# Patient Record
Sex: MALE | Race: BLACK OR AFRICAN AMERICAN | NOT HISPANIC OR LATINO | URBAN - METROPOLITAN AREA
[De-identification: names, ages, dates, MRNs, and addresses within clinical notes are randomized per-mention and may not be internally consistent; named-entity substitution may affect disease eponyms.]

---

## 2017-01-27 ENCOUNTER — INPATIENT (INPATIENT)
Facility: HOSPITAL | Age: 67
LOS: 9 days | Discharge: HOME HEALTH | End: 2017-02-06
Attending: INTERNAL MEDICINE | Admitting: INTERNAL MEDICINE
Payer: MEDICAID

## 2017-01-27 VITALS
DIASTOLIC BLOOD PRESSURE: 110 MMHG | SYSTOLIC BLOOD PRESSURE: 199 MMHG | HEART RATE: 70 BPM | TEMPERATURE: 98 F | OXYGEN SATURATION: 98 % | RESPIRATION RATE: 18 BRPM

## 2017-01-27 NOTE — ED ADULT TRIAGE NOTE - CHIEF COMPLAINT QUOTE
Pt. with c/o blood in urine since yesterday; + urine noted in tex[place] cath noted with blood. denies abdominal pain. pt. is paraplegic. c/o pain to back from pressure ulcer. Pt. c/o headache bp noted to be 199/110.

## 2017-01-28 DIAGNOSIS — N30.01 ACUTE CYSTITIS WITH HEMATURIA: ICD-10-CM

## 2017-01-28 DIAGNOSIS — L89.150 PRESSURE ULCER OF SACRAL REGION, UNSTAGEABLE: ICD-10-CM

## 2017-01-28 DIAGNOSIS — R31.9 HEMATURIA, UNSPECIFIED: ICD-10-CM

## 2017-01-28 DIAGNOSIS — K59.00 CONSTIPATION, UNSPECIFIED: ICD-10-CM

## 2017-01-28 DIAGNOSIS — L98.499 NON-PRESSURE CHRONIC ULCER OF SKIN OF OTHER SITES WITH UNSPECIFIED SEVERITY: Chronic | ICD-10-CM

## 2017-01-28 DIAGNOSIS — R80.9 PROTEINURIA, UNSPECIFIED: ICD-10-CM

## 2017-01-28 DIAGNOSIS — G40.909 EPILEPSY, UNSPECIFIED, NOT INTRACTABLE, WITHOUT STATUS EPILEPTICUS: ICD-10-CM

## 2017-01-28 DIAGNOSIS — N39.0 URINARY TRACT INFECTION, SITE NOT SPECIFIED: ICD-10-CM

## 2017-01-28 DIAGNOSIS — I10 ESSENTIAL (PRIMARY) HYPERTENSION: ICD-10-CM

## 2017-01-28 LAB
ALBUMIN SERPL ELPH-MCNC: 3.9 G/DL — SIGNIFICANT CHANGE UP (ref 3.3–5)
ALP SERPL-CCNC: 86 U/L — SIGNIFICANT CHANGE UP (ref 40–120)
ALT FLD-CCNC: 16 U/L — SIGNIFICANT CHANGE UP (ref 4–41)
APPEARANCE UR: SIGNIFICANT CHANGE UP
AST SERPL-CCNC: 33 U/L — SIGNIFICANT CHANGE UP (ref 4–40)
BACTERIA # UR AUTO: SIGNIFICANT CHANGE UP
BASOPHILS # BLD AUTO: 0.02 K/UL — SIGNIFICANT CHANGE UP (ref 0–0.2)
BASOPHILS NFR BLD AUTO: 0.4 % — SIGNIFICANT CHANGE UP (ref 0–2)
BILIRUB SERPL-MCNC: 0.6 MG/DL — SIGNIFICANT CHANGE UP (ref 0.2–1.2)
BILIRUB UR-MCNC: NEGATIVE — SIGNIFICANT CHANGE UP
BLOOD UR QL VISUAL: HIGH
BUN SERPL-MCNC: 9 MG/DL — SIGNIFICANT CHANGE UP (ref 7–23)
CALCIUM SERPL-MCNC: 8.8 MG/DL — SIGNIFICANT CHANGE UP (ref 8.4–10.5)
CHLORIDE SERPL-SCNC: 98 MMOL/L — SIGNIFICANT CHANGE UP (ref 98–107)
CO2 SERPL-SCNC: 30 MMOL/L — SIGNIFICANT CHANGE UP (ref 22–31)
COLOR SPEC: HIGH
CREAT SERPL-MCNC: 0.73 MG/DL — SIGNIFICANT CHANGE UP (ref 0.5–1.3)
EOSINOPHIL # BLD AUTO: 0.13 K/UL — SIGNIFICANT CHANGE UP (ref 0–0.5)
EOSINOPHIL NFR BLD AUTO: 2.4 % — SIGNIFICANT CHANGE UP (ref 0–6)
GLUCOSE SERPL-MCNC: 100 MG/DL — HIGH (ref 70–99)
GLUCOSE UR-MCNC: NEGATIVE — SIGNIFICANT CHANGE UP
HCT VFR BLD CALC: 38.2 % — LOW (ref 39–50)
HGB BLD-MCNC: 12.2 G/DL — LOW (ref 13–17)
IMM GRANULOCYTES NFR BLD AUTO: 0.2 % — SIGNIFICANT CHANGE UP (ref 0–1.5)
KETONES UR-MCNC: NEGATIVE — SIGNIFICANT CHANGE UP
LEUKOCYTE ESTERASE UR-ACNC: HIGH
LYMPHOCYTES # BLD AUTO: 1.68 K/UL — SIGNIFICANT CHANGE UP (ref 1–3.3)
LYMPHOCYTES # BLD AUTO: 31.1 % — SIGNIFICANT CHANGE UP (ref 13–44)
MCHC RBC-ENTMCNC: 27 PG — SIGNIFICANT CHANGE UP (ref 27–34)
MCHC RBC-ENTMCNC: 31.9 % — LOW (ref 32–36)
MCV RBC AUTO: 84.5 FL — SIGNIFICANT CHANGE UP (ref 80–100)
MONOCYTES # BLD AUTO: 0.51 K/UL — SIGNIFICANT CHANGE UP (ref 0–0.9)
MONOCYTES NFR BLD AUTO: 9.4 % — SIGNIFICANT CHANGE UP (ref 2–14)
NEUTROPHILS # BLD AUTO: 3.05 K/UL — SIGNIFICANT CHANGE UP (ref 1.8–7.4)
NEUTROPHILS NFR BLD AUTO: 56.5 % — SIGNIFICANT CHANGE UP (ref 43–77)
NITRITE UR-MCNC: POSITIVE — HIGH
PH UR: 8.5 — HIGH (ref 4.6–8)
PLATELET # BLD AUTO: 220 K/UL — SIGNIFICANT CHANGE UP (ref 150–400)
PMV BLD: 11.7 FL — SIGNIFICANT CHANGE UP (ref 7–13)
POTASSIUM SERPL-MCNC: 3.8 MMOL/L — SIGNIFICANT CHANGE UP (ref 3.5–5.3)
POTASSIUM SERPL-SCNC: 3.8 MMOL/L — SIGNIFICANT CHANGE UP (ref 3.5–5.3)
PROT SERPL-MCNC: 7.3 G/DL — SIGNIFICANT CHANGE UP (ref 6–8.3)
PROT UR-MCNC: 150 — HIGH
RBC # BLD: 4.52 M/UL — SIGNIFICANT CHANGE UP (ref 4.2–5.8)
RBC # FLD: 14.4 % — SIGNIFICANT CHANGE UP (ref 10.3–14.5)
RBC CASTS # UR COMP ASSIST: >50 — HIGH (ref 0–?)
SODIUM SERPL-SCNC: 140 MMOL/L — SIGNIFICANT CHANGE UP (ref 135–145)
SP GR SPEC: 1.01 — SIGNIFICANT CHANGE UP (ref 1–1.03)
SQUAMOUS # UR AUTO: SIGNIFICANT CHANGE UP
UROBILINOGEN FLD QL: NORMAL E.U. — SIGNIFICANT CHANGE UP (ref 0.1–0.2)
WBC # BLD: 5.4 K/UL — SIGNIFICANT CHANGE UP (ref 3.8–10.5)
WBC # FLD AUTO: 5.4 K/UL — SIGNIFICANT CHANGE UP (ref 3.8–10.5)
WBC UR QL: HIGH (ref 0–?)
YEAST BUDDING # UR COMP ASSIST: SIGNIFICANT CHANGE UP

## 2017-01-28 PROCEDURE — 99223 1ST HOSP IP/OBS HIGH 75: CPT

## 2017-01-28 RX ORDER — SODIUM CHLORIDE 9 MG/ML
1000 INJECTION INTRAMUSCULAR; INTRAVENOUS; SUBCUTANEOUS ONCE
Qty: 0 | Refills: 0 | Status: COMPLETED | OUTPATIENT
Start: 2017-01-28 | End: 2017-01-28

## 2017-01-28 RX ORDER — DOCUSATE SODIUM 100 MG
100 CAPSULE ORAL THREE TIMES A DAY
Qty: 0 | Refills: 0 | Status: DISCONTINUED | OUTPATIENT
Start: 2017-01-28 | End: 2017-02-06

## 2017-01-28 RX ORDER — ENOXAPARIN SODIUM 100 MG/ML
40 INJECTION SUBCUTANEOUS EVERY 24 HOURS
Qty: 0 | Refills: 0 | Status: DISCONTINUED | OUTPATIENT
Start: 2017-01-28 | End: 2017-01-28

## 2017-01-28 RX ORDER — CEFTRIAXONE 500 MG/1
2 INJECTION, POWDER, FOR SOLUTION INTRAMUSCULAR; INTRAVENOUS EVERY 24 HOURS
Qty: 0 | Refills: 0 | Status: DISCONTINUED | OUTPATIENT
Start: 2017-01-29 | End: 2017-02-01

## 2017-01-28 RX ORDER — CEFTRIAXONE 500 MG/1
1 INJECTION, POWDER, FOR SOLUTION INTRAMUSCULAR; INTRAVENOUS ONCE
Qty: 0 | Refills: 0 | Status: COMPLETED | OUTPATIENT
Start: 2017-01-28 | End: 2017-01-28

## 2017-01-28 RX ORDER — SENNA PLUS 8.6 MG/1
2 TABLET ORAL AT BEDTIME
Qty: 0 | Refills: 0 | Status: DISCONTINUED | OUTPATIENT
Start: 2017-01-28 | End: 2017-02-06

## 2017-01-28 RX ORDER — AMLODIPINE BESYLATE 2.5 MG/1
10 TABLET ORAL DAILY
Qty: 0 | Refills: 0 | Status: DISCONTINUED | OUTPATIENT
Start: 2017-01-28 | End: 2017-02-06

## 2017-01-28 RX ORDER — POLYETHYLENE GLYCOL 3350 17 G/17G
17 POWDER, FOR SOLUTION ORAL DAILY
Qty: 0 | Refills: 0 | Status: DISCONTINUED | OUTPATIENT
Start: 2017-01-28 | End: 2017-02-06

## 2017-01-28 RX ORDER — ATENOLOL 25 MG/1
50 TABLET ORAL DAILY
Qty: 0 | Refills: 0 | Status: DISCONTINUED | OUTPATIENT
Start: 2017-01-28 | End: 2017-01-30

## 2017-01-28 RX ORDER — ASPIRIN/CALCIUM CARB/MAGNESIUM 324 MG
81 TABLET ORAL DAILY
Qty: 0 | Refills: 0 | Status: DISCONTINUED | OUTPATIENT
Start: 2017-01-28 | End: 2017-02-06

## 2017-01-28 RX ORDER — BACLOFEN 100 %
10 POWDER (GRAM) MISCELLANEOUS THREE TIMES A DAY
Qty: 0 | Refills: 0 | Status: DISCONTINUED | OUTPATIENT
Start: 2017-01-28 | End: 2017-02-06

## 2017-01-28 RX ADMIN — Medication 1 TABLET(S): at 15:47

## 2017-01-28 RX ADMIN — ATENOLOL 50 MILLIGRAM(S): 25 TABLET ORAL at 15:46

## 2017-01-28 RX ADMIN — Medication 10 MILLIGRAM(S): at 23:10

## 2017-01-28 RX ADMIN — Medication 100 MILLIGRAM(S): at 23:10

## 2017-01-28 RX ADMIN — SENNA PLUS 2 TABLET(S): 8.6 TABLET ORAL at 23:11

## 2017-01-28 RX ADMIN — SODIUM CHLORIDE 1000 MILLILITER(S): 9 INJECTION INTRAMUSCULAR; INTRAVENOUS; SUBCUTANEOUS at 00:46

## 2017-01-28 RX ADMIN — Medication 81 MILLIGRAM(S): at 15:46

## 2017-01-28 RX ADMIN — Medication 10 MILLIGRAM(S): at 15:46

## 2017-01-28 RX ADMIN — CEFTRIAXONE 100 GRAM(S): 500 INJECTION, POWDER, FOR SOLUTION INTRAMUSCULAR; INTRAVENOUS at 04:45

## 2017-01-28 RX ADMIN — POLYETHYLENE GLYCOL 3350 17 GRAM(S): 17 POWDER, FOR SOLUTION ORAL at 15:47

## 2017-01-28 RX ADMIN — Medication 100 MILLIGRAM(S): at 15:47

## 2017-01-28 RX ADMIN — AMLODIPINE BESYLATE 10 MILLIGRAM(S): 2.5 TABLET ORAL at 15:46

## 2017-01-28 NOTE — H&P ADULT. - PROBLEM SELECTOR PLAN 6
Patient has had wound care at home   - Wound care consult placed for recs   - Pain control with percocet 1 tab q4hrs prn

## 2017-01-28 NOTE — ED PROVIDER NOTE - ATTENDING CONTRIBUTION TO CARE
67 yo male with paraplegia presents with hematuria, without fevers chills back pain // NL vitals afebrile no distress ao3 rrr s1s2 cta bl abd sntnd no cva or calf tenderness//ua labs check sensitivities, not septic will try to DC

## 2017-01-28 NOTE — H&P ADULT. - PROBLEM SELECTOR PLAN 8
Unclear etiology; may be due to small nerve polyneuropathy from pre-diabetes??  - Check A1C, B12 levels  - Wants to hold off gabapentin at this time until further w/u  - Consider outpatient EMG/nerve conduction study

## 2017-01-28 NOTE — H&P ADULT. - ASSESSMENT
65 yo man with a history of HTN, paraplegia, seizure disorder, constipation, and incontinence requiring chronic texas condom cath who presents with hematuria for 2 days duration, currently admitted for workup and management of hematuria and UTI.

## 2017-01-28 NOTE — ED ADULT NURSE NOTE - OBJECTIVE STATEMENT
Pt received to rm 6 aaox4 nonambulatory d/t paraplegia c/o hematuria beginning tonight.  Pt wears a condom catheter at home d/t paraplegia.  Pt denies painful urination, difficulty urinating, pelvic or abdominal pain. Pt denies blood in stool and reports he has not had BM for two days and feels constipated.  Pt denies all other symptoms and is in NAD.  Pt urinating brightly red colored urine in ED.  Condom catheter changed.  Meds and labs as ordered.  Left buttocks unstageable pressure ulcer as documented below.  Will continue to monitor

## 2017-01-28 NOTE — H&P ADULT. - PROBLEM SELECTOR PLAN 1
Painless hematuria; DDx includes UTI (no sensation below groin) vs.  cancer vs. stone   - Maintain condom cath   - Urology consult placed   - Monitor Hgb/Hct daily  - Hold chemical ppx, IPC instead

## 2017-01-28 NOTE — PATIENT PROFILE ADULT. - VISION (WITH CORRECTIVE LENSES IF THE PATIENT USUALLY WEARS THEM):
Normal vision: sees adequately in most situations; can see medication labels, newsprint Normal vision: sees adequately in most situations; can see medication labels, newsprint/reading blurry no glasses

## 2017-01-28 NOTE — H&P ADULT. - HISTORY OF PRESENT ILLNESS
This is a 65 yo man with a history of HTN, paraplegia, seizure disorder, constipation, and incontinence requiring chronic texas condom cath who presents with hematuria for 2 days duration. As per patient, his home aide noticed bleeding coming from the catheter on Thursday, which has been persistent. Patient denies any previous episodes of such blood in the urine and states normally urine is yellow to clear color. He does not have any dysuria, feeling or frequency or urgency but also states he does not have any feeling from the groin area downwards. Patient's other c/o at this time is burning sensation of the hands b/l, which he describes as on the brock surface, tingling sensation occasionally, with burning pain. This pain has been going on for 2 months and he saw a neurologist at [facility], who prescribed a medication (gabapentin??) which he has not been adherent too. Patient also has some constipation, last BM 2 days ago. Otherwise patient denies any fevers, chills rigors, diaphoresis, N/V, diarrhea, suprapubic pain, malaise, fatigue, or other sx at this time. In the ED, hypertensive but afebrile. He received 1L NS and ceftriaxone x1 and was admitted to medicine for further evaluation of UTI and hematuria.

## 2017-01-28 NOTE — H&P ADULT. - PROBLEM SELECTOR PLAN 3
UA shows proteinuria, unclear if acute or chronic but may be due to HTN  - Check A1C  - Quantify protein with spot urine protein/Cr

## 2017-01-28 NOTE — H&P ADULT. - PROBLEM SELECTOR PLAN 2
Unclear if symptomatic given lack of sensation below groin. Will treat as UTI given presence of hematuria   - F/u urine cx  - C/w ceftriaxone qdaily   - F/u urology recs

## 2017-01-28 NOTE — ED PROVIDER NOTE - OBJECTIVE STATEMENT
66M h/o HTN, paraplegic, chronic texas condom cath p/w hematuria.  Pt lives with son and has aid 3 times a week, blood was noted coming from the gentile so EMS was called.  no one with patient at this time.  c/o chronic pain to right sacral deub.  Pt also notes constipation, +flatus.  denies CP, abd pain, n/v/d.

## 2017-01-28 NOTE — PATIENT PROFILE ADULT. - MEDICATION DISPOSITION, PROFILE
pt states already showed meds downstairs empty methadone bottle- rest are meds already scheduled in [facility]; family will come /bedside

## 2017-01-29 LAB
BUN SERPL-MCNC: 7 MG/DL — SIGNIFICANT CHANGE UP (ref 7–23)
CALCIUM SERPL-MCNC: 9.4 MG/DL — SIGNIFICANT CHANGE UP (ref 8.4–10.5)
CHLORIDE SERPL-SCNC: 96 MMOL/L — LOW (ref 98–107)
CO2 SERPL-SCNC: 29 MMOL/L — SIGNIFICANT CHANGE UP (ref 22–31)
CREAT SERPL-MCNC: 0.73 MG/DL — SIGNIFICANT CHANGE UP (ref 0.5–1.3)
GLUCOSE SERPL-MCNC: 92 MG/DL — SIGNIFICANT CHANGE UP (ref 70–99)
HBA1C BLD-MCNC: 5.4 % — SIGNIFICANT CHANGE UP (ref 4–5.6)
HCT VFR BLD CALC: 38.3 % — LOW (ref 39–50)
HGB BLD-MCNC: 12.3 G/DL — LOW (ref 13–17)
MCHC RBC-ENTMCNC: 27.2 PG — SIGNIFICANT CHANGE UP (ref 27–34)
MCHC RBC-ENTMCNC: 32.1 % — SIGNIFICANT CHANGE UP (ref 32–36)
MCV RBC AUTO: 84.7 FL — SIGNIFICANT CHANGE UP (ref 80–100)
PLATELET # BLD AUTO: 213 K/UL — SIGNIFICANT CHANGE UP (ref 150–400)
PMV BLD: 11.9 FL — SIGNIFICANT CHANGE UP (ref 7–13)
POTASSIUM SERPL-MCNC: 3 MMOL/L — LOW (ref 3.5–5.3)
POTASSIUM SERPL-SCNC: 3 MMOL/L — LOW (ref 3.5–5.3)
RBC # BLD: 4.52 M/UL — SIGNIFICANT CHANGE UP (ref 4.2–5.8)
RBC # FLD: 14.2 % — SIGNIFICANT CHANGE UP (ref 10.3–14.5)
SODIUM SERPL-SCNC: 140 MMOL/L — SIGNIFICANT CHANGE UP (ref 135–145)
SPECIMEN SOURCE: SIGNIFICANT CHANGE UP
TSH SERPL-MCNC: 0.67 UIU/ML — SIGNIFICANT CHANGE UP (ref 0.27–4.2)
WBC # BLD: 4.29 K/UL — SIGNIFICANT CHANGE UP (ref 3.8–10.5)
WBC # FLD AUTO: 4.29 K/UL — SIGNIFICANT CHANGE UP (ref 3.8–10.5)

## 2017-01-29 RX ORDER — POTASSIUM CHLORIDE 20 MEQ
40 PACKET (EA) ORAL ONCE
Qty: 0 | Refills: 0 | Status: COMPLETED | OUTPATIENT
Start: 2017-01-29 | End: 2017-01-29

## 2017-01-29 RX ORDER — LABETALOL HCL 100 MG
20 TABLET ORAL EVERY 8 HOURS
Qty: 0 | Refills: 0 | Status: DISCONTINUED | OUTPATIENT
Start: 2017-01-29 | End: 2017-01-30

## 2017-01-29 RX ORDER — OXYCODONE HYDROCHLORIDE 5 MG/1
5 TABLET ORAL ONCE
Qty: 0 | Refills: 0 | Status: DISCONTINUED | OUTPATIENT
Start: 2017-01-29 | End: 2017-01-29

## 2017-01-29 RX ORDER — HYDRALAZINE HCL 50 MG
10 TABLET ORAL ONCE
Qty: 0 | Refills: 0 | Status: COMPLETED | OUTPATIENT
Start: 2017-01-29 | End: 2017-01-29

## 2017-01-29 RX ORDER — POTASSIUM CHLORIDE 20 MEQ
10 PACKET (EA) ORAL
Qty: 0 | Refills: 0 | Status: COMPLETED | OUTPATIENT
Start: 2017-01-29 | End: 2017-01-29

## 2017-01-29 RX ADMIN — ATENOLOL 50 MILLIGRAM(S): 25 TABLET ORAL at 04:48

## 2017-01-29 RX ADMIN — OXYCODONE HYDROCHLORIDE 5 MILLIGRAM(S): 5 TABLET ORAL at 09:49

## 2017-01-29 RX ADMIN — Medication 100 MILLIGRAM(S): at 04:48

## 2017-01-29 RX ADMIN — Medication 10 MILLIGRAM(S): at 04:48

## 2017-01-29 RX ADMIN — Medication 10 MILLIGRAM(S): at 14:07

## 2017-01-29 RX ADMIN — Medication 20 MILLIGRAM(S): at 23:35

## 2017-01-29 RX ADMIN — Medication 100 MILLIGRAM(S): at 23:02

## 2017-01-29 RX ADMIN — Medication 100 MILLIGRAM(S): at 14:07

## 2017-01-29 RX ADMIN — Medication 10 MILLIGRAM(S): at 23:02

## 2017-01-29 RX ADMIN — Medication 100 MILLIEQUIVALENT(S): at 18:07

## 2017-01-29 RX ADMIN — Medication 10 MILLIGRAM(S): at 15:17

## 2017-01-29 RX ADMIN — CEFTRIAXONE 100 GRAM(S): 500 INJECTION, POWDER, FOR SOLUTION INTRAMUSCULAR; INTRAVENOUS at 00:14

## 2017-01-29 RX ADMIN — POLYETHYLENE GLYCOL 3350 17 GRAM(S): 17 POWDER, FOR SOLUTION ORAL at 13:23

## 2017-01-29 RX ADMIN — OXYCODONE HYDROCHLORIDE 5 MILLIGRAM(S): 5 TABLET ORAL at 15:16

## 2017-01-29 RX ADMIN — CEFTRIAXONE 100 GRAM(S): 500 INJECTION, POWDER, FOR SOLUTION INTRAMUSCULAR; INTRAVENOUS at 23:02

## 2017-01-29 RX ADMIN — Medication 40 MILLIEQUIVALENT(S): at 15:16

## 2017-01-29 RX ADMIN — Medication 1 TABLET(S): at 13:23

## 2017-01-29 RX ADMIN — OXYCODONE HYDROCHLORIDE 5 MILLIGRAM(S): 5 TABLET ORAL at 16:55

## 2017-01-29 RX ADMIN — Medication 81 MILLIGRAM(S): at 13:23

## 2017-01-29 RX ADMIN — AMLODIPINE BESYLATE 10 MILLIGRAM(S): 2.5 TABLET ORAL at 04:48

## 2017-01-29 RX ADMIN — OXYCODONE HYDROCHLORIDE 5 MILLIGRAM(S): 5 TABLET ORAL at 08:30

## 2017-01-29 RX ADMIN — SENNA PLUS 2 TABLET(S): 8.6 TABLET ORAL at 23:02

## 2017-01-29 RX ADMIN — Medication 1 ENEMA: at 23:02

## 2017-01-29 RX ADMIN — Medication 100 MILLIEQUIVALENT(S): at 14:16

## 2017-01-29 RX ADMIN — Medication 100 MILLIEQUIVALENT(S): at 16:35

## 2017-01-30 LAB
BUN SERPL-MCNC: 9 MG/DL — SIGNIFICANT CHANGE UP (ref 7–23)
CALCIUM SERPL-MCNC: 9 MG/DL — SIGNIFICANT CHANGE UP (ref 8.4–10.5)
CHLORIDE SERPL-SCNC: 98 MMOL/L — SIGNIFICANT CHANGE UP (ref 98–107)
CO2 SERPL-SCNC: 26 MMOL/L — SIGNIFICANT CHANGE UP (ref 22–31)
CREAT SERPL-MCNC: 0.76 MG/DL — SIGNIFICANT CHANGE UP (ref 0.5–1.3)
GLUCOSE SERPL-MCNC: 99 MG/DL — SIGNIFICANT CHANGE UP (ref 70–99)
HCT VFR BLD CALC: 39.3 % — SIGNIFICANT CHANGE UP (ref 39–50)
HGB BLD-MCNC: 12.5 G/DL — LOW (ref 13–17)
MCHC RBC-ENTMCNC: 26.9 PG — LOW (ref 27–34)
MCHC RBC-ENTMCNC: 31.8 % — LOW (ref 32–36)
MCV RBC AUTO: 84.5 FL — SIGNIFICANT CHANGE UP (ref 80–100)
PLATELET # BLD AUTO: 207 K/UL — SIGNIFICANT CHANGE UP (ref 150–400)
PMV BLD: 11.2 FL — SIGNIFICANT CHANGE UP (ref 7–13)
POTASSIUM SERPL-MCNC: 3.1 MMOL/L — LOW (ref 3.5–5.3)
POTASSIUM SERPL-SCNC: 3.1 MMOL/L — LOW (ref 3.5–5.3)
RBC # BLD: 4.65 M/UL — SIGNIFICANT CHANGE UP (ref 4.2–5.8)
RBC # FLD: 14.2 % — SIGNIFICANT CHANGE UP (ref 10.3–14.5)
SODIUM SERPL-SCNC: 141 MMOL/L — SIGNIFICANT CHANGE UP (ref 135–145)
WBC # BLD: 6.39 K/UL — SIGNIFICANT CHANGE UP (ref 3.8–10.5)
WBC # FLD AUTO: 6.39 K/UL — SIGNIFICANT CHANGE UP (ref 3.8–10.5)

## 2017-01-30 RX ORDER — MORPHINE SULFATE 50 MG/1
2 CAPSULE, EXTENDED RELEASE ORAL ONCE
Qty: 0 | Refills: 0 | Status: DISCONTINUED | OUTPATIENT
Start: 2017-01-30 | End: 2017-01-30

## 2017-01-30 RX ORDER — HYDRALAZINE HCL 50 MG
10 TABLET ORAL ONCE
Qty: 0 | Refills: 0 | Status: COMPLETED | OUTPATIENT
Start: 2017-01-30 | End: 2017-01-30

## 2017-01-30 RX ORDER — POTASSIUM CHLORIDE 20 MEQ
40 PACKET (EA) ORAL EVERY 4 HOURS
Qty: 0 | Refills: 0 | Status: COMPLETED | OUTPATIENT
Start: 2017-01-30 | End: 2017-01-30

## 2017-01-30 RX ORDER — HYDRALAZINE HCL 50 MG
10 TABLET ORAL THREE TIMES A DAY
Qty: 0 | Refills: 0 | Status: DISCONTINUED | OUTPATIENT
Start: 2017-01-30 | End: 2017-01-31

## 2017-01-30 RX ORDER — ATENOLOL 25 MG/1
100 TABLET ORAL DAILY
Qty: 0 | Refills: 0 | Status: DISCONTINUED | OUTPATIENT
Start: 2017-01-30 | End: 2017-02-06

## 2017-01-30 RX ORDER — LOSARTAN POTASSIUM 100 MG/1
50 TABLET, FILM COATED ORAL DAILY
Qty: 0 | Refills: 0 | Status: DISCONTINUED | OUTPATIENT
Start: 2017-01-30 | End: 2017-01-31

## 2017-01-30 RX ORDER — ATENOLOL 25 MG/1
50 TABLET ORAL ONCE
Qty: 0 | Refills: 0 | Status: COMPLETED | OUTPATIENT
Start: 2017-01-30 | End: 2017-01-30

## 2017-01-30 RX ORDER — ATENOLOL 25 MG/1
100 TABLET ORAL DAILY
Qty: 0 | Refills: 0 | Status: DISCONTINUED | OUTPATIENT
Start: 2017-01-30 | End: 2017-01-30

## 2017-01-30 RX ADMIN — ATENOLOL 50 MILLIGRAM(S): 25 TABLET ORAL at 10:22

## 2017-01-30 RX ADMIN — Medication 1 TABLET(S): at 11:29

## 2017-01-30 RX ADMIN — Medication 40 MILLIEQUIVALENT(S): at 21:38

## 2017-01-30 RX ADMIN — Medication 100 MILLIGRAM(S): at 05:48

## 2017-01-30 RX ADMIN — Medication 100 MILLIGRAM(S): at 13:06

## 2017-01-30 RX ADMIN — MORPHINE SULFATE 2 MILLIGRAM(S): 50 CAPSULE, EXTENDED RELEASE ORAL at 19:16

## 2017-01-30 RX ADMIN — POLYETHYLENE GLYCOL 3350 17 GRAM(S): 17 POWDER, FOR SOLUTION ORAL at 11:29

## 2017-01-30 RX ADMIN — AMLODIPINE BESYLATE 10 MILLIGRAM(S): 2.5 TABLET ORAL at 05:48

## 2017-01-30 RX ADMIN — MORPHINE SULFATE 2 MILLIGRAM(S): 50 CAPSULE, EXTENDED RELEASE ORAL at 19:46

## 2017-01-30 RX ADMIN — CEFTRIAXONE 100 GRAM(S): 500 INJECTION, POWDER, FOR SOLUTION INTRAMUSCULAR; INTRAVENOUS at 23:58

## 2017-01-30 RX ADMIN — Medication 10 MILLIGRAM(S): at 13:06

## 2017-01-30 RX ADMIN — Medication 100 MILLIGRAM(S): at 21:38

## 2017-01-30 RX ADMIN — Medication 10 MILLIGRAM(S): at 05:48

## 2017-01-30 RX ADMIN — Medication 10 MILLIGRAM(S): at 15:21

## 2017-01-30 RX ADMIN — ATENOLOL 50 MILLIGRAM(S): 25 TABLET ORAL at 05:48

## 2017-01-30 RX ADMIN — Medication 40 MILLIEQUIVALENT(S): at 17:39

## 2017-01-30 RX ADMIN — Medication 81 MILLIGRAM(S): at 11:29

## 2017-01-30 RX ADMIN — Medication 10 MILLIGRAM(S): at 21:38

## 2017-01-30 RX ADMIN — Medication 10 MILLIGRAM(S): at 23:58

## 2017-01-30 NOTE — ADVANCED PRACTICE NURSE CONSULT - ASSESSMENT
A&Ox4, bed & wheelchair bound, incontinent of urine with use of chronic condom catheter for management of urinary incontinence, chronic constipation- uses enema every other day for bowel management. Skin warm and dry, adequate skin turgor, reports decreased sensation from groin down, bilateral foot drop, bilateral feet with multiple areas of darkened skin pigmentation.     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 Goals of care: protect periwound skin, decrease bioburden, absorb.    IAD perineal area- chronic skin changes, hyperpigmentation. Goals of care: protect from incontinence.    Findings discussed with primary team, JOSEFA Perez, recommend rule out osteomyelitis, [facility] Wound MD consult, Dr Washington ([phone]) to evaluate chronic non-healing left ischial pressure injury.    Patient reports current daily VNS at home using a 'Silver' dressing, reports previous use of MediHoney in the past.

## 2017-01-30 NOTE — ADVANCED PRACTICE NURSE CONSULT - RECOMMEDATIONS
Recommendations fro topical management:    Recommend [facility] Wound MD consult, Dr Washington ([phone]) to evaluate chronic non-healing left Ischial pressure injury.    Dietary consult to evaluate and optimize nutrition for healing.    Left Ischial: Clean with SAF-Clens. Apply Liquid barrier film to periwound skin. Aquacel AG Hydrofiber Ribbon, cut to size- leaving 1 inch exposed for ease of removal, cover with foam with border. Change daily.    IAD: Clean with perineal cleaning spray. Apply Tony barrier cream twice a day and after incontinence episodes, single breathable underpad.    Continue condom catheter for management of urinary incontinence.    Sween 24 daily moisturizing cream to intact upper & lower extremity dry, flaky skin. Apply daily & prn as needed.    Continue low air loss bed therapy, continue heel elevation with Z-flex boots, continue Z-Sergio fluidized positioning device for pressure redistribution and assist to turn & reposition q2h, continue moisture management with barrier creams (Tony barrier cream) & single breathable pad, continue measures to decrease friction/shear/pressure. Nutrition as per Dietary recommendations, monitor weights.    Please call [facility] service line at x [phone], if we can be of further assistance.

## 2017-01-30 NOTE — ADVANCED PRACTICE NURSE CONSULT - REASON FOR CONSULT
Patient seen on skin care rounds, after wound care referral received from MD, to evaluate impaired skin integrity and recommend topical management. Chart reviewed: Serum albumin 3.9, Luke range 13-14, patient interviewed reports bed & wheelchair bound, reports paraplegic since 1980 with chronic left ischial pressure which patient reports has opened & closed, currently receiving VNS daily for topical management, as per chart, H/O HTN, paraplegia, seizure disorder, constipation, and incontinence requiring chronic texas condom cath use, who presents with hematuria for 2 days, admitted with UTI, on systemic antibiotics, presents with chronic left ischial pressure injury. NKA. Patient reports followed as out patient at [facility] and consumes 2 cans of Ensure daily.

## 2017-01-31 LAB
-  AMIKACIN: SIGNIFICANT CHANGE UP
-  AMIKACIN: SIGNIFICANT CHANGE UP
-  AMPICILLIN/SULBACTAM: SIGNIFICANT CHANGE UP
-  AMPICILLIN: SIGNIFICANT CHANGE UP
-  AZTREONAM: SIGNIFICANT CHANGE UP
-  AZTREONAM: SIGNIFICANT CHANGE UP
-  CEFAZOLIN: SIGNIFICANT CHANGE UP
-  CEFEPIME: SIGNIFICANT CHANGE UP
-  CEFEPIME: SIGNIFICANT CHANGE UP
-  CEFOXITIN: SIGNIFICANT CHANGE UP
-  CEFTAZIDIME: SIGNIFICANT CHANGE UP
-  CEFTAZIDIME: SIGNIFICANT CHANGE UP
-  CEFTRIAXONE: SIGNIFICANT CHANGE UP
-  CIPROFLOXACIN: SIGNIFICANT CHANGE UP
-  CIPROFLOXACIN: SIGNIFICANT CHANGE UP
-  ERTAPENEM: SIGNIFICANT CHANGE UP
-  GENTAMICIN: SIGNIFICANT CHANGE UP
-  GENTAMICIN: SIGNIFICANT CHANGE UP
-  IMIPENEM: SIGNIFICANT CHANGE UP
-  IMIPENEM: SIGNIFICANT CHANGE UP
-  LEVOFLOXACIN: SIGNIFICANT CHANGE UP
-  LEVOFLOXACIN: SIGNIFICANT CHANGE UP
-  MEROPENEM: SIGNIFICANT CHANGE UP
-  MEROPENEM: SIGNIFICANT CHANGE UP
-  NITROFURANTOIN: SIGNIFICANT CHANGE UP
-  PIPERACILLIN/TAZOBACTAM: SIGNIFICANT CHANGE UP
-  PIPERACILLIN/TAZOBACTAM: SIGNIFICANT CHANGE UP
-  TIGECYCLINE: SIGNIFICANT CHANGE UP
-  TOBRAMYCIN: SIGNIFICANT CHANGE UP
-  TOBRAMYCIN: SIGNIFICANT CHANGE UP
-  TRIMETHOPRIM/SULFAMETHOXAZOLE: SIGNIFICANT CHANGE UP
BACTERIA UR CULT: SIGNIFICANT CHANGE UP
BUN SERPL-MCNC: 14 MG/DL — SIGNIFICANT CHANGE UP (ref 7–23)
CALCIUM SERPL-MCNC: 9.7 MG/DL — SIGNIFICANT CHANGE UP (ref 8.4–10.5)
CHLORIDE SERPL-SCNC: 100 MMOL/L — SIGNIFICANT CHANGE UP (ref 98–107)
CO2 SERPL-SCNC: 26 MMOL/L — SIGNIFICANT CHANGE UP (ref 22–31)
CREAT SERPL-MCNC: 0.76 MG/DL — SIGNIFICANT CHANGE UP (ref 0.5–1.3)
GLUCOSE SERPL-MCNC: 122 MG/DL — HIGH (ref 70–99)
HCT VFR BLD CALC: 39.9 % — SIGNIFICANT CHANGE UP (ref 39–50)
HGB BLD-MCNC: 13 G/DL — SIGNIFICANT CHANGE UP (ref 13–17)
MCHC RBC-ENTMCNC: 27.4 PG — SIGNIFICANT CHANGE UP (ref 27–34)
MCHC RBC-ENTMCNC: 32.6 % — SIGNIFICANT CHANGE UP (ref 32–36)
MCV RBC AUTO: 84 FL — SIGNIFICANT CHANGE UP (ref 80–100)
METHOD TYPE: SIGNIFICANT CHANGE UP
METHOD TYPE: SIGNIFICANT CHANGE UP
ORGANISM # SPEC MICROSCOPIC CNT: SIGNIFICANT CHANGE UP
PLATELET # BLD AUTO: 287 K/UL — SIGNIFICANT CHANGE UP (ref 150–400)
PMV BLD: 11.9 FL — SIGNIFICANT CHANGE UP (ref 7–13)
POTASSIUM SERPL-MCNC: 3.4 MMOL/L — LOW (ref 3.5–5.3)
POTASSIUM SERPL-SCNC: 3.4 MMOL/L — LOW (ref 3.5–5.3)
RBC # BLD: 4.75 M/UL — SIGNIFICANT CHANGE UP (ref 4.2–5.8)
RBC # FLD: 14.5 % — SIGNIFICANT CHANGE UP (ref 10.3–14.5)
SODIUM SERPL-SCNC: 141 MMOL/L — SIGNIFICANT CHANGE UP (ref 135–145)
WBC # BLD: 6.99 K/UL — SIGNIFICANT CHANGE UP (ref 3.8–10.5)
WBC # FLD AUTO: 6.99 K/UL — SIGNIFICANT CHANGE UP (ref 3.8–10.5)

## 2017-01-31 RX ORDER — METHADONE HYDROCHLORIDE 40 MG/1
130 TABLET ORAL DAILY
Qty: 0 | Refills: 0 | Status: DISCONTINUED | OUTPATIENT
Start: 2017-01-31 | End: 2017-01-31

## 2017-01-31 RX ORDER — POTASSIUM CHLORIDE 20 MEQ
40 PACKET (EA) ORAL ONCE
Qty: 0 | Refills: 0 | Status: COMPLETED | OUTPATIENT
Start: 2017-01-31 | End: 2017-02-01

## 2017-01-31 RX ORDER — HYDRALAZINE HCL 50 MG
25 TABLET ORAL EVERY 8 HOURS
Qty: 0 | Refills: 0 | Status: DISCONTINUED | OUTPATIENT
Start: 2017-01-31 | End: 2017-02-01

## 2017-01-31 RX ORDER — LOSARTAN POTASSIUM 100 MG/1
100 TABLET, FILM COATED ORAL DAILY
Qty: 0 | Refills: 0 | Status: DISCONTINUED | OUTPATIENT
Start: 2017-02-01 | End: 2017-02-06

## 2017-01-31 RX ORDER — LOSARTAN POTASSIUM 100 MG/1
50 TABLET, FILM COATED ORAL ONCE
Qty: 0 | Refills: 0 | Status: COMPLETED | OUTPATIENT
Start: 2017-01-31 | End: 2017-01-31

## 2017-01-31 RX ORDER — METHADONE HYDROCHLORIDE 40 MG/1
130 TABLET ORAL EVERY 24 HOURS
Qty: 0 | Refills: 0 | Status: DISCONTINUED | OUTPATIENT
Start: 2017-01-31 | End: 2017-02-02

## 2017-01-31 RX ADMIN — Medication 100 MILLIGRAM(S): at 06:51

## 2017-01-31 RX ADMIN — Medication 100 MILLIGRAM(S): at 22:50

## 2017-01-31 RX ADMIN — Medication 10 MILLIGRAM(S): at 11:57

## 2017-01-31 RX ADMIN — Medication 10 MILLIGRAM(S): at 06:51

## 2017-01-31 RX ADMIN — AMLODIPINE BESYLATE 10 MILLIGRAM(S): 2.5 TABLET ORAL at 06:51

## 2017-01-31 RX ADMIN — Medication 100 MILLIGRAM(S): at 11:56

## 2017-01-31 RX ADMIN — LOSARTAN POTASSIUM 50 MILLIGRAM(S): 100 TABLET, FILM COATED ORAL at 17:26

## 2017-01-31 RX ADMIN — SENNA PLUS 2 TABLET(S): 8.6 TABLET ORAL at 22:50

## 2017-01-31 RX ADMIN — METHADONE HYDROCHLORIDE 130 MILLIGRAM(S): 40 TABLET ORAL at 14:14

## 2017-01-31 RX ADMIN — Medication 100 MILLIGRAM(S): at 11:57

## 2017-01-31 RX ADMIN — ATENOLOL 100 MILLIGRAM(S): 25 TABLET ORAL at 06:51

## 2017-01-31 RX ADMIN — CEFTRIAXONE 100 GRAM(S): 500 INJECTION, POWDER, FOR SOLUTION INTRAMUSCULAR; INTRAVENOUS at 23:14

## 2017-01-31 RX ADMIN — Medication 1 TABLET(S): at 11:56

## 2017-01-31 RX ADMIN — Medication 25 MILLIGRAM(S): at 22:49

## 2017-01-31 RX ADMIN — Medication 81 MILLIGRAM(S): at 11:56

## 2017-01-31 RX ADMIN — LOSARTAN POTASSIUM 50 MILLIGRAM(S): 100 TABLET, FILM COATED ORAL at 06:51

## 2017-01-31 RX ADMIN — Medication 10 MILLIGRAM(S): at 22:50

## 2017-01-31 NOTE — DISCHARGE NOTE ADULT - PROVIDER TOKENS
TOKEN:'[id number]',FREE:[LAST:[Urology clnic],PHONE:[phone]],FAX:[(   [phone]    -],ADDRESS:[[address] [place] [address], [facility]]]

## 2017-01-31 NOTE — DISCHARGE NOTE ADULT - CARE PROVIDERS DIRECT ADDRESSES
[email or web address],DirectAddress_Unknown,DirectAddress_Unknown

## 2017-01-31 NOTE — DISCHARGE NOTE ADULT - VISION (WITH CORRECTIVE LENSES IF THE PATIENT USUALLY WEARS THEM):
Normal vision: sees adequately in most situations; can see medication labels, newsprint/reading blurry no glasses

## 2017-01-31 NOTE — DISCHARGE NOTE ADULT - SECONDARY DIAGNOSIS.
Decubitus ulcer of sacral region, unstageable Uncontrolled hypertension Urinary retention Seizure disorder Paraplegia

## 2017-01-31 NOTE — DISCHARGE NOTE ADULT - HOSPITAL COURSE
67 yo man with a history of HTN, paraplegia, seizure disorder, constipation, and incontinence requiring chronic texas condom cath who presents with hematuria for 2 days duration, currently admitted for workup and management of hematuria and UTI.     Painless hematuria; DDx includes UTI (no sensation below groin) vs.  cancer vs. stone   - Maintain condom cath   - -urology cs- advised no inpatient workup from [facility] on 1/28 130pm; provided with outpatient follow up with dr kang @ [phone]; reconsult if needed  - Monitor Hgb/Hct daily  - Hold chemical ppx, IPC instead    Acute cystitis with hematuria.    -Unclear if symptomatic given lack of sensation below groin. Will treat as UTI given presence of hematuria   - urine cx- pseudomonas  - C/w ceftriaxone qdaily   -2/1 changed to PO cipro for 4 more days    Proteinuria  -UA shows proteinuria, unclear if acute or chronic but may be due to HTN  -A1c- 5.4    Uncontrolled hypertension.   -Likely due to missing home meds   - Re-start Atenolol 50mg qdaily and Amlodipine 10mg qdaily.     Constipation,  - Start bowel regiment with senna, colace, and miralax.     Decubitus ulcer of sacral region,  -Patient has had wound care at home   - Wound care consult Dr. Washington- wound care recs ordered  - Pain control with percocet 1 tab q4hrs prn.    Seizure disorder  - C/w phenytoin and baclofen.     R/O Peripheral polyneuropathy.   -Unclear etiology; may be due to small nerve polyneuropathy from pre-diabetes??  - A1c- 5.4  - Wants to hold off gabapentin at this time until further w/u  - Consider outpatient EMG/nerve conduction study.   Urinary retenstion   2/3/17 seen by urology   -c/w with gentile   -start on flomax ,  bowel regimen to prevent constipation   -Trial to viod in 2- 3 days   -antibiotics for UTI x 7 days 65 yo man with a history of HTN, paraplegia, seizure disorder, constipation, and incontinence requiring chronic tex[place] condom cath who presents with hematuria for 2 days duration, currently admitted for workup and management of hematuria and UTI.     Painless hematuria; DDx includes UTI (no sensation below groin) vs.  cancer vs. stone   - Maintain condom cath   - -urology cs- advised no inpatient workup from [facility] on 1/28 130pm; provided with outpatient follow up with dr kang @ [phone]; reconsult if needed  - Monitor Hgb/Hct daily  - Hold chemical ppx, IPC instead    Acute cystitis with hematuria.    -Unclear if symptomatic given lack of sensation below groin. Will treat as UTI given presence of hematuria   - urine cx- pseudomonas  - C/w ceftriaxone qdaily   -2/1 changed to PO cipro for 4 more days    Proteinuria  -UA shows proteinuria, unclear if acute or chronic but may be due to HTN  -A1c- 5.4    Uncontrolled hypertension.   -Likely due to missing home meds   - Re-start Atenolol 50mg qdaily and Amlodipine 10mg qdaily.     Constipation,  - Start bowel regiment with senna, colace, and miralax.     Decubitus ulcer of sacral region,  -Patient has had wound care at home   - Wound care consult Dr. Washington- wound care recs ordered  - Pain control with percocet 1 tab q4hrs prn.    Seizure disorder  - C/w phenytoin and baclofen.     R/O Peripheral polyneuropathy.   -Unclear etiology; may be due to small nerve polyneuropathy from pre-diabetes??  - A1c- 5.4  - Wants to hold off gabapentin at this time until further w/u  - Consider outpatient EMG/nerve conduction study.   Urinary retenstion   2/3/17 seen by urology   -c/w with gentile   -start on flomax ,  bowel regimen to prevent constipation   -Trial to viod in 2- 3 days   -antibiotics for UTI x 7 days     Patient stable for discharge home with aide.  patient to follow up with urology within 1 week for further management.  Dischage with catheter.

## 2017-01-31 NOTE — DISCHARGE NOTE ADULT - CARE PROVIDER_API CALL
Galina Washington), Surgery  [id number] [address] [place] [address]  Phone: [phone]  Fax: [phone]    Urology nic,   [address] [place] [address], [facility]  Phone: [phone]  Fax: (   )    -

## 2017-01-31 NOTE — DISCHARGE NOTE ADULT - MEDICATION SUMMARY - MEDICATIONS TO CHANGE
I will SWITCH the dose or number of times a day I take the medications listed below when I get home from the hospital:    atenolol 50 mg oral tablet  -- 1 tab(s) by mouth once a day  -- Hold for SBP < 110 or HR < 60

## 2017-01-31 NOTE — DISCHARGE NOTE ADULT - ADDITIONAL INSTRUCTIONS
please call urology clinic to schedule a follow up apt for further evaluation and management of urinary retention and gentile care

## 2017-01-31 NOTE — PROVIDER CONTACT NOTE (OTHER) - RECOMMENDATIONS
will admin scheduled hydralazine; admin atenolol/losaartan as well? will admin scheduled hydralazine; admin atenolol or losartan with hydrazaline too?

## 2017-01-31 NOTE — DISCHARGE NOTE ADULT - PATIENT PORTAL LINK FT
“You can access the Follow[facility] Patient Portal, offered by [facility], by registering with the following website: [email or web address]”

## 2017-01-31 NOTE — DISCHARGE NOTE ADULT - CARE PLAN
Principal Discharge DX:	UTI (urinary tract infection)  Secondary Diagnosis:	Decubitus ulcer of sacral region, unstageable  Secondary Diagnosis:	Uncontrolled hypertension Principal Discharge DX:	UTI (urinary tract infection)  Goal:	resolved infection  Secondary Diagnosis:	Decubitus ulcer of sacral region, unstageable  Goal:	wound healing  Secondary Diagnosis:	Uncontrolled hypertension  Goal:	BP control  Secondary Diagnosis:	Urinary retention  Goal:	further follow up  Instructions for follow-up, activity and diet:	Please take Flomax daily and call urology clinic to schedule a follow up apt for further evaluation and management of urinary retention and Osullivan care  Secondary Diagnosis:	Seizure disorder  Goal:	seizure precautions  Instructions for follow-up, activity and diet:	phenytoin  Secondary Diagnosis:	Paraplegia  Instructions for follow-up, activity and diet:	continue baclofen. To be turned and reposition to prevent pressure ulcers Principal Discharge DX:	UTI (urinary tract infection)  Goal:	resolved infection  Instructions for follow-up, activity and diet:	Complete Cipro as ordered, last day 2/6/2017  Secondary Diagnosis:	Decubitus ulcer of sacral region, unstageable  Goal:	wound healing  Instructions for follow-up, activity and diet:	Please make a follow up appt with Dr Washington  for further evaluation and management of chronic non-healing left Ischial pressure injury.  Daily wound care as follow:  Left Ischial: Clean with SAF-Clens. Apply Liquid barrier film to periwound skin. Aquacel AG Hydrofiber Ribbon, cut to size- leaving 1 inch exposed for ease of removal, cover with foam with border.     IAD: Clean with perineal cleaning spray. Apply Tony barrier cream twice a day and after incontinence episodes, single breathable underpad.  Secondary Diagnosis:	Uncontrolled hypertension  Goal:	BP control  Instructions for follow-up, activity and diet:	Low salt diet, Atenolol and Norvasc  Secondary Diagnosis:	Urinary retention  Goal:	further follow up  Instructions for follow-up, activity and diet:	Please take Flomax daily and call urology clinic to schedule a follow up apt for further evaluation and management of urinary retention and Osullivan care  Secondary Diagnosis:	Seizure disorder  Goal:	seizure precautions  Instructions for follow-up, activity and diet:	phenytoin  Secondary Diagnosis:	Paraplegia  Instructions for follow-up, activity and diet:	continue baclofen. To be turned and reposition to prevent pressure ulcers

## 2017-01-31 NOTE — DISCHARGE NOTE ADULT - PLAN OF CARE
resolved infection wound healing BP control further follow up Please take Flomax daily and call urology clinic to schedule a follow up apt for further evaluation and management of urinary retention and Osullivan care phenytoin seizure precautions continue baclofen. To be turned and reposition to prevent pressure ulcers Please make a follow up appt with Dr Washington  for further evaluation and management of chronic non-healing left Ischial pressure injury.  Daily wound care as follow:  Left Ischial: Clean with SAF-Clens. Apply Liquid barrier film to periwound skin. Aquacel AG Hydrofiber Ribbon, cut to size- leaving 1 inch exposed for ease of removal, cover with foam with border.     IAD: Clean with perineal cleaning spray. Apply Tony barrier cream twice a day and after incontinence episodes, single breathable underpad. Low salt diet, Atenolol and Norvasc Complete Cipro as ordered, last day 2/6/2017

## 2017-02-01 LAB
BUN SERPL-MCNC: 24 MG/DL — HIGH (ref 7–23)
CALCIUM SERPL-MCNC: 9.1 MG/DL — SIGNIFICANT CHANGE UP (ref 8.4–10.5)
CHLORIDE SERPL-SCNC: 96 MMOL/L — LOW (ref 98–107)
CO2 SERPL-SCNC: 28 MMOL/L — SIGNIFICANT CHANGE UP (ref 22–31)
CREAT SERPL-MCNC: 1.07 MG/DL — SIGNIFICANT CHANGE UP (ref 0.5–1.3)
GLUCOSE SERPL-MCNC: 72 MG/DL — SIGNIFICANT CHANGE UP (ref 70–99)
HCT VFR BLD CALC: 38.8 % — LOW (ref 39–50)
HGB BLD-MCNC: 12.3 G/DL — LOW (ref 13–17)
MCHC RBC-ENTMCNC: 26.9 PG — LOW (ref 27–34)
MCHC RBC-ENTMCNC: 31.7 % — LOW (ref 32–36)
MCV RBC AUTO: 84.9 FL — SIGNIFICANT CHANGE UP (ref 80–100)
NRBC FLD-RTO: 1.8 — SIGNIFICANT CHANGE UP
PLATELET # BLD AUTO: 218 K/UL — SIGNIFICANT CHANGE UP (ref 150–400)
PMV BLD: 11.2 FL — SIGNIFICANT CHANGE UP (ref 7–13)
POTASSIUM SERPL-MCNC: 3.2 MMOL/L — LOW (ref 3.5–5.3)
POTASSIUM SERPL-SCNC: 3.2 MMOL/L — LOW (ref 3.5–5.3)
RBC # BLD: 4.57 M/UL — SIGNIFICANT CHANGE UP (ref 4.2–5.8)
RBC # FLD: 14.7 % — HIGH (ref 10.3–14.5)
SODIUM SERPL-SCNC: 138 MMOL/L — SIGNIFICANT CHANGE UP (ref 135–145)
VIT B12 SERPL-MCNC: 888 PG/ML — SIGNIFICANT CHANGE UP (ref 200–900)
WBC # BLD: 9.64 K/UL — SIGNIFICANT CHANGE UP (ref 3.8–10.5)
WBC # FLD AUTO: 9.64 K/UL — SIGNIFICANT CHANGE UP (ref 3.8–10.5)

## 2017-02-01 RX ORDER — POTASSIUM CHLORIDE 20 MEQ
40 PACKET (EA) ORAL EVERY 4 HOURS
Qty: 0 | Refills: 0 | Status: COMPLETED | OUTPATIENT
Start: 2017-02-01 | End: 2017-02-01

## 2017-02-01 RX ORDER — HYDRALAZINE HCL 50 MG
25 TABLET ORAL EVERY 8 HOURS
Qty: 0 | Refills: 0 | Status: DISCONTINUED | OUTPATIENT
Start: 2017-02-01 | End: 2017-02-06

## 2017-02-01 RX ORDER — CIPROFLOXACIN LACTATE 400MG/40ML
500 VIAL (ML) INTRAVENOUS EVERY 12 HOURS
Qty: 0 | Refills: 0 | Status: COMPLETED | OUTPATIENT
Start: 2017-02-01 | End: 2017-02-05

## 2017-02-01 RX ADMIN — Medication 100 MILLIGRAM(S): at 13:09

## 2017-02-01 RX ADMIN — METHADONE HYDROCHLORIDE 130 MILLIGRAM(S): 40 TABLET ORAL at 13:47

## 2017-02-01 RX ADMIN — SENNA PLUS 2 TABLET(S): 8.6 TABLET ORAL at 21:17

## 2017-02-01 RX ADMIN — AMLODIPINE BESYLATE 10 MILLIGRAM(S): 2.5 TABLET ORAL at 05:38

## 2017-02-01 RX ADMIN — Medication 500 MILLIGRAM(S): at 19:35

## 2017-02-01 RX ADMIN — LOSARTAN POTASSIUM 100 MILLIGRAM(S): 100 TABLET, FILM COATED ORAL at 05:38

## 2017-02-01 RX ADMIN — Medication 40 MILLIEQUIVALENT(S): at 17:51

## 2017-02-01 RX ADMIN — Medication 81 MILLIGRAM(S): at 13:09

## 2017-02-01 RX ADMIN — Medication 40 MILLIEQUIVALENT(S): at 21:17

## 2017-02-01 RX ADMIN — ATENOLOL 100 MILLIGRAM(S): 25 TABLET ORAL at 05:38

## 2017-02-01 RX ADMIN — Medication 10 MILLIGRAM(S): at 21:17

## 2017-02-01 RX ADMIN — Medication 10 MILLIGRAM(S): at 13:10

## 2017-02-01 RX ADMIN — Medication 1 TABLET(S): at 13:09

## 2017-02-01 RX ADMIN — Medication 100 MILLIGRAM(S): at 21:17

## 2017-02-01 RX ADMIN — POLYETHYLENE GLYCOL 3350 17 GRAM(S): 17 POWDER, FOR SOLUTION ORAL at 13:10

## 2017-02-01 RX ADMIN — Medication 25 MILLIGRAM(S): at 13:09

## 2017-02-01 RX ADMIN — Medication 10 MILLIGRAM(S): at 05:38

## 2017-02-01 RX ADMIN — Medication 100 MILLIGRAM(S): at 05:38

## 2017-02-01 RX ADMIN — Medication 40 MILLIEQUIVALENT(S): at 05:38

## 2017-02-01 RX ADMIN — Medication 25 MILLIGRAM(S): at 21:17

## 2017-02-01 NOTE — PROVIDER CONTACT NOTE (OTHER) - BACKGROUND
pt admitted for Urinary tract infection
Pt admitted for UTI
admitted w/ UTI
patient admitted with elevated blood pressure
pt admitted for urinary tract infection
pt came in with UTI
Pt admitted for UTI
admitted for UTI hematuria
admitted for uti

## 2017-02-01 NOTE — PROVIDER CONTACT NOTE (OTHER) - NAME OF MD/NP/PA/DO NOTIFIED:
ADS- Abbey, NP
ADS- Abbey, NP
Abbey S, NP
JOSEFA porter
PHAN Stern
juan diego HENAO
Abbey NP

## 2017-02-01 NOTE — DIETITIAN INITIAL EVALUATION ADULT. - NS AS NUTRI INTERV COLLABORAT
1)Add Ensure Enlive 8oz PO 2x daily to low sodium diet 2)Continue Multivitamin for micronutrient coverage  3)Obtain daily weights

## 2017-02-01 NOTE — PROVIDER CONTACT NOTE (OTHER) - DATE AND TIME:
31-Jan-2017 16:40
01-Feb-2017 05:44
01-Feb-2017 13:12
30-Jan-2017 06:00
30-Jan-2017 15:14
30-Jan-2017 16:00
30-Jan-2017 21:20
31-Jan-2017 23:43
31-Jan-2017 06:50

## 2017-02-01 NOTE — PROVIDER CONTACT NOTE (OTHER) - SITUATION
/108 manual HR 82 RR 18 98% 99.9
/108 HR 72
/110 HR 77 99.1 RR 17 100%
162/94 patient is hypertensive
BP reassessment is 169/111
Blood pressure 138/96
Bp 178/108 manually HR 78 T 97.8 RR 16 100%
Pt BP is 181/116. All other VSS.
pt DBP elevated

## 2017-02-01 NOTE — DIETITIAN INITIAL EVALUATION ADULT. - OTHER INFO
Pt. observed w good intake of breakfast (>75%).  Pt denies food allergies, nausea/vomiting/diarrhea/constipation, or issues with chewing/swallowing.  Discussed suggested diet modifications, emphasizing sodium restriction.  Also encouraged protein intake... Pt. typically consumes Ensure supplement 2x daily, PTA.

## 2017-02-01 NOTE — PROVIDER CONTACT NOTE (OTHER) - ASSESSMENT
/108 HR 72
/108 manual HR 82 RR 18 98% 99.9. Pt stating that methadone is the only medication that can manage his pain and BP. refusing oxy at this time.
/110 manually HR 77 99.1 RR 17 100%
BP reassessment is 169/111
Bp 178/108 manually HR 78 T 97.8 RR 16 100%. pt resting at this time. denies s/s of any acute distress
Patient denies headache.
Pt BP is 181/116, all other VSS. Pt was just given pain medication for back pain; otherwise pt is asymptomatic.
no s/s
pt is in no acute distress

## 2017-02-02 LAB
BASOPHILS # BLD AUTO: 0.02 K/UL — SIGNIFICANT CHANGE UP (ref 0–0.2)
BASOPHILS NFR BLD AUTO: 0.3 % — SIGNIFICANT CHANGE UP (ref 0–2)
BUN SERPL-MCNC: 20 MG/DL — SIGNIFICANT CHANGE UP (ref 7–23)
CALCIUM SERPL-MCNC: 8.8 MG/DL — SIGNIFICANT CHANGE UP (ref 8.4–10.5)
CHLORIDE SERPL-SCNC: 99 MMOL/L — SIGNIFICANT CHANGE UP (ref 98–107)
CO2 SERPL-SCNC: 26 MMOL/L — SIGNIFICANT CHANGE UP (ref 22–31)
CREAT SERPL-MCNC: 0.82 MG/DL — SIGNIFICANT CHANGE UP (ref 0.5–1.3)
EOSINOPHIL # BLD AUTO: 0.17 K/UL — SIGNIFICANT CHANGE UP (ref 0–0.5)
EOSINOPHIL NFR BLD AUTO: 2.8 % — SIGNIFICANT CHANGE UP (ref 0–6)
GLUCOSE SERPL-MCNC: 84 MG/DL — SIGNIFICANT CHANGE UP (ref 70–99)
HCT VFR BLD CALC: 38 % — LOW (ref 39–50)
HGB BLD-MCNC: 12.2 G/DL — LOW (ref 13–17)
IMM GRANULOCYTES NFR BLD AUTO: 0.2 % — SIGNIFICANT CHANGE UP (ref 0–1.5)
LYMPHOCYTES # BLD AUTO: 2.75 K/UL — SIGNIFICANT CHANGE UP (ref 1–3.3)
LYMPHOCYTES # BLD AUTO: 44.6 % — HIGH (ref 13–44)
MCHC RBC-ENTMCNC: 27.1 PG — SIGNIFICANT CHANGE UP (ref 27–34)
MCHC RBC-ENTMCNC: 32.1 % — SIGNIFICANT CHANGE UP (ref 32–36)
MCV RBC AUTO: 84.4 FL — SIGNIFICANT CHANGE UP (ref 80–100)
MONOCYTES # BLD AUTO: 0.44 K/UL — SIGNIFICANT CHANGE UP (ref 0–0.9)
MONOCYTES NFR BLD AUTO: 7.1 % — SIGNIFICANT CHANGE UP (ref 2–14)
NEUTROPHILS # BLD AUTO: 2.77 K/UL — SIGNIFICANT CHANGE UP (ref 1.8–7.4)
NEUTROPHILS NFR BLD AUTO: 45 % — SIGNIFICANT CHANGE UP (ref 43–77)
PLATELET # BLD AUTO: 207 K/UL — SIGNIFICANT CHANGE UP (ref 150–400)
PMV BLD: 11.1 FL — SIGNIFICANT CHANGE UP (ref 7–13)
POTASSIUM SERPL-MCNC: 4.2 MMOL/L — SIGNIFICANT CHANGE UP (ref 3.5–5.3)
POTASSIUM SERPL-SCNC: 4.2 MMOL/L — SIGNIFICANT CHANGE UP (ref 3.5–5.3)
RBC # BLD: 4.5 M/UL — SIGNIFICANT CHANGE UP (ref 4.2–5.8)
RBC # FLD: 14.3 % — SIGNIFICANT CHANGE UP (ref 10.3–14.5)
SODIUM SERPL-SCNC: 139 MMOL/L — SIGNIFICANT CHANGE UP (ref 135–145)
WBC # BLD: 6.16 K/UL — SIGNIFICANT CHANGE UP (ref 3.8–10.5)
WBC # FLD AUTO: 6.16 K/UL — SIGNIFICANT CHANGE UP (ref 3.8–10.5)

## 2017-02-02 RX ORDER — METHADONE HYDROCHLORIDE 40 MG/1
130 TABLET ORAL EVERY 24 HOURS
Qty: 0 | Refills: 0 | Status: DISCONTINUED | OUTPATIENT
Start: 2017-02-02 | End: 2017-02-06

## 2017-02-02 RX ADMIN — Medication 25 MILLIGRAM(S): at 06:22

## 2017-02-02 RX ADMIN — Medication 100 MILLIGRAM(S): at 14:04

## 2017-02-02 RX ADMIN — Medication 500 MILLIGRAM(S): at 06:23

## 2017-02-02 RX ADMIN — Medication 10 MILLIGRAM(S): at 06:22

## 2017-02-02 RX ADMIN — POLYETHYLENE GLYCOL 3350 17 GRAM(S): 17 POWDER, FOR SOLUTION ORAL at 12:29

## 2017-02-02 RX ADMIN — Medication 81 MILLIGRAM(S): at 12:29

## 2017-02-02 RX ADMIN — Medication 25 MILLIGRAM(S): at 21:18

## 2017-02-02 RX ADMIN — Medication 25 MILLIGRAM(S): at 14:04

## 2017-02-02 RX ADMIN — Medication 100 MILLIGRAM(S): at 21:18

## 2017-02-02 RX ADMIN — Medication 100 MILLIGRAM(S): at 06:22

## 2017-02-02 RX ADMIN — SENNA PLUS 2 TABLET(S): 8.6 TABLET ORAL at 21:18

## 2017-02-02 RX ADMIN — ATENOLOL 100 MILLIGRAM(S): 25 TABLET ORAL at 06:23

## 2017-02-02 RX ADMIN — Medication 10 MILLIGRAM(S): at 21:18

## 2017-02-02 RX ADMIN — Medication 500 MILLIGRAM(S): at 18:14

## 2017-02-02 RX ADMIN — Medication 10 MILLIGRAM(S): at 14:04

## 2017-02-02 RX ADMIN — Medication 1 TABLET(S): at 12:29

## 2017-02-02 RX ADMIN — AMLODIPINE BESYLATE 10 MILLIGRAM(S): 2.5 TABLET ORAL at 06:23

## 2017-02-02 RX ADMIN — LOSARTAN POTASSIUM 100 MILLIGRAM(S): 100 TABLET, FILM COATED ORAL at 06:23

## 2017-02-02 RX ADMIN — METHADONE HYDROCHLORIDE 130 MILLIGRAM(S): 40 TABLET ORAL at 12:28

## 2017-02-02 RX ADMIN — Medication 100 MILLIGRAM(S): at 06:23

## 2017-02-03 RX ORDER — TAMSULOSIN HYDROCHLORIDE 0.4 MG/1
0.4 CAPSULE ORAL AT BEDTIME
Qty: 0 | Refills: 0 | Status: DISCONTINUED | OUTPATIENT
Start: 2017-02-03 | End: 2017-02-06

## 2017-02-03 RX ADMIN — Medication 10 MILLIGRAM(S): at 05:10

## 2017-02-03 RX ADMIN — Medication 100 MILLIGRAM(S): at 21:18

## 2017-02-03 RX ADMIN — Medication 100 MILLIGRAM(S): at 05:10

## 2017-02-03 RX ADMIN — Medication 500 MILLIGRAM(S): at 05:15

## 2017-02-03 RX ADMIN — METHADONE HYDROCHLORIDE 130 MILLIGRAM(S): 40 TABLET ORAL at 12:50

## 2017-02-03 RX ADMIN — ATENOLOL 100 MILLIGRAM(S): 25 TABLET ORAL at 05:10

## 2017-02-03 RX ADMIN — Medication 25 MILLIGRAM(S): at 05:11

## 2017-02-03 RX ADMIN — Medication 10 MILLIGRAM(S): at 13:42

## 2017-02-03 RX ADMIN — AMLODIPINE BESYLATE 10 MILLIGRAM(S): 2.5 TABLET ORAL at 05:11

## 2017-02-03 RX ADMIN — Medication 100 MILLIGRAM(S): at 13:42

## 2017-02-03 RX ADMIN — Medication 500 MILLIGRAM(S): at 19:08

## 2017-02-03 RX ADMIN — LOSARTAN POTASSIUM 100 MILLIGRAM(S): 100 TABLET, FILM COATED ORAL at 05:11

## 2017-02-03 RX ADMIN — SENNA PLUS 2 TABLET(S): 8.6 TABLET ORAL at 21:18

## 2017-02-03 RX ADMIN — Medication 25 MILLIGRAM(S): at 21:18

## 2017-02-03 RX ADMIN — TAMSULOSIN HYDROCHLORIDE 0.4 MILLIGRAM(S): 0.4 CAPSULE ORAL at 23:27

## 2017-02-03 RX ADMIN — Medication 81 MILLIGRAM(S): at 11:48

## 2017-02-03 RX ADMIN — Medication 100 MILLIGRAM(S): at 05:15

## 2017-02-03 RX ADMIN — Medication 1 TABLET(S): at 11:48

## 2017-02-03 RX ADMIN — Medication 10 MILLIGRAM(S): at 21:18

## 2017-02-03 RX ADMIN — Medication 25 MILLIGRAM(S): at 13:42

## 2017-02-04 LAB
BASOPHILS # BLD AUTO: 0.01 K/UL — SIGNIFICANT CHANGE UP (ref 0–0.2)
BASOPHILS NFR BLD AUTO: 0.2 % — SIGNIFICANT CHANGE UP (ref 0–2)
BUN SERPL-MCNC: 23 MG/DL — SIGNIFICANT CHANGE UP (ref 7–23)
CALCIUM SERPL-MCNC: 8.5 MG/DL — SIGNIFICANT CHANGE UP (ref 8.4–10.5)
CHLORIDE SERPL-SCNC: 99 MMOL/L — SIGNIFICANT CHANGE UP (ref 98–107)
CO2 SERPL-SCNC: 27 MMOL/L — SIGNIFICANT CHANGE UP (ref 22–31)
CREAT SERPL-MCNC: 0.8 MG/DL — SIGNIFICANT CHANGE UP (ref 0.5–1.3)
EOSINOPHIL # BLD AUTO: 0.19 K/UL — SIGNIFICANT CHANGE UP (ref 0–0.5)
EOSINOPHIL NFR BLD AUTO: 3.4 % — SIGNIFICANT CHANGE UP (ref 0–6)
GLUCOSE SERPL-MCNC: 98 MG/DL — SIGNIFICANT CHANGE UP (ref 70–99)
HCT VFR BLD CALC: 36.8 % — LOW (ref 39–50)
HGB BLD-MCNC: 11.8 G/DL — LOW (ref 13–17)
IMM GRANULOCYTES NFR BLD AUTO: 0.2 % — SIGNIFICANT CHANGE UP (ref 0–1.5)
LYMPHOCYTES # BLD AUTO: 2.5 K/UL — SIGNIFICANT CHANGE UP (ref 1–3.3)
LYMPHOCYTES # BLD AUTO: 44.3 % — HIGH (ref 13–44)
MCHC RBC-ENTMCNC: 27.2 PG — SIGNIFICANT CHANGE UP (ref 27–34)
MCHC RBC-ENTMCNC: 32.1 % — SIGNIFICANT CHANGE UP (ref 32–36)
MCV RBC AUTO: 84.8 FL — SIGNIFICANT CHANGE UP (ref 80–100)
MONOCYTES # BLD AUTO: 0.4 K/UL — SIGNIFICANT CHANGE UP (ref 0–0.9)
MONOCYTES NFR BLD AUTO: 7.1 % — SIGNIFICANT CHANGE UP (ref 2–14)
NEUTROPHILS # BLD AUTO: 2.53 K/UL — SIGNIFICANT CHANGE UP (ref 1.8–7.4)
NEUTROPHILS NFR BLD AUTO: 44.8 % — SIGNIFICANT CHANGE UP (ref 43–77)
PLATELET # BLD AUTO: 194 K/UL — SIGNIFICANT CHANGE UP (ref 150–400)
PMV BLD: 11.2 FL — SIGNIFICANT CHANGE UP (ref 7–13)
POTASSIUM SERPL-MCNC: 4.5 MMOL/L — SIGNIFICANT CHANGE UP (ref 3.5–5.3)
POTASSIUM SERPL-SCNC: 4.5 MMOL/L — SIGNIFICANT CHANGE UP (ref 3.5–5.3)
RBC # BLD: 4.34 M/UL — SIGNIFICANT CHANGE UP (ref 4.2–5.8)
RBC # FLD: 14.3 % — SIGNIFICANT CHANGE UP (ref 10.3–14.5)
SODIUM SERPL-SCNC: 138 MMOL/L — SIGNIFICANT CHANGE UP (ref 135–145)
WBC # BLD: 5.64 K/UL — SIGNIFICANT CHANGE UP (ref 3.8–10.5)
WBC # FLD AUTO: 5.64 K/UL — SIGNIFICANT CHANGE UP (ref 3.8–10.5)

## 2017-02-04 RX ADMIN — Medication 81 MILLIGRAM(S): at 11:26

## 2017-02-04 RX ADMIN — Medication 100 MILLIGRAM(S): at 22:02

## 2017-02-04 RX ADMIN — Medication 100 MILLIGRAM(S): at 22:03

## 2017-02-04 RX ADMIN — ATENOLOL 100 MILLIGRAM(S): 25 TABLET ORAL at 05:13

## 2017-02-04 RX ADMIN — Medication 10 MILLIGRAM(S): at 05:13

## 2017-02-04 RX ADMIN — Medication 25 MILLIGRAM(S): at 14:33

## 2017-02-04 RX ADMIN — SENNA PLUS 2 TABLET(S): 8.6 TABLET ORAL at 22:03

## 2017-02-04 RX ADMIN — Medication 25 MILLIGRAM(S): at 05:13

## 2017-02-04 RX ADMIN — Medication 100 MILLIGRAM(S): at 05:13

## 2017-02-04 RX ADMIN — AMLODIPINE BESYLATE 10 MILLIGRAM(S): 2.5 TABLET ORAL at 05:13

## 2017-02-04 RX ADMIN — Medication 500 MILLIGRAM(S): at 17:16

## 2017-02-04 RX ADMIN — Medication 500 MILLIGRAM(S): at 05:13

## 2017-02-04 RX ADMIN — Medication 100 MILLIGRAM(S): at 14:33

## 2017-02-04 RX ADMIN — Medication 10 MILLIGRAM(S): at 22:03

## 2017-02-04 RX ADMIN — Medication 1 TABLET(S): at 11:24

## 2017-02-04 RX ADMIN — LOSARTAN POTASSIUM 100 MILLIGRAM(S): 100 TABLET, FILM COATED ORAL at 05:13

## 2017-02-04 RX ADMIN — POLYETHYLENE GLYCOL 3350 17 GRAM(S): 17 POWDER, FOR SOLUTION ORAL at 11:23

## 2017-02-04 RX ADMIN — Medication 10 MILLIGRAM(S): at 14:33

## 2017-02-04 RX ADMIN — Medication 25 MILLIGRAM(S): at 22:03

## 2017-02-04 RX ADMIN — TAMSULOSIN HYDROCHLORIDE 0.4 MILLIGRAM(S): 0.4 CAPSULE ORAL at 22:02

## 2017-02-05 LAB
BASOPHILS # BLD AUTO: 0.01 K/UL — SIGNIFICANT CHANGE UP (ref 0–0.2)
BASOPHILS NFR BLD AUTO: 0.2 % — SIGNIFICANT CHANGE UP (ref 0–2)
BUN SERPL-MCNC: 17 MG/DL — SIGNIFICANT CHANGE UP (ref 7–23)
CALCIUM SERPL-MCNC: 9.1 MG/DL — SIGNIFICANT CHANGE UP (ref 8.4–10.5)
CHLORIDE SERPL-SCNC: 101 MMOL/L — SIGNIFICANT CHANGE UP (ref 98–107)
CO2 SERPL-SCNC: 28 MMOL/L — SIGNIFICANT CHANGE UP (ref 22–31)
CREAT SERPL-MCNC: 0.68 MG/DL — SIGNIFICANT CHANGE UP (ref 0.5–1.3)
EOSINOPHIL # BLD AUTO: 0.13 K/UL — SIGNIFICANT CHANGE UP (ref 0–0.5)
EOSINOPHIL NFR BLD AUTO: 3 % — SIGNIFICANT CHANGE UP (ref 0–6)
GLUCOSE SERPL-MCNC: 97 MG/DL — SIGNIFICANT CHANGE UP (ref 70–99)
HCT VFR BLD CALC: 36.9 % — LOW (ref 39–50)
HGB BLD-MCNC: 11.8 G/DL — LOW (ref 13–17)
IMM GRANULOCYTES NFR BLD AUTO: 0.2 % — SIGNIFICANT CHANGE UP (ref 0–1.5)
LYMPHOCYTES # BLD AUTO: 2.01 K/UL — SIGNIFICANT CHANGE UP (ref 1–3.3)
LYMPHOCYTES # BLD AUTO: 46.5 % — HIGH (ref 13–44)
MCHC RBC-ENTMCNC: 27.2 PG — SIGNIFICANT CHANGE UP (ref 27–34)
MCHC RBC-ENTMCNC: 32 % — SIGNIFICANT CHANGE UP (ref 32–36)
MCV RBC AUTO: 85 FL — SIGNIFICANT CHANGE UP (ref 80–100)
MONOCYTES # BLD AUTO: 0.33 K/UL — SIGNIFICANT CHANGE UP (ref 0–0.9)
MONOCYTES NFR BLD AUTO: 7.6 % — SIGNIFICANT CHANGE UP (ref 2–14)
NEUTROPHILS # BLD AUTO: 1.83 K/UL — SIGNIFICANT CHANGE UP (ref 1.8–7.4)
NEUTROPHILS NFR BLD AUTO: 42.5 % — LOW (ref 43–77)
PLATELET # BLD AUTO: 168 K/UL — SIGNIFICANT CHANGE UP (ref 150–400)
PMV BLD: 10.6 FL — SIGNIFICANT CHANGE UP (ref 7–13)
POTASSIUM SERPL-MCNC: 3.9 MMOL/L — SIGNIFICANT CHANGE UP (ref 3.5–5.3)
POTASSIUM SERPL-SCNC: 3.9 MMOL/L — SIGNIFICANT CHANGE UP (ref 3.5–5.3)
RBC # BLD: 4.34 M/UL — SIGNIFICANT CHANGE UP (ref 4.2–5.8)
RBC # FLD: 14.3 % — SIGNIFICANT CHANGE UP (ref 10.3–14.5)
SODIUM SERPL-SCNC: 141 MMOL/L — SIGNIFICANT CHANGE UP (ref 135–145)
WBC # BLD: 4.32 K/UL — SIGNIFICANT CHANGE UP (ref 3.8–10.5)
WBC # FLD AUTO: 4.32 K/UL — SIGNIFICANT CHANGE UP (ref 3.8–10.5)

## 2017-02-05 RX ORDER — HYDRALAZINE HCL 50 MG
1 TABLET ORAL
Qty: 0 | Refills: 0 | COMMUNITY
Start: 2017-02-05

## 2017-02-05 RX ORDER — ATENOLOL 25 MG/1
1 TABLET ORAL
Qty: 0 | Refills: 0 | COMMUNITY

## 2017-02-05 RX ORDER — TAMSULOSIN HYDROCHLORIDE 0.4 MG/1
1 CAPSULE ORAL
Qty: 0 | Refills: 0 | COMMUNITY
Start: 2017-02-05

## 2017-02-05 RX ORDER — POLYETHYLENE GLYCOL 3350 17 G/17G
17 POWDER, FOR SOLUTION ORAL
Qty: 0 | Refills: 0 | COMMUNITY
Start: 2017-02-05

## 2017-02-05 RX ORDER — ATENOLOL 25 MG/1
1 TABLET ORAL
Qty: 0 | Refills: 0 | COMMUNITY
Start: 2017-02-05

## 2017-02-05 RX ORDER — LOSARTAN POTASSIUM 100 MG/1
1 TABLET, FILM COATED ORAL
Qty: 0 | Refills: 0 | COMMUNITY
Start: 2017-02-05

## 2017-02-05 RX ORDER — SENNA PLUS 8.6 MG/1
2 TABLET ORAL
Qty: 0 | Refills: 0 | COMMUNITY
Start: 2017-02-05

## 2017-02-05 RX ORDER — METHADONE HYDROCHLORIDE 40 MG/1
65 TABLET ORAL
Qty: 0 | Refills: 0 | COMMUNITY
Start: 2017-02-05

## 2017-02-05 RX ORDER — CIPROFLOXACIN LACTATE 400MG/40ML
1 VIAL (ML) INTRAVENOUS
Qty: 0 | Refills: 0 | COMMUNITY
Start: 2017-02-05

## 2017-02-05 RX ORDER — ROBINUL 0.2 MG/ML
1 INJECTION INTRAMUSCULAR; INTRAVENOUS DAILY
Qty: 0 | Refills: 0 | Status: DISCONTINUED | OUTPATIENT
Start: 2017-02-05 | End: 2017-02-05

## 2017-02-05 RX ORDER — DOCUSATE SODIUM 100 MG
1 CAPSULE ORAL
Qty: 0 | Refills: 0 | DISCHARGE
Start: 2017-02-05

## 2017-02-05 RX ADMIN — Medication 25 MILLIGRAM(S): at 05:30

## 2017-02-05 RX ADMIN — Medication 100 MILLIGRAM(S): at 05:29

## 2017-02-05 RX ADMIN — Medication 100 MILLIGRAM(S): at 11:50

## 2017-02-05 RX ADMIN — TAMSULOSIN HYDROCHLORIDE 0.4 MILLIGRAM(S): 0.4 CAPSULE ORAL at 21:22

## 2017-02-05 RX ADMIN — Medication 500 MILLIGRAM(S): at 17:17

## 2017-02-05 RX ADMIN — LOSARTAN POTASSIUM 100 MILLIGRAM(S): 100 TABLET, FILM COATED ORAL at 05:29

## 2017-02-05 RX ADMIN — Medication 81 MILLIGRAM(S): at 11:50

## 2017-02-05 RX ADMIN — ATENOLOL 100 MILLIGRAM(S): 25 TABLET ORAL at 05:30

## 2017-02-05 RX ADMIN — SENNA PLUS 2 TABLET(S): 8.6 TABLET ORAL at 21:22

## 2017-02-05 RX ADMIN — Medication 100 MILLIGRAM(S): at 21:22

## 2017-02-05 RX ADMIN — METHADONE HYDROCHLORIDE 130 MILLIGRAM(S): 40 TABLET ORAL at 08:54

## 2017-02-05 RX ADMIN — Medication 10 MILLIGRAM(S): at 21:22

## 2017-02-05 RX ADMIN — Medication 1 TABLET(S): at 11:49

## 2017-02-05 RX ADMIN — AMLODIPINE BESYLATE 10 MILLIGRAM(S): 2.5 TABLET ORAL at 05:30

## 2017-02-05 RX ADMIN — Medication 25 MILLIGRAM(S): at 11:49

## 2017-02-05 RX ADMIN — Medication 10 MILLIGRAM(S): at 05:30

## 2017-02-05 RX ADMIN — Medication 100 MILLIGRAM(S): at 05:30

## 2017-02-05 RX ADMIN — POLYETHYLENE GLYCOL 3350 17 GRAM(S): 17 POWDER, FOR SOLUTION ORAL at 11:50

## 2017-02-05 RX ADMIN — Medication 25 MILLIGRAM(S): at 21:22

## 2017-02-05 RX ADMIN — Medication 100 MILLIGRAM(S): at 11:49

## 2017-02-05 RX ADMIN — Medication 500 MILLIGRAM(S): at 05:30

## 2017-02-05 RX ADMIN — Medication 10 MILLIGRAM(S): at 11:49

## 2017-02-06 VITALS
SYSTOLIC BLOOD PRESSURE: 141 MMHG | DIASTOLIC BLOOD PRESSURE: 96 MMHG | HEART RATE: 64 BPM | RESPIRATION RATE: 18 BRPM | TEMPERATURE: 98 F | OXYGEN SATURATION: 100 %

## 2017-02-06 LAB
BASOPHILS # BLD AUTO: 0.01 K/UL — SIGNIFICANT CHANGE UP (ref 0–0.2)
BASOPHILS NFR BLD AUTO: 0.2 % — SIGNIFICANT CHANGE UP (ref 0–2)
BUN SERPL-MCNC: 19 MG/DL — SIGNIFICANT CHANGE UP (ref 7–23)
CALCIUM SERPL-MCNC: 9.2 MG/DL — SIGNIFICANT CHANGE UP (ref 8.4–10.5)
CHLORIDE SERPL-SCNC: 100 MMOL/L — SIGNIFICANT CHANGE UP (ref 98–107)
CO2 SERPL-SCNC: 31 MMOL/L — SIGNIFICANT CHANGE UP (ref 22–31)
CREAT SERPL-MCNC: 0.72 MG/DL — SIGNIFICANT CHANGE UP (ref 0.5–1.3)
EOSINOPHIL # BLD AUTO: 0.19 K/UL — SIGNIFICANT CHANGE UP (ref 0–0.5)
EOSINOPHIL NFR BLD AUTO: 4.1 % — SIGNIFICANT CHANGE UP (ref 0–6)
GLUCOSE SERPL-MCNC: 93 MG/DL — SIGNIFICANT CHANGE UP (ref 70–99)
HCT VFR BLD CALC: 38.2 % — LOW (ref 39–50)
HGB BLD-MCNC: 11.9 G/DL — LOW (ref 13–17)
IMM GRANULOCYTES NFR BLD AUTO: 0.2 % — SIGNIFICANT CHANGE UP (ref 0–1.5)
LYMPHOCYTES # BLD AUTO: 1.9 K/UL — SIGNIFICANT CHANGE UP (ref 1–3.3)
LYMPHOCYTES # BLD AUTO: 40.9 % — SIGNIFICANT CHANGE UP (ref 13–44)
MCHC RBC-ENTMCNC: 26.7 PG — LOW (ref 27–34)
MCHC RBC-ENTMCNC: 31.2 % — LOW (ref 32–36)
MCV RBC AUTO: 85.8 FL — SIGNIFICANT CHANGE UP (ref 80–100)
MONOCYTES # BLD AUTO: 0.28 K/UL — SIGNIFICANT CHANGE UP (ref 0–0.9)
MONOCYTES NFR BLD AUTO: 6 % — SIGNIFICANT CHANGE UP (ref 2–14)
NEUTROPHILS # BLD AUTO: 2.25 K/UL — SIGNIFICANT CHANGE UP (ref 1.8–7.4)
NEUTROPHILS NFR BLD AUTO: 48.6 % — SIGNIFICANT CHANGE UP (ref 43–77)
PLATELET # BLD AUTO: 196 K/UL — SIGNIFICANT CHANGE UP (ref 150–400)
PMV BLD: 11.2 FL — SIGNIFICANT CHANGE UP (ref 7–13)
POTASSIUM SERPL-MCNC: 3.9 MMOL/L — SIGNIFICANT CHANGE UP (ref 3.5–5.3)
POTASSIUM SERPL-SCNC: 3.9 MMOL/L — SIGNIFICANT CHANGE UP (ref 3.5–5.3)
RBC # BLD: 4.45 M/UL — SIGNIFICANT CHANGE UP (ref 4.2–5.8)
RBC # FLD: 14.5 % — SIGNIFICANT CHANGE UP (ref 10.3–14.5)
SODIUM SERPL-SCNC: 143 MMOL/L — SIGNIFICANT CHANGE UP (ref 135–145)
WBC # BLD: 4.64 K/UL — SIGNIFICANT CHANGE UP (ref 3.8–10.5)
WBC # FLD AUTO: 4.64 K/UL — SIGNIFICANT CHANGE UP (ref 3.8–10.5)

## 2017-02-06 RX ADMIN — AMLODIPINE BESYLATE 10 MILLIGRAM(S): 2.5 TABLET ORAL at 05:58

## 2017-02-06 RX ADMIN — METHADONE HYDROCHLORIDE 130 MILLIGRAM(S): 40 TABLET ORAL at 10:22

## 2017-02-06 RX ADMIN — Medication 100 MILLIGRAM(S): at 12:45

## 2017-02-06 RX ADMIN — LOSARTAN POTASSIUM 100 MILLIGRAM(S): 100 TABLET, FILM COATED ORAL at 05:58

## 2017-02-06 RX ADMIN — Medication 1 TABLET(S): at 12:44

## 2017-02-06 RX ADMIN — Medication 10 MILLIGRAM(S): at 12:44

## 2017-02-06 RX ADMIN — Medication 10 MILLIGRAM(S): at 05:58

## 2017-02-06 RX ADMIN — POLYETHYLENE GLYCOL 3350 17 GRAM(S): 17 POWDER, FOR SOLUTION ORAL at 12:44

## 2017-02-06 RX ADMIN — Medication 25 MILLIGRAM(S): at 12:45

## 2017-02-06 RX ADMIN — Medication 100 MILLIGRAM(S): at 12:44

## 2017-02-06 RX ADMIN — Medication 100 MILLIGRAM(S): at 05:58

## 2017-02-06 RX ADMIN — Medication 81 MILLIGRAM(S): at 12:44

## 2017-02-06 RX ADMIN — ATENOLOL 100 MILLIGRAM(S): 25 TABLET ORAL at 05:58

## 2017-02-06 RX ADMIN — Medication 25 MILLIGRAM(S): at 05:58

## 2017-03-04 ENCOUNTER — EMERGENCY (EMERGENCY)
Facility: HOSPITAL | Age: 67
LOS: 1 days | Discharge: HOME | End: 2017-03-04
Attending: EMERGENCY MEDICINE | Admitting: EMERGENCY MEDICINE
Payer: MEDICAID

## 2017-03-04 VITALS
HEART RATE: 78 BPM | OXYGEN SATURATION: 100 % | SYSTOLIC BLOOD PRESSURE: 203 MMHG | TEMPERATURE: 98 F | RESPIRATION RATE: 16 BRPM | DIASTOLIC BLOOD PRESSURE: 108 MMHG

## 2017-03-04 VITALS
TEMPERATURE: 98 F | HEART RATE: 88 BPM | SYSTOLIC BLOOD PRESSURE: 159 MMHG | DIASTOLIC BLOOD PRESSURE: 91 MMHG | OXYGEN SATURATION: 100 % | RESPIRATION RATE: 18 BRPM

## 2017-03-04 DIAGNOSIS — L98.499 NON-PRESSURE CHRONIC ULCER OF SKIN OF OTHER SITES WITH UNSPECIFIED SEVERITY: Chronic | ICD-10-CM

## 2017-03-04 PROBLEM — L89.159 PRESSURE ULCER OF SACRAL REGION, UNSPECIFIED STAGE: Chronic | Status: ACTIVE | Noted: 2017-01-28

## 2017-03-04 PROBLEM — G82.20 PARAPLEGIA, UNSPECIFIED: Chronic | Status: ACTIVE | Noted: 2017-01-28

## 2017-03-04 LAB
APPEARANCE UR: SIGNIFICANT CHANGE UP
BACTERIA # UR AUTO: SIGNIFICANT CHANGE UP
BILIRUB UR-MCNC: NEGATIVE — SIGNIFICANT CHANGE UP
BLOOD UR QL VISUAL: NEGATIVE — SIGNIFICANT CHANGE UP
COLOR SPEC: SIGNIFICANT CHANGE UP
GLUCOSE UR-MCNC: NEGATIVE — SIGNIFICANT CHANGE UP
KETONES UR-MCNC: NEGATIVE — SIGNIFICANT CHANGE UP
LEUKOCYTE ESTERASE UR-ACNC: HIGH
MUCOUS THREADS # UR AUTO: SIGNIFICANT CHANGE UP
NITRITE UR-MCNC: NEGATIVE — SIGNIFICANT CHANGE UP
PH UR: 7.5 — SIGNIFICANT CHANGE UP (ref 4.6–8)
PROT UR-MCNC: 30 — SIGNIFICANT CHANGE UP
RBC CASTS # UR COMP ASSIST: SIGNIFICANT CHANGE UP (ref 0–?)
SP GR SPEC: 1.01 — SIGNIFICANT CHANGE UP (ref 1–1.03)
SQUAMOUS # UR AUTO: SIGNIFICANT CHANGE UP
UROBILINOGEN FLD QL: NORMAL E.U. — SIGNIFICANT CHANGE UP (ref 0.1–0.2)
WBC CLUMPS #/AREA URNS HPF: PRESENT — HIGH (ref 0–?)
WBC UR QL: SIGNIFICANT CHANGE UP (ref 0–?)
YEAST BUDDING # UR COMP ASSIST: SIGNIFICANT CHANGE UP

## 2017-03-04 PROCEDURE — 99053 MED SERV 10PM-8AM 24 HR FAC: CPT

## 2017-03-04 PROCEDURE — 99283 EMERGENCY DEPT VISIT LOW MDM: CPT | Mod: 25

## 2017-03-04 RX ORDER — CEPHALEXIN 500 MG
1 CAPSULE ORAL
Qty: 10 | Refills: 0 | OUTPATIENT
Start: 2017-03-04 | End: 2017-03-09

## 2017-03-04 RX ORDER — CEPHALEXIN 500 MG
500 CAPSULE ORAL ONCE
Qty: 0 | Refills: 0 | Status: COMPLETED | OUTPATIENT
Start: 2017-03-04 | End: 2017-03-04

## 2017-03-04 RX ORDER — CEPHALEXIN 500 MG
1 CAPSULE ORAL
Qty: 8 | Refills: 0 | OUTPATIENT
Start: 2017-03-04 | End: 2017-03-06

## 2017-03-04 RX ADMIN — Medication 500 MILLIGRAM(S): at 04:20

## 2017-03-04 NOTE — ED ADULT NURSE NOTE - OBJECTIVE STATEMENT
Patient is a 66 year old male A&Ox3 recd to room 15 stating "I need my gentile removed." Patient also leg pain 2/2 chronic bedsore, states "I am okay with this level of pain. I live with this on a daily basis." Denies chest pain or SOB. Noted to be hypertensive. MD aware. VS as noted. Appears comfortable. Will MD CRISTAL at bedside.

## 2017-03-04 NOTE — ED PROVIDER NOTE - OBJECTIVE STATEMENT
67 yo man with a history of HTN, paraplegia, seizure disorder, constipation, and incontinence requiring chronic tex[place] condom cath presents for removal of gentile. Patient was in hospital Jan 2016 for hematuria and was discharged with a gentile, started on flomax, and with urology follow-up. Patient never followed up with urology saying they did not take his insurance. Today patient reports that he thinks the gentile dislodged. Denies any recurrent hematuria, abd pain, fevers/chills. 65 yo man with a history of HTN, paraplegia, seizure disorder, and incontinence requiring chronic texas condom cath presents for removal of gentile. Patient was in hospital Jan 2016 for hematuria and was discharged with a gentile, started on flomax, and with urology follow-up. Patient never followed up with urology saying they did not take his insurance. Today patient reports that he thinks the gentile dislodged. Denies any recurrent hematuria, abd pain, fevers/chills.

## 2017-03-04 NOTE — ED PROVIDER NOTE - PROGRESS NOTE DETAILS
Osullivan removed. Patient still not voided. Patient self-caths at home intermittently and has supplies. Counseled on importance of self cath if does not void and urology f/u. Given keflex for UTI vs colonization. was called by pharmacy as percocet rx was not going through; rewrote order for 2 day supply

## 2017-03-04 NOTE — ED PROVIDER NOTE - ATTENDING CONTRIBUTION TO CARE
67 yo man with a history of HTN, paraplegia, seizure disorder, and incontinence requiring chronic texas condom cath presents with complaint of being unable to follow up with urology to have gentile removed.  had been admitted and had UTI due to hematuria and gentile was placed.  Unable to get appt so comes in here.  Pt due to paraplegia pt very comfortable self cathing himself.      PE: well appearing; CTAB/L; s1 s2 no m/r/g abd soft/NT/ND : gentile draining clear yellow fluid    given pt has equipment and is able to cath himself will remove gentile; check UA; and have given pt info to follow up with urology

## 2017-03-04 NOTE — ED ADULT TRIAGE NOTE - CHIEF COMPLAINT QUOTE
as per EMS, "pt called us because his catheter was dislodged and his leg was hurting." pt states he "wants us to take out his gentile." states he has been unable to have someone to come remove the catheter. catheter is currently in place and draining yellow urine. pt also reports chronic left leg pain "from my bed sore, but im not here for that." pt states he lives with 10/10 pain and is comfortable with this pain. appears comfortable. calm and cooperative in triage.

## 2017-03-05 LAB — SPECIMEN SOURCE: SIGNIFICANT CHANGE UP

## 2017-03-06 LAB
METHOD TYPE: SIGNIFICANT CHANGE UP
ORGANISM # SPEC MICROSCOPIC CNT: SIGNIFICANT CHANGE UP

## 2017-03-07 LAB
-  AMIKACIN: SIGNIFICANT CHANGE UP
-  AMPICILLIN/SULBACTAM: SIGNIFICANT CHANGE UP
-  AMPICILLIN: SIGNIFICANT CHANGE UP
-  AMPICILLIN: SIGNIFICANT CHANGE UP
-  AZTREONAM: SIGNIFICANT CHANGE UP
-  CEFAZOLIN: SIGNIFICANT CHANGE UP
-  CEFEPIME: SIGNIFICANT CHANGE UP
-  CEFOXITIN: SIGNIFICANT CHANGE UP
-  CEFTAZIDIME: SIGNIFICANT CHANGE UP
-  CEFTRIAXONE: SIGNIFICANT CHANGE UP
-  CIPROFLOXACIN: SIGNIFICANT CHANGE UP
-  CIPROFLOXACIN: SIGNIFICANT CHANGE UP
-  ERTAPENEM: SIGNIFICANT CHANGE UP
-  GENTAMICIN: SIGNIFICANT CHANGE UP
-  IMIPENEM: SIGNIFICANT CHANGE UP
-  LEVOFLOXACIN: SIGNIFICANT CHANGE UP
-  MEROPENEM: SIGNIFICANT CHANGE UP
-  NITROFURANTOIN: SIGNIFICANT CHANGE UP
-  NITROFURANTOIN: SIGNIFICANT CHANGE UP
-  PIPERACILLIN/TAZOBACTAM: SIGNIFICANT CHANGE UP
-  TETRACYCLINE: SIGNIFICANT CHANGE UP
-  TIGECYCLINE: SIGNIFICANT CHANGE UP
-  TOBRAMYCIN: SIGNIFICANT CHANGE UP
-  TRIMETHOPRIM/SULFAMETHOXAZOLE: SIGNIFICANT CHANGE UP
-  VANCOMYCIN: SIGNIFICANT CHANGE UP
BACTERIA UR CULT: SIGNIFICANT CHANGE UP
METHOD TYPE: SIGNIFICANT CHANGE UP
ORGANISM # SPEC MICROSCOPIC CNT: SIGNIFICANT CHANGE UP
ORGANISM # SPEC MICROSCOPIC CNT: SIGNIFICANT CHANGE UP

## 2018-10-28 ENCOUNTER — INPATIENT (INPATIENT)
Facility: HOSPITAL | Age: 68
LOS: 3 days | Discharge: HOME HEALTH | End: 2018-11-01
Attending: HOSPITALIST | Admitting: HOSPITALIST
Payer: MEDICAID

## 2018-10-28 VITALS
OXYGEN SATURATION: 100 % | SYSTOLIC BLOOD PRESSURE: 165 MMHG | TEMPERATURE: 97 F | HEART RATE: 98 BPM | DIASTOLIC BLOOD PRESSURE: 104 MMHG | RESPIRATION RATE: 16 BRPM

## 2018-10-28 DIAGNOSIS — L98.499 NON-PRESSURE CHRONIC ULCER OF SKIN OF OTHER SITES WITH UNSPECIFIED SEVERITY: Chronic | ICD-10-CM

## 2018-10-28 DIAGNOSIS — R50.9 FEVER, UNSPECIFIED: ICD-10-CM

## 2018-10-28 DIAGNOSIS — T14.90XA INJURY, UNSPECIFIED, INITIAL ENCOUNTER: Chronic | ICD-10-CM

## 2018-10-28 LAB
ALBUMIN SERPL ELPH-MCNC: 2.4 G/DL — LOW (ref 3.3–5)
ALBUMIN SERPL ELPH-MCNC: 4.3 G/DL — SIGNIFICANT CHANGE UP (ref 3.3–5)
ALP SERPL-CCNC: 117 U/L — SIGNIFICANT CHANGE UP (ref 40–120)
ALP SERPL-CCNC: 65 U/L — SIGNIFICANT CHANGE UP (ref 40–120)
ALT FLD-CCNC: 16 U/L — SIGNIFICANT CHANGE UP (ref 4–41)
ALT FLD-CCNC: 30 U/L — SIGNIFICANT CHANGE UP (ref 4–41)
APPEARANCE UR: CLEAR — SIGNIFICANT CHANGE UP
APTT BLD: 32.9 SEC — SIGNIFICANT CHANGE UP (ref 27.5–37.4)
AST SERPL-CCNC: 30 U/L — SIGNIFICANT CHANGE UP (ref 4–40)
AST SERPL-CCNC: 47 U/L — HIGH (ref 4–40)
B PERT DNA SPEC QL NAA+PROBE: SIGNIFICANT CHANGE UP
BASE EXCESS BLDA CALC-SCNC: 7.3 MMOL/L — SIGNIFICANT CHANGE UP
BASE EXCESS BLDV CALC-SCNC: -4.3 MMOL/L — SIGNIFICANT CHANGE UP
BASE EXCESS BLDV CALC-SCNC: 6.9 MMOL/L — SIGNIFICANT CHANGE UP
BASOPHILS # BLD AUTO: 0 K/UL — SIGNIFICANT CHANGE UP (ref 0–0.2)
BASOPHILS # BLD AUTO: 0.01 K/UL — SIGNIFICANT CHANGE UP (ref 0–0.2)
BASOPHILS NFR BLD AUTO: 0 % — SIGNIFICANT CHANGE UP (ref 0–2)
BASOPHILS NFR BLD AUTO: 0.1 % — SIGNIFICANT CHANGE UP (ref 0–2)
BASOPHILS NFR SPEC: 0 % — SIGNIFICANT CHANGE UP (ref 0–2)
BILIRUB SERPL-MCNC: 0.4 MG/DL — SIGNIFICANT CHANGE UP (ref 0.2–1.2)
BILIRUB SERPL-MCNC: < 0.2 MG/DL — LOW (ref 0.2–1.2)
BILIRUB UR-MCNC: NEGATIVE — SIGNIFICANT CHANGE UP
BLASTS # FLD: 0 % — SIGNIFICANT CHANGE UP (ref 0–0)
BLD GP AB SCN SERPL QL: NEGATIVE — SIGNIFICANT CHANGE UP
BLOOD GAS VENOUS - CREATININE: < 0.36 MG/DL — LOW (ref 0.5–1.3)
BLOOD GAS VENOUS - CREATININE: SIGNIFICANT CHANGE UP MG/DL (ref 0.5–1.3)
BLOOD UR QL VISUAL: NEGATIVE — SIGNIFICANT CHANGE UP
BUN SERPL-MCNC: 10 MG/DL — SIGNIFICANT CHANGE UP (ref 7–23)
BUN SERPL-MCNC: 16 MG/DL — SIGNIFICANT CHANGE UP (ref 7–23)
C PNEUM DNA SPEC QL NAA+PROBE: NOT DETECTED — SIGNIFICANT CHANGE UP
CALCIUM SERPL-MCNC: 7.4 MG/DL — LOW (ref 8.4–10.5)
CALCIUM SERPL-MCNC: 9.3 MG/DL — SIGNIFICANT CHANGE UP (ref 8.4–10.5)
CHLORIDE BLDA-SCNC: 97 MMOL/L — SIGNIFICANT CHANGE UP (ref 96–108)
CHLORIDE BLDV-SCNC: 93 MMOL/L — LOW (ref 96–108)
CHLORIDE BLDV-SCNC: 98 MMOL/L — SIGNIFICANT CHANGE UP (ref 96–108)
CHLORIDE SERPL-SCNC: 91 MMOL/L — LOW (ref 98–107)
CHLORIDE SERPL-SCNC: 93 MMOL/L — LOW (ref 98–107)
CK SERPL-CCNC: 124 U/L — SIGNIFICANT CHANGE UP (ref 30–200)
CK SERPL-CCNC: 74 U/L — SIGNIFICANT CHANGE UP (ref 30–200)
CLARITY CSF: CLEAR — SIGNIFICANT CHANGE UP
CO2 SERPL-SCNC: 20 MMOL/L — LOW (ref 22–31)
CO2 SERPL-SCNC: 29 MMOL/L — SIGNIFICANT CHANGE UP (ref 22–31)
COLOR CSF: COLORLESS — SIGNIFICANT CHANGE UP
COLOR SPEC: SIGNIFICANT CHANGE UP
CREAT SERPL-MCNC: 0.4 MG/DL — LOW (ref 0.5–1.3)
CREAT SERPL-MCNC: 0.54 MG/DL — SIGNIFICANT CHANGE UP (ref 0.5–1.3)
CSF PCR RESULT: SIGNIFICANT CHANGE UP
EOSINOPHIL # BLD AUTO: 0 K/UL — SIGNIFICANT CHANGE UP (ref 0–0.5)
EOSINOPHIL # BLD AUTO: 0 K/UL — SIGNIFICANT CHANGE UP (ref 0–0.5)
EOSINOPHIL NFR BLD AUTO: 0 % — SIGNIFICANT CHANGE UP (ref 0–6)
EOSINOPHIL NFR BLD AUTO: 0 % — SIGNIFICANT CHANGE UP (ref 0–6)
EOSINOPHIL NFR FLD: 0 % — SIGNIFICANT CHANGE UP (ref 0–6)
FLUAV H1 2009 PAND RNA SPEC QL NAA+PROBE: NOT DETECTED — SIGNIFICANT CHANGE UP
FLUAV H1 RNA SPEC QL NAA+PROBE: NOT DETECTED — SIGNIFICANT CHANGE UP
FLUAV H3 RNA SPEC QL NAA+PROBE: NOT DETECTED — SIGNIFICANT CHANGE UP
FLUAV SUBTYP SPEC NAA+PROBE: SIGNIFICANT CHANGE UP
FLUBV RNA SPEC QL NAA+PROBE: NOT DETECTED — SIGNIFICANT CHANGE UP
GAS PNL BLDV: 115 MMOL/L — CRITICAL LOW (ref 136–146)
GAS PNL BLDV: 132 MMOL/L — LOW (ref 136–146)
GIANT PLATELETS BLD QL SMEAR: PRESENT — SIGNIFICANT CHANGE UP
GLUCOSE BLDA-MCNC: 119 MG/DL — HIGH (ref 70–99)
GLUCOSE BLDV-MCNC: 171 — HIGH (ref 70–99)
GLUCOSE BLDV-MCNC: 75 — SIGNIFICANT CHANGE UP (ref 70–99)
GLUCOSE CSF-MCNC: 89 MG/DL — HIGH (ref 40–70)
GLUCOSE SERPL-MCNC: 166 MG/DL — HIGH (ref 70–99)
GLUCOSE SERPL-MCNC: 91 MG/DL — SIGNIFICANT CHANGE UP (ref 70–99)
GLUCOSE UR-MCNC: NEGATIVE — SIGNIFICANT CHANGE UP
GRAM STN CSF: SIGNIFICANT CHANGE UP
HADV DNA SPEC QL NAA+PROBE: NOT DETECTED — SIGNIFICANT CHANGE UP
HCO3 BLDA-SCNC: 31 MMOL/L — HIGH (ref 22–26)
HCO3 BLDV-SCNC: 21 MMOL/L — SIGNIFICANT CHANGE UP (ref 20–27)
HCO3 BLDV-SCNC: 31 MMOL/L — HIGH (ref 20–27)
HCOV 229E RNA SPEC QL NAA+PROBE: NOT DETECTED — SIGNIFICANT CHANGE UP
HCOV HKU1 RNA SPEC QL NAA+PROBE: NOT DETECTED — SIGNIFICANT CHANGE UP
HCOV NL63 RNA SPEC QL NAA+PROBE: NOT DETECTED — SIGNIFICANT CHANGE UP
HCOV OC43 RNA SPEC QL NAA+PROBE: NOT DETECTED — SIGNIFICANT CHANGE UP
HCT VFR BLD CALC: 34.3 % — LOW (ref 39–50)
HCT VFR BLD CALC: 38.1 % — LOW (ref 39–50)
HCT VFR BLDA CALC: 36 % — LOW (ref 39–51)
HCT VFR BLDV CALC: 23.6 % — LOW (ref 39–51)
HCT VFR BLDV CALC: 37.7 % — LOW (ref 39–51)
HGB BLD-MCNC: 11.2 G/DL — LOW (ref 13–17)
HGB BLD-MCNC: 12.3 G/DL — LOW (ref 13–17)
HGB BLDA-MCNC: 11.7 G/DL — LOW (ref 13–17)
HGB BLDV-MCNC: 12.3 G/DL — LOW (ref 13–17)
HGB BLDV-MCNC: 7.6 G/DL — LOW (ref 13–17)
HIV COMBO RESULT: SIGNIFICANT CHANGE UP
HIV1+2 AB SPEC QL: SIGNIFICANT CHANGE UP
HMPV RNA SPEC QL NAA+PROBE: NOT DETECTED — SIGNIFICANT CHANGE UP
HPIV1 RNA SPEC QL NAA+PROBE: NOT DETECTED — SIGNIFICANT CHANGE UP
HPIV2 RNA SPEC QL NAA+PROBE: NOT DETECTED — SIGNIFICANT CHANGE UP
HPIV3 RNA SPEC QL NAA+PROBE: NOT DETECTED — SIGNIFICANT CHANGE UP
HPIV4 RNA SPEC QL NAA+PROBE: NOT DETECTED — SIGNIFICANT CHANGE UP
IMM GRANULOCYTES # BLD AUTO: 0.03 # — SIGNIFICANT CHANGE UP
IMM GRANULOCYTES # BLD AUTO: 0.05 # — SIGNIFICANT CHANGE UP
IMM GRANULOCYTES NFR BLD AUTO: 0.3 % — SIGNIFICANT CHANGE UP (ref 0–1.5)
IMM GRANULOCYTES NFR BLD AUTO: 0.4 % — SIGNIFICANT CHANGE UP (ref 0–1.5)
INR BLD: 1.1 — SIGNIFICANT CHANGE UP (ref 0.88–1.17)
KETONES UR-MCNC: NEGATIVE — SIGNIFICANT CHANGE UP
LACTATE BLDA-SCNC: 2.3 MMOL/L — HIGH (ref 0.5–2)
LACTATE BLDV-MCNC: 10.5 MMOL/L — CRITICAL HIGH (ref 0.5–2)
LACTATE BLDV-MCNC: 2.6 MMOL/L — HIGH (ref 0.5–2)
LEUKOCYTE ESTERASE UR-ACNC: NEGATIVE — SIGNIFICANT CHANGE UP
LYMPHOCYTES # BLD AUTO: 0.44 K/UL — LOW (ref 1–3.3)
LYMPHOCYTES # BLD AUTO: 1 K/UL — SIGNIFICANT CHANGE UP (ref 1–3.3)
LYMPHOCYTES # BLD AUTO: 10.2 % — LOW (ref 13–44)
LYMPHOCYTES # BLD AUTO: 3.6 % — LOW (ref 13–44)
LYMPHOCYTES # CSF: 100 % — SIGNIFICANT CHANGE UP
LYMPHOCYTES NFR SPEC AUTO: 2.6 % — LOW (ref 13–44)
M PNEUMO DNA SPEC QL NAA+PROBE: NOT DETECTED — SIGNIFICANT CHANGE UP
MAGNESIUM SERPL-MCNC: 1.5 MG/DL — LOW (ref 1.6–2.6)
MAGNESIUM SERPL-MCNC: 2.2 MG/DL — SIGNIFICANT CHANGE UP (ref 1.6–2.6)
MAGNESIUM SERPL-MCNC: 2.3 MG/DL — SIGNIFICANT CHANGE UP (ref 1.6–2.6)
MCHC RBC-ENTMCNC: 28.5 PG — SIGNIFICANT CHANGE UP (ref 27–34)
MCHC RBC-ENTMCNC: 28.8 PG — SIGNIFICANT CHANGE UP (ref 27–34)
MCHC RBC-ENTMCNC: 32.3 % — SIGNIFICANT CHANGE UP (ref 32–36)
MCHC RBC-ENTMCNC: 32.7 % — SIGNIFICANT CHANGE UP (ref 32–36)
MCV RBC AUTO: 88.2 FL — SIGNIFICANT CHANGE UP (ref 80–100)
MCV RBC AUTO: 88.4 FL — SIGNIFICANT CHANGE UP (ref 80–100)
METAMYELOCYTES # FLD: 0 % — SIGNIFICANT CHANGE UP (ref 0–1)
MONOCYTES # BLD AUTO: 0.46 K/UL — SIGNIFICANT CHANGE UP (ref 0–0.9)
MONOCYTES # BLD AUTO: 0.88 K/UL — SIGNIFICANT CHANGE UP (ref 0–0.9)
MONOCYTES NFR BLD AUTO: 3.7 % — SIGNIFICANT CHANGE UP (ref 2–14)
MONOCYTES NFR BLD AUTO: 9 % — SIGNIFICANT CHANGE UP (ref 2–14)
MONOCYTES NFR BLD: 7.8 % — SIGNIFICANT CHANGE UP (ref 2–9)
MYELOCYTES NFR BLD: 0 % — SIGNIFICANT CHANGE UP (ref 0–0)
NEUTROPHIL AB SER-ACNC: 89.6 % — HIGH (ref 43–77)
NEUTROPHILS # BLD AUTO: 11.31 K/UL — HIGH (ref 1.8–7.4)
NEUTROPHILS # BLD AUTO: 7.91 K/UL — HIGH (ref 1.8–7.4)
NEUTROPHILS NFR BLD AUTO: 80.5 % — HIGH (ref 43–77)
NEUTROPHILS NFR BLD AUTO: 92.2 % — HIGH (ref 43–77)
NEUTS BAND # BLD: 0 % — SIGNIFICANT CHANGE UP (ref 0–6)
NITRITE UR-MCNC: NEGATIVE — SIGNIFICANT CHANGE UP
NRBC # FLD: 0 — SIGNIFICANT CHANGE UP
NRBC # FLD: 0 — SIGNIFICANT CHANGE UP
NRBC NFR CSF: 1 CELL/UL — SIGNIFICANT CHANGE UP (ref 0–5)
OTHER - HEMATOLOGY %: 0 — SIGNIFICANT CHANGE UP
PCO2 BLDA: 40 MMHG — SIGNIFICANT CHANGE UP (ref 35–48)
PCO2 BLDV: 32 MMHG — LOW (ref 41–51)
PCO2 BLDV: 43 MMHG — SIGNIFICANT CHANGE UP (ref 41–51)
PH BLDA: 7.5 PH — HIGH (ref 7.35–7.45)
PH BLDV: 7.41 PH — SIGNIFICANT CHANGE UP (ref 7.32–7.43)
PH BLDV: 7.47 PH — HIGH (ref 7.32–7.43)
PH UR: 7.5 — SIGNIFICANT CHANGE UP (ref 5–8)
PHOSPHATE SERPL-MCNC: 1.9 MG/DL — LOW (ref 2.5–4.5)
PHOSPHATE SERPL-MCNC: 2.7 MG/DL — SIGNIFICANT CHANGE UP (ref 2.5–4.5)
PHOSPHATE SERPL-MCNC: 2.8 MG/DL — SIGNIFICANT CHANGE UP (ref 2.5–4.5)
PLATELET # BLD AUTO: 181 K/UL — SIGNIFICANT CHANGE UP (ref 150–400)
PLATELET # BLD AUTO: 225 K/UL — SIGNIFICANT CHANGE UP (ref 150–400)
PLATELET COUNT - ESTIMATE: NORMAL — SIGNIFICANT CHANGE UP
PMV BLD: 11 FL — SIGNIFICANT CHANGE UP (ref 7–13)
PMV BLD: 11.2 FL — SIGNIFICANT CHANGE UP (ref 7–13)
PO2 BLDA: 64 MMHG — LOW (ref 83–108)
PO2 BLDV: 35 MMHG — SIGNIFICANT CHANGE UP (ref 35–40)
PO2 BLDV: 68 MMHG — HIGH (ref 35–40)
POLYCHROMASIA BLD QL SMEAR: SLIGHT — SIGNIFICANT CHANGE UP
POTASSIUM BLDA-SCNC: 2.7 MMOL/L — CRITICAL LOW (ref 3.4–4.5)
POTASSIUM BLDV-SCNC: 12.9 MMOL/L — CRITICAL HIGH (ref 3.4–4.5)
POTASSIUM BLDV-SCNC: 2.5 MMOL/L — CRITICAL LOW (ref 3.4–4.5)
POTASSIUM SERPL-MCNC: 11.9 MMOL/L — CRITICAL HIGH (ref 3.5–5.3)
POTASSIUM SERPL-MCNC: 2.7 MMOL/L — CRITICAL LOW (ref 3.5–5.3)
POTASSIUM SERPL-SCNC: 11.9 MMOL/L — CRITICAL HIGH (ref 3.5–5.3)
POTASSIUM SERPL-SCNC: 2.7 MMOL/L — CRITICAL LOW (ref 3.5–5.3)
PROMYELOCYTES # FLD: 0 % — SIGNIFICANT CHANGE UP (ref 0–0)
PROT CSF-MCNC: 33.5 MG/DL — SIGNIFICANT CHANGE UP (ref 15–40)
PROT SERPL-MCNC: 4.7 G/DL — LOW (ref 6–8.3)
PROT SERPL-MCNC: 7.8 G/DL — SIGNIFICANT CHANGE UP (ref 6–8.3)
PROT UR-MCNC: 10 — SIGNIFICANT CHANGE UP
PROTHROM AB SERPL-ACNC: 12.2 SEC — SIGNIFICANT CHANGE UP (ref 9.8–13.1)
RBC # BLD: 3.89 M/UL — LOW (ref 4.2–5.8)
RBC # BLD: 4.31 M/UL — SIGNIFICANT CHANGE UP (ref 4.2–5.8)
RBC # CSF: 1 CELL/UL — HIGH (ref 0–0)
RBC # FLD: 15.1 % — HIGH (ref 10.3–14.5)
RBC # FLD: 15.2 % — HIGH (ref 10.3–14.5)
REVIEW TO FOLLOW: YES — SIGNIFICANT CHANGE UP
RH IG SCN BLD-IMP: POSITIVE — SIGNIFICANT CHANGE UP
RSV RNA SPEC QL NAA+PROBE: NOT DETECTED — SIGNIFICANT CHANGE UP
RV+EV RNA SPEC QL NAA+PROBE: NOT DETECTED — SIGNIFICANT CHANGE UP
SAO2 % BLDA: 93.1 % — LOW (ref 95–99)
SAO2 % BLDV: 61.5 % — SIGNIFICANT CHANGE UP (ref 60–85)
SAO2 % BLDV: 94.7 % — HIGH (ref 60–85)
SODIUM BLDA-SCNC: 132 MMOL/L — LOW (ref 136–146)
SODIUM SERPL-SCNC: 124 MMOL/L — LOW (ref 135–145)
SODIUM SERPL-SCNC: 137 MMOL/L — SIGNIFICANT CHANGE UP (ref 135–145)
SP GR SPEC: 1.01 — SIGNIFICANT CHANGE UP (ref 1–1.04)
SPECIMEN SOURCE: SIGNIFICANT CHANGE UP
STOMATOCYTES BLD QL SMEAR: SIGNIFICANT CHANGE UP
TOTAL CELLS COUNTED, SPINAL FLUID: 2 CELLS — SIGNIFICANT CHANGE UP
TROPONIN T, HIGH SENSITIVITY: 17 NG/L — SIGNIFICANT CHANGE UP (ref ?–14)
TROPONIN T, HIGH SENSITIVITY: 22 NG/L — SIGNIFICANT CHANGE UP (ref ?–14)
UROBILINOGEN FLD QL: NORMAL — SIGNIFICANT CHANGE UP
VALPROATE SERPL-MCNC: < 3.2 UG/ML — LOW (ref 50–100)
VARIANT LYMPHS # BLD: 0 % — SIGNIFICANT CHANGE UP
WBC # BLD: 12.27 K/UL — HIGH (ref 3.8–10.5)
WBC # BLD: 9.82 K/UL — SIGNIFICANT CHANGE UP (ref 3.8–10.5)
WBC # FLD AUTO: 12.27 K/UL — HIGH (ref 3.8–10.5)
WBC # FLD AUTO: 9.82 K/UL — SIGNIFICANT CHANGE UP (ref 3.8–10.5)
XANTHOCHROMIA: SIGNIFICANT CHANGE UP

## 2018-10-28 PROCEDURE — 72125 CT NECK SPINE W/O DYE: CPT | Mod: 26

## 2018-10-28 PROCEDURE — 71045 X-RAY EXAM CHEST 1 VIEW: CPT | Mod: 26

## 2018-10-28 PROCEDURE — 70450 CT HEAD/BRAIN W/O DYE: CPT | Mod: 26

## 2018-10-28 PROCEDURE — 74176 CT ABD & PELVIS W/O CONTRAST: CPT | Mod: 26

## 2018-10-28 PROCEDURE — 71250 CT THORAX DX C-: CPT | Mod: 26

## 2018-10-28 PROCEDURE — 71045 X-RAY EXAM CHEST 1 VIEW: CPT | Mod: 26,77

## 2018-10-28 PROCEDURE — 99291 CRITICAL CARE FIRST HOUR: CPT

## 2018-10-28 RX ORDER — NALOXONE HYDROCHLORIDE 4 MG/.1ML
0.4 SPRAY NASAL ONCE
Qty: 0 | Refills: 0 | Status: COMPLETED | OUTPATIENT
Start: 2018-10-28 | End: 2018-10-28

## 2018-10-28 RX ORDER — CEFTRIAXONE 500 MG/1
2 INJECTION, POWDER, FOR SOLUTION INTRAMUSCULAR; INTRAVENOUS ONCE
Qty: 0 | Refills: 0 | Status: COMPLETED | OUTPATIENT
Start: 2018-10-28 | End: 2018-10-28

## 2018-10-28 RX ORDER — LABETALOL HCL 100 MG
10 TABLET ORAL ONCE
Qty: 0 | Refills: 0 | Status: COMPLETED | OUTPATIENT
Start: 2018-10-28 | End: 2018-10-28

## 2018-10-28 RX ORDER — CEFTRIAXONE 500 MG/1
INJECTION, POWDER, FOR SOLUTION INTRAMUSCULAR; INTRAVENOUS
Qty: 0 | Refills: 0 | Status: DISCONTINUED | OUTPATIENT
Start: 2018-10-28 | End: 2018-10-29

## 2018-10-28 RX ORDER — SODIUM CHLORIDE 9 MG/ML
500 INJECTION, SOLUTION INTRAVENOUS ONCE
Qty: 0 | Refills: 0 | Status: COMPLETED | OUTPATIENT
Start: 2018-10-28 | End: 2018-10-28

## 2018-10-28 RX ORDER — SODIUM CHLORIDE 9 MG/ML
1000 INJECTION, SOLUTION INTRAVENOUS ONCE
Qty: 0 | Refills: 0 | Status: COMPLETED | OUTPATIENT
Start: 2018-10-28 | End: 2018-10-28

## 2018-10-28 RX ORDER — BACLOFEN 100 %
10 POWDER (GRAM) MISCELLANEOUS ONCE
Qty: 0 | Refills: 0 | Status: COMPLETED | OUTPATIENT
Start: 2018-10-28 | End: 2018-10-28

## 2018-10-28 RX ORDER — VANCOMYCIN HCL 1 G
1000 VIAL (EA) INTRAVENOUS EVERY 12 HOURS
Qty: 0 | Refills: 0 | Status: DISCONTINUED | OUTPATIENT
Start: 2018-10-28 | End: 2018-10-29

## 2018-10-28 RX ORDER — BACLOFEN 100 %
10 POWDER (GRAM) MISCELLANEOUS THREE TIMES A DAY
Qty: 0 | Refills: 0 | Status: DISCONTINUED | OUTPATIENT
Start: 2018-10-28 | End: 2018-11-01

## 2018-10-28 RX ORDER — ACETAMINOPHEN 500 MG
650 TABLET ORAL ONCE
Qty: 0 | Refills: 0 | Status: COMPLETED | OUTPATIENT
Start: 2018-10-28 | End: 2018-10-28

## 2018-10-28 RX ORDER — VANCOMYCIN HCL 1 G
1000 VIAL (EA) INTRAVENOUS ONCE
Qty: 0 | Refills: 0 | Status: COMPLETED | OUTPATIENT
Start: 2018-10-28 | End: 2018-10-28

## 2018-10-28 RX ORDER — INSULIN HUMAN 100 [IU]/ML
8 INJECTION, SOLUTION SUBCUTANEOUS ONCE
Qty: 0 | Refills: 0 | Status: DISCONTINUED | OUTPATIENT
Start: 2018-10-28 | End: 2018-10-28

## 2018-10-28 RX ORDER — AMLODIPINE BESYLATE 2.5 MG/1
1 TABLET ORAL
Qty: 0 | Refills: 0 | COMMUNITY

## 2018-10-28 RX ORDER — VALPROIC ACID (AS SODIUM SALT) 250 MG/5ML
500 SOLUTION, ORAL ORAL EVERY 12 HOURS
Qty: 0 | Refills: 0 | Status: DISCONTINUED | OUTPATIENT
Start: 2018-10-28 | End: 2018-10-29

## 2018-10-28 RX ORDER — MAGNESIUM SULFATE 500 MG/ML
1 VIAL (ML) INJECTION ONCE
Qty: 0 | Refills: 0 | Status: COMPLETED | OUTPATIENT
Start: 2018-10-28 | End: 2018-10-28

## 2018-10-28 RX ORDER — POTASSIUM CHLORIDE 20 MEQ
10 PACKET (EA) ORAL
Qty: 0 | Refills: 0 | Status: COMPLETED | OUTPATIENT
Start: 2018-10-28 | End: 2018-10-28

## 2018-10-28 RX ORDER — AMLODIPINE BESYLATE 2.5 MG/1
10 TABLET ORAL DAILY
Qty: 0 | Refills: 0 | Status: DISCONTINUED | OUTPATIENT
Start: 2018-10-28 | End: 2018-11-01

## 2018-10-28 RX ORDER — AMPICILLIN TRIHYDRATE 250 MG
2000 CAPSULE ORAL ONCE
Qty: 0 | Refills: 0 | Status: COMPLETED | OUTPATIENT
Start: 2018-10-28 | End: 2018-10-28

## 2018-10-28 RX ORDER — CALCIUM GLUCONATE 100 MG/ML
1 VIAL (ML) INTRAVENOUS ONCE
Qty: 0 | Refills: 0 | Status: DISCONTINUED | OUTPATIENT
Start: 2018-10-28 | End: 2018-10-28

## 2018-10-28 RX ORDER — HYDRALAZINE HCL 50 MG
10 TABLET ORAL ONCE
Qty: 0 | Refills: 0 | Status: COMPLETED | OUTPATIENT
Start: 2018-10-28 | End: 2018-10-28

## 2018-10-28 RX ORDER — NALOXONE HYDROCHLORIDE 4 MG/.1ML
0.4 SPRAY NASAL ONCE
Qty: 0 | Refills: 0 | Status: DISCONTINUED | OUTPATIENT
Start: 2018-10-28 | End: 2018-10-28

## 2018-10-28 RX ORDER — LEVETIRACETAM 250 MG/1
1000 TABLET, FILM COATED ORAL ONCE
Qty: 0 | Refills: 0 | Status: COMPLETED | OUTPATIENT
Start: 2018-10-28 | End: 2018-10-28

## 2018-10-28 RX ORDER — DEXTROSE 50 % IN WATER 50 %
50 SYRINGE (ML) INTRAVENOUS ONCE
Qty: 0 | Refills: 0 | Status: DISCONTINUED | OUTPATIENT
Start: 2018-10-28 | End: 2018-10-28

## 2018-10-28 RX ORDER — ASPIRIN/CALCIUM CARB/MAGNESIUM 324 MG
81 TABLET ORAL DAILY
Qty: 0 | Refills: 0 | Status: DISCONTINUED | OUTPATIENT
Start: 2018-10-28 | End: 2018-11-01

## 2018-10-28 RX ORDER — VANCOMYCIN HCL 1 G
VIAL (EA) INTRAVENOUS
Qty: 0 | Refills: 0 | Status: DISCONTINUED | OUTPATIENT
Start: 2018-10-28 | End: 2018-10-29

## 2018-10-28 RX ORDER — HYDRALAZINE HCL 50 MG
50 TABLET ORAL EVERY 6 HOURS
Qty: 0 | Refills: 0 | Status: DISCONTINUED | OUTPATIENT
Start: 2018-10-28 | End: 2018-10-29

## 2018-10-28 RX ORDER — CEFTRIAXONE 500 MG/1
2 INJECTION, POWDER, FOR SOLUTION INTRAMUSCULAR; INTRAVENOUS EVERY 24 HOURS
Qty: 0 | Refills: 0 | Status: DISCONTINUED | OUTPATIENT
Start: 2018-10-29 | End: 2018-10-29

## 2018-10-28 RX ADMIN — NALOXONE HYDROCHLORIDE 0.4 MILLIGRAM(S): 4 SPRAY NASAL at 20:53

## 2018-10-28 RX ADMIN — Medication 100 MILLIEQUIVALENT(S): at 15:08

## 2018-10-28 RX ADMIN — SODIUM CHLORIDE 1000 MILLILITER(S): 9 INJECTION, SOLUTION INTRAVENOUS at 16:30

## 2018-10-28 RX ADMIN — Medication 100 MILLIEQUIVALENT(S): at 17:36

## 2018-10-28 RX ADMIN — LEVETIRACETAM 400 MILLIGRAM(S): 250 TABLET, FILM COATED ORAL at 12:07

## 2018-10-28 RX ADMIN — Medication 10 MILLIGRAM(S): at 20:53

## 2018-10-28 RX ADMIN — Medication 50 MILLIGRAM(S): at 22:19

## 2018-10-28 RX ADMIN — Medication 216 MILLIGRAM(S): at 20:53

## 2018-10-28 RX ADMIN — SODIUM CHLORIDE 1000 MILLILITER(S): 9 INJECTION, SOLUTION INTRAVENOUS at 18:29

## 2018-10-28 RX ADMIN — Medication 100 MILLIEQUIVALENT(S): at 16:26

## 2018-10-28 RX ADMIN — Medication 100 GRAM(S): at 15:08

## 2018-10-28 RX ADMIN — CEFTRIAXONE 100 GRAM(S): 500 INJECTION, POWDER, FOR SOLUTION INTRAMUSCULAR; INTRAVENOUS at 13:13

## 2018-10-28 RX ADMIN — Medication 650 MILLIGRAM(S): at 18:17

## 2018-10-28 RX ADMIN — Medication 250 MILLIGRAM(S): at 13:52

## 2018-10-28 RX ADMIN — Medication 10 MILLIGRAM(S): at 16:48

## 2018-10-28 RX ADMIN — SODIUM CHLORIDE 16.67 MILLILITER(S): 9 INJECTION, SOLUTION INTRAVENOUS at 13:13

## 2018-10-28 RX ADMIN — Medication 650 MILLIGRAM(S): at 15:15

## 2018-10-28 RX ADMIN — Medication 10 MILLIGRAM(S): at 13:54

## 2018-10-28 RX ADMIN — Medication 1 MILLIGRAM(S): at 15:08

## 2018-10-28 NOTE — ED PROVIDER NOTE - ATTENDING CONTRIBUTION TO CARE
DR. BLOCH, ATTENDING MD-  I performed a face to face bedside interview with patient regarding history of present illness, review of symptoms and past medical history. I completed an independent physical exam.  I have discussed patient's plan of care with the resident.   Patient lethargic arousable to voice, but not cooperative. Pupils mid position, not cooperative to light exposure. generally rigid arms and neck. heart sounds nml lungs clear, abd soft nontender.  some blood in mouth, unable to open mouth. lower ext paralysed.
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
## 2018-10-28 NOTE — ED PROVIDER NOTE - CARE PLAN
Principal Discharge DX:	Fever Principal Discharge DX:	Altered mental status  Secondary Diagnosis:	Fever  Secondary Diagnosis:	Withdrawal from sedative, hypnotic, or anxiolytic drug
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
## 2018-10-28 NOTE — H&P ADULT - NSHPPHYSICALEXAM_GEN_ALL_CORE
VS: T 100.2, /100, HR 91, RR 18, 98% on RA  General: Lethargic   Skin: Warm and dry  Neuro: Responsive only to tactile stimuli, opens eyes to tactile stimuli, hyperreflexic, upgoing Babibski, reigid but also seems to resist movement of extremities  HEENT: Pinpoint pupils minimally reactive, dentures removed, dried blood around oropharynx  Neck: Full range of motion, no JVD  Lungs: Clear to ascultation bilaterally, no wheezes, rales, or rhonchi   Heart: Regular rate and rhythm, no murmurs  Abdomen: Normoactive bowl sounds, soft, nontender, nondistended  Extremities: No lower extremity tenderness, erythema, or edema   Vascular: 2+ radial and pedal pulses  Lymph: no cervical lymphadenopathy
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
## 2018-10-28 NOTE — ED PROVIDER NOTE - PROGRESS NOTE DETAILS
ED Attending: Arturo Lerner signed out to me from Dr. Bloch to follow up and reassess.  Per report dx unknown.  DDx included seizure, opiate OD, Baclofen withdrawal, HTN emergency, sepsis, meningitis, encephalitis, and others.  Mental dtatus did not change with BP control.  Also, remained rigid despite BDZ given earlier, which reportedly made pt more somnolent. Had initially, per report, been answering simple q's, now responding only to pain.  CTH was unrevealing. ED Attending: Arturo  Pt signed out to me from Dr. Bloch to follow up and reassess.  Per report dx unknown.  DDx included seizure, opiate OD, Baclofen withdrawal, HTN emergency, sepsis, meningitis, encephalitis, and others.  Mental status did not change with BP control.  Also, remained rigid despite BDZ given earlier, which reportedly made pt more somnolent. Had initially, per report, been answering simple q's, now responding only to pain.  CTH was unrevealing. Tox c/s recc NGT and to give usual Baclofen dose given urinary retention, rigidity, fever, AMS.  Incidentally, pt not known to be on antipsychotics, SSRI, or other illicit drugs.  NGT revealed coffee ground emesis.  Seen by and accepted to MICU. We will also order CTAP given dilated loops of bowel and coffee grounds.  Final report to be followed by MICU team. ED Attending: Arturo  Pt signed out to me from Dr. Bloch to follow up and reassess.  Per report dx unknown.  DDx included seizure, opiate OD, Baclofen withdrawal, HTN emergency, sepsis, meningitis, encephalitis, and others.  Opiate OD also considered given pinpoint pupils and availability, however pt known to be on opiates and no h/o suicidality or OD.  Naloxone deferred d/t concern for withdrawal potentiation and pt not hypoventilating. Mental status did not change with BP control.  Also, remained rigid despite BDZ given earlier, which reportedly made pt more somnolent. Had initially, per report, been answering simple q's, now responding only to pain.  CTH was unrevealing. Tox c/s recc NGT and to give usual Baclofen dose given urinary retention, rigidity, fever, AMS.  Incidentally, pt not known to be on antipsychotics, SSRI, or other illicit drugs.  NGT revealed coffee ground emesis.  Seen by and accepted to MICU. We will also order CTAP given dilated loops of bowel and coffee grounds.  Final report to be followed by MICU team.
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
## 2018-10-28 NOTE — ED ADULT NURSE NOTE - OBJECTIVE STATEMENT
Pt received a&ox0, responsive to verbal and tactile stimuli, as per EMS and aid pt was last seen normal yesterday at 1pm, today aid heard pt scream and found him altered in bed, denies fall, pt w hx of seizure, pt spitting out blood, unable to assess tongue, pt had [place] cath in place, dcd, pt skin with redness to sacrum, pt unable to answer questions at present time, as per aid pt is paraplegic and able a&ox3 and able to maintain conversation at baseline, MD franklin performed pt placed on monitor, noted to be febrile rectally to 100.8, md aware, will continue to monitor.

## 2018-10-28 NOTE — ED ADULT NURSE NOTE - NSIMPLEMENTINTERV_GEN_ALL_ED
Implemented All Fall with Harm Risk Interventions:  [place] to call system. Call bell, personal items and telephone within reach. Instruct patient to call for assistance. Room bathroom lighting operational. Non-slip footwear when patient is off stretcher. Physically safe environment: no spills, clutter or unnecessary equipment. Stretcher in lowest position, wheels locked, appropriate side rails in place. Provide visual cue, wrist band, yellow gown, etc. Monitor gait and stability. Monitor for mental status changes and reorient to person, place, and time. Review medications for side effects contributing to fall risk. Reinforce activity limits and safety measures with patient and family. Provide visual clues: red socks.

## 2018-10-28 NOTE — ED ADULT NURSE REASSESSMENT NOTE - NS ED NURSE REASSESS COMMENT FT1
notified of abnormal labs, MD made aware, EKG requested, MICU at bedside, pt on monitor, BMP added on blood work, will continue to monitor.

## 2018-10-28 NOTE — H&P ADULT - NSHPLABSRESULTS_GEN_ALL_CORE
11.2   9.82  )-----------( 181      ( 28 Oct 2018 23:24 )             34.3     10-28    124<L>  |  93<L>  |  10  ----------------------------<  91  > 10.0<HH>   |  20<L>  |  0.40<L>    Ca    7.4<L>      28 Oct 2018 16:26  Phos  2.7     10-28  Mg     2.3     10-28    TPro  4.7<L>  /  Alb  2.4<L>  /  TBili  < 0.2<L>  /  DBili  x   /  AST  30  /  ALT  16  /  AlkPhos  65  10-28    PT/INR - ( 28 Oct 2018 13:00 )   PT: 12.2 SEC;   INR: 1.10          PTT - ( 28 Oct 2018 13:00 )  PTT:32.9 SEC  CARDIAC MARKERS ( 28 Oct 2018 16:26 )  x     / x     / 74 u/L / x     / x      CARDIAC MARKERS ( 28 Oct 2018 13:00 )  x     / x     / 124 u/L / x     / x          ABG - ( 28 Oct 2018 20:47 )  pH, Arterial: 7.50  pH, Blood: x     /  pCO2: 40    /  pO2: 64    / HCO3: 31    / Base Excess: 7.3   /  SaO2: 93.1          Urinalysis Basic - ( 28 Oct 2018 12:50 )    Color: LIGHT YELLOW / Appearance: CLEAR / S.013 / pH: 7.5  Gluc: NEGATIVE / Ketone: NEGATIVE  / Bili: NEGATIVE / Urobili: NORMAL   Blood: NEGATIVE / Protein: 10 / Nitrite: NEGATIVE   Leuk Esterase: NEGATIVE / RBC: x / WBC x   Sq Epi: x / Non Sq Epi: x / Bacteria: x        Culture - CSF with Gram Stain (collected 28 Oct 2018 16:40)  Source: CEREBRAL SPINAL FLUID      < from: CT Head No Cont (10.28.18 @ 14:39) >    Head:  No acute intracranial hemorrhage or mass effect.    Nonspecific white matter changes which may reflect microvascular disease.    Cervical spine:  No acute fracture or traumatic malalignment of the cervical spine.     < end of copied text >

## 2018-10-28 NOTE — ED ADULT TRIAGE NOTE - CHIEF COMPLAINT QUOTE
Pt with altered mental status. Last seen normal 1 pm yesterday. Pt found to be vomiting brown emesis.

## 2018-10-28 NOTE — PROVIDER CONTACT NOTE (CRITICAL VALUE NOTIFICATION) - SITUATION
Patient K on ABG 2.7; MD Morris aware of results, to hold and d/c orders for insulin, calcium, dextrose per MD. Will monitor.

## 2018-10-28 NOTE — ED PROCEDURE NOTE - CPROC ED GASTRIC INTUB DETAIL1
Gastric tube connected to low continuous suction./The nasogastric tube (see size above) was inserted via the anatomic location./coffeeground contents aspirated

## 2018-10-28 NOTE — ED ADULT NURSE REASSESSMENT NOTE - NS ED NURSE REASSESS COMMENT FT1
break coverage RN: pt responsive to pain only at this time, VSS, BP responding to tmt, O2 sat 96% RA, potassium and mag sulfate infusing as ordered, will continue to monitor.

## 2018-10-28 NOTE — ED PROCEDURE NOTE - ATTENDING CONTRIBUTION TO CARE
Attending Attestation: Dr. Arturo CHANDLER supervised the resident and was personally present during key portions of the procedure.
lp supervised- clear fluid obtained

## 2018-10-28 NOTE — ED ADULT NURSE REASSESSMENT NOTE - NS ED NURSE REASSESS COMMENT FT1
pt noted to be less responsive than on arrival, MD bloch and sukhjinder made aware, eval performed, pt maintaining airway sating 98% on room air, pending ct read, on monitor, will continue to monitor.
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
## 2018-10-29 LAB
ALBUMIN SERPL ELPH-MCNC: 3.6 G/DL — SIGNIFICANT CHANGE UP (ref 3.3–5)
ALBUMIN SERPL ELPH-MCNC: 3.8 G/DL — SIGNIFICANT CHANGE UP (ref 3.3–5)
ALP SERPL-CCNC: 90 U/L — SIGNIFICANT CHANGE UP (ref 40–120)
ALP SERPL-CCNC: 96 U/L — SIGNIFICANT CHANGE UP (ref 40–120)
ALT FLD-CCNC: 28 U/L — SIGNIFICANT CHANGE UP (ref 4–41)
ALT FLD-CCNC: 28 U/L — SIGNIFICANT CHANGE UP (ref 4–41)
AMMONIA BLD-MCNC: 30 UMOL/L — SIGNIFICANT CHANGE UP (ref 11–55)
AMPHET UR-MCNC: NEGATIVE — SIGNIFICANT CHANGE UP
AST SERPL-CCNC: 39 U/L — SIGNIFICANT CHANGE UP (ref 4–40)
AST SERPL-CCNC: 42 U/L — HIGH (ref 4–40)
BARBITURATES UR SCN-MCNC: NEGATIVE — SIGNIFICANT CHANGE UP
BASOPHILS # BLD AUTO: 0.01 K/UL — SIGNIFICANT CHANGE UP (ref 0–0.2)
BASOPHILS NFR BLD AUTO: 0.1 % — SIGNIFICANT CHANGE UP (ref 0–2)
BENZODIAZ UR-MCNC: NEGATIVE — SIGNIFICANT CHANGE UP
BILIRUB SERPL-MCNC: 0.4 MG/DL — SIGNIFICANT CHANGE UP (ref 0.2–1.2)
BILIRUB SERPL-MCNC: 0.5 MG/DL — SIGNIFICANT CHANGE UP (ref 0.2–1.2)
BUN SERPL-MCNC: 12 MG/DL — SIGNIFICANT CHANGE UP (ref 7–23)
BUN SERPL-MCNC: 13 MG/DL — SIGNIFICANT CHANGE UP (ref 7–23)
CALCIUM SERPL-MCNC: 9.1 MG/DL — SIGNIFICANT CHANGE UP (ref 8.4–10.5)
CALCIUM SERPL-MCNC: 9.2 MG/DL — SIGNIFICANT CHANGE UP (ref 8.4–10.5)
CANNABINOIDS UR-MCNC: NEGATIVE — SIGNIFICANT CHANGE UP
CHLORIDE SERPL-SCNC: 92 MMOL/L — LOW (ref 98–107)
CHLORIDE SERPL-SCNC: 95 MMOL/L — LOW (ref 98–107)
CHLORIDE UR-SCNC: 69 MMOL/L — SIGNIFICANT CHANGE UP
CO2 SERPL-SCNC: 26 MMOL/L — SIGNIFICANT CHANGE UP (ref 22–31)
CO2 SERPL-SCNC: 27 MMOL/L — SIGNIFICANT CHANGE UP (ref 22–31)
COCAINE METAB.OTHER UR-MCNC: NEGATIVE — SIGNIFICANT CHANGE UP
CREAT ?TM UR-MCNC: 24.2 MG/DL — SIGNIFICANT CHANGE UP
CREAT SERPL-MCNC: 0.48 MG/DL — LOW (ref 0.5–1.3)
CREAT SERPL-MCNC: 0.55 MG/DL — SIGNIFICANT CHANGE UP (ref 0.5–1.3)
EOSINOPHIL # BLD AUTO: 0 K/UL — SIGNIFICANT CHANGE UP (ref 0–0.5)
EOSINOPHIL NFR BLD AUTO: 0 % — SIGNIFICANT CHANGE UP (ref 0–6)
GLUCOSE SERPL-MCNC: 117 MG/DL — HIGH (ref 70–99)
GLUCOSE SERPL-MCNC: 65 MG/DL — LOW (ref 70–99)
HCT VFR BLD CALC: 36.6 % — LOW (ref 39–50)
HGB BLD-MCNC: 11.6 G/DL — LOW (ref 13–17)
IMM GRANULOCYTES # BLD AUTO: 0.03 # — SIGNIFICANT CHANGE UP
IMM GRANULOCYTES NFR BLD AUTO: 0.3 % — SIGNIFICANT CHANGE UP (ref 0–1.5)
LYMPHOCYTES # BLD AUTO: 1.32 K/UL — SIGNIFICANT CHANGE UP (ref 1–3.3)
LYMPHOCYTES # BLD AUTO: 14.1 % — SIGNIFICANT CHANGE UP (ref 13–44)
MAGNESIUM SERPL-MCNC: 2.1 MG/DL — SIGNIFICANT CHANGE UP (ref 1.6–2.6)
MAGNESIUM SERPL-MCNC: 2.4 MG/DL — SIGNIFICANT CHANGE UP (ref 1.6–2.6)
MCHC RBC-ENTMCNC: 28.6 PG — SIGNIFICANT CHANGE UP (ref 27–34)
MCHC RBC-ENTMCNC: 31.7 % — LOW (ref 32–36)
MCV RBC AUTO: 90.4 FL — SIGNIFICANT CHANGE UP (ref 80–100)
METHADONE UR-MCNC: POSITIVE — SIGNIFICANT CHANGE UP
METHOD TYPE: SIGNIFICANT CHANGE UP
MONOCYTES # BLD AUTO: 1.05 K/UL — HIGH (ref 0–0.9)
MONOCYTES NFR BLD AUTO: 11.2 % — SIGNIFICANT CHANGE UP (ref 2–14)
NEUTROPHILS # BLD AUTO: 6.97 K/UL — SIGNIFICANT CHANGE UP (ref 1.8–7.4)
NEUTROPHILS NFR BLD AUTO: 74.3 % — SIGNIFICANT CHANGE UP (ref 43–77)
NRBC # FLD: 0 — SIGNIFICANT CHANGE UP
OPIATES UR-MCNC: POSITIVE — SIGNIFICANT CHANGE UP
ORGANISM # SPEC MICROSCOPIC CNT: SIGNIFICANT CHANGE UP
ORGANISM # SPEC MICROSCOPIC CNT: SIGNIFICANT CHANGE UP
OSMOLALITY UR: 351 MOSMO/KG — SIGNIFICANT CHANGE UP (ref 50–1200)
OXYCODONE UR-MCNC: NEGATIVE — SIGNIFICANT CHANGE UP
PCP UR-MCNC: NEGATIVE — SIGNIFICANT CHANGE UP
PHOSPHATE SERPL-MCNC: 2 MG/DL — LOW (ref 2.5–4.5)
PHOSPHATE SERPL-MCNC: 2.7 MG/DL — SIGNIFICANT CHANGE UP (ref 2.5–4.5)
PLATELET # BLD AUTO: 194 K/UL — SIGNIFICANT CHANGE UP (ref 150–400)
PMV BLD: 11.8 FL — SIGNIFICANT CHANGE UP (ref 7–13)
POTASSIUM SERPL-MCNC: 2.7 MMOL/L — CRITICAL LOW (ref 3.5–5.3)
POTASSIUM SERPL-MCNC: 3.8 MMOL/L — SIGNIFICANT CHANGE UP (ref 3.5–5.3)
POTASSIUM SERPL-SCNC: 2.7 MMOL/L — CRITICAL LOW (ref 3.5–5.3)
POTASSIUM SERPL-SCNC: 3.8 MMOL/L — SIGNIFICANT CHANGE UP (ref 3.5–5.3)
POTASSIUM UR-SCNC: 38.8 MMOL/L — SIGNIFICANT CHANGE UP
PROT SERPL-MCNC: 7.1 G/DL — SIGNIFICANT CHANGE UP (ref 6–8.3)
PROT SERPL-MCNC: 7.1 G/DL — SIGNIFICANT CHANGE UP (ref 6–8.3)
RBC # BLD: 4.05 M/UL — LOW (ref 4.2–5.8)
RBC # FLD: 15.4 % — HIGH (ref 10.3–14.5)
SODIUM SERPL-SCNC: 134 MMOL/L — LOW (ref 135–145)
SODIUM SERPL-SCNC: 138 MMOL/L — SIGNIFICANT CHANGE UP (ref 135–145)
SODIUM UR-SCNC: 86 MMOL/L — SIGNIFICANT CHANGE UP
SPECIMEN SOURCE: SIGNIFICANT CHANGE UP
SPECIMEN SOURCE: SIGNIFICANT CHANGE UP
UUN UR-MCNC: 309.4 MG/DL — SIGNIFICANT CHANGE UP
WBC # BLD: 9.38 K/UL — SIGNIFICANT CHANGE UP (ref 3.8–10.5)
WBC # FLD AUTO: 9.38 K/UL — SIGNIFICANT CHANGE UP (ref 3.8–10.5)

## 2018-10-29 PROCEDURE — 99291 CRITICAL CARE FIRST HOUR: CPT

## 2018-10-29 PROCEDURE — 99222 1ST HOSP IP/OBS MODERATE 55: CPT | Mod: GC

## 2018-10-29 RX ORDER — PIPERACILLIN AND TAZOBACTAM 4; .5 G/20ML; G/20ML
3.38 INJECTION, POWDER, LYOPHILIZED, FOR SOLUTION INTRAVENOUS EVERY 8 HOURS
Qty: 0 | Refills: 0 | Status: DISCONTINUED | OUTPATIENT
Start: 2018-10-29 | End: 2018-10-29

## 2018-10-29 RX ORDER — PIPERACILLIN AND TAZOBACTAM 4; .5 G/20ML; G/20ML
3.38 INJECTION, POWDER, LYOPHILIZED, FOR SOLUTION INTRAVENOUS EVERY 12 HOURS
Qty: 0 | Refills: 0 | Status: DISCONTINUED | OUTPATIENT
Start: 2018-10-29 | End: 2018-10-29

## 2018-10-29 RX ORDER — PIPERACILLIN AND TAZOBACTAM 4; .5 G/20ML; G/20ML
3.38 INJECTION, POWDER, LYOPHILIZED, FOR SOLUTION INTRAVENOUS ONCE
Qty: 0 | Refills: 0 | Status: COMPLETED | OUTPATIENT
Start: 2018-10-29 | End: 2018-10-29

## 2018-10-29 RX ORDER — ENOXAPARIN SODIUM 100 MG/ML
40 INJECTION SUBCUTANEOUS DAILY
Qty: 0 | Refills: 0 | Status: DISCONTINUED | OUTPATIENT
Start: 2018-10-29 | End: 2018-11-01

## 2018-10-29 RX ORDER — DIVALPROEX SODIUM 500 MG/1
500 TABLET, DELAYED RELEASE ORAL EVERY 12 HOURS
Qty: 0 | Refills: 0 | Status: DISCONTINUED | OUTPATIENT
Start: 2018-10-29 | End: 2018-11-01

## 2018-10-29 RX ORDER — VANCOMYCIN HCL 1 G
1000 VIAL (EA) INTRAVENOUS EVERY 12 HOURS
Qty: 0 | Refills: 0 | Status: DISCONTINUED | OUTPATIENT
Start: 2018-10-30 | End: 2018-10-31

## 2018-10-29 RX ORDER — NALOXONE HYDROCHLORIDE 4 MG/.1ML
0.4 SPRAY NASAL ONCE
Qty: 0 | Refills: 0 | Status: DISCONTINUED | OUTPATIENT
Start: 2018-10-29 | End: 2018-10-29

## 2018-10-29 RX ORDER — POTASSIUM CHLORIDE 20 MEQ
10 PACKET (EA) ORAL
Qty: 0 | Refills: 0 | Status: COMPLETED | OUTPATIENT
Start: 2018-10-29 | End: 2018-10-29

## 2018-10-29 RX ORDER — POTASSIUM CHLORIDE 20 MEQ
40 PACKET (EA) ORAL ONCE
Qty: 0 | Refills: 0 | Status: COMPLETED | OUTPATIENT
Start: 2018-10-29 | End: 2018-10-29

## 2018-10-29 RX ORDER — POTASSIUM CHLORIDE 20 MEQ
40 PACKET (EA) ORAL ONCE
Qty: 0 | Refills: 0 | Status: DISCONTINUED | OUTPATIENT
Start: 2018-10-29 | End: 2018-10-29

## 2018-10-29 RX ORDER — VANCOMYCIN HCL 1 G
1000 VIAL (EA) INTRAVENOUS ONCE
Qty: 0 | Refills: 0 | Status: COMPLETED | OUTPATIENT
Start: 2018-10-29 | End: 2018-10-29

## 2018-10-29 RX ORDER — HYDRALAZINE HCL 50 MG
50 TABLET ORAL
Qty: 0 | Refills: 0 | Status: DISCONTINUED | OUTPATIENT
Start: 2018-10-29 | End: 2018-11-01

## 2018-10-29 RX ORDER — VANCOMYCIN HCL 1 G
VIAL (EA) INTRAVENOUS
Qty: 0 | Refills: 0 | Status: DISCONTINUED | OUTPATIENT
Start: 2018-10-29 | End: 2018-10-29

## 2018-10-29 RX ORDER — PIPERACILLIN AND TAZOBACTAM 4; .5 G/20ML; G/20ML
3.38 INJECTION, POWDER, LYOPHILIZED, FOR SOLUTION INTRAVENOUS EVERY 8 HOURS
Qty: 0 | Refills: 0 | Status: DISCONTINUED | OUTPATIENT
Start: 2018-10-29 | End: 2018-10-31

## 2018-10-29 RX ORDER — AZITHROMYCIN 500 MG/1
500 TABLET, FILM COATED ORAL EVERY 24 HOURS
Qty: 0 | Refills: 0 | Status: DISCONTINUED | OUTPATIENT
Start: 2018-10-29 | End: 2018-10-29

## 2018-10-29 RX ADMIN — Medication 50 MILLIGRAM(S): at 17:12

## 2018-10-29 RX ADMIN — ENOXAPARIN SODIUM 40 MILLIGRAM(S): 100 INJECTION SUBCUTANEOUS at 12:23

## 2018-10-29 RX ADMIN — Medication 100 MILLIEQUIVALENT(S): at 02:14

## 2018-10-29 RX ADMIN — Medication 250 MILLIGRAM(S): at 00:20

## 2018-10-29 RX ADMIN — Medication 27.5 MILLIGRAM(S): at 00:20

## 2018-10-29 RX ADMIN — Medication 100 MILLIEQUIVALENT(S): at 01:10

## 2018-10-29 RX ADMIN — Medication 40 MILLIEQUIVALENT(S): at 01:31

## 2018-10-29 RX ADMIN — Medication 10 MILLIGRAM(S): at 21:07

## 2018-10-29 RX ADMIN — AMLODIPINE BESYLATE 10 MILLIGRAM(S): 2.5 TABLET ORAL at 00:28

## 2018-10-29 RX ADMIN — Medication 250 MILLIGRAM(S): at 13:06

## 2018-10-29 RX ADMIN — PIPERACILLIN AND TAZOBACTAM 25 GRAM(S): 4; .5 INJECTION, POWDER, LYOPHILIZED, FOR SOLUTION INTRAVENOUS at 12:26

## 2018-10-29 RX ADMIN — Medication 50 MILLIGRAM(S): at 12:23

## 2018-10-29 RX ADMIN — PIPERACILLIN AND TAZOBACTAM 200 GRAM(S): 4; .5 INJECTION, POWDER, LYOPHILIZED, FOR SOLUTION INTRAVENOUS at 02:37

## 2018-10-29 RX ADMIN — DIVALPROEX SODIUM 500 MILLIGRAM(S): 500 TABLET, DELAYED RELEASE ORAL at 17:11

## 2018-10-29 RX ADMIN — PIPERACILLIN AND TAZOBACTAM 25 GRAM(S): 4; .5 INJECTION, POWDER, LYOPHILIZED, FOR SOLUTION INTRAVENOUS at 09:26

## 2018-10-29 RX ADMIN — Medication 81 MILLIGRAM(S): at 12:23

## 2018-10-29 RX ADMIN — PIPERACILLIN AND TAZOBACTAM 25 GRAM(S): 4; .5 INJECTION, POWDER, LYOPHILIZED, FOR SOLUTION INTRAVENOUS at 21:07

## 2018-10-29 RX ADMIN — Medication 100 MILLIEQUIVALENT(S): at 03:30

## 2018-10-29 RX ADMIN — AZITHROMYCIN 250 MILLIGRAM(S): 500 TABLET, FILM COATED ORAL at 03:49

## 2018-10-29 RX ADMIN — Medication 10 MILLIGRAM(S): at 13:06

## 2018-10-29 NOTE — PHARMACOTHERAPY INTERVENTION NOTE - COMMENTS
Discussed with MICU team.  Recommend switching IV valproic acid to PO divalproex  mg BID as this is patients home regimen.  Additionally IV valproic acid would ideally be dosed with a frequency of Q6H to maintain adequate serum drug levels.
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
## 2018-10-30 DIAGNOSIS — R29.898 OTHER SYMPTOMS AND SIGNS INVOLVING THE MUSCULOSKELETAL SYSTEM: ICD-10-CM

## 2018-10-30 DIAGNOSIS — I10 ESSENTIAL (PRIMARY) HYPERTENSION: ICD-10-CM

## 2018-10-30 DIAGNOSIS — G82.20 PARAPLEGIA, UNSPECIFIED: ICD-10-CM

## 2018-10-30 DIAGNOSIS — Z29.9 ENCOUNTER FOR PROPHYLACTIC MEASURES, UNSPECIFIED: ICD-10-CM

## 2018-10-30 DIAGNOSIS — L89.159 PRESSURE ULCER OF SACRAL REGION, UNSPECIFIED STAGE: ICD-10-CM

## 2018-10-30 DIAGNOSIS — G40.909 EPILEPSY, UNSPECIFIED, NOT INTRACTABLE, WITHOUT STATUS EPILEPTICUS: ICD-10-CM

## 2018-10-30 DIAGNOSIS — R78.81 BACTEREMIA: ICD-10-CM

## 2018-10-30 LAB
ALBUMIN SERPL ELPH-MCNC: 3.7 G/DL — SIGNIFICANT CHANGE UP (ref 3.3–5)
ALP SERPL-CCNC: 87 U/L — SIGNIFICANT CHANGE UP (ref 40–120)
ALT FLD-CCNC: 23 U/L — SIGNIFICANT CHANGE UP (ref 4–41)
AST SERPL-CCNC: 35 U/L — SIGNIFICANT CHANGE UP (ref 4–40)
BACTERIA UR CULT: SIGNIFICANT CHANGE UP
BASOPHILS # BLD AUTO: 0.03 K/UL — SIGNIFICANT CHANGE UP (ref 0–0.2)
BASOPHILS NFR BLD AUTO: 0.4 % — SIGNIFICANT CHANGE UP (ref 0–2)
BILIRUB SERPL-MCNC: 0.6 MG/DL — SIGNIFICANT CHANGE UP (ref 0.2–1.2)
BUN SERPL-MCNC: 11 MG/DL — SIGNIFICANT CHANGE UP (ref 7–23)
CALCIUM SERPL-MCNC: 9.2 MG/DL — SIGNIFICANT CHANGE UP (ref 8.4–10.5)
CHLORIDE SERPL-SCNC: 96 MMOL/L — LOW (ref 98–107)
CO2 SERPL-SCNC: 28 MMOL/L — SIGNIFICANT CHANGE UP (ref 22–31)
CREAT SERPL-MCNC: 0.7 MG/DL — SIGNIFICANT CHANGE UP (ref 0.5–1.3)
EOSINOPHIL # BLD AUTO: 0.07 K/UL — SIGNIFICANT CHANGE UP (ref 0–0.5)
EOSINOPHIL NFR BLD AUTO: 0.9 % — SIGNIFICANT CHANGE UP (ref 0–6)
GLUCOSE SERPL-MCNC: 72 MG/DL — SIGNIFICANT CHANGE UP (ref 70–99)
HCT VFR BLD CALC: 36 % — LOW (ref 39–50)
HGB BLD-MCNC: 11.5 G/DL — LOW (ref 13–17)
IMM GRANULOCYTES # BLD AUTO: 0.02 # — SIGNIFICANT CHANGE UP
IMM GRANULOCYTES NFR BLD AUTO: 0.3 % — SIGNIFICANT CHANGE UP (ref 0–1.5)
LYMPHOCYTES # BLD AUTO: 1.98 K/UL — SIGNIFICANT CHANGE UP (ref 1–3.3)
LYMPHOCYTES # BLD AUTO: 26 % — SIGNIFICANT CHANGE UP (ref 13–44)
MAGNESIUM SERPL-MCNC: 2.2 MG/DL — SIGNIFICANT CHANGE UP (ref 1.6–2.6)
MCHC RBC-ENTMCNC: 28 PG — SIGNIFICANT CHANGE UP (ref 27–34)
MCHC RBC-ENTMCNC: 31.9 % — LOW (ref 32–36)
MCV RBC AUTO: 87.6 FL — SIGNIFICANT CHANGE UP (ref 80–100)
MONOCYTES # BLD AUTO: 0.75 K/UL — SIGNIFICANT CHANGE UP (ref 0–0.9)
MONOCYTES NFR BLD AUTO: 9.8 % — SIGNIFICANT CHANGE UP (ref 2–14)
NEUTROPHILS # BLD AUTO: 4.78 K/UL — SIGNIFICANT CHANGE UP (ref 1.8–7.4)
NEUTROPHILS NFR BLD AUTO: 62.6 % — SIGNIFICANT CHANGE UP (ref 43–77)
NRBC # FLD: 0 — SIGNIFICANT CHANGE UP
ORGANISM # SPEC MICROSCOPIC CNT: SIGNIFICANT CHANGE UP
PHOSPHATE SERPL-MCNC: 2.9 MG/DL — SIGNIFICANT CHANGE UP (ref 2.5–4.5)
PLATELET # BLD AUTO: 199 K/UL — SIGNIFICANT CHANGE UP (ref 150–400)
PMV BLD: 11.4 FL — SIGNIFICANT CHANGE UP (ref 7–13)
POTASSIUM SERPL-MCNC: 2.9 MMOL/L — CRITICAL LOW (ref 3.5–5.3)
POTASSIUM SERPL-SCNC: 2.9 MMOL/L — CRITICAL LOW (ref 3.5–5.3)
PROT SERPL-MCNC: 6.8 G/DL — SIGNIFICANT CHANGE UP (ref 6–8.3)
RBC # BLD: 4.11 M/UL — LOW (ref 4.2–5.8)
RBC # FLD: 15.3 % — HIGH (ref 10.3–14.5)
SODIUM SERPL-SCNC: 137 MMOL/L — SIGNIFICANT CHANGE UP (ref 135–145)
SPECIMEN SOURCE: SIGNIFICANT CHANGE UP
VANCOMYCIN TROUGH SERPL-MCNC: 10.5 UG/ML — SIGNIFICANT CHANGE UP (ref 10–20)
WBC # BLD: 7.63 K/UL — SIGNIFICANT CHANGE UP (ref 3.8–10.5)
WBC # FLD AUTO: 7.63 K/UL — SIGNIFICANT CHANGE UP (ref 3.8–10.5)

## 2018-10-30 PROCEDURE — 99233 SBSQ HOSP IP/OBS HIGH 50: CPT | Mod: GC

## 2018-10-30 PROCEDURE — 99223 1ST HOSP IP/OBS HIGH 75: CPT

## 2018-10-30 RX ORDER — GABAPENTIN 400 MG/1
300 CAPSULE ORAL THREE TIMES A DAY
Qty: 0 | Refills: 0 | Status: DISCONTINUED | OUTPATIENT
Start: 2018-10-30 | End: 2018-11-01

## 2018-10-30 RX ORDER — ATENOLOL 25 MG/1
50 TABLET ORAL DAILY
Qty: 0 | Refills: 0 | Status: DISCONTINUED | OUTPATIENT
Start: 2018-10-30 | End: 2018-11-01

## 2018-10-30 RX ORDER — POTASSIUM CHLORIDE 20 MEQ
20 PACKET (EA) ORAL ONCE
Qty: 0 | Refills: 0 | Status: COMPLETED | OUTPATIENT
Start: 2018-10-30 | End: 2018-10-30

## 2018-10-30 RX ORDER — POTASSIUM CHLORIDE 20 MEQ
40 PACKET (EA) ORAL ONCE
Qty: 0 | Refills: 0 | Status: COMPLETED | OUTPATIENT
Start: 2018-10-30 | End: 2018-10-30

## 2018-10-30 RX ORDER — ATENOLOL 25 MG/1
1 TABLET ORAL
Qty: 0 | Refills: 0 | COMMUNITY

## 2018-10-30 RX ORDER — DOCUSATE SODIUM 100 MG
100 CAPSULE ORAL THREE TIMES A DAY
Qty: 0 | Refills: 0 | Status: DISCONTINUED | OUTPATIENT
Start: 2018-10-30 | End: 2018-11-01

## 2018-10-30 RX ORDER — ATENOLOL 25 MG/1
50 TABLET ORAL DAILY
Qty: 0 | Refills: 0 | Status: DISCONTINUED | OUTPATIENT
Start: 2018-10-30 | End: 2018-10-30

## 2018-10-30 RX ADMIN — DIVALPROEX SODIUM 500 MILLIGRAM(S): 500 TABLET, DELAYED RELEASE ORAL at 17:39

## 2018-10-30 RX ADMIN — DIVALPROEX SODIUM 500 MILLIGRAM(S): 500 TABLET, DELAYED RELEASE ORAL at 05:32

## 2018-10-30 RX ADMIN — Medication 10 MILLIGRAM(S): at 13:50

## 2018-10-30 RX ADMIN — Medication 100 MILLIGRAM(S): at 13:49

## 2018-10-30 RX ADMIN — Medication 100 MILLIGRAM(S): at 22:55

## 2018-10-30 RX ADMIN — Medication 81 MILLIGRAM(S): at 12:19

## 2018-10-30 RX ADMIN — GABAPENTIN 300 MILLIGRAM(S): 400 CAPSULE ORAL at 22:54

## 2018-10-30 RX ADMIN — ATENOLOL 50 MILLIGRAM(S): 25 TABLET ORAL at 17:39

## 2018-10-30 RX ADMIN — Medication 20 MILLIEQUIVALENT(S): at 17:39

## 2018-10-30 RX ADMIN — PIPERACILLIN AND TAZOBACTAM 25 GRAM(S): 4; .5 INJECTION, POWDER, LYOPHILIZED, FOR SOLUTION INTRAVENOUS at 16:20

## 2018-10-30 RX ADMIN — ENOXAPARIN SODIUM 40 MILLIGRAM(S): 100 INJECTION SUBCUTANEOUS at 12:19

## 2018-10-30 RX ADMIN — Medication 10 MILLIGRAM(S): at 22:52

## 2018-10-30 RX ADMIN — AMLODIPINE BESYLATE 10 MILLIGRAM(S): 2.5 TABLET ORAL at 05:32

## 2018-10-30 RX ADMIN — Medication 40 MILLIEQUIVALENT(S): at 12:18

## 2018-10-30 RX ADMIN — Medication 10 MILLIGRAM(S): at 05:31

## 2018-10-30 RX ADMIN — Medication 50 MILLIGRAM(S): at 17:39

## 2018-10-30 RX ADMIN — Medication 250 MILLIGRAM(S): at 13:43

## 2018-10-30 RX ADMIN — PIPERACILLIN AND TAZOBACTAM 25 GRAM(S): 4; .5 INJECTION, POWDER, LYOPHILIZED, FOR SOLUTION INTRAVENOUS at 23:30

## 2018-10-30 RX ADMIN — Medication 50 MILLIGRAM(S): at 12:19

## 2018-10-30 RX ADMIN — Medication 250 MILLIGRAM(S): at 00:50

## 2018-10-30 RX ADMIN — GABAPENTIN 300 MILLIGRAM(S): 400 CAPSULE ORAL at 13:51

## 2018-10-30 RX ADMIN — PIPERACILLIN AND TAZOBACTAM 25 GRAM(S): 4; .5 INJECTION, POWDER, LYOPHILIZED, FOR SOLUTION INTRAVENOUS at 05:31

## 2018-10-30 RX ADMIN — Medication 50 MILLIGRAM(S): at 00:51

## 2018-10-30 RX ADMIN — Medication 50 MILLIGRAM(S): at 05:31

## 2018-10-30 NOTE — PROGRESS NOTE ADULT - PROBLEM SELECTOR PLAN 1
Pt febrile on admission, concern for osteomyelitis on imaging, however WBC wnl. Blood cx coag neg staph, possibly contaminant.  - Continue vanc, zosyn  - F/u repeat blood cx
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
## 2018-10-30 NOTE — CONSULT NOTE ADULT - PROBLEM SELECTOR RECOMMENDATION 9
Baclofen is a derivative of MELONIE that acts tat the spinal end of upper motor neurons.  This patient reports he takes Baclofen for spasticity given history of paraplegia.  Patient reports he has been taking his Baclofen regimen regularly and has not overdosed on his medication but does not recall events over the past several days.  Toxicology was called for possible Baclofen withdrawal or toxicity.  Both states can cause altered mental status, but Baclofen withdrawal is more consistent with rigidity, hyperthermia, tachycardia, and altered mental status.  When we were called initially called, we recommended NG tube placement and administration of Baclofen through NG tube as there is no IV preparation and concern for progression of withdrawal state.  Patient is currently not rigid and able to move upper extremities easily.  Patient's initial presentation could be consistent with Baclofen withdrawal but would rule out other causes of presenting symptoms such as infectious or neurologic cause.  For any questions, please page Toxicology at [phone]. Baclofen is a derivative of MELONIE that acts at the spinal end of upper motor neurons.  This patient reports he takes Baclofen for spasticity given history of paraplegia.  Patient reports he has been taking his Baclofen regimen regularly and has not overdosed on his medication but does not recall events over the past several days.  Toxicology was called for possible Baclofen withdrawal or toxicity.  Both states can cause altered mental status, but Baclofen withdrawal is more consistent with rigidity, hyperthermia, tachycardia, and altered mental status.  When we were called initially, we recommended NG tube placement and administration of Baclofen through NG tube as there is no IV preparation and concern for progression of withdrawal state.  Patient is currently not rigid and able to move upper extremities easily.  Patient's initial presentation could be consistent with Baclofen withdrawal but would rule out other causes of presenting symptoms such as infectious or neurologic cause.  Patient should continue taking his normal Baclofen dosing.  For any questions, please page Toxicology at [phone].
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
## 2018-10-31 LAB
BUN SERPL-MCNC: 16 MG/DL — SIGNIFICANT CHANGE UP (ref 7–23)
CALCIUM SERPL-MCNC: 8.9 MG/DL — SIGNIFICANT CHANGE UP (ref 8.4–10.5)
CHLORIDE SERPL-SCNC: 96 MMOL/L — LOW (ref 98–107)
CO2 SERPL-SCNC: 28 MMOL/L — SIGNIFICANT CHANGE UP (ref 22–31)
CREAT SERPL-MCNC: 0.81 MG/DL — SIGNIFICANT CHANGE UP (ref 0.5–1.3)
CRP SERPL-MCNC: 6.5 MG/L — HIGH
ERYTHROCYTE [SEDIMENTATION RATE] IN BLOOD: 19 MM/HR — HIGH (ref 1–15)
GLUCOSE SERPL-MCNC: 70 MG/DL — SIGNIFICANT CHANGE UP (ref 70–99)
MAGNESIUM SERPL-MCNC: 2.2 MG/DL — SIGNIFICANT CHANGE UP (ref 1.6–2.6)
METHOD TYPE: SIGNIFICANT CHANGE UP
ORGANISM # SPEC MICROSCOPIC CNT: SIGNIFICANT CHANGE UP
PHOSPHATE SERPL-MCNC: 4 MG/DL — SIGNIFICANT CHANGE UP (ref 2.5–4.5)
POTASSIUM SERPL-MCNC: 3.5 MMOL/L — SIGNIFICANT CHANGE UP (ref 3.5–5.3)
POTASSIUM SERPL-SCNC: 3.5 MMOL/L — SIGNIFICANT CHANGE UP (ref 3.5–5.3)
SODIUM SERPL-SCNC: 138 MMOL/L — SIGNIFICANT CHANGE UP (ref 135–145)
SPECIMEN SOURCE: SIGNIFICANT CHANGE UP
SPECIMEN SOURCE: SIGNIFICANT CHANGE UP

## 2018-10-31 PROCEDURE — 99222 1ST HOSP IP/OBS MODERATE 55: CPT | Mod: GC

## 2018-10-31 PROCEDURE — 99223 1ST HOSP IP/OBS HIGH 75: CPT

## 2018-10-31 PROCEDURE — 99233 SBSQ HOSP IP/OBS HIGH 50: CPT | Mod: GC

## 2018-10-31 RX ORDER — VANCOMYCIN HCL 1 G
1250 VIAL (EA) INTRAVENOUS EVERY 12 HOURS
Qty: 0 | Refills: 0 | Status: DISCONTINUED | OUTPATIENT
Start: 2018-10-31 | End: 2018-10-31

## 2018-10-31 RX ADMIN — Medication 50 MILLIGRAM(S): at 00:50

## 2018-10-31 RX ADMIN — Medication 100 MILLIGRAM(S): at 13:26

## 2018-10-31 RX ADMIN — Medication 10 MILLIGRAM(S): at 06:09

## 2018-10-31 RX ADMIN — Medication 50 MILLIGRAM(S): at 06:11

## 2018-10-31 RX ADMIN — Medication 81 MILLIGRAM(S): at 11:52

## 2018-10-31 RX ADMIN — PIPERACILLIN AND TAZOBACTAM 25 GRAM(S): 4; .5 INJECTION, POWDER, LYOPHILIZED, FOR SOLUTION INTRAVENOUS at 06:11

## 2018-10-31 RX ADMIN — Medication 166.67 MILLIGRAM(S): at 13:26

## 2018-10-31 RX ADMIN — GABAPENTIN 300 MILLIGRAM(S): 400 CAPSULE ORAL at 13:26

## 2018-10-31 RX ADMIN — Medication 50 MILLIGRAM(S): at 11:52

## 2018-10-31 RX ADMIN — AMLODIPINE BESYLATE 10 MILLIGRAM(S): 2.5 TABLET ORAL at 06:09

## 2018-10-31 RX ADMIN — DIVALPROEX SODIUM 500 MILLIGRAM(S): 500 TABLET, DELAYED RELEASE ORAL at 06:10

## 2018-10-31 RX ADMIN — PIPERACILLIN AND TAZOBACTAM 25 GRAM(S): 4; .5 INJECTION, POWDER, LYOPHILIZED, FOR SOLUTION INTRAVENOUS at 17:33

## 2018-10-31 RX ADMIN — Medication 250 MILLIGRAM(S): at 00:51

## 2018-10-31 RX ADMIN — ENOXAPARIN SODIUM 40 MILLIGRAM(S): 100 INJECTION SUBCUTANEOUS at 11:51

## 2018-10-31 RX ADMIN — Medication 100 MILLIGRAM(S): at 06:10

## 2018-10-31 RX ADMIN — Medication 10 MILLIGRAM(S): at 13:26

## 2018-10-31 RX ADMIN — DIVALPROEX SODIUM 500 MILLIGRAM(S): 500 TABLET, DELAYED RELEASE ORAL at 17:33

## 2018-10-31 RX ADMIN — GABAPENTIN 300 MILLIGRAM(S): 400 CAPSULE ORAL at 22:24

## 2018-10-31 RX ADMIN — Medication 100 MILLIGRAM(S): at 22:24

## 2018-10-31 RX ADMIN — Medication 50 MILLIGRAM(S): at 17:33

## 2018-10-31 RX ADMIN — ATENOLOL 50 MILLIGRAM(S): 25 TABLET ORAL at 06:09

## 2018-10-31 RX ADMIN — GABAPENTIN 300 MILLIGRAM(S): 400 CAPSULE ORAL at 06:10

## 2018-10-31 RX ADMIN — Medication 10 MILLIGRAM(S): at 22:24
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
## 2018-10-31 NOTE — CONSULT NOTE ADULT - PROBLEM SELECTOR RECOMMENDATION 9
- History of sacral decubital ulcer a/w CT findings c/f chronic osteomyelitis  - Bacteremia on BCx on presentation showed inconsistent bacterial growth: one set showing no growth, one set with Staphylococcus haemolyticus, and one set with Corynbacterium jekeium and Staphylococcus hominis; urine culture and CSF no growth. Repeat BCx on 10/30 showed no growth.  - Patient initially presented with fever and WBC. It is possible that patient had bacteremia on presentation, it is difficult to delineate which organism(s) populated the blood, given the inconsistency of blood culture results.  - Patient appears nontoxic; currently WBC WNL and afebrile; +opioids/methadone s/p narcan with clearing mental status.  - Questionable utility of antibiotics. - History of sacral decubital ulcer a/w CT findings c/f chronic osteomyelitis  - Bacteremia on BCx on presentation showed inconsistent bacterial growth: one set showing no growth, one set with Staphylococcus haemolyticus, and one set with Corynbacterium jekeium and Staphylococcus hominis; urine culture and CSF no growth. Repeat BCx on 10/30 showed no growth.  - Patient initially presented with fever and WBC. It is possible that patient had bacteremia on presentation, it is difficult to delineate which organism(s) populated the blood, given the inconsistency of blood culture results.  - Patient appears nontoxic; currently WBC WNL and afebrile; +opioids/methadone s/p Narcan with clearing mental status.  - Questionable utility of antibiotics. Recommend discontinuing antibiotics and observe. Will continue to follow. - History of sacral decubital ulcer a/w CT findings c/f chronic osteomyelitis  - Bacteremia on BCx on presentation showed inconsistent bacterial growth: one set showing no growth, one set with Staphylococcus haemolyticus, and one set with Corynbacterium jekeium and Staphylococcus hominis; urine culture and CSF no growth. Repeat BCx on 10/30 showed no growth.  - Patient initially presented with fever and WBC. It is possible that patient had bacteremia on presentation, it is difficult to delineate which organism(s) populated the blood, given the inconsistency of blood culture results.  - Patient appears nontoxic; currently WBC WNL and afebrile; +opioids/methadone s/p Narcan with clearing mental status.  - Questionable utility of antibiotics at this point. Recommend discontinuing antibiotics and observe. Will continue to follow.
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
## 2018-10-31 NOTE — CONSULT NOTE ADULT - ATTENDING COMMENTS
Patient seen and examined with neurology team and above note reviewed in detail and I agree with assessment and plan as outlined. Patient with reported seizure like activity in setting of potential baclofen withdrawal due to abrupt cessation. Found to have methadone levels in urine.   Will obtain EEG and continue baclofen 10mg TID and he was on depakote however levels were very subtherapeutic.  Obtain MRI brain without contrast if no contraindication.    Will be restarted and continue medical management and supportive care. All questions answered.
MD Rogers:  patient seen and evaluated with the fellow.  I was present for key portions of the history & physical, and I agree with the impression & plan.  67 yo M, paraplegic on baclofen, who presented to the ED with autonomic instability, elevated temperature, and rigidity.  He underwent an aggressive infectious workup (UCx, BCx, CXR, LP) all of which were negative for an infectious source (1x BCx coag-neg staph).  In the ED, he was initially rigid throughout (arms, legs, neck), mumbling speech.  Of note the patient takes baclofen for lower extremity spacticity, and there was a possibility of baclofen withdrawal, although the patient nor chart suggested baclofen cessation.  His symptoms seemed to improve with PO balcofen over his inpatient stay and he is clinically well.
Positive blood cultures with coag negative staph and corynebacterium likely represent skin contaminant.    Sacral ulcer small and clean- patient reports home wound care ongoing.  Mental status improved. alert and oriented x 3.  Seizure.    Would observe off antibiotics.  d/c vanco d/c zosyn.

## 2018-10-31 NOTE — PROGRESS NOTE ADULT - PROBLEM SELECTOR PLAN 1
Pt febrile on admission, concern for osteomyelitis on imaging, however WBC wnl. Blood cx coag neg staph x2, possibly contaminant.  - Continue vanc, zosyn  - F/u repeat blood cx Pt febrile on admission, concern for osteomyelitis on imaging, however WBC wnl. Blood cx coag neg staph x2, also on repeat blood cx.  - Continue vanc, zosyn  - Consult ID
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
## 2018-10-31 NOTE — PROGRESS NOTE ADULT - ASSESSMENT
Pt is a 66M with HTN, seizure disorder, paraplegia, and urinary continence w/chronic gentile admitted for encephalopathy, likely 2/2 to baclofen intoxication vs. methadone intoxication vs. sepsis, transferred from MICU. Febrile on admission, blood cx with coag-neg staph, concern for osteomyelitis on imaging. Now at baseline for mental status.

## 2018-10-31 NOTE — CONSULT NOTE ADULT - ASSESSMENT
68y old  Male s/p Encephalopathy ,HTN, seizure disorder, paraplegia, urinary incontinence  s/p febrile, tachycardic, treated for possible bacterial meningitis and LP was negative for bacterial and viral meningitis  chronic ischial pressure stage 4  does not appear cellultis currently/ aquacel ag with foam /cavilon placed  continue workup as per medicine and ID  no surgical debridement needed at this time, if not responding to conservative alginate/foam/ consider vac- will reevaluate  NUtritional assessment  patient has wheelchair- asses for bed mattress and roho for home

## 2018-11-01 ENCOUNTER — TRANSCRIPTION ENCOUNTER (OUTPATIENT)
Age: 68
End: 2018-11-01

## 2018-11-01 VITALS
HEART RATE: 68 BPM | DIASTOLIC BLOOD PRESSURE: 91 MMHG | SYSTOLIC BLOOD PRESSURE: 129 MMHG | OXYGEN SATURATION: 100 % | TEMPERATURE: 98 F | RESPIRATION RATE: 18 BRPM

## 2018-11-01 LAB
-  CEFAZOLIN: SIGNIFICANT CHANGE UP
-  CIPROFLOXACIN: SIGNIFICANT CHANGE UP
-  CLINDAMYCIN: SIGNIFICANT CHANGE UP
-  COAGULASE NEGATIVE STAPHYLOCOCCUS: SIGNIFICANT CHANGE UP
-  ERYTHROMYCIN: SIGNIFICANT CHANGE UP
-  GENTAMICIN: SIGNIFICANT CHANGE UP
-  LEVOFLOXACIN: SIGNIFICANT CHANGE UP
-  MOXIFLOXACIN(AEROBIC): SIGNIFICANT CHANGE UP
-  OXACILLIN: SIGNIFICANT CHANGE UP
-  PENICILLIN: SIGNIFICANT CHANGE UP
-  RIFAMPIN.: SIGNIFICANT CHANGE UP
-  TETRACYCLINE: SIGNIFICANT CHANGE UP
-  TRIMETHOPRIM/SULFAMETHOXAZOLE: SIGNIFICANT CHANGE UP
-  VANCOMYCIN: SIGNIFICANT CHANGE UP
BACTERIA BLD CULT: SIGNIFICANT CHANGE UP

## 2018-11-01 PROCEDURE — 99232 SBSQ HOSP IP/OBS MODERATE 35: CPT | Mod: GC

## 2018-11-01 PROCEDURE — 99239 HOSP IP/OBS DSCHRG MGMT >30: CPT

## 2018-11-01 RX ORDER — SENNA PLUS 8.6 MG/1
2 TABLET ORAL ONCE
Qty: 0 | Refills: 0 | Status: COMPLETED | OUTPATIENT
Start: 2018-11-01 | End: 2018-11-01

## 2018-11-01 RX ORDER — POLYETHYLENE GLYCOL 3350 17 G/17G
17 POWDER, FOR SOLUTION ORAL ONCE
Qty: 0 | Refills: 0 | Status: COMPLETED | OUTPATIENT
Start: 2018-11-01 | End: 2018-11-01

## 2018-11-01 RX ORDER — SENNA PLUS 8.6 MG/1
TABLET ORAL
Qty: 0 | Refills: 0 | Status: DISCONTINUED | OUTPATIENT
Start: 2018-11-01 | End: 2018-11-01

## 2018-11-01 RX ORDER — SENNA PLUS 8.6 MG/1
2 TABLET ORAL AT BEDTIME
Qty: 0 | Refills: 0 | Status: DISCONTINUED | OUTPATIENT
Start: 2018-11-01 | End: 2018-11-01

## 2018-11-01 RX ADMIN — GABAPENTIN 300 MILLIGRAM(S): 400 CAPSULE ORAL at 05:58

## 2018-11-01 RX ADMIN — ENOXAPARIN SODIUM 40 MILLIGRAM(S): 100 INJECTION SUBCUTANEOUS at 11:06

## 2018-11-01 RX ADMIN — Medication 10 MILLIGRAM(S): at 05:57

## 2018-11-01 RX ADMIN — ATENOLOL 50 MILLIGRAM(S): 25 TABLET ORAL at 05:57

## 2018-11-01 RX ADMIN — Medication 10 MILLIGRAM(S): at 14:30

## 2018-11-01 RX ADMIN — Medication 50 MILLIGRAM(S): at 11:06

## 2018-11-01 RX ADMIN — Medication 100 MILLIGRAM(S): at 14:30

## 2018-11-01 RX ADMIN — GABAPENTIN 300 MILLIGRAM(S): 400 CAPSULE ORAL at 14:30

## 2018-11-01 RX ADMIN — Medication 100 MILLIGRAM(S): at 05:57

## 2018-11-01 RX ADMIN — Medication 50 MILLIGRAM(S): at 05:58

## 2018-11-01 RX ADMIN — DIVALPROEX SODIUM 500 MILLIGRAM(S): 500 TABLET, DELAYED RELEASE ORAL at 05:57

## 2018-11-01 RX ADMIN — SENNA PLUS 2 TABLET(S): 8.6 TABLET ORAL at 11:06

## 2018-11-01 RX ADMIN — Medication 81 MILLIGRAM(S): at 11:06

## 2018-11-01 RX ADMIN — POLYETHYLENE GLYCOL 3350 17 GRAM(S): 17 POWDER, FOR SOLUTION ORAL at 11:06

## 2018-11-01 RX ADMIN — Medication 50 MILLIGRAM(S): at 00:13

## 2018-11-01 RX ADMIN — AMLODIPINE BESYLATE 10 MILLIGRAM(S): 2.5 TABLET ORAL at 05:57

## 2018-11-01 NOTE — DISCHARGE NOTE ADULT - PLAN OF CARE
improvement in withdrawal symptoms You were admitted with altered mental status, rigidity, fever, and increased heart rate. This was likely due to not taking your baclofen as prescribed. Please be sure to take baclofen 10mg three times daily in the future. healing of wound and avoiding infection You have a wound from pressure below your left butt cheek. Continue to clean the wound with saline and apply aquacel ag with foam and cover with cavilon dressing. Continue to take your valproic acid as prescribed. Systolic blood pressure under 140 Continue to take your atenolol, amlodipine, and hydralazine as prescribed. Continue to take your baclofen, gabapentin, and methadone as prescribed.

## 2018-11-01 NOTE — ADVANCED PRACTICE NURSE CONSULT - RECOMMEDATIONS
Recommend follow up care at [facility] ([phone], [address] [place] [address]).     Left ischium: Cleanse with SAF-clens, rinse with NS, pat dry. Apply Liquid barrier film to periwound skin. Lightly pack wound base with Aquacel AG hydrfoiber, cover with foam with border. Change daily.    Continue low air loss bed therapy, heel elevation, continue to turn & reposition q2h, continue moisture management with barrier creams & single breathable pad, continue measures to decrease friction/shear/pressure.     Please call wound care service line is further assistance is needed ([phone]).

## 2018-11-01 NOTE — DISCHARGE NOTE ADULT - HOME CARE AGENCY
resumption of services with  [facility]  [phone]  4hrs  x 7 days resumption of services with  [facility]  [phone]  4hrs  x 7 days  S OF  [place]  FOR  RN VISITS  FORM WOUND CARE  SOC  11/2/18

## 2018-11-01 NOTE — PROGRESS NOTE ADULT - PROBLEM SELECTOR PLAN 2
Pt reports treatment for sacral decub ulcer, resolved. Seen on imaging, concern for osteo.  - Consult wound culture, f/u recs
L ischial ulcer  - Appreciate wound care recs, aquacel ag with foam, cavilon dressing
Pt reports treatment for sacral decub ulcer, resolved. Seen on imaging, concern for osteo.  - Consult wound culture, f/u recs
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
## 2018-11-01 NOTE — DISCHARGE NOTE ADULT - MEDICATION SUMMARY - MEDICATIONS TO TAKE
I will START or STAY ON the medications listed below when I get home from the hospital:    aspirin 81 mg oral tablet  -- 1 tab(s) by mouth once a day  -- Indication: For Preventive measure    methadone 10 mg/mL oral concentrate  -- 1 milliliter(s) by mouth once a day  -- Indication: For Paraplegia    gabapentin 300 mg oral tablet  -- 1 tab(s) by mouth 3 times a day  -- Indication: For Paraplegia    Depakote 500 mg oral delayed release tablet  -- 1 tab(s) by mouth 2 times a day  -- Indication: For Seizure disorder    amlodipine-benazepril 10 mg-40 mg oral capsule  -- 1 cap(s) by mouth once a day  -- Indication: For Hypertension    atenolol 50 mg oral tablet  -- 1 tab(s) by mouth once a day  -- Indication: For Hypertension    ferrous sulfate 325 mg (65 mg elemental iron) oral tablet  -- Indication: For Preventive measure    docusate sodium 100 mg oral capsule  -- 1 cap(s) by mouth 3 times a day  -- Indication: For Preventive measure    baclofen 10 mg oral tablet  -- 1 tab(s) by mouth 3 times a day  -- Indication: For Paraplegia    hydrALAZINE 50 mg oral tablet  -- 1 tab(s) by mouth 4 times a day  -- Indication: For Hypertension    multivitamin  -- 1 tab(s) by mouth once a day  -- Indication: For Preventive measure

## 2018-11-01 NOTE — DISCHARGE NOTE ADULT - PATIENT PORTAL LINK FT
You can access the [facility] Patient Portal, offered by [facility], by registering with the following website: [email or web address]

## 2018-11-01 NOTE — PROGRESS NOTE ADULT - PROBLEM SELECTOR PLAN 1
- History of sacral decubital ulcer a/w CT findings c/f chronic osteomyelitis  - Currently mentating well and afebrile. Denies back pain. WBC WNL.  - Previous BCx result appears to be a result of contamination.  - Keep off antibiotics until patient becomes febrile, develops back pain or tenderness, or with elevated WBC.

## 2018-11-01 NOTE — PROGRESS NOTE ADULT - PROBLEM SELECTOR PLAN 6
DVT ppx: lovenox  Diet: normal  Dispo: pending, will need home health aide reinstated
DVT ppx: lovenox  Diet: normal  Dispo: pending, will need home health aide reinstated
DVT ppx: lovenox  Diet: normal  Dispo: to home with home health aide

## 2018-11-01 NOTE — PROGRESS NOTE ADULT - ATTENDING COMMENTS
Agree with above. Seen and examined with residents. Has paraplegia, was unresponsive. Now is much more responsive. Continue supportive care. Blood cultures and urine pending.
Positive blood cultures with coag negative staph and corynebacterium likely represent skin contaminant.    Sacral ulcer small and clean- patient reports home wound care ongoing.  Mental status much improved. alert and oriented x 3.  Planning d/c home today.      Would observe off antibiotics.
Patient seen and examined. Chart/lab reviewed. Agree with above with modifications.  66M with HTN, seizure disorder, paraplegia, and urinary continence w/chronic gentile admitted for encephalopathy, likely 2/2 to methadone intoxication vs. sepsis. Mental status now at baseline, febrile on admission, blood cx coag neg staph, CT left medial gluteal decubitus ulcer extending to the left ischium with associated cortical thickening and periosteal reaction, concerning for osteomyelitis, possibly chronic.    PE: lung clear, heart regular, abdomen soft, L gluteal decub, extrem: paraplegic b/l; : condom catheter    Assessment/plan:   # Acute encephalopathy likely due to methadone overdose, states he takes methadone occasionally,  urine tox +opioids/methadone, s/p narcan ; mental status now baseline; LP neg, EEG neg, CT head no acute pathology  # coag neg staph bacteremia: c/w vanco/zosyn for now, may be contaminant, repeat blood cx  # L gluteal decub ulcer: concerning for possible chronic OM on CT, wound care surgery consult Dr. Brenner  # HTN: titrate BP meds
Patient seen and examined. Chart/lab reviewed. Agree with above with modifications.  66M with HTN, seizure disorder, paraplegia, and urinary continence w/chronic gentile admitted for encephalopathy, likely 2/2 to methadone intoxication vs. sepsis. Mental status now at baseline, febrile on admission, blood cx coag neg staph, CT left medial gluteal decubitus ulcer extending to the left ischium with associated cortical thickening and periosteal reaction, concerning for osteomyelitis, possibly chronic.    PE: lung clear, heart regular, abdomen soft, L gluteal decub, extrem: paraplegic b/l; : condom catheter; back: chronic sacral decubitus no active drainage     Assessment/plan:   # Acute encephalopathy likely due to baclofen withdrawal per toxicology,   mental status now back to baseline,  LP neg, EEG neg, CT head no acute pathology  c/w baclofen  #bacteremia: repeat blood cx 10/30 no growth to date, blood cx 10/28 staph hominis, staph haemolyticus, corynebacterium, likely contaminant  ID consult appreciated, now off abx  # L gluteal decub ulcer: concerning for possible chronic OM on CT, however, exam not impressive per Dr. Brenner, no debridement needed,   local wound care  # dispo: d/c to home with home aide today  Time spent on discharge 35 minutes coordinating discharge plan and discussing with patient and family.
Patient seen and examined. Chart/lab reviewed. Agree with above with modifications.  66M with HTN, seizure disorder, paraplegia, and urinary continence w/chronic gentile admitted for encephalopathy, likely 2/2 to methadone intoxication vs. sepsis. Mental status now at baseline, febrile on admission, blood cx coag neg staph, CT left medial gluteal decubitus ulcer extending to the left ischium with associated cortical thickening and periosteal reaction, concerning for osteomyelitis, possibly chronic.    PE: lung clear, heart regular, abdomen soft, L gluteal decub, extrem: paraplegic b/l; : condom catheter; back: chronic sacral decubitus no active drainage     Assessment/plan:   # Acute encephalopathy likely due to baclofen withdrawal per toxicology, pt reports he takes methadone occasionally,  urine tox +opioids/methadone, s/p narcan ; mental status now baseline; LP neg, EEG neg, CT head no acute pathology  #bacteremia: repeat blood cx 10/30 no growth to date, blood cx 10/28 staph hominis, staph haemolyticus, corynebacterium, likely contaminant  ID consult, pt nontoxic appearing, afebrile with normal WBC, sacral wound appear chronic and non-active, ESR 19, consider d/c abx if ok with ID  # L gluteal decub ulcer: concerning for possible chronic OM on CT, however, not impressive on exam, wound care surgery consult Dr. Brenner  # dispo: d/c plan if abx can be d/c'd

## 2018-11-01 NOTE — PROGRESS NOTE ADULT - PROBLEM SELECTOR PLAN 3
Chronic, has home health aide. Utox+ for methadone however pt reports has not taken it in several months.  - Continue baclofen, gabapentin
Chronic, has home health aide.  - Continue baclofen, gabapentin  - Pt reports no pain currently, was taking methadone inconsistently, hold for now
Chronic, has home health aide.  - Continue baclofen, gabapentin  - Pt reports no pain currently, was taking methadone inconsistently, hold for now

## 2018-11-01 NOTE — DISCHARGE NOTE ADULT - [id number]_GEN_ALL_CORE
medication teaching and assessment/catheter insertion/wound care and assessment/observation and assessment

## 2018-11-01 NOTE — PROGRESS NOTE ADULT - PROBLEM SELECTOR PLAN 5
Intermittently hypertensive, 163/96 at most  - Continue home hydralazine, amlodipine
Normotensive  - Continue home hydralazine, amlodipine, atenolol
Normotensive  - Continue home hydralazine, amlodipine, atenolol
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
## 2018-11-01 NOTE — DISCHARGE NOTE ADULT - CONDITIONS AT DISCHARGE
Pt is stable at discharge. Wound care provided. Cleanse with Normal Saline. pat dry, apply liquid barrier film to periwound, apply Aquacel AG on the wound and cover with foam.

## 2018-11-01 NOTE — PROGRESS NOTE ADULT - ASSESSMENT
Pt is a 66M with HTN, seizure disorder, paraplegia, and urinary continence w/chronic gentile admitted for encephalopathy, likely 2/2 to baclofen intoxication, transferred from MICU. Blood cx likely contaminant, clinically stable, afebrile, normal WBC.

## 2018-11-01 NOTE — DISCHARGE NOTE ADULT - PROVIDER TOKENS
FREE:[LAST:[Laney],FIRST:[Miguel Angel],PHONE:[phone]:[phone]    -],ADDRESS:[[address] [place] [address]]]

## 2018-11-01 NOTE — PROGRESS NOTE ADULT - PROBLEM SELECTOR PLAN 4
Last seizure over 1 year ago  - Continue depakote
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
## 2018-11-01 NOTE — DISCHARGE NOTE ADULT - CARE PLAN
Principal Discharge DX:	Withdrawal from sedative, hypnotic, or anxiolytic drug  Goal:	improvement in withdrawal symptoms  Assessment and plan of treatment:	You were admitted with altered mental status, rigidity, fever, and increased heart rate. This was likely due to not taking your baclofen as prescribed. Please be sure to take baclofen 10mg three times daily in the future.  Secondary Diagnosis:	Sacral decubitus ulcer  Goal:	healing of wound and avoiding infection  Assessment and plan of treatment:	You have a wound from pressure below your left butt cheek. Continue to clean the wound with saline and apply aquacel ag with foam and cover with cavilon dressing.  Secondary Diagnosis:	Seizure disorder  Assessment and plan of treatment:	Continue to take your valproic acid as prescribed.  Secondary Diagnosis:	Hypertension  Goal:	Systolic blood pressure under 140  Assessment and plan of treatment:	Continue to take your atenolol, amlodipine, and hydralazine as prescribed.  Secondary Diagnosis:	Paraplegia  Assessment and plan of treatment:	Continue to take your baclofen, gabapentin, and methadone as prescribed.

## 2018-11-01 NOTE — ADVANCED PRACTICE NURSE CONSULT - ASSESSMENT
A&Ox3, bedbound, reports transfer to wheelchair at home, external condom catheter in place to bedside drainage. Skin warm, dry. Multiple surgical scars on back. Right plantar foot with callus.    Left ischium Stage 4, chronic injury measures [id number]. Unable to visualize wound base due to narrow opening. Injury within gluteal fold, maceration noted from 4-8 o'clock in periwound skin. Small amount of serosanguinous drainage, no odor. Soft tissue contracture in periwound skin and hypopigmentation circumferentially. No induration, no increased warmth, no erythema.

## 2018-11-02 LAB — BACTERIA BLD CULT: SIGNIFICANT CHANGE UP

## 2018-11-03 LAB — BACTERIA CSF CULT: SIGNIFICANT CHANGE UP

## 2018-11-04 LAB
BACTERIA BLD CULT: SIGNIFICANT CHANGE UP
BACTERIA BLD CULT: SIGNIFICANT CHANGE UP

## 2018-11-07 LAB — MISCELLANEOUS - CHEM: SIGNIFICANT CHANGE UP

## 2018-11-08 NOTE — CONSULT NOTE ADULT - ASSESSMENT
66 year old male brought in for AMS. History obtained from EMR, patient was obtunded at presentation. Based on conversation with health aide and wife, pt is conversational and oriented at baseline, is able to use his upper extremities, and ambulates with a wheelchair. Pt was last seen normal by his home health aide day prior to presentation in the afternoon, however morning of presentation he was unarousable. Last seizure was 1 year ago, no reported recent seizure activity.  In the ED, pt was febrile to 100.8, tachycardic, hypertensive, with normal RR saturating high 90s on RA. Pt remained obtunded and was rigid on exam. CT head and neck negative. He was given antibiotics for bacterial meningitis and LP was negative for bacterial and viral meningitis. Pt had full pill bottles of multiple medications at bedside including baclofen. Per toxiology, concern for baclofen withdrawl given rigidity. Pt developed a small amount of blood emesis possibly due to tongue biting and was brought to MICU for further management  Workup thursfar: LP negative; PCR of CSF neg, cells 1 lymphocyte, chem pending Prot wnl glucose sl elevated. He is much more alert compared to presentation, uncooperative with exam but conversational and alert, AOx2.   Etiology: due to utox + for methadone and opiates, DDX likely baclofen withdrawal vs methadone toxicity    Plan  [] no need for emperic abx, encephalitis unlikely at this point  [] EEG to r/o subclinical seizure activity  [] continue depakote  [] continue baclofen, on limited exam patient is no longer rigid, tone is normal. 66 year old male brought in for AMS. History obtained from EMR, patient was obtunded at presentation. Based on conversation with health aide and wife, pt is conversational and oriented at baseline, is able to use his upper extremities, and ambulates with a wheelchair. Pt was last seen normal by his home health aide day prior to presentation in the afternoon, however morning of presentation he was unarousable. Last seizure was 1 year ago, no reported recent seizure activity.  In the ED, pt was febrile to 100.8, tachycardic, hypertensive, with normal RR saturating high 90s on RA. Pt remained obtunded and was rigid on exam. CT head and neck negative. He was given antibiotics for bacterial meningitis and LP was negative for bacterial and viral meningitis. Pt had full pill bottles of multiple medications at bedside including baclofen. Per toxiology, concern for baclofen withdrawl given rigidity. Pt developed a small amount of blood emesis possibly due to tongue biting and was brought to MICU for further management  Workup thursfar: LP negative; PCR of CSF neg, cells 1 lymphocyte, chem pending Prot wnl glucose sl elevated. He is much more alert compared to presentation, uncooperative with exam but conversational and alert, AOx2.   Etiology: due to utox + for methadone and opiates, DDX likely baclofen withdrawal vs methadone toxicity    Plan  [] no need for emperic abx, encephalitis unlikely at this point  [] EEG to r/o subclinical seizure activity and MRI brain without contrast if no contraindication  [] continue depakote as outlined  [] continue baclofen, on limited exam patient is no longer rigid, tone is normal. 09-Nov-2018 01:28

## 2020-05-20 NOTE — PROVIDER CONTACT NOTE (OTHER) - RECOMMENDATIONS
Azithromycin Counseling:  I discussed with the patient the risks of azithromycin including but not limited to GI upset, allergic reaction, drug rash, diarrhea, and yeast infections. Birth Control Pills Pregnancy And Lactation Text: This medication should be avoided if pregnant and for the first 30 days post-partum. Detail Level: Zone Tazorac Counseling:  Patient advised that medication is irritating and drying.  Patient may need to apply sparingly and wash off after an hour before eventually leaving it on overnight.  The patient verbalized understanding of the proper use and possible adverse effects of tazorac.  All of the patient's questions and concerns were addressed. Tetracycline Pregnancy And Lactation Text: This medication is Pregnancy Category D and not consider safe during pregnancy. It is also excreted in breast milk. Tetracycline Counseling: Patient counseled regarding possible photosensitivity and increased risk for sunburn.  Patient instructed to avoid sunlight, if possible.  When exposed to sunlight, patients should wear protective clothing, sunglasses, and sunscreen.  The patient was instructed to call the office immediately if the following severe adverse effects occur:  hearing changes, easy bruising/bleeding, severe headache, or vision changes.  The patient verbalized understanding of the proper use and possible adverse effects of tetracycline.  All of the patient's questions and concerns were addressed. Patient understands to avoid pregnancy while on therapy due to potential birth defects. Azithromycin Pregnancy And Lactation Text: This medication is considered safe during pregnancy and is also secreted in breast milk. Monitor. Use Enhanced Medication Counseling?: No Dapsone Pregnancy And Lactation Text: This medication is Pregnancy Category C and is not considered safe during pregnancy or breast feeding. Benzoyl Peroxide Counseling: Patient counseled that medicine may cause skin irritation and bleach clothing.  In the event of skin irritation, the patient was advised to reduce the amount of the drug applied or use it less frequently.   The patient verbalized understanding of the proper use and possible adverse effects of benzoyl peroxide.  All of the patient's questions and concerns were addressed. Tazorac Pregnancy And Lactation Text: This medication is not safe during pregnancy. It is unknown if this medication is excreted in breast milk. Isotretinoin Pregnancy And Lactation Text: This medication is Pregnancy Category X and is considered extremely dangerous during pregnancy. It is unknown if it is excreted in breast milk. Topical Clindamycin Counseling: Patient counseled that this medication may cause skin irritation or allergic reactions.  In the event of skin irritation, the patient was advised to reduce the amount of the drug applied or use it less frequently.   The patient verbalized understanding of the proper use and possible adverse effects of clindamycin.  All of the patient's questions and concerns were addressed. Patient Specific Counseling (Will Not Stick From Patient To Patient): - pt happy with results but continues to have minimal flares around nose\\n- will continue Clindamycin gel and sulfacleanse\\n- reassured pt that he should have refills at pharmacy, advised to contact office if RF needed\\n- will increase to Tretinoin 0.1% cr ( RF X 1 yr) - if not tolerable QHS, can use qohs or alternate between Tretinoin 0.05% and 0.1%\\n- dry and sensitive skin care discussed; spf 30+\\nRTC in 6-12 months Benzoyl Peroxide Pregnancy And Lactation Text: This medication is Pregnancy Category C. It is unknown if benzoyl peroxide is excreted in breast milk. Erythromycin Pregnancy And Lactation Text: This medication is Pregnancy Category B and is considered safe during pregnancy. It is also excreted in breast milk. Isotretinoin Counseling: Patient should get monthly blood tests, not donate blood, not drive at night if vision affected, not share medication, and not undergo elective surgery for 6 months after tx completed. Side effects reviewed, pt to contact office should one occur. Topical Sulfur Applications Pregnancy And Lactation Text: This medication is Pregnancy Category C and has an unknown safety profile during pregnancy. It is unknown if this topical medication is excreted in breast milk. Doxycycline Pregnancy And Lactation Text: This medication is Pregnancy Category D and not consider safe during pregnancy. It is also excreted in breast milk but is considered safe for shorter treatment courses. High Dose Vitamin A Counseling: Side effects reviewed, pt to contact office should one occur. Doxycycline Counseling:  Patient counseled regarding possible photosensitivity and increased risk for sunburn.  Patient instructed to avoid sunlight, if possible.  When exposed to sunlight, patients should wear protective clothing, sunglasses, and sunscreen.  The patient was instructed to call the office immediately if the following severe adverse effects occur:  hearing changes, easy bruising/bleeding, severe headache, or vision changes.  The patient verbalized understanding of the proper use and possible adverse effects of doxycycline.  All of the patient's questions and concerns were addressed. Minocycline Counseling: Patient advised regarding possible photosensitivity and discoloration of the teeth, skin, lips, tongue and gums.  Patient instructed to avoid sunlight, if possible.  When exposed to sunlight, patients should wear protective clothing, sunglasses, and sunscreen.  The patient was instructed to call the office immediately if the following severe adverse effects occur:  hearing changes, easy bruising/bleeding, severe headache, or vision changes.  The patient verbalized understanding of the proper use and possible adverse effects of minocycline.  All of the patient's questions and concerns were addressed. Dapsone Counseling: I discussed with the patient the risks of dapsone including but not limited to hemolytic anemia, agranulocytosis, rashes, methemoglobinemia, kidney failure, peripheral neuropathy, headaches, GI upset, and liver toxicity.  Patients who start dapsone require monitoring including baseline LFTs and weekly CBCs for the first month, then every month thereafter.  The patient verbalized understanding of the proper use and possible adverse effects of dapsone.  All of the patient's questions and concerns were addressed. Topical Clindamycin Pregnancy And Lactation Text: This medication is Pregnancy Category B and is considered safe during pregnancy. It is unknown if it is excreted in breast milk. Birth Control Pills Counseling: Birth Control Pill Counseling: I discussed with the patient the potential side effects of OCPs including but not limited to increased risk of stroke, heart attack, thrombophlebitis, deep venous thrombosis, hepatic adenomas, breast changes, GI upset, headaches, and depression.  The patient verbalized understanding of the proper use and possible adverse effects of OCPs. All of the patient's questions and concerns were addressed. Topical Retinoid counseling:  Patient advised to apply a pea-sized amount only at bedtime and wait 30 minutes after washing their face before applying.  If too drying, patient may add a non-comedogenic moisturizer. The patient verbalized understanding of the proper use and possible adverse effects of retinoids.  All of the patient's questions and concerns were addressed. Erythromycin Counseling:  I discussed with the patient the risks of erythromycin including but not limited to GI upset, allergic reaction, drug rash, diarrhea, increase in liver enzymes, and yeast infections. Bactrim Pregnancy And Lactation Text: This medication is Pregnancy Category D and is known to cause fetal risk.  It is also excreted in breast milk. Spironolactone Counseling: Patient advised regarding risks of diarrhea, abdominal pain, hyperkalemia, birth defects (for female patients), liver toxicity and renal toxicity. The patient may need blood work to monitor liver and kidney function and potassium levels while on therapy. The patient verbalized understanding of the proper use and possible adverse effects of spironolactone.  All of the patient's questions and concerns were addressed. Spironolactone Pregnancy And Lactation Text: This medication can cause feminization of the male fetus and should be avoided during pregnancy. The active metabolite is also found in breast milk. Topical Sulfur Applications Counseling: Topical Sulfur Counseling: Patient counseled that this medication may cause skin irritation or allergic reactions.  In the event of skin irritation, the patient was advised to reduce the amount of the drug applied or use it less frequently.   The patient verbalized understanding of the proper use and possible adverse effects of topical sulfur application.  All of the patient's questions and concerns were addressed. Bactrim Counseling:  I discussed with the patient the risks of sulfa antibiotics including but not limited to GI upset, allergic reaction, drug rash, diarrhea, dizziness, photosensitivity, and yeast infections.  Rarely, more serious reactions can occur including but not limited to aplastic anemia, agranulocytosis, methemoglobinemia, blood dyscrasias, liver or kidney failure, lung infiltrates or desquamative/blistering drug rashes. High Dose Vitamin A Pregnancy And Lactation Text: High dose vitamin A therapy is contraindicated during pregnancy and breast feeding. Topical Retinoid Pregnancy And Lactation Text: This medication is Pregnancy Category C. It is unknown if this medication is excreted in breast milk. Sarecycline Counseling: Patient advised regarding possible photosensitivity and discoloration of the teeth, skin, lips, tongue and gums.  Patient instructed to avoid sunlight, if possible.  When exposed to sunlight, patients should wear protective clothing, sunglasses, and sunscreen.  The patient was instructed to call the office immediately if the following severe adverse effects occur:  hearing changes, easy bruising/bleeding, severe headache, or vision changes.  The patient verbalized understanding of the proper use and possible adverse effects of sarecycline.  All of the patient's questions and concerns were addressed. Detail Level: Detailed
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
## 2021-11-11 ENCOUNTER — INPATIENT (INPATIENT)
Facility: HOSPITAL | Age: 71
LOS: 67 days | Discharge: NURSING, REHAB OR LONG-TERM CARE | End: 2022-01-18
Attending: STUDENT IN AN ORGANIZED HEALTH CARE EDUCATION/TRAINING PROGRAM | Admitting: STUDENT IN AN ORGANIZED HEALTH CARE EDUCATION/TRAINING PROGRAM
Payer: MEDICAID

## 2021-11-11 VITALS
HEIGHT: 68 IN | TEMPERATURE: 98 F | RESPIRATION RATE: 18 BRPM | OXYGEN SATURATION: 98 % | HEART RATE: 115 BPM | SYSTOLIC BLOOD PRESSURE: 150 MMHG | DIASTOLIC BLOOD PRESSURE: 83 MMHG

## 2021-11-11 DIAGNOSIS — L98.499 NON-PRESSURE CHRONIC ULCER OF SKIN OF OTHER SITES WITH UNSPECIFIED SEVERITY: Chronic | ICD-10-CM

## 2021-11-11 DIAGNOSIS — T14.90XA INJURY, UNSPECIFIED, INITIAL ENCOUNTER: Chronic | ICD-10-CM

## 2021-11-11 LAB
ALBUMIN SERPL ELPH-MCNC: 4.2 G/DL — SIGNIFICANT CHANGE UP (ref 3.3–5)
ALP SERPL-CCNC: 58 U/L — SIGNIFICANT CHANGE UP (ref 40–120)
ALT FLD-CCNC: 14 U/L — SIGNIFICANT CHANGE UP (ref 4–41)
ANION GAP SERPL CALC-SCNC: 13 MMOL/L — SIGNIFICANT CHANGE UP (ref 7–14)
APPEARANCE UR: ABNORMAL
APTT BLD: 46.3 SEC — HIGH (ref 27–36.3)
AST SERPL-CCNC: 54 U/L — HIGH (ref 4–40)
B PERT DNA SPEC QL NAA+PROBE: SIGNIFICANT CHANGE UP
B PERT+PARAPERT DNA PNL SPEC NAA+PROBE: SIGNIFICANT CHANGE UP
BACTERIA # UR AUTO: ABNORMAL
BASE EXCESS BLDV CALC-SCNC: 9.3 MMOL/L — HIGH (ref -2–3)
BASOPHILS # BLD AUTO: 0.02 K/UL — SIGNIFICANT CHANGE UP (ref 0–0.2)
BASOPHILS NFR BLD AUTO: 0.3 % — SIGNIFICANT CHANGE UP (ref 0–2)
BILIRUB SERPL-MCNC: 0.7 MG/DL — SIGNIFICANT CHANGE UP (ref 0.2–1.2)
BILIRUB UR-MCNC: NEGATIVE — SIGNIFICANT CHANGE UP
BLOOD GAS VENOUS COMPREHENSIVE RESULT: SIGNIFICANT CHANGE UP
BORDETELLA PARAPERTUSSIS (RAPRVP): SIGNIFICANT CHANGE UP
BUN SERPL-MCNC: 18 MG/DL — SIGNIFICANT CHANGE UP (ref 7–23)
C PNEUM DNA SPEC QL NAA+PROBE: SIGNIFICANT CHANGE UP
CALCIUM SERPL-MCNC: 9.6 MG/DL — SIGNIFICANT CHANGE UP (ref 8.4–10.5)
CHLORIDE BLDV-SCNC: 100 MMOL/L — SIGNIFICANT CHANGE UP (ref 96–108)
CHLORIDE SERPL-SCNC: 95 MMOL/L — LOW (ref 98–107)
CO2 BLDV-SCNC: 37.4 MMOL/L — HIGH (ref 22–26)
CO2 SERPL-SCNC: 31 MMOL/L — SIGNIFICANT CHANGE UP (ref 22–31)
COLOR SPEC: YELLOW — SIGNIFICANT CHANGE UP
CREAT SERPL-MCNC: 0.64 MG/DL — SIGNIFICANT CHANGE UP (ref 0.5–1.3)
DIFF PNL FLD: NEGATIVE — SIGNIFICANT CHANGE UP
EOSINOPHIL # BLD AUTO: 0.03 K/UL — SIGNIFICANT CHANGE UP (ref 0–0.5)
EOSINOPHIL NFR BLD AUTO: 0.4 % — SIGNIFICANT CHANGE UP (ref 0–6)
EPI CELLS # UR: 1 /HPF — SIGNIFICANT CHANGE UP (ref 0–5)
FLUAV SUBTYP SPEC NAA+PROBE: SIGNIFICANT CHANGE UP
FLUBV RNA SPEC QL NAA+PROBE: SIGNIFICANT CHANGE UP
GAS PNL BLDV: 136 MMOL/L — SIGNIFICANT CHANGE UP (ref 136–145)
GAS PNL BLDV: SIGNIFICANT CHANGE UP
GLUCOSE BLDV-MCNC: 93 MG/DL — SIGNIFICANT CHANGE UP (ref 70–99)
GLUCOSE SERPL-MCNC: 86 MG/DL — SIGNIFICANT CHANGE UP (ref 70–99)
GLUCOSE UR QL: NEGATIVE — SIGNIFICANT CHANGE UP
HADV DNA SPEC QL NAA+PROBE: SIGNIFICANT CHANGE UP
HCO3 BLDV-SCNC: 36 MMOL/L — HIGH (ref 22–29)
HCOV 229E RNA SPEC QL NAA+PROBE: SIGNIFICANT CHANGE UP
HCOV HKU1 RNA SPEC QL NAA+PROBE: SIGNIFICANT CHANGE UP
HCOV NL63 RNA SPEC QL NAA+PROBE: SIGNIFICANT CHANGE UP
HCOV OC43 RNA SPEC QL NAA+PROBE: SIGNIFICANT CHANGE UP
HCT VFR BLD CALC: 42.7 % — SIGNIFICANT CHANGE UP (ref 39–50)
HCT VFR BLDA CALC: 40 % — SIGNIFICANT CHANGE UP (ref 39–51)
HGB BLD CALC-MCNC: 13.4 G/DL — SIGNIFICANT CHANGE UP (ref 13–17)
HGB BLD-MCNC: 14 G/DL — SIGNIFICANT CHANGE UP (ref 13–17)
HMPV RNA SPEC QL NAA+PROBE: SIGNIFICANT CHANGE UP
HPIV1 RNA SPEC QL NAA+PROBE: SIGNIFICANT CHANGE UP
HPIV2 RNA SPEC QL NAA+PROBE: SIGNIFICANT CHANGE UP
HPIV3 RNA SPEC QL NAA+PROBE: SIGNIFICANT CHANGE UP
HPIV4 RNA SPEC QL NAA+PROBE: SIGNIFICANT CHANGE UP
HYALINE CASTS # UR AUTO: 1 /LPF — SIGNIFICANT CHANGE UP (ref 0–7)
IANC: 4.48 K/UL — SIGNIFICANT CHANGE UP (ref 1.5–8.5)
IMM GRANULOCYTES NFR BLD AUTO: 0.3 % — SIGNIFICANT CHANGE UP (ref 0–1.5)
INR BLD: 1.13 RATIO — SIGNIFICANT CHANGE UP (ref 0.88–1.16)
KETONES UR-MCNC: ABNORMAL
LACTATE BLDV-MCNC: 1.9 MMOL/L — SIGNIFICANT CHANGE UP (ref 0.5–2)
LEUKOCYTE ESTERASE UR-ACNC: NEGATIVE — SIGNIFICANT CHANGE UP
LYMPHOCYTES # BLD AUTO: 1.19 K/UL — SIGNIFICANT CHANGE UP (ref 1–3.3)
LYMPHOCYTES # BLD AUTO: 17.6 % — SIGNIFICANT CHANGE UP (ref 13–44)
M PNEUMO DNA SPEC QL NAA+PROBE: SIGNIFICANT CHANGE UP
MCHC RBC-ENTMCNC: 29.7 PG — SIGNIFICANT CHANGE UP (ref 27–34)
MCHC RBC-ENTMCNC: 32.8 GM/DL — SIGNIFICANT CHANGE UP (ref 32–36)
MCV RBC AUTO: 90.7 FL — SIGNIFICANT CHANGE UP (ref 80–100)
MONOCYTES # BLD AUTO: 1.04 K/UL — HIGH (ref 0–0.9)
MONOCYTES NFR BLD AUTO: 15.3 % — HIGH (ref 2–14)
NEUTROPHILS # BLD AUTO: 4.48 K/UL — SIGNIFICANT CHANGE UP (ref 1.8–7.4)
NEUTROPHILS NFR BLD AUTO: 66.1 % — SIGNIFICANT CHANGE UP (ref 43–77)
NITRITE UR-MCNC: NEGATIVE — SIGNIFICANT CHANGE UP
NRBC # BLD: 0 /100 WBCS — SIGNIFICANT CHANGE UP
NRBC # FLD: 0 K/UL — SIGNIFICANT CHANGE UP
PCO2 BLDV: 55 MMHG — SIGNIFICANT CHANGE UP (ref 42–55)
PH BLDV: 7.42 — SIGNIFICANT CHANGE UP (ref 7.32–7.43)
PH UR: 7.5 — SIGNIFICANT CHANGE UP (ref 5–8)
PLATELET # BLD AUTO: 205 K/UL — SIGNIFICANT CHANGE UP (ref 150–400)
PO2 BLDV: <20 MMHG — SIGNIFICANT CHANGE UP
POTASSIUM BLDV-SCNC: 3.5 MMOL/L — SIGNIFICANT CHANGE UP (ref 3.5–5.1)
POTASSIUM SERPL-MCNC: 4.3 MMOL/L — SIGNIFICANT CHANGE UP (ref 3.5–5.3)
POTASSIUM SERPL-SCNC: 4.3 MMOL/L — SIGNIFICANT CHANGE UP (ref 3.5–5.3)
PROT SERPL-MCNC: 8.7 G/DL — HIGH (ref 6–8.3)
PROT UR-MCNC: ABNORMAL
PROTHROM AB SERPL-ACNC: 12.8 SEC — SIGNIFICANT CHANGE UP (ref 10.6–13.6)
RAPID RVP RESULT: SIGNIFICANT CHANGE UP
RBC # BLD: 4.71 M/UL — SIGNIFICANT CHANGE UP (ref 4.2–5.8)
RBC # FLD: 13.8 % — SIGNIFICANT CHANGE UP (ref 10.3–14.5)
RBC CASTS # UR COMP ASSIST: 6 /HPF — HIGH (ref 0–4)
RSV RNA SPEC QL NAA+PROBE: SIGNIFICANT CHANGE UP
RV+EV RNA SPEC QL NAA+PROBE: SIGNIFICANT CHANGE UP
SAO2 % BLDV: 21.8 % — SIGNIFICANT CHANGE UP
SARS-COV-2 RNA SPEC QL NAA+PROBE: SIGNIFICANT CHANGE UP
SODIUM SERPL-SCNC: 139 MMOL/L — SIGNIFICANT CHANGE UP (ref 135–145)
SP GR SPEC: 1.02 — SIGNIFICANT CHANGE UP (ref 1–1.05)
UROBILINOGEN FLD QL: ABNORMAL
WBC # BLD: 6.78 K/UL — SIGNIFICANT CHANGE UP (ref 3.8–10.5)
WBC # FLD AUTO: 6.78 K/UL — SIGNIFICANT CHANGE UP (ref 3.8–10.5)
WBC UR QL: 2 /HPF — SIGNIFICANT CHANGE UP (ref 0–5)

## 2021-11-11 PROCEDURE — 71045 X-RAY EXAM CHEST 1 VIEW: CPT | Mod: 26

## 2021-11-11 PROCEDURE — 99285 EMERGENCY DEPT VISIT HI MDM: CPT

## 2021-11-11 RX ORDER — PIPERACILLIN AND TAZOBACTAM 4; .5 G/20ML; G/20ML
3.38 INJECTION, POWDER, LYOPHILIZED, FOR SOLUTION INTRAVENOUS ONCE
Refills: 0 | Status: COMPLETED | OUTPATIENT
Start: 2021-11-11 | End: 2021-11-11

## 2021-11-11 RX ORDER — ACETAMINOPHEN 500 MG
975 TABLET ORAL ONCE
Refills: 0 | Status: COMPLETED | OUTPATIENT
Start: 2021-11-11 | End: 2021-11-11

## 2021-11-11 RX ORDER — VANCOMYCIN HCL 1 G
1000 VIAL (EA) INTRAVENOUS ONCE
Refills: 0 | Status: COMPLETED | OUTPATIENT
Start: 2021-11-11 | End: 2021-11-11

## 2021-11-11 RX ORDER — SODIUM CHLORIDE 9 MG/ML
1000 INJECTION, SOLUTION INTRAVENOUS ONCE
Refills: 0 | Status: COMPLETED | OUTPATIENT
Start: 2021-11-11 | End: 2021-11-11

## 2021-11-11 RX ADMIN — PIPERACILLIN AND TAZOBACTAM 200 GRAM(S): 4; .5 INJECTION, POWDER, LYOPHILIZED, FOR SOLUTION INTRAVENOUS at 21:14

## 2021-11-11 RX ADMIN — Medication 975 MILLIGRAM(S): at 21:14

## 2021-11-11 RX ADMIN — Medication 975 MILLIGRAM(S): at 22:07

## 2021-11-11 RX ADMIN — SODIUM CHLORIDE 1000 MILLILITER(S): 9 INJECTION, SOLUTION INTRAVENOUS at 21:09

## 2021-11-11 RX ADMIN — Medication 250 MILLIGRAM(S): at 21:14
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
## 2021-11-11 NOTE — ED PROVIDER NOTE - PROGRESS NOTE DETAILS
Sergio Soria, PGY-2- Dylan Martin, patient's son, [phone]. Updated that pt will be admitted. Work up pending. Sergio Soria, PGY-2- CT with concern for osteomyelitis. Pt reporting PCP is Dr. Lafleur in [place]
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
## 2021-11-11 NOTE — ED PROVIDER NOTE - NSICDXPASTMEDICALHX_GEN_ALL_CORE_FT
PAST MEDICAL HISTORY:  Hypertension, unspecified type     Paraplegia     Sacral decubitus ulcer     Seizure disorder

## 2021-11-11 NOTE — ED PROVIDER NOTE - NS ED ROS FT
General: no fever, +chills  Eyes: no vision changes, no eye pain  Cardiovascular: no chest pain, no edema  Respiratory: no cough, no shortness of breath  Gastrointestinal: no abdominal pain, no nausea, no vomiting, no diarrhea  Genitourinary: no dysuria, no hematuria  Musculoskeletal: no muscle pain, no joint pain  Skin: no rash, +sacral ulcer  Neuro: no numbness, no tingling  Psych: no depression, no anxiety
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
## 2021-11-11 NOTE — ED PROVIDER NOTE - PHYSICAL EXAMINATION
General: well appearing, no acute distress, AOx3  Skin: no rash, no pallor, stage III sacral decubiti, no exposed bone, 4x4 cm, foul odor, no fluctuance or crepitus  Head: normocephalic, atraumatic  Eyes: clear conjunctiva, EOMI  ENMT: airway patent, no nasal discharge  Cardiovascular: tachycardic, normal rhythm, S1/S2  Pulmonary: clear to auscultation bilaterally, no rales, rhonchi, or wheeze  Abdomen: soft, nontender  Musculoskeletal: moving extremities well, no deformity  Psych: normal mood, normal affect

## 2021-11-12 DIAGNOSIS — R91.8 OTHER NONSPECIFIC ABNORMAL FINDING OF LUNG FIELD: ICD-10-CM

## 2021-11-12 DIAGNOSIS — A41.9 SEPSIS, UNSPECIFIED ORGANISM: ICD-10-CM

## 2021-11-12 DIAGNOSIS — R33.9 RETENTION OF URINE, UNSPECIFIED: ICD-10-CM

## 2021-11-12 DIAGNOSIS — I10 ESSENTIAL (PRIMARY) HYPERTENSION: ICD-10-CM

## 2021-11-12 DIAGNOSIS — G82.20 PARAPLEGIA, UNSPECIFIED: ICD-10-CM

## 2021-11-12 DIAGNOSIS — G40.909 EPILEPSY, UNSPECIFIED, NOT INTRACTABLE, WITHOUT STATUS EPILEPTICUS: ICD-10-CM

## 2021-11-12 DIAGNOSIS — Z29.9 ENCOUNTER FOR PROPHYLACTIC MEASURES, UNSPECIFIED: ICD-10-CM

## 2021-11-12 DIAGNOSIS — L89.159 PRESSURE ULCER OF SACRAL REGION, UNSPECIFIED STAGE: ICD-10-CM

## 2021-11-12 LAB
A1C WITH ESTIMATED AVERAGE GLUCOSE RESULT: 4.6 % — SIGNIFICANT CHANGE UP (ref 4–5.6)
ALBUMIN SERPL ELPH-MCNC: 3.3 G/DL — SIGNIFICANT CHANGE UP (ref 3.3–5)
ALP SERPL-CCNC: 48 U/L — SIGNIFICANT CHANGE UP (ref 40–120)
ALT FLD-CCNC: 10 U/L — SIGNIFICANT CHANGE UP (ref 4–41)
ANION GAP SERPL CALC-SCNC: 14 MMOL/L — SIGNIFICANT CHANGE UP (ref 7–14)
AST SERPL-CCNC: 24 U/L — SIGNIFICANT CHANGE UP (ref 4–40)
BILIRUB SERPL-MCNC: 0.5 MG/DL — SIGNIFICANT CHANGE UP (ref 0.2–1.2)
BUN SERPL-MCNC: 11 MG/DL — SIGNIFICANT CHANGE UP (ref 7–23)
CALCIUM SERPL-MCNC: 8.7 MG/DL — SIGNIFICANT CHANGE UP (ref 8.4–10.5)
CHLORIDE SERPL-SCNC: 99 MMOL/L — SIGNIFICANT CHANGE UP (ref 98–107)
CO2 SERPL-SCNC: 25 MMOL/L — SIGNIFICANT CHANGE UP (ref 22–31)
CREAT SERPL-MCNC: 0.47 MG/DL — LOW (ref 0.5–1.3)
CRP SERPL-MCNC: 29 MG/L — HIGH
ERYTHROCYTE [SEDIMENTATION RATE] IN BLOOD: 54 MM/HR — HIGH (ref 1–15)
ESTIMATED AVERAGE GLUCOSE: 85 — SIGNIFICANT CHANGE UP
GLUCOSE SERPL-MCNC: 90 MG/DL — SIGNIFICANT CHANGE UP (ref 70–99)
HCT VFR BLD CALC: 36.5 % — LOW (ref 39–50)
HGB BLD-MCNC: 11.9 G/DL — LOW (ref 13–17)
LIDOCAIN IGE QN: 18 U/L — SIGNIFICANT CHANGE UP (ref 7–60)
MAGNESIUM SERPL-MCNC: 1.9 MG/DL — SIGNIFICANT CHANGE UP (ref 1.6–2.6)
MCHC RBC-ENTMCNC: 29.7 PG — SIGNIFICANT CHANGE UP (ref 27–34)
MCHC RBC-ENTMCNC: 32.6 GM/DL — SIGNIFICANT CHANGE UP (ref 32–36)
MCV RBC AUTO: 91 FL — SIGNIFICANT CHANGE UP (ref 80–100)
NRBC # BLD: 0 /100 WBCS — SIGNIFICANT CHANGE UP
NRBC # FLD: 0 K/UL — SIGNIFICANT CHANGE UP
PHOSPHATE SERPL-MCNC: 3 MG/DL — SIGNIFICANT CHANGE UP (ref 2.5–4.5)
PLATELET # BLD AUTO: 157 K/UL — SIGNIFICANT CHANGE UP (ref 150–400)
POTASSIUM SERPL-MCNC: 3.1 MMOL/L — LOW (ref 3.5–5.3)
POTASSIUM SERPL-SCNC: 3.1 MMOL/L — LOW (ref 3.5–5.3)
PROT SERPL-MCNC: 7 G/DL — SIGNIFICANT CHANGE UP (ref 6–8.3)
RBC # BLD: 4.01 M/UL — LOW (ref 4.2–5.8)
RBC # FLD: 13.5 % — SIGNIFICANT CHANGE UP (ref 10.3–14.5)
SODIUM SERPL-SCNC: 138 MMOL/L — SIGNIFICANT CHANGE UP (ref 135–145)
TSH SERPL-MCNC: 0.39 UIU/ML — SIGNIFICANT CHANGE UP (ref 0.27–4.2)
WBC # BLD: 6.42 K/UL — SIGNIFICANT CHANGE UP (ref 3.8–10.5)
WBC # FLD AUTO: 6.42 K/UL — SIGNIFICANT CHANGE UP (ref 3.8–10.5)

## 2021-11-12 PROCEDURE — 99223 1ST HOSP IP/OBS HIGH 75: CPT

## 2021-11-12 PROCEDURE — 74177 CT ABD & PELVIS W/CONTRAST: CPT | Mod: 26,MA

## 2021-11-12 PROCEDURE — 71250 CT THORAX DX C-: CPT | Mod: 26

## 2021-11-12 RX ORDER — LANOLIN ALCOHOL/MO/W.PET/CERES
3 CREAM (GRAM) TOPICAL AT BEDTIME
Refills: 0 | Status: DISCONTINUED | OUTPATIENT
Start: 2021-11-12 | End: 2022-01-18

## 2021-11-12 RX ORDER — DIVALPROEX SODIUM 500 MG/1
1 TABLET, DELAYED RELEASE ORAL
Qty: 0 | Refills: 0 | DISCHARGE

## 2021-11-12 RX ORDER — ACETAMINOPHEN 500 MG
650 TABLET ORAL EVERY 6 HOURS
Refills: 0 | Status: DISCONTINUED | OUTPATIENT
Start: 2021-11-12 | End: 2021-12-03

## 2021-11-12 RX ORDER — ATENOLOL 25 MG/1
1 TABLET ORAL
Qty: 0 | Refills: 0 | DISCHARGE

## 2021-11-12 RX ORDER — GABAPENTIN 400 MG/1
300 CAPSULE ORAL THREE TIMES A DAY
Refills: 0 | Status: DISCONTINUED | OUTPATIENT
Start: 2021-11-12 | End: 2021-12-03

## 2021-11-12 RX ORDER — METHADONE HYDROCHLORIDE 40 MG/1
10 TABLET ORAL DAILY
Refills: 0 | Status: DISCONTINUED | OUTPATIENT
Start: 2021-11-12 | End: 2021-11-12

## 2021-11-12 RX ORDER — OXYCODONE AND ACETAMINOPHEN 5; 325 MG/1; MG/1
1 TABLET ORAL ONCE
Refills: 0 | Status: DISCONTINUED | OUTPATIENT
Start: 2021-11-12 | End: 2021-11-12

## 2021-11-12 RX ORDER — METHADONE HYDROCHLORIDE 40 MG/1
1 TABLET ORAL
Qty: 0 | Refills: 0 | DISCHARGE

## 2021-11-12 RX ORDER — BACLOFEN 100 %
10 POWDER (GRAM) MISCELLANEOUS THREE TIMES A DAY
Refills: 0 | Status: DISCONTINUED | OUTPATIENT
Start: 2021-11-12 | End: 2021-12-03

## 2021-11-12 RX ORDER — HYDRALAZINE HCL 50 MG
50 TABLET ORAL
Refills: 0 | Status: DISCONTINUED | OUTPATIENT
Start: 2021-11-12 | End: 2021-12-03

## 2021-11-12 RX ORDER — METHADONE HYDROCHLORIDE 40 MG/1
10 TABLET ORAL DAILY
Refills: 0 | Status: DISCONTINUED | OUTPATIENT
Start: 2021-11-12 | End: 2021-11-18

## 2021-11-12 RX ORDER — DIVALPROEX SODIUM 500 MG/1
500 TABLET, DELAYED RELEASE ORAL
Refills: 0 | Status: DISCONTINUED | OUTPATIENT
Start: 2021-11-12 | End: 2021-12-09

## 2021-11-12 RX ORDER — METOPROLOL TARTRATE 50 MG
50 TABLET ORAL
Refills: 0 | Status: DISCONTINUED | OUTPATIENT
Start: 2021-11-12 | End: 2021-12-03

## 2021-11-12 RX ORDER — FERROUS SULFATE 325(65) MG
325 TABLET ORAL DAILY
Refills: 0 | Status: DISCONTINUED | OUTPATIENT
Start: 2021-11-12 | End: 2022-01-18

## 2021-11-12 RX ORDER — ASPIRIN/CALCIUM CARB/MAGNESIUM 324 MG
81 TABLET ORAL DAILY
Refills: 0 | Status: DISCONTINUED | OUTPATIENT
Start: 2021-11-12 | End: 2022-01-18

## 2021-11-12 RX ORDER — TAMSULOSIN HYDROCHLORIDE 0.4 MG/1
0.4 CAPSULE ORAL AT BEDTIME
Refills: 0 | Status: DISCONTINUED | OUTPATIENT
Start: 2021-11-12 | End: 2022-01-18

## 2021-11-12 RX ORDER — AMLODIPINE BESYLATE AND BENAZEPRIL HYDROCHLORIDE 10; 20 MG/1; MG/1
1 CAPSULE ORAL
Qty: 0 | Refills: 0 | DISCHARGE

## 2021-11-12 RX ORDER — ASPIRIN/CALCIUM CARB/MAGNESIUM 324 MG
1 TABLET ORAL
Qty: 0 | Refills: 0 | DISCHARGE

## 2021-11-12 RX ORDER — ENOXAPARIN SODIUM 100 MG/ML
40 INJECTION SUBCUTANEOUS DAILY
Refills: 0 | Status: DISCONTINUED | OUTPATIENT
Start: 2021-11-12 | End: 2022-01-18

## 2021-11-12 RX ORDER — SENNA PLUS 8.6 MG/1
2 TABLET ORAL AT BEDTIME
Refills: 0 | Status: DISCONTINUED | OUTPATIENT
Start: 2021-11-12 | End: 2022-01-15

## 2021-11-12 RX ORDER — VANCOMYCIN HCL 1 G
1000 VIAL (EA) INTRAVENOUS EVERY 12 HOURS
Refills: 0 | Status: DISCONTINUED | OUTPATIENT
Start: 2021-11-12 | End: 2021-11-13

## 2021-11-12 RX ADMIN — Medication 10 MILLIGRAM(S): at 11:18

## 2021-11-12 RX ADMIN — Medication 1 TABLET(S): at 11:25

## 2021-11-12 RX ADMIN — DIVALPROEX SODIUM 500 MILLIGRAM(S): 500 TABLET, DELAYED RELEASE ORAL at 11:19

## 2021-11-12 RX ADMIN — METHADONE HYDROCHLORIDE 10 MILLIGRAM(S): 40 TABLET ORAL at 11:48

## 2021-11-12 RX ADMIN — Medication 50 MILLIGRAM(S): at 11:19

## 2021-11-12 RX ADMIN — OXYCODONE AND ACETAMINOPHEN 1 TABLET(S): 5; 325 TABLET ORAL at 12:50

## 2021-11-12 RX ADMIN — DIVALPROEX SODIUM 500 MILLIGRAM(S): 500 TABLET, DELAYED RELEASE ORAL at 18:36

## 2021-11-12 RX ADMIN — Medication 50 MILLIGRAM(S): at 18:13

## 2021-11-12 RX ADMIN — Medication 1 TABLET(S): at 11:16

## 2021-11-12 RX ADMIN — Medication 250 MILLIGRAM(S): at 14:18

## 2021-11-12 RX ADMIN — SENNA PLUS 2 TABLET(S): 8.6 TABLET ORAL at 22:50

## 2021-11-12 RX ADMIN — Medication 325 MILLIGRAM(S): at 11:19

## 2021-11-12 RX ADMIN — OXYCODONE AND ACETAMINOPHEN 1 TABLET(S): 5; 325 TABLET ORAL at 11:51

## 2021-11-12 RX ADMIN — Medication 50 MILLIGRAM(S): at 18:14

## 2021-11-12 RX ADMIN — Medication 50 MILLIGRAM(S): at 23:00

## 2021-11-12 RX ADMIN — Medication 10 MILLIGRAM(S): at 22:50

## 2021-11-12 RX ADMIN — GABAPENTIN 300 MILLIGRAM(S): 400 CAPSULE ORAL at 22:49

## 2021-11-12 RX ADMIN — TAMSULOSIN HYDROCHLORIDE 0.4 MILLIGRAM(S): 0.4 CAPSULE ORAL at 22:49

## 2021-11-12 RX ADMIN — ENOXAPARIN SODIUM 40 MILLIGRAM(S): 100 INJECTION SUBCUTANEOUS at 11:19

## 2021-11-12 RX ADMIN — GABAPENTIN 300 MILLIGRAM(S): 400 CAPSULE ORAL at 11:18

## 2021-11-12 RX ADMIN — Medication 81 MILLIGRAM(S): at 11:18

## 2021-11-12 NOTE — PHYSICAL THERAPY INITIAL EVALUATION ADULT - GENERAL OBSERVATIONS, REHAB EVAL
Pt received semisupine in bed, +condom catheter, +IV, in NAD. Pt agreeable to participate in PT evaluation.

## 2021-11-12 NOTE — H&P ADULT - PROBLEM SELECTOR PLAN 1
VS Hr 115, Tmax 100.4  Vitals q4hrs  IV hydration  Broad coverage IV ABX - ID consult for underlying bone erosion/osteomyelitis found on CT of abdomen.  Procalcitonin, VBG, Lactate  CXR, U/A, UCx, BCx, COVID, RVP  In ED, Given Zosyn and Vanco x 1
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
## 2021-11-12 NOTE — PROVIDER CONTACT NOTE (MEDICATION) - SITUATION
pt endorsing 10/10 sacral pain, pt is ordered for both methadone and percocet  attending MD Khoury spoken with and agreed to give both meds

## 2021-11-12 NOTE — PHYSICAL THERAPY INITIAL EVALUATION ADULT - PATIENT PROFILE REVIEW, REHAB EVAL
PT orders received: no formal activity orders. Consult with RN Mahogany, patient may participate in PT evaluation./yes

## 2021-11-12 NOTE — H&P ADULT - PROBLEM SELECTOR PLAN 2
Wound care consult  CT of abdomen showed  - Known left ischial tuberosity decubitus ulcer with underlying bone erosion/osteomyelitis.  Broad coverage IV ABX -  ID consult for underlying bone erosion/osteomyelitis  In ED, Given Zosyn and Vanco x 1   UCx, BCx, performing  Pain management with Methadone
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
## 2021-11-12 NOTE — H&P ADULT - PROBLEM SELECTOR PLAN 7
- CT of abdomen showed Distended urinary bladder. Recommend clinical correlation to assess urinary retention. Layering of stones in the left bladder diverticulum.  - Bladder scans Q 8hrs for evaluation of urinary retention  - I's & O's  - U/A, UC&S performing

## 2021-11-12 NOTE — H&P ADULT - PROBLEM SELECTOR PLAN 6
CT of abdomen showed Emphysema. Bibasilar atelectasis. Increased patchy opacity in the right lower lobe.  Will obtain a dedicated CT of chest for full evaluation  Pulmonary consult if needed after results of above

## 2021-11-12 NOTE — PROVIDER CONTACT NOTE (MEDICATION) - ASSESSMENT
pt is a/ox3, pressure ulcer on sacrum  pt endorsing 10/10 sacral pain, pt is ordered for both methadone and percocet  attending MD Khoury spoken with and agreed to give both meds

## 2021-11-12 NOTE — H&P ADULT - GENITOURINARY MALE
Vital Signs Last 24 Hrs  T(C): 36.2 (27 Jan 2018 02:10), Max: 37 (26 Jan 2018 17:51)  T(F): 97.1 (27 Jan 2018 02:10), Max: 98.6 (26 Jan 2018 17:51)  HR: 102 (27 Jan 2018 02:10) (83 - 148)  BP: 102/80 (26 Jan 2018 20:00) (100/68 - 102/80)  BP(mean): --  RR: 40 (27 Jan 2018 02:10) (22 - 40)  SpO2: 100% (27 Jan 2018 02:10) (98% - 100%)    exam limited because patient became very upset during examination  Physical exam: Gen: Well developed, NAD, tired, irritable, non-toxic appearing  HEENT: NC/AT, PERRL, no nasal flaring, no nasal congestion, cracked erythematous lips, no cervical lymphadenopathy  CVS: +S1, S2, RRR, no murmurs  Lungs: CTA b/l, no retractions/wheezes  Abdomen: soft, tender to palpation, voluntary guarding, +BS  : tisha 1 male  Ext: no cyanosis/edema, cap refill < 2 seconds  Skin: no rashes or skin break down  Neuro: Awake/alert, no focal deficit
patient
normal external genitalia

## 2021-11-12 NOTE — PHYSICAL THERAPY INITIAL EVALUATION ADULT - ADDITIONAL COMMENTS
Pt lethargic, reports living in handicap accessible house, with son. Pt has a home health aide, did not report hours. Prior to admission, pt endorses transferring to wheelchair with slide board.     Pt was left semi-supine with head of bed elevated to 30°, all lines/tubes intact and call bell within reach, RN aware.
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
## 2021-11-13 DIAGNOSIS — E87.6 HYPOKALEMIA: ICD-10-CM

## 2021-11-13 DIAGNOSIS — K76.9 LIVER DISEASE, UNSPECIFIED: ICD-10-CM

## 2021-11-13 LAB
ALBUMIN SERPL ELPH-MCNC: 3.4 G/DL — SIGNIFICANT CHANGE UP (ref 3.3–5)
ALP SERPL-CCNC: 48 U/L — SIGNIFICANT CHANGE UP (ref 40–120)
ALT FLD-CCNC: 9 U/L — SIGNIFICANT CHANGE UP (ref 4–41)
ANION GAP SERPL CALC-SCNC: 15 MMOL/L — HIGH (ref 7–14)
ANION GAP SERPL CALC-SCNC: 15 MMOL/L — HIGH (ref 7–14)
AST SERPL-CCNC: 22 U/L — SIGNIFICANT CHANGE UP (ref 4–40)
BILIRUB SERPL-MCNC: 0.6 MG/DL — SIGNIFICANT CHANGE UP (ref 0.2–1.2)
BUN SERPL-MCNC: 12 MG/DL — SIGNIFICANT CHANGE UP (ref 7–23)
BUN SERPL-MCNC: 8 MG/DL — SIGNIFICANT CHANGE UP (ref 7–23)
CALCIUM SERPL-MCNC: 8.9 MG/DL — SIGNIFICANT CHANGE UP (ref 8.4–10.5)
CALCIUM SERPL-MCNC: 9 MG/DL — SIGNIFICANT CHANGE UP (ref 8.4–10.5)
CHLORIDE SERPL-SCNC: 96 MMOL/L — LOW (ref 98–107)
CHLORIDE SERPL-SCNC: 97 MMOL/L — LOW (ref 98–107)
CO2 SERPL-SCNC: 27 MMOL/L — SIGNIFICANT CHANGE UP (ref 22–31)
CO2 SERPL-SCNC: 27 MMOL/L — SIGNIFICANT CHANGE UP (ref 22–31)
COVID-19 NUCLEOCAPSID GAM AB INTERP: POSITIVE
COVID-19 NUCLEOCAPSID TOTAL GAM ANTIBODY RESULT: 8.21 INDEX — HIGH
COVID-19 SPIKE DOMAIN AB INTERP: POSITIVE
COVID-19 SPIKE DOMAIN ANTIBODY RESULT: >250 U/ML — HIGH
CREAT SERPL-MCNC: 0.41 MG/DL — LOW (ref 0.5–1.3)
CREAT SERPL-MCNC: 0.46 MG/DL — LOW (ref 0.5–1.3)
GLUCOSE SERPL-MCNC: 126 MG/DL — HIGH (ref 70–99)
GLUCOSE SERPL-MCNC: 89 MG/DL — SIGNIFICANT CHANGE UP (ref 70–99)
HCT VFR BLD CALC: 38 % — LOW (ref 39–50)
HCV AB S/CO SERPL IA: 11.67 S/CO — HIGH (ref 0–0.99)
HCV AB SERPL-IMP: REACTIVE
HGB BLD-MCNC: 12.5 G/DL — LOW (ref 13–17)
MAGNESIUM SERPL-MCNC: 1.9 MG/DL — SIGNIFICANT CHANGE UP (ref 1.6–2.6)
MCHC RBC-ENTMCNC: 29.4 PG — SIGNIFICANT CHANGE UP (ref 27–34)
MCHC RBC-ENTMCNC: 32.9 GM/DL — SIGNIFICANT CHANGE UP (ref 32–36)
MCV RBC AUTO: 89.4 FL — SIGNIFICANT CHANGE UP (ref 80–100)
NRBC # BLD: 0 /100 WBCS — SIGNIFICANT CHANGE UP
NRBC # FLD: 0 K/UL — SIGNIFICANT CHANGE UP
PHOSPHATE SERPL-MCNC: 2.9 MG/DL — SIGNIFICANT CHANGE UP (ref 2.5–4.5)
PLATELET # BLD AUTO: 210 K/UL — SIGNIFICANT CHANGE UP (ref 150–400)
POTASSIUM SERPL-MCNC: 2.8 MMOL/L — CRITICAL LOW (ref 3.5–5.3)
POTASSIUM SERPL-MCNC: 3 MMOL/L — LOW (ref 3.5–5.3)
POTASSIUM SERPL-SCNC: 2.8 MMOL/L — CRITICAL LOW (ref 3.5–5.3)
POTASSIUM SERPL-SCNC: 3 MMOL/L — LOW (ref 3.5–5.3)
PROT SERPL-MCNC: 7.3 G/DL — SIGNIFICANT CHANGE UP (ref 6–8.3)
RBC # BLD: 4.25 M/UL — SIGNIFICANT CHANGE UP (ref 4.2–5.8)
RBC # FLD: 13.6 % — SIGNIFICANT CHANGE UP (ref 10.3–14.5)
SARS-COV-2 IGG+IGM SERPL QL IA: 8.21 INDEX — HIGH
SARS-COV-2 IGG+IGM SERPL QL IA: >250 U/ML — HIGH
SARS-COV-2 IGG+IGM SERPL QL IA: POSITIVE
SARS-COV-2 IGG+IGM SERPL QL IA: POSITIVE
SODIUM SERPL-SCNC: 138 MMOL/L — SIGNIFICANT CHANGE UP (ref 135–145)
SODIUM SERPL-SCNC: 139 MMOL/L — SIGNIFICANT CHANGE UP (ref 135–145)
VANCOMYCIN TROUGH SERPL-MCNC: 6.7 UG/ML — LOW (ref 10–20)
WBC # BLD: 7 K/UL — SIGNIFICANT CHANGE UP (ref 3.8–10.5)
WBC # FLD AUTO: 7 K/UL — SIGNIFICANT CHANGE UP (ref 3.8–10.5)

## 2021-11-13 PROCEDURE — 99232 SBSQ HOSP IP/OBS MODERATE 35: CPT

## 2021-11-13 RX ORDER — POTASSIUM CHLORIDE 20 MEQ
40 PACKET (EA) ORAL ONCE
Refills: 0 | Status: COMPLETED | OUTPATIENT
Start: 2021-11-13 | End: 2021-11-13

## 2021-11-13 RX ORDER — VANCOMYCIN HCL 1 G
1250 VIAL (EA) INTRAVENOUS EVERY 12 HOURS
Refills: 0 | Status: DISCONTINUED | OUTPATIENT
Start: 2021-11-13 | End: 2021-11-16

## 2021-11-13 RX ORDER — POTASSIUM CHLORIDE 20 MEQ
10 PACKET (EA) ORAL ONCE
Refills: 0 | Status: COMPLETED | OUTPATIENT
Start: 2021-11-13 | End: 2021-11-13

## 2021-11-13 RX ORDER — POTASSIUM CHLORIDE 20 MEQ
10 PACKET (EA) ORAL
Refills: 0 | Status: COMPLETED | OUTPATIENT
Start: 2021-11-13 | End: 2021-11-13

## 2021-11-13 RX ADMIN — Medication 10 MILLIGRAM(S): at 05:07

## 2021-11-13 RX ADMIN — Medication 100 MILLIEQUIVALENT(S): at 17:29

## 2021-11-13 RX ADMIN — Medication 50 MILLIGRAM(S): at 23:40

## 2021-11-13 RX ADMIN — Medication 50 MILLIGRAM(S): at 11:35

## 2021-11-13 RX ADMIN — GABAPENTIN 300 MILLIGRAM(S): 400 CAPSULE ORAL at 05:07

## 2021-11-13 RX ADMIN — Medication 81 MILLIGRAM(S): at 11:35

## 2021-11-13 RX ADMIN — ENOXAPARIN SODIUM 40 MILLIGRAM(S): 100 INJECTION SUBCUTANEOUS at 11:35

## 2021-11-13 RX ADMIN — Medication 10 MILLIGRAM(S): at 21:19

## 2021-11-13 RX ADMIN — GABAPENTIN 300 MILLIGRAM(S): 400 CAPSULE ORAL at 21:19

## 2021-11-13 RX ADMIN — Medication 40 MILLIEQUIVALENT(S): at 08:48

## 2021-11-13 RX ADMIN — Medication 100 MILLIEQUIVALENT(S): at 19:19

## 2021-11-13 RX ADMIN — DIVALPROEX SODIUM 500 MILLIGRAM(S): 500 TABLET, DELAYED RELEASE ORAL at 05:08

## 2021-11-13 RX ADMIN — Medication 1 TABLET(S): at 11:35

## 2021-11-13 RX ADMIN — DIVALPROEX SODIUM 500 MILLIGRAM(S): 500 TABLET, DELAYED RELEASE ORAL at 17:29

## 2021-11-13 RX ADMIN — Medication 166.67 MILLIGRAM(S): at 17:11

## 2021-11-13 RX ADMIN — METHADONE HYDROCHLORIDE 10 MILLIGRAM(S): 40 TABLET ORAL at 11:34

## 2021-11-13 RX ADMIN — Medication 50 MILLIGRAM(S): at 17:29

## 2021-11-13 RX ADMIN — Medication 325 MILLIGRAM(S): at 11:34

## 2021-11-13 RX ADMIN — Medication 100 MILLIEQUIVALENT(S): at 16:13

## 2021-11-13 RX ADMIN — Medication 250 MILLIGRAM(S): at 00:01

## 2021-11-13 RX ADMIN — GABAPENTIN 300 MILLIGRAM(S): 400 CAPSULE ORAL at 13:15

## 2021-11-13 RX ADMIN — TAMSULOSIN HYDROCHLORIDE 0.4 MILLIGRAM(S): 0.4 CAPSULE ORAL at 21:19

## 2021-11-13 RX ADMIN — Medication 50 MILLIGRAM(S): at 05:07

## 2021-11-13 RX ADMIN — Medication 100 MILLIEQUIVALENT(S): at 08:48

## 2021-11-13 RX ADMIN — Medication 10 MILLIGRAM(S): at 13:18

## 2021-11-13 RX ADMIN — Medication 50 MILLIGRAM(S): at 05:08

## 2021-11-13 RX ADMIN — Medication 40 MILLIEQUIVALENT(S): at 17:31

## 2021-11-13 NOTE — PROGRESS NOTE ADULT - PROBLEM SELECTOR PLAN 6
- CT of abdomen showed distended urinary bladder. Recommend clinical correlation to assess urinary retention. Layering of stones in the left bladder diverticulum.  - Bladder scans Q 8hrs for evaluation of urinary retention  - straight cath prn

## 2021-11-13 NOTE — PROGRESS NOTE ADULT - PROBLEM SELECTOR PLAN 1
Sepsis present on admission likely d/t infected decubitus ulcer   CT of abdomen showed increased stranding in the left buttock soft tissue overlying the sacral decubitus ulcer with minimally increased bone erosion of the underlying coccyx   Blood culture NGTD   Continue w/ IV Vanco, monitor Vanco level

## 2021-11-13 NOTE — PROGRESS NOTE ADULT - SUBJECTIVE AND OBJECTIVE BOX
PROGRESS NOTE:     Patient is a 71y old  Male who presents with a chief complaint of Infected bedsores (2021 12:34)      SUBJECTIVE / OVERNIGHT EVENTS: No acute events.     ADDITIONAL REVIEW OF SYSTEMS:    MEDICATIONS  (STANDING):  aspirin enteric coated 81 milliGRAM(s) Oral daily  baclofen 10 milliGRAM(s) Oral three times a day  calcium carbonate 1250 mG  + Vitamin D (OsCal 500 + D) 1 Tablet(s) Oral daily  diVALproex  milliGRAM(s) Oral two times a day  enoxaparin Injectable 40 milliGRAM(s) SubCutaneous daily  ferrous    sulfate 325 milliGRAM(s) Oral daily  gabapentin 300 milliGRAM(s) Oral three times a day  hydrALAZINE 50 milliGRAM(s) Oral four times a day  methadone   Solution 10 milliGRAM(s) Oral daily  metoprolol tartrate 50 milliGRAM(s) Oral two times a day  multivitamin 1 Tablet(s) Oral daily  senna 2 Tablet(s) Oral at bedtime  tamsulosin 0.4 milliGRAM(s) Oral at bedtime  vancomycin  IVPB 1000 milliGRAM(s) IV Intermittent every 12 hours    MEDICATIONS  (PRN):  acetaminophen     Tablet .. 650 milliGRAM(s) Oral every 6 hours PRN Temp greater or equal to 38C (100.4F), Mild Pain (1 - 3)  melatonin 3 milliGRAM(s) Oral at bedtime PRN Insomnia      CAPILLARY BLOOD GLUCOSE        I&O's Summary    2021 07:  -  2021 07:00  --------------------------------------------------------  IN: 250 mL / OUT: 2600 mL / NET: -2350 mL    2021 07:01  -  2021 15:37  --------------------------------------------------------  IN: 290 mL / OUT: 0 mL / NET: 290 mL        PHYSICAL EXAM:  Vital Signs Last 24 Hrs  T(C): 37.4 (2021 14:46), Max: 37.5 (2021 04:30)  T(F): 99.3 (2021 14:46), Max: 99.5 (2021 04:30)  HR: 70 (2021 14:46) (70 - 88)  BP: 138/84 (2021 14:46) (135/74 - 142/76)  BP(mean): --  RR: 18 (2021 14:46) (16 - 20)  SpO2: 97% (2021 14:46) (97% - 99%)    CONSTITUTIONAL: NAD  RESPIRATORY: Normal respiratory effort; lungs are clear to auscultation bilaterally  CARDIOVASCULAR: Regular rate and rhythm, normal S1 and S2, no murmur/rub/gallop; No lower extremity edema; Peripheral pulses are 2+ bilaterally  ABDOMEN: Nontender to palpation, normoactive bowel sounds, no rebound/guarding; No hepatosplenomegaly  MUSCLOSKELETAL: no clubbing or cyanosis of digits; no joint swelling or tenderness to palpation  PSYCH: Awake and alert  NEURO: Paraplegic     LABS:                        12.5   7.00  )-----------( 210      ( 2021 07:24 )             38.0     11-13    138  |  96<L>  |  12  ----------------------------<  126<H>  3.0<L>   |  27  |  0.46<L>    Ca    9.0      2021 15:02  Phos  2.9     -  Mg     1.90     -    TPro  7.3  /  Alb  3.4  /  TBili  0.6  /  DBili  x   /  AST  22  /  ALT  9   /  AlkPhos  48  11-13    PT/INR - ( 2021 21:27 )   PT: 12.8 sec;   INR: 1.13 ratio         PTT - ( 2021 21:27 )  PTT:46.3 sec      Urinalysis Basic - ( 2021 21:41 )    Color: Yellow / Appearance: Slightly Turbid / S.022 / pH: x  Gluc: x / Ketone: Trace  / Bili: Negative / Urobili: 3 mg/dL   Blood: x / Protein: 30 mg/dL / Nitrite: Negative   Leuk Esterase: Negative / RBC: 6 /HPF / WBC 2 /HPF   Sq Epi: x / Non Sq Epi: 1 /HPF / Bacteria: Moderate        Culture - Blood (collected 2021 02:29)  Source: .Blood Blood-Peripheral  Preliminary Report (2021 03:01):    No growth to date.    Culture - Blood (collected 2021 02:28)  Source: .Blood Blood-Peripheral  Preliminary Report (2021 03:01):    No growth to date.        RADIOLOGY & ADDITIONAL TESTS:  Results Reviewed:   Imaging Personally Reviewed:  CT Chest: Bibasilar linear atelectasis, emphysema     Electrocardiogram Personally Reviewed:    COORDINATION OF CARE:  Care Discussed with Consultants/Other Providers [Y/N]:  Prior or Outpatient Records Reviewed [Y/N]:

## 2021-11-14 LAB
ANION GAP SERPL CALC-SCNC: 13 MMOL/L — SIGNIFICANT CHANGE UP (ref 7–14)
BILIRUB SERPL-MCNC: 0.5 MG/DL — SIGNIFICANT CHANGE UP (ref 0.2–1.2)
BUN SERPL-MCNC: 11 MG/DL — SIGNIFICANT CHANGE UP (ref 7–23)
CALCIUM SERPL-MCNC: 9.2 MG/DL — SIGNIFICANT CHANGE UP (ref 8.4–10.5)
CHLORIDE SERPL-SCNC: 94 MMOL/L — LOW (ref 98–107)
CO2 SERPL-SCNC: 26 MMOL/L — SIGNIFICANT CHANGE UP (ref 22–31)
CREAT SERPL-MCNC: 0.46 MG/DL — LOW (ref 0.5–1.3)
CULTURE RESULTS: SIGNIFICANT CHANGE UP
GLUCOSE SERPL-MCNC: 106 MG/DL — HIGH (ref 70–99)
HCT VFR BLD CALC: 38 % — LOW (ref 39–50)
HGB BLD-MCNC: 13.1 G/DL — SIGNIFICANT CHANGE UP (ref 13–17)
INR BLD: 1.27 RATIO — HIGH (ref 0.88–1.16)
MAGNESIUM SERPL-MCNC: 2 MG/DL — SIGNIFICANT CHANGE UP (ref 1.6–2.6)
MCHC RBC-ENTMCNC: 29.8 PG — SIGNIFICANT CHANGE UP (ref 27–34)
MCHC RBC-ENTMCNC: 34.5 GM/DL — SIGNIFICANT CHANGE UP (ref 32–36)
MCV RBC AUTO: 86.4 FL — SIGNIFICANT CHANGE UP (ref 80–100)
MELD SCORE WITH DIALYSIS: 24 POINTS — SIGNIFICANT CHANGE UP
MELD SCORE WITHOUT DIALYSIS: 9 POINTS — SIGNIFICANT CHANGE UP
NRBC # BLD: 0 /100 WBCS — SIGNIFICANT CHANGE UP
NRBC # FLD: 0 K/UL — SIGNIFICANT CHANGE UP
PHOSPHATE SERPL-MCNC: 2.8 MG/DL — SIGNIFICANT CHANGE UP (ref 2.5–4.5)
PLATELET # BLD AUTO: 215 K/UL — SIGNIFICANT CHANGE UP (ref 150–400)
POTASSIUM SERPL-MCNC: 3 MMOL/L — LOW (ref 3.5–5.3)
POTASSIUM SERPL-SCNC: 3 MMOL/L — LOW (ref 3.5–5.3)
PROTHROM AB SERPL-ACNC: 14.3 SEC — HIGH (ref 10.6–13.6)
RBC # BLD: 4.4 M/UL — SIGNIFICANT CHANGE UP (ref 4.2–5.8)
RBC # FLD: 13.3 % — SIGNIFICANT CHANGE UP (ref 10.3–14.5)
SODIUM SERPL-SCNC: 133 MMOL/L — LOW (ref 135–145)
SPECIMEN SOURCE: SIGNIFICANT CHANGE UP
WBC # BLD: 7.16 K/UL — SIGNIFICANT CHANGE UP (ref 3.8–10.5)
WBC # FLD AUTO: 7.16 K/UL — SIGNIFICANT CHANGE UP (ref 3.8–10.5)

## 2021-11-14 PROCEDURE — 99232 SBSQ HOSP IP/OBS MODERATE 35: CPT

## 2021-11-14 RX ORDER — POTASSIUM CHLORIDE 20 MEQ
40 PACKET (EA) ORAL EVERY 4 HOURS
Refills: 0 | Status: COMPLETED | OUTPATIENT
Start: 2021-11-14 | End: 2021-11-14

## 2021-11-14 RX ADMIN — Medication 650 MILLIGRAM(S): at 09:41

## 2021-11-14 RX ADMIN — Medication 1 TABLET(S): at 11:13

## 2021-11-14 RX ADMIN — Medication 50 MILLIGRAM(S): at 11:17

## 2021-11-14 RX ADMIN — Medication 50 MILLIGRAM(S): at 05:53

## 2021-11-14 RX ADMIN — Medication 81 MILLIGRAM(S): at 11:14

## 2021-11-14 RX ADMIN — METHADONE HYDROCHLORIDE 10 MILLIGRAM(S): 40 TABLET ORAL at 11:12

## 2021-11-14 RX ADMIN — TAMSULOSIN HYDROCHLORIDE 0.4 MILLIGRAM(S): 0.4 CAPSULE ORAL at 21:58

## 2021-11-14 RX ADMIN — Medication 50 MILLIGRAM(S): at 18:00

## 2021-11-14 RX ADMIN — Medication 10 MILLIGRAM(S): at 05:53

## 2021-11-14 RX ADMIN — Medication 3 MILLIGRAM(S): at 21:58

## 2021-11-14 RX ADMIN — GABAPENTIN 300 MILLIGRAM(S): 400 CAPSULE ORAL at 13:46

## 2021-11-14 RX ADMIN — Medication 650 MILLIGRAM(S): at 10:20

## 2021-11-14 RX ADMIN — Medication 10 MILLIGRAM(S): at 21:57

## 2021-11-14 RX ADMIN — Medication 10 MILLIGRAM(S): at 13:46

## 2021-11-14 RX ADMIN — GABAPENTIN 300 MILLIGRAM(S): 400 CAPSULE ORAL at 21:58

## 2021-11-14 RX ADMIN — Medication 166.67 MILLIGRAM(S): at 04:15

## 2021-11-14 RX ADMIN — Medication 325 MILLIGRAM(S): at 11:14

## 2021-11-14 RX ADMIN — Medication 50 MILLIGRAM(S): at 17:56

## 2021-11-14 RX ADMIN — SENNA PLUS 2 TABLET(S): 8.6 TABLET ORAL at 21:58

## 2021-11-14 RX ADMIN — ENOXAPARIN SODIUM 40 MILLIGRAM(S): 100 INJECTION SUBCUTANEOUS at 11:13

## 2021-11-14 RX ADMIN — Medication 40 MILLIEQUIVALENT(S): at 13:45

## 2021-11-14 RX ADMIN — Medication 40 MILLIEQUIVALENT(S): at 18:00

## 2021-11-14 RX ADMIN — Medication 166.67 MILLIGRAM(S): at 18:23

## 2021-11-14 RX ADMIN — DIVALPROEX SODIUM 500 MILLIGRAM(S): 500 TABLET, DELAYED RELEASE ORAL at 17:57

## 2021-11-14 RX ADMIN — GABAPENTIN 300 MILLIGRAM(S): 400 CAPSULE ORAL at 05:53

## 2021-11-14 RX ADMIN — DIVALPROEX SODIUM 500 MILLIGRAM(S): 500 TABLET, DELAYED RELEASE ORAL at 05:53

## 2021-11-14 NOTE — DIETITIAN INITIAL EVALUATION ADULT. - RD TO REMAIN AVAILABLE
Tawnya Erickson RDN [id number]/yes -Improving with fluids  -likely pre-renal  -encouraged to increase PO intake of fluids on discharge  -follow up with PCP monday, check BMP

## 2021-11-14 NOTE — PROGRESS NOTE ADULT - PROBLEM SELECTOR PLAN 1
Sepsis present on admission likely d/t infected decubitus ulcer, sepsis now resolved   CT of abdomen showed increased stranding in the left buttock soft tissue overlying the sacral decubitus ulcer with minimally increased bone erosion of the underlying coccyx   Blood culture NGTD   Continue w/ IV Vanco, monitor Vanco level  ID following

## 2021-11-14 NOTE — DIETITIAN INITIAL EVALUATION ADULT. - SIGNS/SYMPTOMS
as evidenced by <50% PO intake this admission as evidenced by patient with unstageable pressure injuries to sacrum and R thigh

## 2021-11-14 NOTE — PROGRESS NOTE ADULT - SUBJECTIVE AND OBJECTIVE BOX
PROGRESS NOTE:     Patient is a 71y old  Male who presents with a chief complaint of Infected bedsores (14 Nov 2021 11:25)      SUBJECTIVE / OVERNIGHT EVENTS: Pt c/o pain in back, had not received Methadone yet.     ADDITIONAL REVIEW OF SYSTEMS:    MEDICATIONS  (STANDING):  aspirin enteric coated 81 milliGRAM(s) Oral daily  baclofen 10 milliGRAM(s) Oral three times a day  calcium carbonate 1250 mG  + Vitamin D (OsCal 500 + D) 1 Tablet(s) Oral daily  diVALproex  milliGRAM(s) Oral two times a day  enoxaparin Injectable 40 milliGRAM(s) SubCutaneous daily  ferrous    sulfate 325 milliGRAM(s) Oral daily  gabapentin 300 milliGRAM(s) Oral three times a day  hydrALAZINE 50 milliGRAM(s) Oral four times a day  methadone   Solution 10 milliGRAM(s) Oral daily  metoprolol tartrate 50 milliGRAM(s) Oral two times a day  multivitamin 1 Tablet(s) Oral daily  potassium chloride    Tablet ER 40 milliEquivalent(s) Oral every 4 hours  senna 2 Tablet(s) Oral at bedtime  tamsulosin 0.4 milliGRAM(s) Oral at bedtime  vancomycin  IVPB 1250 milliGRAM(s) IV Intermittent every 12 hours    MEDICATIONS  (PRN):  acetaminophen     Tablet .. 650 milliGRAM(s) Oral every 6 hours PRN Temp greater or equal to 38C (100.4F), Mild Pain (1 - 3)  melatonin 3 milliGRAM(s) Oral at bedtime PRN Insomnia      CAPILLARY BLOOD GLUCOSE        I&O's Summary    13 Nov 2021 07:01  -  14 Nov 2021 07:00  --------------------------------------------------------  IN: 735 mL / OUT: 2350 mL / NET: -1615 mL    14 Nov 2021 07:01  -  14 Nov 2021 13:09  --------------------------------------------------------  IN: 0 mL / OUT: 250 mL / NET: -250 mL        PHYSICAL EXAM:  Vital Signs Last 24 Hrs  T(C): 36.9 (14 Nov 2021 06:03), Max: 37.4 (13 Nov 2021 14:46)  T(F): 98.4 (14 Nov 2021 06:03), Max: 99.3 (13 Nov 2021 14:46)  HR: 77 (14 Nov 2021 06:03) (70 - 78)  BP: 155/85 (14 Nov 2021 06:03) (138/84 - 155/85)  BP(mean): --  RR: 18 (14 Nov 2021 06:03) (18 - 18)  SpO2: 98% (14 Nov 2021 06:03) (97% - 99%)    CONSTITUTIONAL: NAD  RESPIRATORY: Normal respiratory effort; lungs are clear to auscultation bilaterally  CARDIOVASCULAR: Regular rate and rhythm, normal S1 and S2, no murmur/rub/gallop; No lower extremity edema; Peripheral pulses are 2+ bilaterally  ABDOMEN: Nontender to palpation, normoactive bowel sounds, no rebound/guarding; No hepatosplenomegaly  MUSCLOSKELETAL: no clubbing or cyanosis of digits; no joint swelling or tenderness to palpation  PSYCH: Awake and alert  NEURO: Paraplegic     LABS:                        13.1   7.16  )-----------( 215      ( 14 Nov 2021 07:24 )             38.0     11-14    133<L>  |  94<L>  |  11  ----------------------------<  106<H>  3.0<L>   |  26  |  0.46<L>    Ca    9.2      14 Nov 2021 07:24  Phos  2.8     11-14  Mg     2.00     11-14    TPro  x   /  Alb  x   /  TBili  0.5  /  DBili  x   /  AST  x   /  ALT  x   /  AlkPhos  x   11-14    PT/INR - ( 14 Nov 2021 07:24 )   PT: 14.3 sec;   INR: 1.27 ratio                   Culture - Blood (collected 12 Nov 2021 02:29)  Source: .Blood Blood-Peripheral  Preliminary Report (13 Nov 2021 03:01):    No growth to date.    Culture - Blood (collected 12 Nov 2021 02:28)  Source: .Blood Blood-Peripheral  Preliminary Report (13 Nov 2021 03:01):    No growth to date.    Culture - Urine (collected 11 Nov 2021 21:10)  Source: Clean Catch Clean Catch (Midstream)  Final Report (14 Nov 2021 10:52):    >=3 organisms. Probable collection contamination.        RADIOLOGY & ADDITIONAL TESTS:  Results Reviewed:   Imaging Personally Reviewed:  Electrocardiogram Personally Reviewed:    COORDINATION OF CARE:  Care Discussed with Consultants/Other Providers [Y/N]:  Prior or Outpatient Records Reviewed [Y/N]:

## 2021-11-14 NOTE — DIETITIAN INITIAL EVALUATION ADULT. - OTHER INFO
Observed patient breakfast tray untouched, states he does not have an appetite presently. <10% PO documented per RN flowsheets. Denies GI distress (nausea/vomiting/diarrhea/constipation), last BM 11/12 per EMR. States no difficulty chewing/swallowing. Patient denies recent weight changes and endorses a usual body weight of 68kg. Dosing weight of 61kg differs from stated weight possibly in setting of scale error vs. recall error vs. actual weight loss. Observed patient breakfast tray untouched, states he does not have an appetite presently. <10% PO this admission documented per RN flowsheets. Denies GI distress (nausea/vomiting/diarrhea/constipation), last BM 11/12 per EMR. States no difficulty chewing/swallowing. Patient denies recent weight changes and endorses a usual body weight of 68kg. Dosing weight of 61kg differs from stated weight possibly in setting of scale error vs. recall error vs. actual weight loss.

## 2021-11-14 NOTE — DIETITIAN INITIAL EVALUATION ADULT. - PERTINENT MEDS FT
MEDICATIONS  (STANDING):  aspirin enteric coated 81 milliGRAM(s) Oral daily  baclofen 10 milliGRAM(s) Oral three times a day  calcium carbonate 1250 mG  + Vitamin D (OsCal 500 + D) 1 Tablet(s) Oral daily  diVALproex  milliGRAM(s) Oral two times a day  enoxaparin Injectable 40 milliGRAM(s) SubCutaneous daily  ferrous    sulfate 325 milliGRAM(s) Oral daily  gabapentin 300 milliGRAM(s) Oral three times a day  hydrALAZINE 50 milliGRAM(s) Oral four times a day  methadone   Solution 10 milliGRAM(s) Oral daily  metoprolol tartrate 50 milliGRAM(s) Oral two times a day  multivitamin 1 Tablet(s) Oral daily  potassium chloride    Tablet ER 40 milliEquivalent(s) Oral every 4 hours  senna 2 Tablet(s) Oral at bedtime  tamsulosin 0.4 milliGRAM(s) Oral at bedtime  vancomycin  IVPB 1250 milliGRAM(s) IV Intermittent every 12 hours
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
## 2021-11-14 NOTE — DIETITIAN INITIAL EVALUATION ADULT. - ORAL INTAKE PTA/DIET HISTORY
Patient reports good PO intake PTA. Diet recall consists of [employer]  food, 2 meals per day. Reports use of Ensure supplements occasionally at home. Confirms NKFA.

## 2021-11-14 NOTE — DIETITIAN INITIAL EVALUATION ADULT. - PERTINENT LABORATORY DATA
11-14 Na 133 mmol/L<L> Glu 106 mg/dL<H> K+ 3.0 mmol/L<L> Cr 0.46 mg/dL<L> BUN 11 mg/dL Phos 2.8 mg/dL

## 2021-11-15 LAB
ANION GAP SERPL CALC-SCNC: 9 MMOL/L — SIGNIFICANT CHANGE UP (ref 7–14)
BUN SERPL-MCNC: 17 MG/DL — SIGNIFICANT CHANGE UP (ref 7–23)
CALCIUM SERPL-MCNC: 9.2 MG/DL — SIGNIFICANT CHANGE UP (ref 8.4–10.5)
CHLORIDE SERPL-SCNC: 95 MMOL/L — LOW (ref 98–107)
CO2 SERPL-SCNC: 27 MMOL/L — SIGNIFICANT CHANGE UP (ref 22–31)
CREAT SERPL-MCNC: 0.64 MG/DL — SIGNIFICANT CHANGE UP (ref 0.5–1.3)
GLUCOSE SERPL-MCNC: 85 MG/DL — SIGNIFICANT CHANGE UP (ref 70–99)
HCT VFR BLD CALC: 35.9 % — LOW (ref 39–50)
HGB BLD-MCNC: 11.9 G/DL — LOW (ref 13–17)
MAGNESIUM SERPL-MCNC: 2 MG/DL — SIGNIFICANT CHANGE UP (ref 1.6–2.6)
MCHC RBC-ENTMCNC: 29.6 PG — SIGNIFICANT CHANGE UP (ref 27–34)
MCHC RBC-ENTMCNC: 33.1 GM/DL — SIGNIFICANT CHANGE UP (ref 32–36)
MCV RBC AUTO: 89.3 FL — SIGNIFICANT CHANGE UP (ref 80–100)
NRBC # BLD: 0 /100 WBCS — SIGNIFICANT CHANGE UP
NRBC # FLD: 0 K/UL — SIGNIFICANT CHANGE UP
PHOSPHATE SERPL-MCNC: 2.6 MG/DL — SIGNIFICANT CHANGE UP (ref 2.5–4.5)
PLATELET # BLD AUTO: 201 K/UL — SIGNIFICANT CHANGE UP (ref 150–400)
POTASSIUM SERPL-MCNC: 4 MMOL/L — SIGNIFICANT CHANGE UP (ref 3.5–5.3)
POTASSIUM SERPL-SCNC: 4 MMOL/L — SIGNIFICANT CHANGE UP (ref 3.5–5.3)
RBC # BLD: 4.02 M/UL — LOW (ref 4.2–5.8)
RBC # FLD: 13.3 % — SIGNIFICANT CHANGE UP (ref 10.3–14.5)
SODIUM SERPL-SCNC: 131 MMOL/L — LOW (ref 135–145)
VANCOMYCIN TROUGH SERPL-MCNC: 15.5 UG/ML — SIGNIFICANT CHANGE UP (ref 10–20)
WBC # BLD: 7.38 K/UL — SIGNIFICANT CHANGE UP (ref 3.8–10.5)
WBC # FLD AUTO: 7.38 K/UL — SIGNIFICANT CHANGE UP (ref 3.8–10.5)

## 2021-11-15 PROCEDURE — 99232 SBSQ HOSP IP/OBS MODERATE 35: CPT

## 2021-11-15 PROCEDURE — 99223 1ST HOSP IP/OBS HIGH 75: CPT

## 2021-11-15 RX ADMIN — Medication 50 MILLIGRAM(S): at 17:50

## 2021-11-15 RX ADMIN — ENOXAPARIN SODIUM 40 MILLIGRAM(S): 100 INJECTION SUBCUTANEOUS at 12:03

## 2021-11-15 RX ADMIN — Medication 50 MILLIGRAM(S): at 05:00

## 2021-11-15 RX ADMIN — Medication 50 MILLIGRAM(S): at 00:09

## 2021-11-15 RX ADMIN — Medication 10 MILLIGRAM(S): at 05:00

## 2021-11-15 RX ADMIN — Medication 166.67 MILLIGRAM(S): at 06:05

## 2021-11-15 RX ADMIN — GABAPENTIN 300 MILLIGRAM(S): 400 CAPSULE ORAL at 05:00

## 2021-11-15 RX ADMIN — Medication 10 MILLIGRAM(S): at 14:13

## 2021-11-15 RX ADMIN — GABAPENTIN 300 MILLIGRAM(S): 400 CAPSULE ORAL at 21:36

## 2021-11-15 RX ADMIN — DIVALPROEX SODIUM 500 MILLIGRAM(S): 500 TABLET, DELAYED RELEASE ORAL at 05:00

## 2021-11-15 RX ADMIN — Medication 50 MILLIGRAM(S): at 12:08

## 2021-11-15 RX ADMIN — DIVALPROEX SODIUM 500 MILLIGRAM(S): 500 TABLET, DELAYED RELEASE ORAL at 17:50

## 2021-11-15 RX ADMIN — Medication 166.67 MILLIGRAM(S): at 18:11

## 2021-11-15 RX ADMIN — Medication 50 MILLIGRAM(S): at 17:51

## 2021-11-15 RX ADMIN — TAMSULOSIN HYDROCHLORIDE 0.4 MILLIGRAM(S): 0.4 CAPSULE ORAL at 21:37

## 2021-11-15 RX ADMIN — Medication 1 TABLET(S): at 12:08

## 2021-11-15 RX ADMIN — Medication 325 MILLIGRAM(S): at 12:04

## 2021-11-15 RX ADMIN — Medication 81 MILLIGRAM(S): at 12:04

## 2021-11-15 RX ADMIN — Medication 1 ENEMA: at 15:07

## 2021-11-15 RX ADMIN — GABAPENTIN 300 MILLIGRAM(S): 400 CAPSULE ORAL at 14:13

## 2021-11-15 RX ADMIN — SENNA PLUS 2 TABLET(S): 8.6 TABLET ORAL at 21:46

## 2021-11-15 RX ADMIN — METHADONE HYDROCHLORIDE 10 MILLIGRAM(S): 40 TABLET ORAL at 12:03

## 2021-11-15 RX ADMIN — Medication 1 TABLET(S): at 12:03

## 2021-11-15 RX ADMIN — Medication 10 MILLIGRAM(S): at 21:36

## 2021-11-15 NOTE — PROGRESS NOTE ADULT - SUBJECTIVE AND OBJECTIVE BOX
Follow Up:  Fever, decubitus     Interval History/ROS: Afebrile. Feels better. No diarrhea. No pain.     Allergies  No Known Allergies        ANTIMICROBIALS:  vancomycin  IVPB 1250 every 12 hours      OTHER MEDS:  acetaminophen     Tablet .. 650 milliGRAM(s) Oral every 6 hours PRN  aspirin enteric coated 81 milliGRAM(s) Oral daily  baclofen 10 milliGRAM(s) Oral three times a day  calcium carbonate 1250 mG  + Vitamin D (OsCal 500 + D) 1 Tablet(s) Oral daily  diVALproex  milliGRAM(s) Oral two times a day  enoxaparin Injectable 40 milliGRAM(s) SubCutaneous daily  ferrous    sulfate 325 milliGRAM(s) Oral daily  gabapentin 300 milliGRAM(s) Oral three times a day  hydrALAZINE 50 milliGRAM(s) Oral four times a day  melatonin 3 milliGRAM(s) Oral at bedtime PRN  methadone   Solution 10 milliGRAM(s) Oral daily  metoprolol tartrate 50 milliGRAM(s) Oral two times a day  multivitamin 1 Tablet(s) Oral daily  senna 2 Tablet(s) Oral at bedtime  tamsulosin 0.4 milliGRAM(s) Oral at bedtime      Vital Signs Last 24 Hrs  T(C): 36.7 (15 Nov 2021 11:53), Max: 37.1 (14 Nov 2021 22:21)  T(F): 98 (15 Nov 2021 11:53), Max: 98.8 (14 Nov 2021 22:21)  HR: 66 (15 Nov 2021 11:53) (66 - 76)  BP: 121/83 (15 Nov 2021 11:53) (121/83 - 133/82)  BP(mean): --  RR: 18 (15 Nov 2021 11:53) (18 - 18)  SpO2: 98% (15 Nov 2021 11:53) (98% - 100%)    Physical Exam:  General: awake, alert, non toxic  Head: atraumatic, normocephalic  Eye: normal sclera and conjunctiva  Cardio: regular rate   Respiratory: nonlabored on room air  abd: soft, bowel sounds present, no tenderness  Musculoskeletal: no focal joint swelling, no edema  Skin: sacral wound dressed   Neurologic: bedbound                           11.9   7.38  )-----------( 201      ( 15 Nov 2021 05:29 )             35.9       11-15    131<L>  |  95<L>  |  17  ----------------------------<  85  4.0   |  27  |  0.64    Ca    9.2      15 Nov 2021 05:29  Phos  2.6     11-15  Mg     2.00     11-15    TPro  x   /  Alb  x   /  TBili  0.5  /  DBili  x   /  AST  x   /  ALT  x   /  AlkPhos  x   11-14      MICROBIOLOGY:  Vancomycin Level, Trough: 15.5 ug/mL (11-15-21 @ 05:29)    Culture - Blood (collected 11-12-21 @ 02:29)  Source: .Blood Blood-Peripheral  Preliminary Report (11-13-21 @ 03:01):    No growth to date.    Culture - Blood (collected 11-12-21 @ 02:28)  Source: .Blood Blood-Peripheral  Preliminary Report (11-13-21 @ 03:01):    No growth to date.    Culture - Urine (collected 11-11-21 @ 21:10)  Source: Clean Catch Clean Catch (Midstream)  Final Report (11-14-21 @ 10:52):    >=3 organisms. Probable collection contamination.    Rapid RVP Result: NotDetec (11-11 @ 22:35)    RADIOLOGY:  Images below reviewed personally  CT Chest No Cont (11.12.21 @ 12:58)   Bibasilar linear atelectasis, greater on the right. Emphysema.    CT Abdomen and Pelvis w/ IV Cont (11.12.21 @ 01:27)   Known left ischial tuberosity decubitus ulcer with underlying bone erosion/osteomyelitis. Overall increased stranding in the left buttock soft tissue overlying the sacral decubitus ulcer with minimally increased bone erosion of the underlying coccyx since 10/28/2018. This may represent worsening infection (cellulitis/osteomyelitis). Recommend clinical correlation. No discrete fluid collection in the visualized superficial soft tissue.  Indeterminate hepatic lesions, which can be further characterized on a nonemergent contrast enhanced MRI.  Dilated proximal/mid common bile duct with distal tapering. Follow-up MRCP/MR would be helpful for further evaluation of biliary pathology.  Distended urinary bladder. Recommend clinical correlation to assess urinary retention. Layering of stones in the left bladder diverticulum.
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
## 2021-11-15 NOTE — PROGRESS NOTE ADULT - SUBJECTIVE AND OBJECTIVE BOX
Reynaldo Starks MD  [employer]ist  Pager [phone]  Email: [email or web address]          PROGRESS NOTE:     Patient is a 71y old  Male who presents with a chief complaint of pressure ulcer (15 Nov 2021 11:39)      SUBJECTIVE / OVERNIGHT EVENTS:  Patient seen and examined this morning. No new complaints. States that he does have some widespread pain which has been present for a long time.   ADDITIONAL REVIEW OF SYSTEMS:  He denies f/c/n/v    MEDICATIONS  (STANDING):  aspirin enteric coated 81 milliGRAM(s) Oral daily  baclofen 10 milliGRAM(s) Oral three times a day  calcium carbonate 1250 mG  + Vitamin D (OsCal 500 + D) 1 Tablet(s) Oral daily  diVALproex  milliGRAM(s) Oral two times a day  enoxaparin Injectable 40 milliGRAM(s) SubCutaneous daily  ferrous    sulfate 325 milliGRAM(s) Oral daily  gabapentin 300 milliGRAM(s) Oral three times a day  hydrALAZINE 50 milliGRAM(s) Oral four times a day  methadone   Solution 10 milliGRAM(s) Oral daily  metoprolol tartrate 50 milliGRAM(s) Oral two times a day  multivitamin 1 Tablet(s) Oral daily  saline laxative (FLEET) Rectal Enema 1 Enema Rectal once  senna 2 Tablet(s) Oral at bedtime  tamsulosin 0.4 milliGRAM(s) Oral at bedtime  vancomycin  IVPB 1250 milliGRAM(s) IV Intermittent every 12 hours    MEDICATIONS  (PRN):  acetaminophen     Tablet .. 650 milliGRAM(s) Oral every 6 hours PRN Temp greater or equal to 38C (100.4F), Mild Pain (1 - 3)  melatonin 3 milliGRAM(s) Oral at bedtime PRN Insomnia      CAPILLARY BLOOD GLUCOSE        I&O's Summary    14 Nov 2021 07:01  -  15 Nov 2021 07:00  --------------------------------------------------------  IN: 0 mL / OUT: 250 mL / NET: -250 mL    15 Nov 2021 07:01  -  15 Nov 2021 13:32  --------------------------------------------------------  IN: 0 mL / OUT: 1000 mL / NET: -1000 mL        PHYSICAL EXAM:  Vital Signs Last 24 Hrs  T(C): 36.7 (15 Nov 2021 11:53), Max: 37.1 (14 Nov 2021 22:21)  T(F): 98 (15 Nov 2021 11:53), Max: 98.8 (14 Nov 2021 22:21)  HR: 66 (15 Nov 2021 11:53) (66 - 76)  BP: 121/83 (15 Nov 2021 11:53) (121/83 - 133/82)  BP(mean): --  RR: 18 (15 Nov 2021 11:53) (18 - 18)  SpO2: 98% (15 Nov 2021 11:53) (98% - 100%)    CONSTITUTIONAL: NAD  RESPIRATORY: Normal respiratory effort; lungs are clear to auscultation bilaterally  CARDIOVASCULAR: Regular rate and rhythm, normal S1 and S2, no murmur/rub/gallop; No lower extremity edema; Peripheral pulses are 2+ bilaterally  ABDOMEN: Nontender to palpation, normoactive bowel sounds, no rebound/guarding; No hepatosplenomegaly  MUSCLOSKELETAL: no clubbing or cyanosis of digits; no joint swelling or tenderness to palpation  PSYCH: Awake and alert  NEURO: Paraplegic     LABS:                        11.9   7.38  )-----------( 201      ( 15 Nov 2021 05:29 )             35.9     11-15    131<L>  |  95<L>  |  17  ----------------------------<  85  4.0   |  27  |  0.64    Ca    9.2      15 Nov 2021 05:29  Phos  2.6     11-15  Mg     2.00     11-15    TPro  x   /  Alb  x   /  TBili  0.5  /  DBili  x   /  AST  x   /  ALT  x   /  AlkPhos  x   11-14    PT/INR - ( 14 Nov 2021 07:24 )   PT: 14.3 sec;   INR: 1.27 ratio                     RADIOLOGY & ADDITIONAL TESTS:  Results Reviewed:   Imaging Personally Reviewed:  Electrocardiogram Personally Reviewed:    COORDINATION OF CARE:  Care Discussed with Consultants/Other Providers [Y/N]: Case discussed during interdisciplinary rounds with social work and case management  Prior or Outpatient Records Reviewed [Y/N]:

## 2021-11-15 NOTE — CONSULT NOTE ADULT - ASSESSMENT
68y old  Male s/p Encephalopathy ,HTN, seizure disorder, paraplegia, urinary incontinence  s/p febrile, tachycardic, treated for possible bacterial meningitis and LP was negative for bacterial and viral meningitis  chronic ischial pressure stage 4  does not appear cellultis currently/ aquacel ag with foam /cavilon placed  continue workup as per medicine and ID  no surgical debridement needed at this time, if not responding to conservative alginate/foam/ consider vac- will reevaluate  NUtritional assessment  patient has wheelchair- asses for bed mattress and roho for home  68y old  Male s/p Encephalopathy ,HTN, seizure disorder, paraplegia, urinary incontinence  s/p febrile, tachycardic, treated for possible bacterial meningitis and LP was negative for bacterial and viral meningitis  chronic ischial pressure stage 4  does not appear cellultis currently/ aquacel ag with foam /cavilon placed,   continue workup as per medicine and ID  no surgical debridement needed at this time, if not responding to conservative alginate/foam/ consider vac- will reevaluate  NUtritional assessment  patient has wheelchair- asses for bed mattress and roho for home

## 2021-11-15 NOTE — PROGRESS NOTE ADULT - PROBLEM SELECTOR PLAN 8
Supplement K and repeat BMP  Also w/hyponatremia, will monitor and if it continues to travel, then will w/u
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
## 2021-11-16 LAB
ANION GAP SERPL CALC-SCNC: 11 MMOL/L — SIGNIFICANT CHANGE UP (ref 7–14)
BUN SERPL-MCNC: 17 MG/DL — SIGNIFICANT CHANGE UP (ref 7–23)
CALCIUM SERPL-MCNC: 8.9 MG/DL — SIGNIFICANT CHANGE UP (ref 8.4–10.5)
CHLORIDE SERPL-SCNC: 96 MMOL/L — LOW (ref 98–107)
CO2 SERPL-SCNC: 28 MMOL/L — SIGNIFICANT CHANGE UP (ref 22–31)
CREAT SERPL-MCNC: 0.56 MG/DL — SIGNIFICANT CHANGE UP (ref 0.5–1.3)
GLUCOSE SERPL-MCNC: 84 MG/DL — SIGNIFICANT CHANGE UP (ref 70–99)
HCT VFR BLD CALC: 39.5 % — SIGNIFICANT CHANGE UP (ref 39–50)
HGB BLD-MCNC: 12.5 G/DL — LOW (ref 13–17)
MCHC RBC-ENTMCNC: 28.9 PG — SIGNIFICANT CHANGE UP (ref 27–34)
MCHC RBC-ENTMCNC: 31.6 GM/DL — LOW (ref 32–36)
MCV RBC AUTO: 91.4 FL — SIGNIFICANT CHANGE UP (ref 80–100)
NRBC # BLD: 0 /100 WBCS — SIGNIFICANT CHANGE UP
NRBC # FLD: 0 K/UL — SIGNIFICANT CHANGE UP
PLATELET # BLD AUTO: 170 K/UL — SIGNIFICANT CHANGE UP (ref 150–400)
POTASSIUM SERPL-MCNC: 3.9 MMOL/L — SIGNIFICANT CHANGE UP (ref 3.5–5.3)
POTASSIUM SERPL-SCNC: 3.9 MMOL/L — SIGNIFICANT CHANGE UP (ref 3.5–5.3)
RBC # BLD: 4.32 M/UL — SIGNIFICANT CHANGE UP (ref 4.2–5.8)
RBC # FLD: 13.2 % — SIGNIFICANT CHANGE UP (ref 10.3–14.5)
SODIUM SERPL-SCNC: 135 MMOL/L — SIGNIFICANT CHANGE UP (ref 135–145)
WBC # BLD: 5.34 K/UL — SIGNIFICANT CHANGE UP (ref 3.8–10.5)
WBC # FLD AUTO: 5.34 K/UL — SIGNIFICANT CHANGE UP (ref 3.8–10.5)

## 2021-11-16 PROCEDURE — 72020 X-RAY EXAM OF SPINE 1 VIEW: CPT | Mod: 26

## 2021-11-16 PROCEDURE — 99232 SBSQ HOSP IP/OBS MODERATE 35: CPT

## 2021-11-16 RX ORDER — VANCOMYCIN HCL 1 G
1000 VIAL (EA) INTRAVENOUS EVERY 12 HOURS
Refills: 0 | Status: DISCONTINUED | OUTPATIENT
Start: 2021-11-16 | End: 2021-11-18

## 2021-11-16 RX ADMIN — Medication 50 MILLIGRAM(S): at 06:10

## 2021-11-16 RX ADMIN — Medication 50 MILLIGRAM(S): at 17:11

## 2021-11-16 RX ADMIN — DIVALPROEX SODIUM 500 MILLIGRAM(S): 500 TABLET, DELAYED RELEASE ORAL at 06:10

## 2021-11-16 RX ADMIN — ENOXAPARIN SODIUM 40 MILLIGRAM(S): 100 INJECTION SUBCUTANEOUS at 11:48

## 2021-11-16 RX ADMIN — DIVALPROEX SODIUM 500 MILLIGRAM(S): 500 TABLET, DELAYED RELEASE ORAL at 17:10

## 2021-11-16 RX ADMIN — SENNA PLUS 2 TABLET(S): 8.6 TABLET ORAL at 21:41

## 2021-11-16 RX ADMIN — Medication 50 MILLIGRAM(S): at 17:09

## 2021-11-16 RX ADMIN — Medication 81 MILLIGRAM(S): at 11:47

## 2021-11-16 RX ADMIN — GABAPENTIN 300 MILLIGRAM(S): 400 CAPSULE ORAL at 06:09

## 2021-11-16 RX ADMIN — Medication 650 MILLIGRAM(S): at 23:30

## 2021-11-16 RX ADMIN — Medication 325 MILLIGRAM(S): at 11:48

## 2021-11-16 RX ADMIN — Medication 1 TABLET(S): at 11:48

## 2021-11-16 RX ADMIN — Medication 10 MILLIGRAM(S): at 13:15

## 2021-11-16 RX ADMIN — Medication 250 MILLIGRAM(S): at 17:09

## 2021-11-16 RX ADMIN — Medication 50 MILLIGRAM(S): at 00:36

## 2021-11-16 RX ADMIN — METHADONE HYDROCHLORIDE 10 MILLIGRAM(S): 40 TABLET ORAL at 11:48

## 2021-11-16 RX ADMIN — Medication 50 MILLIGRAM(S): at 11:47

## 2021-11-16 RX ADMIN — Medication 650 MILLIGRAM(S): at 21:40

## 2021-11-16 RX ADMIN — Medication 166.67 MILLIGRAM(S): at 06:09

## 2021-11-16 RX ADMIN — Medication 10 MILLIGRAM(S): at 21:42

## 2021-11-16 RX ADMIN — TAMSULOSIN HYDROCHLORIDE 0.4 MILLIGRAM(S): 0.4 CAPSULE ORAL at 21:41

## 2021-11-16 RX ADMIN — GABAPENTIN 300 MILLIGRAM(S): 400 CAPSULE ORAL at 13:14

## 2021-11-16 RX ADMIN — Medication 10 MILLIGRAM(S): at 06:09

## 2021-11-16 RX ADMIN — GABAPENTIN 300 MILLIGRAM(S): 400 CAPSULE ORAL at 21:41

## 2021-11-16 NOTE — PROGRESS NOTE ADULT - SUBJECTIVE AND OBJECTIVE BOX
Reynaldo Starks MD  Academic Hospitalist  Pager [phone]  Email: [email or web address]          PROGRESS NOTE:     Patient is a 71y old  Male who presents with a chief complaint of Infected bedsores (15 Nov 2021 15:07)      SUBJECTIVE / OVERNIGHT EVENTS:  Patient seen and examined this morning. Requests help to get "a special mattress". He also complains with trouble moving his bowels.   ADDITIONAL REVIEW OF SYSTEMS:  Denies shortness of breath, f/c/n/v    MEDICATIONS  (STANDING):  aspirin enteric coated 81 milliGRAM(s) Oral daily  baclofen 10 milliGRAM(s) Oral three times a day  calcium carbonate 1250 mG  + Vitamin D (OsCal 500 + D) 1 Tablet(s) Oral daily  diVALproex  milliGRAM(s) Oral two times a day  enoxaparin Injectable 40 milliGRAM(s) SubCutaneous daily  ferrous    sulfate 325 milliGRAM(s) Oral daily  gabapentin 300 milliGRAM(s) Oral three times a day  hydrALAZINE 50 milliGRAM(s) Oral four times a day  methadone   Solution 10 milliGRAM(s) Oral daily  metoprolol tartrate 50 milliGRAM(s) Oral two times a day  multivitamin 1 Tablet(s) Oral daily  senna 2 Tablet(s) Oral at bedtime  tamsulosin 0.4 milliGRAM(s) Oral at bedtime  vancomycin  IVPB 1000 milliGRAM(s) IV Intermittent every 12 hours    MEDICATIONS  (PRN):  acetaminophen     Tablet .. 650 milliGRAM(s) Oral every 6 hours PRN Temp greater or equal to 38C (100.4F), Mild Pain (1 - 3)  melatonin 3 milliGRAM(s) Oral at bedtime PRN Insomnia      CAPILLARY BLOOD GLUCOSE        I&O's Summary    15 Nov 2021 07:01  -  16 Nov 2021 07:00  --------------------------------------------------------  IN: 0 mL / OUT: 1600 mL / NET: -1600 mL    16 Nov 2021 07:01  -  16 Nov 2021 14:17  --------------------------------------------------------  IN: 0 mL / OUT: 300 mL / NET: -300 mL        PHYSICAL EXAM:  Vital Signs Last 24 Hrs  T(C): 36.7 (16 Nov 2021 13:47), Max: 37.5 (15 Nov 2021 20:32)  T(F): 98.1 (16 Nov 2021 13:47), Max: 99.5 (15 Nov 2021 20:32)  HR: 79 (16 Nov 2021 13:47) (59 - 79)  BP: 101/63 (16 Nov 2021 13:47) (101/63 - 131/72)  BP(mean): --  RR: 17 (16 Nov 2021 13:47) (17 - 18)  SpO2: 93% (16 Nov 2021 13:47) (93% - 98%)    CONSTITUTIONAL: NAD  RESPIRATORY: Normal respiratory effort; lungs are clear to auscultation bilaterally  CARDIOVASCULAR: Regular rate and rhythm, normal S1 and S2, no murmur/rub/gallop; No lower extremity edema; Peripheral pulses are 2+ bilaterally  ABDOMEN: Nontender to palpation, normoactive bowel sounds, no rebound/guarding; No hepatosplenomegaly  MUSCLOSKELETAL: no clubbing or cyanosis of digits; no joint swelling or tenderness to palpation  PSYCH: Awake and alert  NEURO: Paraplegic   LABS:                        12.5   5.34  )-----------( 170      ( 16 Nov 2021 08:02 )             39.5     11-16    135  |  96<L>  |  17  ----------------------------<  84  3.9   |  28  |  0.56    Ca    8.9      16 Nov 2021 08:02  Phos  2.6     11-15  Mg     2.00     11-15                  RADIOLOGY & ADDITIONAL TESTS:  Results Reviewed:   Imaging Personally Reviewed:  Electrocardiogram Personally Reviewed:    COORDINATION OF CARE:  Care Discussed with Consultants/Other Providers [Y/N]:  Prior or Outpatient Records Reviewed [Y/N]:   Reynaldo Starks MD  Academic Hospitalist  Pager [phone]  Email: [email or web address]          PROGRESS NOTE:     Patient is a 71y old  Male who presents with a chief complaint of Infected bedsores (15 Nov 2021 15:07)      SUBJECTIVE / OVERNIGHT EVENTS:  Patient seen and examined this morning. Requests help to get "a special mattress". He also complains with trouble moving his bowels.   ADDITIONAL REVIEW OF SYSTEMS:  Denies shortness of breath, f/c/n/v    MEDICATIONS  (STANDING):  aspirin enteric coated 81 milliGRAM(s) Oral daily  baclofen 10 milliGRAM(s) Oral three times a day  calcium carbonate 1250 mG  + Vitamin D (OsCal 500 + D) 1 Tablet(s) Oral daily  diVALproex  milliGRAM(s) Oral two times a day  enoxaparin Injectable 40 milliGRAM(s) SubCutaneous daily  ferrous    sulfate 325 milliGRAM(s) Oral daily  gabapentin 300 milliGRAM(s) Oral three times a day  hydrALAZINE 50 milliGRAM(s) Oral four times a day  methadone   Solution 10 milliGRAM(s) Oral daily  metoprolol tartrate 50 milliGRAM(s) Oral two times a day  multivitamin 1 Tablet(s) Oral daily  senna 2 Tablet(s) Oral at bedtime  tamsulosin 0.4 milliGRAM(s) Oral at bedtime  vancomycin  IVPB 1000 milliGRAM(s) IV Intermittent every 12 hours    MEDICATIONS  (PRN):  acetaminophen     Tablet .. 650 milliGRAM(s) Oral every 6 hours PRN Temp greater or equal to 38C (100.4F), Mild Pain (1 - 3)  melatonin 3 milliGRAM(s) Oral at bedtime PRN Insomnia      CAPILLARY BLOOD GLUCOSE        I&O's Summary    15 Nov 2021 07:01  -  16 Nov 2021 07:00  --------------------------------------------------------  IN: 0 mL / OUT: 1600 mL / NET: -1600 mL    16 Nov 2021 07:01  -  16 Nov 2021 14:17  --------------------------------------------------------  IN: 0 mL / OUT: 300 mL / NET: -300 mL        PHYSICAL EXAM:  Vital Signs Last 24 Hrs  T(C): 36.7 (16 Nov 2021 13:47), Max: 37.5 (15 Nov 2021 20:32)  T(F): 98.1 (16 Nov 2021 13:47), Max: 99.5 (15 Nov 2021 20:32)  HR: 79 (16 Nov 2021 13:47) (59 - 79)  BP: 101/63 (16 Nov 2021 13:47) (101/63 - 131/72)  BP(mean): --  RR: 17 (16 Nov 2021 13:47) (17 - 18)  SpO2: 93% (16 Nov 2021 13:47) (93% - 98%)    CONSTITUTIONAL: NAD  RESPIRATORY: Normal respiratory effort; lungs are clear to auscultation bilaterally  CARDIOVASCULAR: Regular rate and rhythm, normal S1 and S2, no murmur/rub/gallop; No lower extremity edema; Peripheral pulses are 2+ bilaterally  ABDOMEN: Nontender to palpation, normoactive bowel sounds, no rebound/guarding; No hepatosplenomegaly  MUSCLOSKELETAL: no clubbing or cyanosis of digits; no joint swelling or tenderness to palpation  PSYCH: Awake and alert  NEURO: Paraplegic   LABS:                        12.5   5.34  )-----------( 170      ( 16 Nov 2021 08:02 )             39.5     11-16    135  |  96<L>  |  17  ----------------------------<  84  3.9   |  28  |  0.56    Ca    8.9      16 Nov 2021 08:02  Phos  2.6     11-15  Mg     2.00     11-15                  RADIOLOGY & ADDITIONAL TESTS:  Results Reviewed:   Imaging Personally Reviewed:  Electrocardiogram Personally Reviewed:    COORDINATION OF CARE:  Care Discussed with Consultants/Other Providers [Y/N]: Case discussed during interdisciplinary rounds with social work and case management  Prior or Outpatient Records Reviewed [Y/N]:
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
## 2021-11-17 LAB
CULTURE RESULTS: SIGNIFICANT CHANGE UP
CULTURE RESULTS: SIGNIFICANT CHANGE UP
HCV RNA FLD QL NAA+PROBE: SIGNIFICANT CHANGE UP
SPECIMEN SOURCE: SIGNIFICANT CHANGE UP
SPECIMEN SOURCE: SIGNIFICANT CHANGE UP

## 2021-11-17 PROCEDURE — 99232 SBSQ HOSP IP/OBS MODERATE 35: CPT

## 2021-11-17 PROCEDURE — 71045 X-RAY EXAM CHEST 1 VIEW: CPT | Mod: 26

## 2021-11-17 RX ADMIN — Medication 10 MILLIGRAM(S): at 21:42

## 2021-11-17 RX ADMIN — GABAPENTIN 300 MILLIGRAM(S): 400 CAPSULE ORAL at 14:52

## 2021-11-17 RX ADMIN — DIVALPROEX SODIUM 500 MILLIGRAM(S): 500 TABLET, DELAYED RELEASE ORAL at 18:54

## 2021-11-17 RX ADMIN — Medication 50 MILLIGRAM(S): at 05:28

## 2021-11-17 RX ADMIN — DIVALPROEX SODIUM 500 MILLIGRAM(S): 500 TABLET, DELAYED RELEASE ORAL at 05:27

## 2021-11-17 RX ADMIN — Medication 50 MILLIGRAM(S): at 11:57

## 2021-11-17 RX ADMIN — GABAPENTIN 300 MILLIGRAM(S): 400 CAPSULE ORAL at 05:28

## 2021-11-17 RX ADMIN — Medication 50 MILLIGRAM(S): at 17:17

## 2021-11-17 RX ADMIN — Medication 1 TABLET(S): at 11:57

## 2021-11-17 RX ADMIN — GABAPENTIN 300 MILLIGRAM(S): 400 CAPSULE ORAL at 21:42

## 2021-11-17 RX ADMIN — Medication 10 MILLIGRAM(S): at 05:28

## 2021-11-17 RX ADMIN — Medication 325 MILLIGRAM(S): at 11:56

## 2021-11-17 RX ADMIN — ENOXAPARIN SODIUM 40 MILLIGRAM(S): 100 INJECTION SUBCUTANEOUS at 11:56

## 2021-11-17 RX ADMIN — Medication 10 MILLIGRAM(S): at 14:52

## 2021-11-17 RX ADMIN — TAMSULOSIN HYDROCHLORIDE 0.4 MILLIGRAM(S): 0.4 CAPSULE ORAL at 21:42

## 2021-11-17 RX ADMIN — METHADONE HYDROCHLORIDE 10 MILLIGRAM(S): 40 TABLET ORAL at 12:04

## 2021-11-17 RX ADMIN — Medication 250 MILLIGRAM(S): at 17:15

## 2021-11-17 RX ADMIN — Medication 250 MILLIGRAM(S): at 05:27

## 2021-11-17 RX ADMIN — Medication 81 MILLIGRAM(S): at 11:57

## 2021-11-17 RX ADMIN — Medication 50 MILLIGRAM(S): at 00:01

## 2021-11-17 RX ADMIN — SENNA PLUS 2 TABLET(S): 8.6 TABLET ORAL at 21:42
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
## 2021-11-17 NOTE — PROGRESS NOTE ADULT - SUBJECTIVE AND OBJECTIVE BOX
Follow Up: Fevers    Interval History/ROS: Febrile overnight. Denies symptoms or malaise or chills. Feels fine. No pain, cough, dyspnea, diarrhea.     Allergies  No Known Allergies        ANTIMICROBIALS:  vancomycin  IVPB 1000 every 12 hours      OTHER MEDS:  acetaminophen     Tablet .. 650 milliGRAM(s) Oral every 6 hours PRN  aspirin enteric coated 81 milliGRAM(s) Oral daily  baclofen 10 milliGRAM(s) Oral three times a day  calcium carbonate 1250 mG  + Vitamin D (OsCal 500 + D) 1 Tablet(s) Oral daily  diVALproex  milliGRAM(s) Oral two times a day  enoxaparin Injectable 40 milliGRAM(s) SubCutaneous daily  ferrous    sulfate 325 milliGRAM(s) Oral daily  gabapentin 300 milliGRAM(s) Oral three times a day  hydrALAZINE 50 milliGRAM(s) Oral four times a day  melatonin 3 milliGRAM(s) Oral at bedtime PRN  methadone   Solution 10 milliGRAM(s) Oral daily  metoprolol tartrate 50 milliGRAM(s) Oral two times a day  multivitamin 1 Tablet(s) Oral daily  senna 2 Tablet(s) Oral at bedtime  tamsulosin 0.4 milliGRAM(s) Oral at bedtime      Vital Signs Last 24 Hrs  T(C): 37.3 (17 Nov 2021 13:04), Max: 38.8 (16 Nov 2021 21:15)  T(F): 99.1 (17 Nov 2021 13:04), Max: 101.9 (16 Nov 2021 21:15)  HR: 84 (17 Nov 2021 17:30) (57 - 84)  BP: 124/78 (17 Nov 2021 17:30) (106/65 - 131/74)  BP(mean): --  RR: 18 (17 Nov 2021 13:04) (16 - 18)  SpO2: 97% (17 Nov 2021 13:04) (95% - 98%)    Physical Exam:  General: awake, alert, non toxic  Head: atraumatic, normocephalic  Eye: normal sclera and conjunctiva  Cardio: regular rate   Respiratory: nonlabored on room air, clear bilaterally, no wheezing  abd: soft, bowel sounds present, no tenderness  : no suprapubic tenderness. gentile  Musculoskeletal: no focal joint swelling, no edema  vascular: peripheral IV, no phlebitis   Skin: circular sacral wound without fluctuance or local inflammation. left ischial wound stable, granular, doesn't appear to track and no purulence.   Neurologic: bedbound   psych: normal affect                          12.5   5.34  )-----------( 170      ( 16 Nov 2021 08:02 )             39.5       11-16    135  |  96<L>  |  17  ----------------------------<  84  3.9   |  28  |  0.56    Ca    8.9      16 Nov 2021 08:02      MICROBIOLOGY:  Culture - Urine (collected 11-11-21 @ 21:10)  Source: Clean Catch Clean Catch (Midstream)  Final Report (11-14-21 @ 10:52):    >=3 organisms. Probable collection contamination.    Culture - Blood (collected 11-11-21 @ 21:10)  Source: .Blood Blood-Peripheral  Final Report (11-17-21 @ 03:01):    No Growth Final    Culture - Blood (collected 11-11-21 @ 21:00)  Source: .Blood Blood-Peripheral  Final Report (11-17-21 @ 03:01):    No Growth Final    Rapid RVP Result: NotDetec (11-11 @ 22:35)    RADIOLOGY:  Images below reviewed personally  CT Chest No Cont (11.12.21 @ 12:58)   Bibasilar linear atelectasis, greater on the right. Emphysema.    CT Abdomen and Pelvis w/ IV Cont (11.12.21 @ 01:27)   Known left ischial tuberosity decubitus ulcer with underlying bone erosion/osteomyelitis. Overall increased stranding in the left buttock soft tissue overlying the sacral decubitus ulcer with minimally increased bone erosion of the underlying coccyx since 10/28/2018. This may represent worsening infection (cellulitis/osteomyelitis). Recommend clinical correlation. No discrete fluid collection in the visualized superficial soft tissue.  Indeterminate hepatic lesions, which can be further characterized on a nonemergent contrast enhanced MRI.  Dilated proximal/mid common bile duct with distal tapering. Follow-up MRCP/MR would be helpful for further evaluation of biliary pathology.  Distended urinary bladder. Recommend clinical correlation to assess urinary retention. Layering of stones in the left bladder diverticulum.
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
## 2021-11-17 NOTE — PROGRESS NOTE ADULT - SUBJECTIVE AND OBJECTIVE BOX
Reynaldo Starks MD  Academic Hospitalist  Pager [phone]  Email: [email or web address]          PROGRESS NOTE:     Patient is a 71y old  Male who presents with a chief complaint of Infected bedsores (16 Nov 2021 14:17)      SUBJECTIVE / OVERNIGHT EVENTS:  Patient seen and examined this morning. Awaiting further imaging prior to MRI. Denies f/c/n/v  ADDITIONAL REVIEW OF SYSTEMS:    MEDICATIONS  (STANDING):  aspirin enteric coated 81 milliGRAM(s) Oral daily  baclofen 10 milliGRAM(s) Oral three times a day  calcium carbonate 1250 mG  + Vitamin D (OsCal 500 + D) 1 Tablet(s) Oral daily  diVALproex  milliGRAM(s) Oral two times a day  enoxaparin Injectable 40 milliGRAM(s) SubCutaneous daily  ferrous    sulfate 325 milliGRAM(s) Oral daily  gabapentin 300 milliGRAM(s) Oral three times a day  hydrALAZINE 50 milliGRAM(s) Oral four times a day  methadone   Solution 10 milliGRAM(s) Oral daily  metoprolol tartrate 50 milliGRAM(s) Oral two times a day  multivitamin 1 Tablet(s) Oral daily  senna 2 Tablet(s) Oral at bedtime  tamsulosin 0.4 milliGRAM(s) Oral at bedtime  vancomycin  IVPB 1000 milliGRAM(s) IV Intermittent every 12 hours    MEDICATIONS  (PRN):  acetaminophen     Tablet .. 650 milliGRAM(s) Oral every 6 hours PRN Temp greater or equal to 38C (100.4F), Mild Pain (1 - 3)  melatonin 3 milliGRAM(s) Oral at bedtime PRN Insomnia      CAPILLARY BLOOD GLUCOSE        I&O's Summary    16 Nov 2021 07:01  -  17 Nov 2021 07:00  --------------------------------------------------------  IN: 0 mL / OUT: 580 mL / NET: -580 mL    17 Nov 2021 07:01  -  17 Nov 2021 14:22  --------------------------------------------------------  IN: 0 mL / OUT: 600 mL / NET: -600 mL        PHYSICAL EXAM:  Vital Signs Last 24 Hrs  T(C): 37.3 (17 Nov 2021 13:04), Max: 38.8 (16 Nov 2021 21:15)  T(F): 99.1 (17 Nov 2021 13:04), Max: 101.9 (16 Nov 2021 21:15)  HR: 81 (17 Nov 2021 13:04) (57 - 89)  BP: 117/73 (17 Nov 2021 13:04) (106/65 - 131/74)  BP(mean): --  RR: 18 (17 Nov 2021 13:04) (16 - 18)  SpO2: 97% (17 Nov 2021 13:04) (95% - 98%)    CONSTITUTIONAL: NAD  RESPIRATORY: Normal respiratory effort; lungs are clear to auscultation bilaterally  CARDIOVASCULAR: Regular rate and rhythm, normal S1 and S2, no murmur/rub/gallop; No lower extremity edema; Peripheral pulses are 2+ bilaterally  ABDOMEN: Nontender to palpation, normoactive bowel sounds, no rebound/guarding; No hepatosplenomegaly  MUSCLOSKELETAL: no clubbing or cyanosis of digits; no joint swelling or tenderness to palpation  PSYCH: Awake and alert  NEURO: Paraplegic     LABS:                        12.5   5.34  )-----------( 170      ( 16 Nov 2021 08:02 )             39.5     11-16    135  |  96<L>  |  17  ----------------------------<  84  3.9   |  28  |  0.56    Ca    8.9      16 Nov 2021 08:02                  RADIOLOGY & ADDITIONAL TESTS:  Results Reviewed:   Imaging Personally Reviewed:  Electrocardiogram Personally Reviewed:    COORDINATION OF CARE:  Care Discussed with Consultants/Other Providers [Y/N]: Case discussed during interdisciplinary rounds with social work and case management  Prior or Outpatient Records Reviewed [Y/N]:

## 2021-11-18 LAB
ALBUMIN SERPL ELPH-MCNC: 2.9 G/DL — LOW (ref 3.3–5)
ALP SERPL-CCNC: 34 U/L — LOW (ref 40–120)
ALT FLD-CCNC: 7 U/L — SIGNIFICANT CHANGE UP (ref 4–41)
ANION GAP SERPL CALC-SCNC: 12 MMOL/L — SIGNIFICANT CHANGE UP (ref 7–14)
AST SERPL-CCNC: 16 U/L — SIGNIFICANT CHANGE UP (ref 4–40)
BILIRUB SERPL-MCNC: 0.6 MG/DL — SIGNIFICANT CHANGE UP (ref 0.2–1.2)
BUN SERPL-MCNC: 17 MG/DL — SIGNIFICANT CHANGE UP (ref 7–23)
CALCIUM SERPL-MCNC: 8.7 MG/DL — SIGNIFICANT CHANGE UP (ref 8.4–10.5)
CHLORIDE SERPL-SCNC: 97 MMOL/L — LOW (ref 98–107)
CO2 SERPL-SCNC: 27 MMOL/L — SIGNIFICANT CHANGE UP (ref 22–31)
CREAT SERPL-MCNC: 0.63 MG/DL — SIGNIFICANT CHANGE UP (ref 0.5–1.3)
GLUCOSE SERPL-MCNC: 88 MG/DL — SIGNIFICANT CHANGE UP (ref 70–99)
HCT VFR BLD CALC: 31.5 % — LOW (ref 39–50)
HGB BLD-MCNC: 10.7 G/DL — LOW (ref 13–17)
MCHC RBC-ENTMCNC: 30.1 PG — SIGNIFICANT CHANGE UP (ref 27–34)
MCHC RBC-ENTMCNC: 34 GM/DL — SIGNIFICANT CHANGE UP (ref 32–36)
MCV RBC AUTO: 88.7 FL — SIGNIFICANT CHANGE UP (ref 80–100)
NRBC # BLD: 0 /100 WBCS — SIGNIFICANT CHANGE UP
NRBC # FLD: 0 K/UL — SIGNIFICANT CHANGE UP
PLATELET # BLD AUTO: 152 K/UL — SIGNIFICANT CHANGE UP (ref 150–400)
POTASSIUM SERPL-MCNC: 3.9 MMOL/L — SIGNIFICANT CHANGE UP (ref 3.5–5.3)
POTASSIUM SERPL-SCNC: 3.9 MMOL/L — SIGNIFICANT CHANGE UP (ref 3.5–5.3)
PROT SERPL-MCNC: 5.9 G/DL — LOW (ref 6–8.3)
RBC # BLD: 3.55 M/UL — LOW (ref 4.2–5.8)
RBC # FLD: 13.3 % — SIGNIFICANT CHANGE UP (ref 10.3–14.5)
SODIUM SERPL-SCNC: 136 MMOL/L — SIGNIFICANT CHANGE UP (ref 135–145)
VANCOMYCIN TROUGH SERPL-MCNC: 13.4 UG/ML — SIGNIFICANT CHANGE UP (ref 10–20)
WBC # BLD: 6.68 K/UL — SIGNIFICANT CHANGE UP (ref 3.8–10.5)
WBC # FLD AUTO: 6.68 K/UL — SIGNIFICANT CHANGE UP (ref 3.8–10.5)

## 2021-11-18 PROCEDURE — 99232 SBSQ HOSP IP/OBS MODERATE 35: CPT

## 2021-11-18 RX ORDER — METHADONE HYDROCHLORIDE 40 MG/1
10 TABLET ORAL DAILY
Refills: 0 | Status: DISCONTINUED | OUTPATIENT
Start: 2021-11-18 | End: 2021-11-25

## 2021-11-18 RX ORDER — VANCOMYCIN HCL 1 G
1250 VIAL (EA) INTRAVENOUS EVERY 12 HOURS
Refills: 0 | Status: DISCONTINUED | OUTPATIENT
Start: 2021-11-18 | End: 2021-11-18

## 2021-11-18 RX ORDER — VANCOMYCIN HCL 1 G
1000 VIAL (EA) INTRAVENOUS EVERY 12 HOURS
Refills: 0 | Status: DISCONTINUED | OUTPATIENT
Start: 2021-11-19 | End: 2021-11-19

## 2021-11-18 RX ADMIN — DIVALPROEX SODIUM 500 MILLIGRAM(S): 500 TABLET, DELAYED RELEASE ORAL at 06:41

## 2021-11-18 RX ADMIN — METHADONE HYDROCHLORIDE 10 MILLIGRAM(S): 40 TABLET ORAL at 13:37

## 2021-11-18 RX ADMIN — Medication 166.67 MILLIGRAM(S): at 17:25

## 2021-11-18 RX ADMIN — Medication 10 MILLIGRAM(S): at 22:11

## 2021-11-18 RX ADMIN — DIVALPROEX SODIUM 500 MILLIGRAM(S): 500 TABLET, DELAYED RELEASE ORAL at 17:19

## 2021-11-18 RX ADMIN — Medication 10 MILLIGRAM(S): at 13:09

## 2021-11-18 RX ADMIN — Medication 1 TABLET(S): at 13:09

## 2021-11-18 RX ADMIN — Medication 325 MILLIGRAM(S): at 13:09

## 2021-11-18 RX ADMIN — Medication 10 MILLIGRAM(S): at 06:42

## 2021-11-18 RX ADMIN — SENNA PLUS 2 TABLET(S): 8.6 TABLET ORAL at 22:11

## 2021-11-18 RX ADMIN — Medication 50 MILLIGRAM(S): at 06:42

## 2021-11-18 RX ADMIN — GABAPENTIN 300 MILLIGRAM(S): 400 CAPSULE ORAL at 22:11

## 2021-11-18 RX ADMIN — TAMSULOSIN HYDROCHLORIDE 0.4 MILLIGRAM(S): 0.4 CAPSULE ORAL at 22:10

## 2021-11-18 RX ADMIN — Medication 50 MILLIGRAM(S): at 13:37

## 2021-11-18 RX ADMIN — Medication 81 MILLIGRAM(S): at 13:08

## 2021-11-18 RX ADMIN — GABAPENTIN 300 MILLIGRAM(S): 400 CAPSULE ORAL at 06:41

## 2021-11-18 RX ADMIN — ENOXAPARIN SODIUM 40 MILLIGRAM(S): 100 INJECTION SUBCUTANEOUS at 13:10

## 2021-11-18 RX ADMIN — METHADONE HYDROCHLORIDE 10 MILLIGRAM(S): 40 TABLET ORAL at 22:10

## 2021-11-18 RX ADMIN — GABAPENTIN 300 MILLIGRAM(S): 400 CAPSULE ORAL at 13:09

## 2021-11-18 NOTE — PROGRESS NOTE ADULT - SUBJECTIVE AND OBJECTIVE BOX
Follow Up:  Fevers    Interval History/ROS: Afebrile. Feels fine. No cough or pain.     Allergies  No Known Allergies        ANTIMICROBIALS:  vancomycin  IVPB 1250 every 12 hours      OTHER MEDS:  acetaminophen     Tablet .. 650 milliGRAM(s) Oral every 6 hours PRN  aspirin enteric coated 81 milliGRAM(s) Oral daily  baclofen 10 milliGRAM(s) Oral three times a day  calcium carbonate 1250 mG  + Vitamin D (OsCal 500 + D) 1 Tablet(s) Oral daily  diVALproex  milliGRAM(s) Oral two times a day  enoxaparin Injectable 40 milliGRAM(s) SubCutaneous daily  ferrous    sulfate 325 milliGRAM(s) Oral daily  gabapentin 300 milliGRAM(s) Oral three times a day  hydrALAZINE 50 milliGRAM(s) Oral four times a day  melatonin 3 milliGRAM(s) Oral at bedtime PRN  methadone   Solution 10 milliGRAM(s) Oral daily  metoprolol tartrate 50 milliGRAM(s) Oral two times a day  multivitamin 1 Tablet(s) Oral daily  senna 2 Tablet(s) Oral at bedtime  tamsulosin 0.4 milliGRAM(s) Oral at bedtime      Vital Signs Last 24 Hrs  T(C): 36.8 (18 Nov 2021 14:00), Max: 37.3 (17 Nov 2021 20:24)  T(F): 98.3 (18 Nov 2021 14:00), Max: 99.2 (17 Nov 2021 20:24)  HR: 60 (18 Nov 2021 17:33) (56 - 78)  BP: 96/64 (18 Nov 2021 17:33) (96/64 - 117/77)  BP(mean): --  RR: 16 (18 Nov 2021 17:33) (16 - 18)  SpO2: 100% (18 Nov 2021 17:33) (95% - 100%)    Physical Exam:  General: awake, alert, non toxic  Head: atraumatic, normocephalic  Eye: normal sclera and conjunctiva  ENT: no oropharyngeal lesions, no cervical lymphadenopathy   Cardio: regular rate and rhythm   Respiratory: nonlabored on room air, clear bilaterally, no wheezing  abd: soft, bowel sounds present, no tenderness  : gentile  Musculoskeletal: no focal joint swelling, no edema  Neurologic: bedbound   psych: normal affect                          10.7   6.68  )-----------( 152      ( 18 Nov 2021 07:10 )             31.5       11-18    136  |  97<L>  |  17  ----------------------------<  88  3.9   |  27  |  0.63    Ca    8.7      18 Nov 2021 07:10    TPro  5.9<L>  /  Alb  2.9<L>  /  TBili  0.6  /  DBili  x   /  AST  16  /  ALT  7   /  AlkPhos  34<L>  11-18          MICROBIOLOGY:  Vancomycin Level, Trough: 13.4 ug/mL (11-18-21 @ 07:10)    Culture - Urine (collected 11-11-21 @ 21:10)  Source: Clean Catch Clean Catch (Midstream)  Final Report (11-14-21 @ 10:52):    >=3 organisms. Probable collection contamination.    Culture - Blood (collected 11-11-21 @ 21:10)  Source: .Blood Blood-Peripheral  Final Report (11-17-21 @ 03:01):    No Growth Final    Culture - Blood (collected 11-11-21 @ 21:00)  Source: .Blood Blood-Peripheral  Final Report (11-17-21 @ 03:01):    No Growth Final    Rapid RVP Result: NotDetec (11-11 @ 22:35)    RADIOLOGY:  Images below reviewed personally  Xray Chest 1 View- PORTABLE-Routine (Xray Chest 1 View- PORTABLE-Routine .) (11.17.21 @ 15:14)   Low lung volumes.  New patchy right mid to lower lung opacities and more extensive patchy left perihilar opacities. The findings can be due to asymmetric pulmonary edema and/or multifocal infection.    CT Chest No Cont (11.12.21 @ 12:58)   Bibasilar linear atelectasis, greater on the right. Emphysema.    CT Abdomen and Pelvis w/ IV Cont (11.12.21 @ 01:27)   Known left ischial tuberosity decubitus ulcer with underlying bone erosion/osteomyelitis. Overall increased stranding in the left buttock soft tissue overlying the sacral decubitus ulcer with minimally increased bone erosion of the underlying coccyx since 10/28/2018. This may represent worsening infection (cellulitis/osteomyelitis). Recommend clinical correlation. No discrete fluid collection in the visualized superficial soft tissue.  Indeterminate hepatic lesions, which can be further characterized on a nonemergent contrast enhanced MRI.  Dilated proximal/mid common bile duct with distal tapering. Follow-up MRCP/MR would be helpful for further evaluation of biliary pathology.  Distended urinary bladder. Recommend clinical correlation to assess urinary retention. Layering of stones in the left bladder diverticulum.

## 2021-11-18 NOTE — PROGRESS NOTE ADULT - PROBLEM SELECTOR PLAN 1
Sepsis present on admission likely d/t infected decubitus ulcer, sepsis now resolved   CT of abdomen showed increased stranding in the left buttock soft tissue overlying the sacral decubitus ulcer with minimally increased bone erosion of the underlying coccyx   Blood culture NGTD   Continue w/ IV Vanco, monitor Vanco level  ID following, blood cxs repeated, CXR obtained- awaiting radiology read

## 2021-11-18 NOTE — PROGRESS NOTE ADULT - SUBJECTIVE AND OBJECTIVE BOX
Reynaldo Starks MD  Academic Hospitalist  Pager [phone]  Email: [email or web address]          PROGRESS NOTE:     Patient is a 71y old  Male who presents with a chief complaint of Infected bedsores (17 Nov 2021 18:50)      SUBJECTIVE / OVERNIGHT EVENTS:  Patient seen and examined this morning. Does not endorse any new symptoms. Awaiting MRCP.   ADDITIONAL REVIEW OF SYSTEMS:  No f/c/n/v    MEDICATIONS  (STANDING):  aspirin enteric coated 81 milliGRAM(s) Oral daily  baclofen 10 milliGRAM(s) Oral three times a day  calcium carbonate 1250 mG  + Vitamin D (OsCal 500 + D) 1 Tablet(s) Oral daily  diVALproex  milliGRAM(s) Oral two times a day  enoxaparin Injectable 40 milliGRAM(s) SubCutaneous daily  ferrous    sulfate 325 milliGRAM(s) Oral daily  gabapentin 300 milliGRAM(s) Oral three times a day  hydrALAZINE 50 milliGRAM(s) Oral four times a day  methadone   Solution 10 milliGRAM(s) Oral daily  metoprolol tartrate 50 milliGRAM(s) Oral two times a day  multivitamin 1 Tablet(s) Oral daily  senna 2 Tablet(s) Oral at bedtime  tamsulosin 0.4 milliGRAM(s) Oral at bedtime  vancomycin  IVPB 1250 milliGRAM(s) IV Intermittent every 12 hours    MEDICATIONS  (PRN):  acetaminophen     Tablet .. 650 milliGRAM(s) Oral every 6 hours PRN Temp greater or equal to 38C (100.4F), Mild Pain (1 - 3)  melatonin 3 milliGRAM(s) Oral at bedtime PRN Insomnia      CAPILLARY BLOOD GLUCOSE        I&O's Summary    17 Nov 2021 07:01  -  18 Nov 2021 07:00  --------------------------------------------------------  IN: 0 mL / OUT: 900 mL / NET: -900 mL        PHYSICAL EXAM:  Vital Signs Last 24 Hrs  T(C): 37.1 (18 Nov 2021 05:59), Max: 37.3 (17 Nov 2021 20:24)  T(F): 98.7 (18 Nov 2021 05:59), Max: 99.2 (17 Nov 2021 20:24)  HR: 65 (18 Nov 2021 05:59) (56 - 84)  BP: 113/72 (18 Nov 2021 05:59) (98/62 - 124/78)  BP(mean): --  RR: 18 (18 Nov 2021 05:59) (18 - 18)  SpO2: 97% (18 Nov 2021 05:59) (95% - 97%)    CONSTITUTIONAL: NAD  RESPIRATORY: Normal respiratory effort; lungs are clear to auscultation bilaterally  CARDIOVASCULAR: Regular rate and rhythm, normal S1 and S2, no murmur/rub/gallop; No lower extremity edema; Peripheral pulses are 2+ bilaterally  ABDOMEN: Nontender to palpation, normoactive bowel sounds, no rebound/guarding; No hepatosplenomegaly  MUSCLOSKELETAL: no clubbing or cyanosis of digits; no joint swelling or tenderness to palpation  PSYCH: Awake and alert  NEURO: Paraplegic     LABS:                        10.7   6.68  )-----------( 152      ( 18 Nov 2021 07:10 )             31.5     11-18    136  |  97<L>  |  17  ----------------------------<  88  3.9   |  27  |  0.63    Ca    8.7      18 Nov 2021 07:10    TPro  5.9<L>  /  Alb  2.9<L>  /  TBili  0.6  /  DBili  x   /  AST  16  /  ALT  7   /  AlkPhos  34<L>  11-18                RADIOLOGY & ADDITIONAL TESTS:  Results Reviewed:   Imaging Personally Reviewed:  Electrocardiogram Personally Reviewed:    COORDINATION OF CARE:  Care Discussed with Consultants/Other Providers [Y/N]: Case discussed during interdisciplinary rounds with social work and case management  Prior or Outpatient Records Reviewed [Y/N]:
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
## 2021-11-19 LAB
ALBUMIN SERPL ELPH-MCNC: 2.9 G/DL — LOW (ref 3.3–5)
ALP SERPL-CCNC: 34 U/L — LOW (ref 40–120)
ALT FLD-CCNC: 5 U/L — SIGNIFICANT CHANGE UP (ref 4–41)
ANION GAP SERPL CALC-SCNC: 11 MMOL/L — SIGNIFICANT CHANGE UP (ref 7–14)
AST SERPL-CCNC: 17 U/L — SIGNIFICANT CHANGE UP (ref 4–40)
BILIRUB SERPL-MCNC: 0.5 MG/DL — SIGNIFICANT CHANGE UP (ref 0.2–1.2)
BUN SERPL-MCNC: 16 MG/DL — SIGNIFICANT CHANGE UP (ref 7–23)
CALCIUM SERPL-MCNC: 8.8 MG/DL — SIGNIFICANT CHANGE UP (ref 8.4–10.5)
CHLORIDE SERPL-SCNC: 96 MMOL/L — LOW (ref 98–107)
CO2 SERPL-SCNC: 29 MMOL/L — SIGNIFICANT CHANGE UP (ref 22–31)
CREAT SERPL-MCNC: 0.59 MG/DL — SIGNIFICANT CHANGE UP (ref 0.5–1.3)
GLUCOSE SERPL-MCNC: 79 MG/DL — SIGNIFICANT CHANGE UP (ref 70–99)
HCT VFR BLD CALC: 32.1 % — LOW (ref 39–50)
HGB BLD-MCNC: 10.4 G/DL — LOW (ref 13–17)
MCHC RBC-ENTMCNC: 29.1 PG — SIGNIFICANT CHANGE UP (ref 27–34)
MCHC RBC-ENTMCNC: 32.4 GM/DL — SIGNIFICANT CHANGE UP (ref 32–36)
MCV RBC AUTO: 89.7 FL — SIGNIFICANT CHANGE UP (ref 80–100)
NRBC # BLD: 0 /100 WBCS — SIGNIFICANT CHANGE UP
NRBC # FLD: 0 K/UL — SIGNIFICANT CHANGE UP
PLATELET # BLD AUTO: 169 K/UL — SIGNIFICANT CHANGE UP (ref 150–400)
POTASSIUM SERPL-MCNC: 3.7 MMOL/L — SIGNIFICANT CHANGE UP (ref 3.5–5.3)
POTASSIUM SERPL-SCNC: 3.7 MMOL/L — SIGNIFICANT CHANGE UP (ref 3.5–5.3)
PROT SERPL-MCNC: 6.3 G/DL — SIGNIFICANT CHANGE UP (ref 6–8.3)
RBC # BLD: 3.58 M/UL — LOW (ref 4.2–5.8)
RBC # FLD: 13.3 % — SIGNIFICANT CHANGE UP (ref 10.3–14.5)
SARS-COV-2 RNA SPEC QL NAA+PROBE: SIGNIFICANT CHANGE UP
SODIUM SERPL-SCNC: 136 MMOL/L — SIGNIFICANT CHANGE UP (ref 135–145)
WBC # BLD: 6.07 K/UL — SIGNIFICANT CHANGE UP (ref 3.8–10.5)
WBC # FLD AUTO: 6.07 K/UL — SIGNIFICANT CHANGE UP (ref 3.8–10.5)

## 2021-11-19 PROCEDURE — 73552 X-RAY EXAM OF FEMUR 2/>: CPT | Mod: 26,50

## 2021-11-19 PROCEDURE — 99232 SBSQ HOSP IP/OBS MODERATE 35: CPT

## 2021-11-19 PROCEDURE — 99233 SBSQ HOSP IP/OBS HIGH 50: CPT

## 2021-11-19 RX ORDER — AMPICILLIN SODIUM AND SULBACTAM SODIUM 250; 125 MG/ML; MG/ML
3 INJECTION, POWDER, FOR SUSPENSION INTRAMUSCULAR; INTRAVENOUS EVERY 6 HOURS
Refills: 0 | Status: DISCONTINUED | OUTPATIENT
Start: 2021-11-20 | End: 2021-11-27

## 2021-11-19 RX ORDER — AMPICILLIN SODIUM AND SULBACTAM SODIUM 250; 125 MG/ML; MG/ML
INJECTION, POWDER, FOR SUSPENSION INTRAMUSCULAR; INTRAVENOUS
Refills: 0 | Status: DISCONTINUED | OUTPATIENT
Start: 2021-11-19 | End: 2021-11-27

## 2021-11-19 RX ORDER — AMPICILLIN SODIUM AND SULBACTAM SODIUM 250; 125 MG/ML; MG/ML
3 INJECTION, POWDER, FOR SUSPENSION INTRAMUSCULAR; INTRAVENOUS ONCE
Refills: 0 | Status: COMPLETED | OUTPATIENT
Start: 2021-11-19 | End: 2021-11-19

## 2021-11-19 RX ADMIN — Medication 50 MILLIGRAM(S): at 06:46

## 2021-11-19 RX ADMIN — GABAPENTIN 300 MILLIGRAM(S): 400 CAPSULE ORAL at 13:57

## 2021-11-19 RX ADMIN — Medication 50 MILLIGRAM(S): at 12:40

## 2021-11-19 RX ADMIN — Medication 81 MILLIGRAM(S): at 12:41

## 2021-11-19 RX ADMIN — Medication 1 TABLET(S): at 12:41

## 2021-11-19 RX ADMIN — Medication 650 MILLIGRAM(S): at 23:20

## 2021-11-19 RX ADMIN — GABAPENTIN 300 MILLIGRAM(S): 400 CAPSULE ORAL at 23:20

## 2021-11-19 RX ADMIN — DIVALPROEX SODIUM 500 MILLIGRAM(S): 500 TABLET, DELAYED RELEASE ORAL at 06:47

## 2021-11-19 RX ADMIN — Medication 50 MILLIGRAM(S): at 06:44

## 2021-11-19 RX ADMIN — ENOXAPARIN SODIUM 40 MILLIGRAM(S): 100 INJECTION SUBCUTANEOUS at 12:41

## 2021-11-19 RX ADMIN — Medication 50 MILLIGRAM(S): at 00:48

## 2021-11-19 RX ADMIN — GABAPENTIN 300 MILLIGRAM(S): 400 CAPSULE ORAL at 06:47

## 2021-11-19 RX ADMIN — Medication 10 MILLIGRAM(S): at 13:57

## 2021-11-19 RX ADMIN — AMPICILLIN SODIUM AND SULBACTAM SODIUM 200 GRAM(S): 250; 125 INJECTION, POWDER, FOR SUSPENSION INTRAMUSCULAR; INTRAVENOUS at 16:46

## 2021-11-19 RX ADMIN — Medication 50 MILLIGRAM(S): at 17:19

## 2021-11-19 RX ADMIN — Medication 250 MILLIGRAM(S): at 06:43

## 2021-11-19 RX ADMIN — TAMSULOSIN HYDROCHLORIDE 0.4 MILLIGRAM(S): 0.4 CAPSULE ORAL at 23:21

## 2021-11-19 RX ADMIN — Medication 325 MILLIGRAM(S): at 12:41

## 2021-11-19 RX ADMIN — DIVALPROEX SODIUM 500 MILLIGRAM(S): 500 TABLET, DELAYED RELEASE ORAL at 17:19

## 2021-11-19 RX ADMIN — METHADONE HYDROCHLORIDE 10 MILLIGRAM(S): 40 TABLET ORAL at 12:41

## 2021-11-19 RX ADMIN — Medication 10 MILLIGRAM(S): at 23:21

## 2021-11-19 RX ADMIN — SENNA PLUS 2 TABLET(S): 8.6 TABLET ORAL at 23:21

## 2021-11-19 RX ADMIN — Medication 10 MILLIGRAM(S): at 06:46

## 2021-11-19 NOTE — PROGRESS NOTE ADULT - PROBLEM SELECTOR PLAN 6
- CT of abdomen showed distended urinary bladder. Recommend clinical correlation to assess urinary retention. Layering of stones in the left bladder diverticulum. Did not have a chronic indwelling gentile prior to this hospitalization.  - Bladder scans Q 8hrs for evaluation of urinary retention  - TOV starting 11/19

## 2021-11-19 NOTE — PROGRESS NOTE ADULT - SUBJECTIVE AND OBJECTIVE BOX
[facility] Division of Hospital Medicine  Barbara Khoury MD  Pager (M-F, 8A-5P): [phone]  Other Times:  [phone]      SUBJECTIVE / OVERNIGHT EVENTS:    MEDICATIONS  (STANDING):  ampicillin/sulbactam  IVPB      ampicillin/sulbactam  IVPB 3 Gram(s) IV Intermittent once  aspirin enteric coated 81 milliGRAM(s) Oral daily  baclofen 10 milliGRAM(s) Oral three times a day  calcium carbonate 1250 mG  + Vitamin D (OsCal 500 + D) 1 Tablet(s) Oral daily  diVALproex  milliGRAM(s) Oral two times a day  enoxaparin Injectable 40 milliGRAM(s) SubCutaneous daily  ferrous    sulfate 325 milliGRAM(s) Oral daily  gabapentin 300 milliGRAM(s) Oral three times a day  hydrALAZINE 50 milliGRAM(s) Oral four times a day  methadone   Solution 10 milliGRAM(s) Oral daily  metoprolol tartrate 50 milliGRAM(s) Oral two times a day  multivitamin 1 Tablet(s) Oral daily  senna 2 Tablet(s) Oral at bedtime  tamsulosin 0.4 milliGRAM(s) Oral at bedtime    MEDICATIONS  (PRN):  acetaminophen     Tablet .. 650 milliGRAM(s) Oral every 6 hours PRN Temp greater or equal to 38C (100.4F), Mild Pain (1 - 3)  melatonin 3 milliGRAM(s) Oral at bedtime PRN Insomnia      I&O's Summary    18 Nov 2021 07:01  -  19 Nov 2021 07:00  --------------------------------------------------------  IN: 0 mL / OUT: 1200 mL / NET: -1200 mL        PHYSICAL EXAM:  Vital Signs Last 24 Hrs  T(C): 36.9 (19 Nov 2021 15:38), Max: 38.2 (18 Nov 2021 21:41)  T(F): 98.4 (19 Nov 2021 15:38), Max: 100.8 (18 Nov 2021 21:41)  HR: 65 (19 Nov 2021 15:38) (60 - 73)  BP: 119/83 (19 Nov 2021 15:38) (96/64 - 119/83)  BP(mean): --  RR: 17 (19 Nov 2021 15:38) (16 - 18)  SpO2: 98% (19 Nov 2021 15:38) (95% - 100%)  CONSTITUTIONAL: NAD, well-developed, well-groomed  EYES: PERRLA; conjunctiva and sclera clear  ENMT: Moist oral mucosa, no pharyngeal injection or exudates; normal dentition  RESPIRATORY: Normal respiratory effort; lungs are clear to auscultation bilaterally  CARDIOVASCULAR: Regular rate and rhythm, normal S1 and S2, no murmur/rub/gallop; No lower extremity edema; Peripheral pulses are 2+ bilaterally  ABDOMEN: Nontender to palpation, normoactive bowel sounds, no rebound/guarding; No hepatosplenomegaly  PSYCH: A+O to person, place, and time; affect appropriate  SKIN: No rashes; no palpable lesions    LABS:                        10.4   6.07  )-----------( 169      ( 19 Nov 2021 07:24 )             32.1     11-19    136  |  96<L>  |  16  ----------------------------<  79  3.7   |  29  |  0.59    Ca    8.8      19 Nov 2021 07:24    TPro  6.3  /  Alb  2.9<L>  /  TBili  0.5  /  DBili  x   /  AST  17  /  ALT  5   /  AlkPhos  34<L>  11-19              Culture - Blood (collected 17 Nov 2021 23:04)  Source: .Blood Blood-Peripheral  Preliminary Report (19 Nov 2021 01:01):    No growth to date.    Culture - Blood (collected 17 Nov 2021 23:04)  Source: .Blood Blood  Preliminary Report (19 Nov 2021 01:01):    No growth to date.        RADIOLOGY & ADDITIONAL TESTS:  Results Reviewed:   Imaging Personally Reviewed:  Electrocardiogram Personally Reviewed:    COORDINATION OF CARE:  Care Discussed with Consultants/Other Providers [Y/N]:  Prior or Outpatient Records Reviewed [Y/N]:
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
## 2021-11-19 NOTE — PROGRESS NOTE ADULT - SUBJECTIVE AND OBJECTIVE BOX
Follow Up:  Fevers    Interval History/ROS: Febrile last night. Drowsy but denies any pain, cough or malaise.     Allergies  No Known Allergies        ANTIMICROBIALS:  ampicillin/sulbactam  IVPB        OTHER MEDS:  acetaminophen     Tablet .. 650 milliGRAM(s) Oral every 6 hours PRN  aspirin enteric coated 81 milliGRAM(s) Oral daily  baclofen 10 milliGRAM(s) Oral three times a day  calcium carbonate 1250 mG  + Vitamin D (OsCal 500 + D) 1 Tablet(s) Oral daily  diVALproex  milliGRAM(s) Oral two times a day  enoxaparin Injectable 40 milliGRAM(s) SubCutaneous daily  ferrous    sulfate 325 milliGRAM(s) Oral daily  gabapentin 300 milliGRAM(s) Oral three times a day  hydrALAZINE 50 milliGRAM(s) Oral four times a day  melatonin 3 milliGRAM(s) Oral at bedtime PRN  methadone   Solution 10 milliGRAM(s) Oral daily  metoprolol tartrate 50 milliGRAM(s) Oral two times a day  multivitamin 1 Tablet(s) Oral daily  senna 2 Tablet(s) Oral at bedtime  tamsulosin 0.4 milliGRAM(s) Oral at bedtime      Vital Signs Last 24 Hrs  T(C): 37.1 (19 Nov 2021 05:25), Max: 38.2 (18 Nov 2021 21:41)  T(F): 98.7 (19 Nov 2021 05:25), Max: 100.8 (18 Nov 2021 21:41)  HR: 69 (19 Nov 2021 05:25) (60 - 73)  BP: 105/76 (19 Nov 2021 05:25) (96/64 - 119/75)  BP(mean): --  RR: 18 (19 Nov 2021 05:25) (16 - 18)  SpO2: 95% (19 Nov 2021 05:25) (95% - 100%)    Physical Exam:  General: drowsy, non toxic  Head: atraumatic, normocephalic  Eye: normal sclera and conjunctiva  ENT: no cervical lymphadenopathy   Cardio: regular rate   Respiratory: nonlabored on nasal cannula, grossly clear bilaterally, no wheezing  abd: soft, bowel sounds present, no tenderness  Skin: no rash                          10.4   6.07  )-----------( 169      ( 19 Nov 2021 07:24 )             32.1       11-19    136  |  96<L>  |  16  ----------------------------<  79  3.7   |  29  |  0.59    Ca    8.8      19 Nov 2021 07:24    TPro  6.3  /  Alb  2.9<L>  /  TBili  0.5  /  DBili  x   /  AST  17  /  ALT  5   /  AlkPhos  34<L>  11-19          MICROBIOLOGY:  Culture - Blood (collected 11-17-21 @ 23:04)  Source: .Blood Blood-Peripheral  Preliminary Report (11-19-21 @ 01:01):    No growth to date.    Culture - Blood (collected 11-17-21 @ 23:04)  Source: .Blood Blood  Preliminary Report (11-19-21 @ 01:01):    No growth to date.      RADIOLOGY:  Images below reviewed personally  Xray Chest 1 View- PORTABLE-Routine (Xray Chest 1 View- PORTABLE-Routine .) (11.17.21 @ 15:14)   Low lung volumes.  New patchy right mid to lower lung opacities and more extensive patchy left perihilar opacities. The findings can be due to asymmetric pulmonary edema and/or multifocal infection.    CT Chest No Cont (11.12.21 @ 12:58)   Bibasilar linear atelectasis, greater on the right. Emphysema.    CT Abdomen and Pelvis w/ IV Cont (11.12.21 @ 01:27)   Known left ischial tuberosity decubitus ulcer with underlying bone erosion/osteomyelitis. Overall increased stranding in the left buttock soft tissue overlying the sacral decubitus ulcer with minimally increased bone erosion of the underlying coccyx since 10/28/2018. This may represent worsening infection (cellulitis/osteomyelitis). Recommend clinical correlation. No discrete fluid collection in the visualized superficial soft tissue.  Indeterminate hepatic lesions, which can be further characterized on a nonemergent contrast enhanced MRI.  Dilated proximal/mid common bile duct with distal tapering. Follow-up MRCP/MR would be helpful for further evaluation of biliary pathology.  Distended urinary bladder. Recommend clinical correlation to assess urinary retention. Layering of stones in the left bladder diverticulum.
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
## 2021-11-20 LAB
ANION GAP SERPL CALC-SCNC: 11 MMOL/L — SIGNIFICANT CHANGE UP (ref 7–14)
BASOPHILS # BLD AUTO: 0 K/UL — SIGNIFICANT CHANGE UP (ref 0–0.2)
BASOPHILS NFR BLD AUTO: 0 % — SIGNIFICANT CHANGE UP (ref 0–2)
BUN SERPL-MCNC: 13 MG/DL — SIGNIFICANT CHANGE UP (ref 7–23)
CALCIUM SERPL-MCNC: 8.4 MG/DL — SIGNIFICANT CHANGE UP (ref 8.4–10.5)
CHLORIDE SERPL-SCNC: 98 MMOL/L — SIGNIFICANT CHANGE UP (ref 98–107)
CO2 SERPL-SCNC: 28 MMOL/L — SIGNIFICANT CHANGE UP (ref 22–31)
CREAT SERPL-MCNC: 0.56 MG/DL — SIGNIFICANT CHANGE UP (ref 0.5–1.3)
EOSINOPHIL # BLD AUTO: 0 K/UL — SIGNIFICANT CHANGE UP (ref 0–0.5)
EOSINOPHIL NFR BLD AUTO: 0 % — SIGNIFICANT CHANGE UP (ref 0–6)
GLUCOSE SERPL-MCNC: 76 MG/DL — SIGNIFICANT CHANGE UP (ref 70–99)
HCT VFR BLD CALC: 32.6 % — LOW (ref 39–50)
HGB BLD-MCNC: 10.9 G/DL — LOW (ref 13–17)
IANC: 4.69 K/UL — SIGNIFICANT CHANGE UP (ref 1.5–8.5)
LYMPHOCYTES # BLD AUTO: 0.85 K/UL — LOW (ref 1–3.3)
LYMPHOCYTES # BLD AUTO: 12.3 % — LOW (ref 13–44)
MAGNESIUM SERPL-MCNC: 2 MG/DL — SIGNIFICANT CHANGE UP (ref 1.6–2.6)
MCHC RBC-ENTMCNC: 29.6 PG — SIGNIFICANT CHANGE UP (ref 27–34)
MCHC RBC-ENTMCNC: 33.4 GM/DL — SIGNIFICANT CHANGE UP (ref 32–36)
MCV RBC AUTO: 88.6 FL — SIGNIFICANT CHANGE UP (ref 80–100)
MONOCYTES # BLD AUTO: 0.67 K/UL — SIGNIFICANT CHANGE UP (ref 0–0.9)
MONOCYTES NFR BLD AUTO: 9.6 % — SIGNIFICANT CHANGE UP (ref 2–14)
NEUTROPHILS # BLD AUTO: 5.42 K/UL — SIGNIFICANT CHANGE UP (ref 1.8–7.4)
NEUTROPHILS NFR BLD AUTO: 76.3 % — SIGNIFICANT CHANGE UP (ref 43–77)
PHOSPHATE SERPL-MCNC: 3.7 MG/DL — SIGNIFICANT CHANGE UP (ref 2.5–4.5)
PLATELET # BLD AUTO: 203 K/UL — SIGNIFICANT CHANGE UP (ref 150–400)
POTASSIUM SERPL-MCNC: 3.6 MMOL/L — SIGNIFICANT CHANGE UP (ref 3.5–5.3)
POTASSIUM SERPL-SCNC: 3.6 MMOL/L — SIGNIFICANT CHANGE UP (ref 3.5–5.3)
RBC # BLD: 3.68 M/UL — LOW (ref 4.2–5.8)
RBC # FLD: 13.4 % — SIGNIFICANT CHANGE UP (ref 10.3–14.5)
SODIUM SERPL-SCNC: 137 MMOL/L — SIGNIFICANT CHANGE UP (ref 135–145)
WBC # BLD: 6.94 K/UL — SIGNIFICANT CHANGE UP (ref 3.8–10.5)
WBC # FLD AUTO: 6.94 K/UL — SIGNIFICANT CHANGE UP (ref 3.8–10.5)

## 2021-11-20 PROCEDURE — 99232 SBSQ HOSP IP/OBS MODERATE 35: CPT

## 2021-11-20 PROCEDURE — 99233 SBSQ HOSP IP/OBS HIGH 50: CPT

## 2021-11-20 RX ADMIN — Medication 81 MILLIGRAM(S): at 11:31

## 2021-11-20 RX ADMIN — Medication 650 MILLIGRAM(S): at 00:20

## 2021-11-20 RX ADMIN — GABAPENTIN 300 MILLIGRAM(S): 400 CAPSULE ORAL at 06:08

## 2021-11-20 RX ADMIN — Medication 50 MILLIGRAM(S): at 06:08

## 2021-11-20 RX ADMIN — ENOXAPARIN SODIUM 40 MILLIGRAM(S): 100 INJECTION SUBCUTANEOUS at 11:33

## 2021-11-20 RX ADMIN — AMPICILLIN SODIUM AND SULBACTAM SODIUM 200 GRAM(S): 250; 125 INJECTION, POWDER, FOR SUSPENSION INTRAMUSCULAR; INTRAVENOUS at 00:01

## 2021-11-20 RX ADMIN — AMPICILLIN SODIUM AND SULBACTAM SODIUM 200 GRAM(S): 250; 125 INJECTION, POWDER, FOR SUSPENSION INTRAMUSCULAR; INTRAVENOUS at 23:12

## 2021-11-20 RX ADMIN — TAMSULOSIN HYDROCHLORIDE 0.4 MILLIGRAM(S): 0.4 CAPSULE ORAL at 23:13

## 2021-11-20 RX ADMIN — Medication 50 MILLIGRAM(S): at 11:33

## 2021-11-20 RX ADMIN — GABAPENTIN 300 MILLIGRAM(S): 400 CAPSULE ORAL at 23:12

## 2021-11-20 RX ADMIN — AMPICILLIN SODIUM AND SULBACTAM SODIUM 200 GRAM(S): 250; 125 INJECTION, POWDER, FOR SUSPENSION INTRAMUSCULAR; INTRAVENOUS at 17:20

## 2021-11-20 RX ADMIN — DIVALPROEX SODIUM 500 MILLIGRAM(S): 500 TABLET, DELAYED RELEASE ORAL at 06:08

## 2021-11-20 RX ADMIN — Medication 50 MILLIGRAM(S): at 23:13

## 2021-11-20 RX ADMIN — Medication 10 MILLIGRAM(S): at 06:08

## 2021-11-20 RX ADMIN — Medication 50 MILLIGRAM(S): at 17:20

## 2021-11-20 RX ADMIN — METHADONE HYDROCHLORIDE 10 MILLIGRAM(S): 40 TABLET ORAL at 11:42

## 2021-11-20 RX ADMIN — Medication 325 MILLIGRAM(S): at 11:33

## 2021-11-20 RX ADMIN — Medication 1 TABLET(S): at 11:31

## 2021-11-20 RX ADMIN — Medication 50 MILLIGRAM(S): at 17:21

## 2021-11-20 RX ADMIN — Medication 50 MILLIGRAM(S): at 00:00

## 2021-11-20 RX ADMIN — GABAPENTIN 300 MILLIGRAM(S): 400 CAPSULE ORAL at 13:10

## 2021-11-20 RX ADMIN — Medication 10 MILLIGRAM(S): at 23:13

## 2021-11-20 RX ADMIN — Medication 10 MILLIGRAM(S): at 13:10

## 2021-11-20 RX ADMIN — AMPICILLIN SODIUM AND SULBACTAM SODIUM 200 GRAM(S): 250; 125 INJECTION, POWDER, FOR SUSPENSION INTRAMUSCULAR; INTRAVENOUS at 11:42

## 2021-11-20 RX ADMIN — AMPICILLIN SODIUM AND SULBACTAM SODIUM 200 GRAM(S): 250; 125 INJECTION, POWDER, FOR SUSPENSION INTRAMUSCULAR; INTRAVENOUS at 06:08

## 2021-11-20 RX ADMIN — DIVALPROEX SODIUM 500 MILLIGRAM(S): 500 TABLET, DELAYED RELEASE ORAL at 17:21

## 2021-11-20 RX ADMIN — SENNA PLUS 2 TABLET(S): 8.6 TABLET ORAL at 23:12
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
## 2021-11-20 NOTE — PROGRESS NOTE ADULT - SUBJECTIVE AND OBJECTIVE BOX
CC: Patient is a 71y old  Male who presents with a chief complaint of fevers, concern for infected bedsores (20 Nov 2021 14:51)    ID following for fever    Interval History/ROS: Patient with 100.6F fever overnight.     Rest of ROS negative.    Allergies  No Known Allergies    ANTIMICROBIALS:  ampicillin/sulbactam  IVPB    ampicillin/sulbactam  IVPB 3 every 6 hours    OTHER MEDS:  acetaminophen     Tablet .. 650 milliGRAM(s) Oral every 6 hours PRN  aspirin enteric coated 81 milliGRAM(s) Oral daily  baclofen 10 milliGRAM(s) Oral three times a day  calcium carbonate 1250 mG  + Vitamin D (OsCal 500 + D) 1 Tablet(s) Oral daily  diVALproex  milliGRAM(s) Oral two times a day  enoxaparin Injectable 40 milliGRAM(s) SubCutaneous daily  ferrous    sulfate 325 milliGRAM(s) Oral daily  gabapentin 300 milliGRAM(s) Oral three times a day  hydrALAZINE 50 milliGRAM(s) Oral four times a day  melatonin 3 milliGRAM(s) Oral at bedtime PRN  methadone   Solution 10 milliGRAM(s) Oral daily  metoprolol tartrate 50 milliGRAM(s) Oral two times a day  multivitamin 1 Tablet(s) Oral daily  senna 2 Tablet(s) Oral at bedtime  tamsulosin 0.4 milliGRAM(s) Oral at bedtime    PE:    Vital Signs Last 24 Hrs  T(C): 36.9 (20 Nov 2021 13:24), Max: 38.1 (19 Nov 2021 23:12)  T(F): 98.4 (20 Nov 2021 13:24), Max: 100.6 (19 Nov 2021 23:12)  HR: 70 (20 Nov 2021 17:22) (56 - 76)  BP: 108/68 (20 Nov 2021 17:22) (105/66 - 119/68)  BP(mean): --  RR: 16 (20 Nov 2021 13:24) (16 - 18)  SpO2: 98% (20 Nov 2021 13:24) (95% - 98%)    Gen: Awake, NAD  CV: S1+S2 normal, no murmurs  Resp: Clear bilat, no resp distress  Abd: Soft, nontender, +BS  Ext: No LE edema, no wounds  : No Osullivan  IV/Skin: No thrombophlebitis    LABS:                          10.9   6.94  )-----------( 203      ( 20 Nov 2021 06:19 )             32.6       11-20    137  |  98  |  13  ----------------------------<  76  3.6   |  28  |  0.56    Ca    8.4      20 Nov 2021 06:19  Phos  3.7     11-20  Mg     2.00     11-20    TPro  6.3  /  Alb  2.9<L>  /  TBili  0.5  /  DBili  x   /  AST  17  /  ALT  5   /  AlkPhos  34<L>  11-19    MICROBIOLOGY:  v  .Blood Blood  11-17-21   No growth to date.  --  --      .Blood Blood-Peripheral  11-11-21   No Growth Final  --  --      .Blood Blood-Peripheral  11-11-21   No Growth Final  --  --    RADIOLOGY:    < from: Xray Femur 2 Views, Bilat (11.19.21 @ 18:22) >  IMPRESSION: Surgical hardware as noted.    < end of copied text >

## 2021-11-20 NOTE — PROGRESS NOTE ADULT - PROBLEM SELECTOR PLAN 6
- CT of abdomen showed distended urinary bladder. Recommend clinical correlation to assess urinary retention. Layering of stones in the left bladder diverticulum. Did not have a chronic indwelling gentile prior to this hospitalization.  - Bladder scans Q 8hrs for evaluation of urinary retention  - TOV starting 11/19, passed, transitioned to condom cath

## 2021-11-20 NOTE — PROGRESS NOTE ADULT - PROBLEM SELECTOR PLAN 1
Sepsis present on admission likely d/t infected decubitus ulcer, sepsis now resolved   CT of abdomen showed increased stranding in the left buttock soft tissue overlying the sacral decubitus ulcer with minimally increased bone erosion of the underlying coccyx   Blood culture NGTD   Continue w/ IV unasyn, appreciate ID rec;s-- treating presumed CAP, but etiology of fevers currently unclear  ID following, blood cxs repeated  F.u venous duplex bilateral lower extremities for fever work up 11/20

## 2021-11-21 LAB
ANION GAP SERPL CALC-SCNC: 10 MMOL/L — SIGNIFICANT CHANGE UP (ref 7–14)
BASOPHILS # BLD AUTO: 0.01 K/UL — SIGNIFICANT CHANGE UP (ref 0–0.2)
BASOPHILS NFR BLD AUTO: 0.2 % — SIGNIFICANT CHANGE UP (ref 0–2)
BUN SERPL-MCNC: 16 MG/DL — SIGNIFICANT CHANGE UP (ref 7–23)
CALCIUM SERPL-MCNC: 8.7 MG/DL — SIGNIFICANT CHANGE UP (ref 8.4–10.5)
CHLORIDE SERPL-SCNC: 96 MMOL/L — LOW (ref 98–107)
CO2 SERPL-SCNC: 28 MMOL/L — SIGNIFICANT CHANGE UP (ref 22–31)
CREAT SERPL-MCNC: 0.64 MG/DL — SIGNIFICANT CHANGE UP (ref 0.5–1.3)
EOSINOPHIL # BLD AUTO: 0.14 K/UL — SIGNIFICANT CHANGE UP (ref 0–0.5)
EOSINOPHIL NFR BLD AUTO: 2.3 % — SIGNIFICANT CHANGE UP (ref 0–6)
GLUCOSE SERPL-MCNC: 88 MG/DL — SIGNIFICANT CHANGE UP (ref 70–99)
HCT VFR BLD CALC: 31.1 % — LOW (ref 39–50)
HGB BLD-MCNC: 10.5 G/DL — LOW (ref 13–17)
IANC: 3.82 K/UL — SIGNIFICANT CHANGE UP (ref 1.5–8.5)
IMM GRANULOCYTES NFR BLD AUTO: 1 % — SIGNIFICANT CHANGE UP (ref 0–1.5)
LYMPHOCYTES # BLD AUTO: 0.91 K/UL — LOW (ref 1–3.3)
LYMPHOCYTES # BLD AUTO: 14.9 % — SIGNIFICANT CHANGE UP (ref 13–44)
MAGNESIUM SERPL-MCNC: 1.9 MG/DL — SIGNIFICANT CHANGE UP (ref 1.6–2.6)
MCHC RBC-ENTMCNC: 30.1 PG — SIGNIFICANT CHANGE UP (ref 27–34)
MCHC RBC-ENTMCNC: 33.8 GM/DL — SIGNIFICANT CHANGE UP (ref 32–36)
MCV RBC AUTO: 89.1 FL — SIGNIFICANT CHANGE UP (ref 80–100)
MONOCYTES # BLD AUTO: 1.16 K/UL — HIGH (ref 0–0.9)
MONOCYTES NFR BLD AUTO: 19 % — HIGH (ref 2–14)
NEUTROPHILS # BLD AUTO: 3.82 K/UL — SIGNIFICANT CHANGE UP (ref 1.8–7.4)
NEUTROPHILS NFR BLD AUTO: 62.6 % — SIGNIFICANT CHANGE UP (ref 43–77)
NRBC # BLD: 0 /100 WBCS — SIGNIFICANT CHANGE UP
NRBC # FLD: 0 K/UL — SIGNIFICANT CHANGE UP
PHOSPHATE SERPL-MCNC: 2.9 MG/DL — SIGNIFICANT CHANGE UP (ref 2.5–4.5)
PLATELET # BLD AUTO: 224 K/UL — SIGNIFICANT CHANGE UP (ref 150–400)
POTASSIUM SERPL-MCNC: 3.4 MMOL/L — LOW (ref 3.5–5.3)
POTASSIUM SERPL-SCNC: 3.4 MMOL/L — LOW (ref 3.5–5.3)
RBC # BLD: 3.49 M/UL — LOW (ref 4.2–5.8)
RBC # FLD: 13.3 % — SIGNIFICANT CHANGE UP (ref 10.3–14.5)
SODIUM SERPL-SCNC: 134 MMOL/L — LOW (ref 135–145)
WBC # BLD: 6.1 K/UL — SIGNIFICANT CHANGE UP (ref 3.8–10.5)
WBC # FLD AUTO: 6.1 K/UL — SIGNIFICANT CHANGE UP (ref 3.8–10.5)

## 2021-11-21 PROCEDURE — 99232 SBSQ HOSP IP/OBS MODERATE 35: CPT

## 2021-11-21 PROCEDURE — 93970 EXTREMITY STUDY: CPT | Mod: 26

## 2021-11-21 RX ORDER — POTASSIUM CHLORIDE 20 MEQ
20 PACKET (EA) ORAL
Refills: 0 | Status: COMPLETED | OUTPATIENT
Start: 2021-11-21 | End: 2021-11-21

## 2021-11-21 RX ADMIN — TAMSULOSIN HYDROCHLORIDE 0.4 MILLIGRAM(S): 0.4 CAPSULE ORAL at 22:53

## 2021-11-21 RX ADMIN — Medication 50 MILLIGRAM(S): at 07:58

## 2021-11-21 RX ADMIN — AMPICILLIN SODIUM AND SULBACTAM SODIUM 200 GRAM(S): 250; 125 INJECTION, POWDER, FOR SUSPENSION INTRAMUSCULAR; INTRAVENOUS at 17:43

## 2021-11-21 RX ADMIN — GABAPENTIN 300 MILLIGRAM(S): 400 CAPSULE ORAL at 22:53

## 2021-11-21 RX ADMIN — Medication 50 MILLIGRAM(S): at 07:04

## 2021-11-21 RX ADMIN — ENOXAPARIN SODIUM 40 MILLIGRAM(S): 100 INJECTION SUBCUTANEOUS at 11:48

## 2021-11-21 RX ADMIN — Medication 325 MILLIGRAM(S): at 11:47

## 2021-11-21 RX ADMIN — METHADONE HYDROCHLORIDE 10 MILLIGRAM(S): 40 TABLET ORAL at 11:50

## 2021-11-21 RX ADMIN — AMPICILLIN SODIUM AND SULBACTAM SODIUM 200 GRAM(S): 250; 125 INJECTION, POWDER, FOR SUSPENSION INTRAMUSCULAR; INTRAVENOUS at 11:48

## 2021-11-21 RX ADMIN — Medication 1 TABLET(S): at 11:47

## 2021-11-21 RX ADMIN — Medication 20 MILLIEQUIVALENT(S): at 11:47

## 2021-11-21 RX ADMIN — AMPICILLIN SODIUM AND SULBACTAM SODIUM 200 GRAM(S): 250; 125 INJECTION, POWDER, FOR SUSPENSION INTRAMUSCULAR; INTRAVENOUS at 07:04

## 2021-11-21 RX ADMIN — Medication 50 MILLIGRAM(S): at 11:48

## 2021-11-21 RX ADMIN — Medication 20 MILLIEQUIVALENT(S): at 09:20

## 2021-11-21 RX ADMIN — Medication 10 MILLIGRAM(S): at 07:58

## 2021-11-21 RX ADMIN — GABAPENTIN 300 MILLIGRAM(S): 400 CAPSULE ORAL at 13:13

## 2021-11-21 RX ADMIN — Medication 1 TABLET(S): at 11:48

## 2021-11-21 RX ADMIN — AMPICILLIN SODIUM AND SULBACTAM SODIUM 200 GRAM(S): 250; 125 INJECTION, POWDER, FOR SUSPENSION INTRAMUSCULAR; INTRAVENOUS at 23:03

## 2021-11-21 RX ADMIN — GABAPENTIN 300 MILLIGRAM(S): 400 CAPSULE ORAL at 07:05

## 2021-11-21 RX ADMIN — Medication 81 MILLIGRAM(S): at 11:48

## 2021-11-21 RX ADMIN — DIVALPROEX SODIUM 500 MILLIGRAM(S): 500 TABLET, DELAYED RELEASE ORAL at 17:42

## 2021-11-21 RX ADMIN — Medication 50 MILLIGRAM(S): at 17:42

## 2021-11-21 RX ADMIN — Medication 10 MILLIGRAM(S): at 13:13

## 2021-11-21 RX ADMIN — Medication 50 MILLIGRAM(S): at 23:03

## 2021-11-21 RX ADMIN — DIVALPROEX SODIUM 500 MILLIGRAM(S): 500 TABLET, DELAYED RELEASE ORAL at 07:04

## 2021-11-21 RX ADMIN — Medication 10 MILLIGRAM(S): at 22:53

## 2021-11-21 NOTE — PROGRESS NOTE ADULT - SUBJECTIVE AND OBJECTIVE BOX
[facility] Division of Hospital Medicine  Barbara Khoruy MD  Pager (JENNA-TRINI, 8A-5P): [phone]  Other Times:  [phone]      SUBJECTIVE / OVERNIGHT EVENTS: Afebrile over the past 24 hours. Pt did fail TOV however, therefore gentile placed again this admission. No complaints this morning. Eating breakfast unassisted in bed this am.    MEDICATIONS  (STANDING):  ampicillin/sulbactam  IVPB      ampicillin/sulbactam  IVPB 3 Gram(s) IV Intermittent every 6 hours  aspirin enteric coated 81 milliGRAM(s) Oral daily  baclofen 10 milliGRAM(s) Oral three times a day  calcium carbonate 1250 mG  + Vitamin D (OsCal 500 + D) 1 Tablet(s) Oral daily  diVALproex  milliGRAM(s) Oral two times a day  enoxaparin Injectable 40 milliGRAM(s) SubCutaneous daily  ferrous    sulfate 325 milliGRAM(s) Oral daily  gabapentin 300 milliGRAM(s) Oral three times a day  hydrALAZINE 50 milliGRAM(s) Oral four times a day  methadone   Solution 10 milliGRAM(s) Oral daily  metoprolol tartrate 50 milliGRAM(s) Oral two times a day  multivitamin 1 Tablet(s) Oral daily  senna 2 Tablet(s) Oral at bedtime  tamsulosin 0.4 milliGRAM(s) Oral at bedtime    MEDICATIONS  (PRN):  acetaminophen     Tablet .. 650 milliGRAM(s) Oral every 6 hours PRN Temp greater or equal to 38C (100.4F), Mild Pain (1 - 3)  melatonin 3 milliGRAM(s) Oral at bedtime PRN Insomnia      I&O's Summary    20 Nov 2021 07:01  -  21 Nov 2021 07:00  --------------------------------------------------------  IN: 500 mL / OUT: 305 mL / NET: 195 mL    21 Nov 2021 07:01  -  21 Nov 2021 15:06  --------------------------------------------------------  IN: 365 mL / OUT: 0 mL / NET: 365 mL        PHYSICAL EXAM:  Vital Signs Last 24 Hrs  T(C): 37.1 (21 Nov 2021 14:23), Max: 37.1 (20 Nov 2021 23:00)  T(F): 98.8 (21 Nov 2021 14:23), Max: 98.8 (20 Nov 2021 23:00)  HR: 81 (21 Nov 2021 14:23) (68 - 83)  BP: 116/64 (21 Nov 2021 14:23) (108/68 - 139/80)  BP(mean): --  RR: 18 (21 Nov 2021 14:23) (17 - 18)  SpO2: 100% (21 Nov 2021 14:23) (97% - 100%)  CONSTITUTIONAL: NAD, temporal wasting, eating  lunch   EYES: PERRLA; conjunctiva and sclera clear, cataracts  ENMT: Moist oral mucosa  RESPIRATORY: Normal respiratory effort; lungs are clear to auscultation bilaterally, lung sounds distant  CARDIOVASCULAR: Regular rate and rhythm, normal S1 and S2, no murmur/rub/gallop; trace LE extremity edema, symmetric bilaterally; Peripheral pulses are 2+ bilaterally, R thigh ulceration appears improved from prior, no warmth or exudate. Unable to roll patient to examine sacrum.  ABDOMEN: Nontender to palpation, normoactive bowel sounds, no rebound/guarding; No hepatosplenomegaly  PSYCH: A+O to person, place, not date  SKIN: No rashes; no palpable lesions    LABS:                        10.5   6.10  )-----------( 224      ( 21 Nov 2021 07:49 )             31.1     11-21    134<L>  |  96<L>  |  16  ----------------------------<  88  3.4<L>   |  28  |  0.64    Ca    8.7      21 Nov 2021 07:49  Phos  2.9     11-21  Mg     1.90     11-21                  RADIOLOGY & ADDITIONAL TESTS:  Results Reviewed:   < from: Xray Femur 2 Views, Bilat (11.19.21 @ 18:22) >    INTERPRETATION:  Bilateral femurs    HISTORY: Shrapnel check     Two views of the right femur and two views of the left femur show no evidence of fracture nor destructive change. Intramedullary rods are seen bilaterally. The joint spaces are maintained.    IMPRESSION: Surgical hardware as noted.    < end of copied text >      COORDINATION OF CARE:  Care Discussed with Consultants/Other Providers [Y/N]: y ID

## 2021-11-21 NOTE — PROGRESS NOTE ADULT - PROBLEM SELECTOR PLAN 1
Sepsis present on admission likely d/t infected decubitus ulcer, sepsis now resolved   CT of abdomen showed increased stranding in the left buttock soft tissue overlying the sacral decubitus ulcer with minimally increased bone erosion of the underlying coccyx   Blood culture NGTD   Continue w/ IV unasyn, appreciate ID rec;s-- treating presumed CAP, but etiology of fevers currently unclear  ID following, blood cxs repeated, recommending repeat CT chest due to concern for aspiration PNA  F.u venous duplex bilateral lower extremities for fever work up 11/20

## 2021-11-21 NOTE — PROGRESS NOTE ADULT - PROBLEM SELECTOR PLAN 6
- CT of abdomen showed distended urinary bladder. Recommend clinical correlation to assess urinary retention. Layering of stones in the left bladder diverticulum. Did not have a chronic indwelling gentile prior to this hospitalization. UA on admission with moderate bacteria, however no urine culture done.  - Bladder scans Q 8hrs for evaluation of urinary retention  - TOV starting 11/19, passed, transitioned to condom cath--> however, then started retaining again, indwelling gentile again placed 11/20 evening

## 2021-11-22 PROCEDURE — 99232 SBSQ HOSP IP/OBS MODERATE 35: CPT

## 2021-11-22 RX ADMIN — DIVALPROEX SODIUM 500 MILLIGRAM(S): 500 TABLET, DELAYED RELEASE ORAL at 06:10

## 2021-11-22 RX ADMIN — Medication 81 MILLIGRAM(S): at 12:12

## 2021-11-22 RX ADMIN — GABAPENTIN 300 MILLIGRAM(S): 400 CAPSULE ORAL at 06:09

## 2021-11-22 RX ADMIN — Medication 50 MILLIGRAM(S): at 18:30

## 2021-11-22 RX ADMIN — Medication 10 MILLIGRAM(S): at 06:10

## 2021-11-22 RX ADMIN — METHADONE HYDROCHLORIDE 10 MILLIGRAM(S): 40 TABLET ORAL at 12:03

## 2021-11-22 RX ADMIN — AMPICILLIN SODIUM AND SULBACTAM SODIUM 200 GRAM(S): 250; 125 INJECTION, POWDER, FOR SUSPENSION INTRAMUSCULAR; INTRAVENOUS at 06:09

## 2021-11-22 RX ADMIN — Medication 50 MILLIGRAM(S): at 12:15

## 2021-11-22 RX ADMIN — ENOXAPARIN SODIUM 40 MILLIGRAM(S): 100 INJECTION SUBCUTANEOUS at 12:13

## 2021-11-22 RX ADMIN — Medication 325 MILLIGRAM(S): at 12:13

## 2021-11-22 RX ADMIN — Medication 50 MILLIGRAM(S): at 06:09

## 2021-11-22 RX ADMIN — Medication 1 TABLET(S): at 12:15

## 2021-11-22 RX ADMIN — Medication 1 TABLET(S): at 12:14

## 2021-11-22 RX ADMIN — AMPICILLIN SODIUM AND SULBACTAM SODIUM 200 GRAM(S): 250; 125 INJECTION, POWDER, FOR SUSPENSION INTRAMUSCULAR; INTRAVENOUS at 12:12

## 2021-11-22 RX ADMIN — Medication 50 MILLIGRAM(S): at 06:08

## 2021-11-22 RX ADMIN — DIVALPROEX SODIUM 500 MILLIGRAM(S): 500 TABLET, DELAYED RELEASE ORAL at 18:30

## 2021-11-22 RX ADMIN — GABAPENTIN 300 MILLIGRAM(S): 400 CAPSULE ORAL at 14:27

## 2021-11-22 RX ADMIN — AMPICILLIN SODIUM AND SULBACTAM SODIUM 200 GRAM(S): 250; 125 INJECTION, POWDER, FOR SUSPENSION INTRAMUSCULAR; INTRAVENOUS at 18:29

## 2021-11-22 RX ADMIN — Medication 10 MILLIGRAM(S): at 14:27

## 2021-11-22 NOTE — PROGRESS NOTE ADULT - SUBJECTIVE AND OBJECTIVE BOX
Follow Up: Fevers    Interval History/ROS: Afebrile over the weekend. Denies pain. Feels good. No chills or fevers.     Allergies  No Known Allergies        ANTIMICROBIALS:  ampicillin/sulbactam  IVPB    ampicillin/sulbactam  IVPB 3 every 6 hours      OTHER MEDS:  acetaminophen     Tablet .. 650 milliGRAM(s) Oral every 6 hours PRN  aspirin enteric coated 81 milliGRAM(s) Oral daily  baclofen 10 milliGRAM(s) Oral three times a day  calcium carbonate 1250 mG  + Vitamin D (OsCal 500 + D) 1 Tablet(s) Oral daily  diVALproex  milliGRAM(s) Oral two times a day  enoxaparin Injectable 40 milliGRAM(s) SubCutaneous daily  ferrous    sulfate 325 milliGRAM(s) Oral daily  gabapentin 300 milliGRAM(s) Oral three times a day  hydrALAZINE 50 milliGRAM(s) Oral four times a day  melatonin 3 milliGRAM(s) Oral at bedtime PRN  methadone   Solution 10 milliGRAM(s) Oral daily  metoprolol tartrate 50 milliGRAM(s) Oral two times a day  multivitamin 1 Tablet(s) Oral daily  senna 2 Tablet(s) Oral at bedtime  tamsulosin 0.4 milliGRAM(s) Oral at bedtime      Vital Signs Last 24 Hrs  T(C): 36.4 (22 Nov 2021 12:54), Max: 37.1 (21 Nov 2021 14:23)  T(F): 97.6 (22 Nov 2021 12:54), Max: 98.8 (21 Nov 2021 14:23)  HR: 64 (22 Nov 2021 12:54) (64 - 81)  BP: 111/65 (22 Nov 2021 12:54) (111/65 - 128/70)  BP(mean): --  RR: 18 (22 Nov 2021 12:54) (17 - 18)  SpO2: 98% (22 Nov 2021 12:54) (95% - 100%)    Physical Exam:  General: awake, alert, non toxic  Head: atraumatic, normocephalic  Eye: normal sclera and conjunctiva  Cardio: regular rate   Respiratory: nonlabored on room air, clear bilaterally, no wheezing  abd: soft, bowel sounds present, no tenderness  : gentile  Neurologic: bedbound   psych: normal affect                          10.5   6.10  )-----------( 224      ( 21 Nov 2021 07:49 )             31.1       11-21    134<L>  |  96<L>  |  16  ----------------------------<  88  3.4<L>   |  28  |  0.64    Ca    8.7      21 Nov 2021 07:49  Phos  2.9     11-21  Mg     1.90     11-21            MICROBIOLOGY:  Culture - Blood (collected 11-17-21 @ 23:04)  Source: .Blood Blood-Peripheral  Preliminary Report (11-19-21 @ 01:01):    No growth to date.    Culture - Blood (collected 11-17-21 @ 23:04)  Source: .Blood Blood  Preliminary Report (11-19-21 @ 01:01):    No growth to date.    RADIOLOGY:  Images below reviewed personally  Xray Femur 2 Views, Bilat (11.19.21 @ 18:22)    Two views of the right femur and two views of the left femur show no evidence of fracture nor destructive change. Intramedullary rods are seen bilaterally. The joint spaces are maintained.    US Duplex Venous Lower Ext Complete, Bilateral (11.21.21 @ 17:19)   No evidence of deep venous thrombosis within the visualized aspects of either lower extremity.    Xray Chest 1 View- PORTABLE-Routine (Xray Chest 1 View- PORTABLE-Routine .) (11.17.21 @ 15:14)   Low lung volumes.  New patchy right mid to lower lung opacities and more extensive patchy left perihilar opacities. The findings can be due to asymmetric pulmonary edema and/or multifocal infection.

## 2021-11-22 NOTE — PROGRESS NOTE ADULT - SUBJECTIVE AND OBJECTIVE BOX
Reynaldo Starks MD  Academic Hospitalist  Pager [phone]  Email: [email or web address]          PROGRESS NOTE:     Patient is a 71y old  Male who presents with a chief complaint of Infected bedsores (22 Nov 2021 14:00)      SUBJECTIVE / OVERNIGHT EVENTS:  Patient seen and examined this morning. Denies any new complaints. Awaiting MRI and Chest CT.   ADDITIONAL REVIEW OF SYSTEMS:  denies shortness of breath, f/c/n/v.     MEDICATIONS  (STANDING):  ampicillin/sulbactam  IVPB      ampicillin/sulbactam  IVPB 3 Gram(s) IV Intermittent every 6 hours  aspirin enteric coated 81 milliGRAM(s) Oral daily  baclofen 10 milliGRAM(s) Oral three times a day  calcium carbonate 1250 mG  + Vitamin D (OsCal 500 + D) 1 Tablet(s) Oral daily  diVALproex  milliGRAM(s) Oral two times a day  enoxaparin Injectable 40 milliGRAM(s) SubCutaneous daily  ferrous    sulfate 325 milliGRAM(s) Oral daily  gabapentin 300 milliGRAM(s) Oral three times a day  hydrALAZINE 50 milliGRAM(s) Oral four times a day  methadone   Solution 10 milliGRAM(s) Oral daily  metoprolol tartrate 50 milliGRAM(s) Oral two times a day  multivitamin 1 Tablet(s) Oral daily  senna 2 Tablet(s) Oral at bedtime  tamsulosin 0.4 milliGRAM(s) Oral at bedtime    MEDICATIONS  (PRN):  acetaminophen     Tablet .. 650 milliGRAM(s) Oral every 6 hours PRN Temp greater or equal to 38C (100.4F), Mild Pain (1 - 3)  melatonin 3 milliGRAM(s) Oral at bedtime PRN Insomnia      CAPILLARY BLOOD GLUCOSE        I&O's Summary    21 Nov 2021 07:01  -  22 Nov 2021 07:00  --------------------------------------------------------  IN: 465 mL / OUT: 400 mL / NET: 65 mL        PHYSICAL EXAM:  Vital Signs Last 24 Hrs  T(C): 36.4 (22 Nov 2021 12:54), Max: 36.9 (21 Nov 2021 21:35)  T(F): 97.6 (22 Nov 2021 12:54), Max: 98.5 (22 Nov 2021 06:05)  HR: 64 (22 Nov 2021 12:54) (64 - 76)  BP: 111/65 (22 Nov 2021 12:54) (111/65 - 128/70)  BP(mean): --  RR: 18 (22 Nov 2021 12:54) (17 - 18)  SpO2: 98% (22 Nov 2021 12:54) (95% - 100%)    CONSTITUTIONAL: NAD  RESPIRATORY: Normal respiratory effort; lungs are clear to auscultation bilaterally  CARDIOVASCULAR: Regular rate and rhythm, normal S1 and S2, no murmur/rub/gallop; No lower extremity edema; Peripheral pulses are 2+ bilaterally  ABDOMEN: Nontender to palpation, normoactive bowel sounds, no rebound/guarding; No hepatosplenomegaly  MUSCLOSKELETAL: no clubbing or cyanosis of digits; no joint swelling or tenderness to palpation  PSYCH: Awake and alert  NEURO: Paraplegic     LABS:                        10.5   6.10  )-----------( 224      ( 21 Nov 2021 07:49 )             31.1     11-21    134<L>  |  96<L>  |  16  ----------------------------<  88  3.4<L>   |  28  |  0.64    Ca    8.7      21 Nov 2021 07:49  Phos  2.9     11-21  Mg     1.90     11-21                  RADIOLOGY & ADDITIONAL TESTS:  Results Reviewed:   Imaging Personally Reviewed:  Electrocardiogram Personally Reviewed:    COORDINATION OF CARE:  Care Discussed with Consultants/Other Providers [Y/N]: Case discussed during interdisciplinary rounds with social work and case management  Prior or Outpatient Records Reviewed [Y/N]:
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
## 2021-11-22 NOTE — PROGRESS NOTE ADULT - PROBLEM SELECTOR PLAN 1
Sepsis present on admission likely d/t infected decubitus ulcer, sepsis now resolved   CT of abdomen showed increased stranding in the left buttock soft tissue overlying the sacral decubitus ulcer with minimally increased bone erosion of the underlying coccyx   Blood culture NGTD   Continue w/ IV unasyn, appreciate ID rec;s-- treating presumed CAP, but etiology of fevers currently unclear  ID following, blood cxs repeated, recommending repeat CT chest due to concern for aspiration PNA
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
## 2021-11-23 LAB
ALBUMIN SERPL ELPH-MCNC: 3 G/DL — LOW (ref 3.3–5)
ALP SERPL-CCNC: 36 U/L — LOW (ref 40–120)
ALT FLD-CCNC: 8 U/L — SIGNIFICANT CHANGE UP (ref 4–41)
ANION GAP SERPL CALC-SCNC: 5 MMOL/L — LOW (ref 7–14)
AST SERPL-CCNC: 24 U/L — SIGNIFICANT CHANGE UP (ref 4–40)
BILIRUB DIRECT SERPL-MCNC: <0.2 MG/DL — SIGNIFICANT CHANGE UP (ref 0–0.3)
BILIRUB INDIRECT FLD-MCNC: >0.1 MG/DL — SIGNIFICANT CHANGE UP (ref 0–1)
BILIRUB SERPL-MCNC: 0.3 MG/DL — SIGNIFICANT CHANGE UP (ref 0.2–1.2)
BUN SERPL-MCNC: 12 MG/DL — SIGNIFICANT CHANGE UP (ref 7–23)
CALCIUM SERPL-MCNC: 8.8 MG/DL — SIGNIFICANT CHANGE UP (ref 8.4–10.5)
CHLORIDE SERPL-SCNC: 102 MMOL/L — SIGNIFICANT CHANGE UP (ref 98–107)
CO2 SERPL-SCNC: 32 MMOL/L — HIGH (ref 22–31)
CREAT SERPL-MCNC: 0.65 MG/DL — SIGNIFICANT CHANGE UP (ref 0.5–1.3)
CULTURE RESULTS: SIGNIFICANT CHANGE UP
CULTURE RESULTS: SIGNIFICANT CHANGE UP
GLUCOSE SERPL-MCNC: 81 MG/DL — SIGNIFICANT CHANGE UP (ref 70–99)
HCT VFR BLD CALC: 32.9 % — LOW (ref 39–50)
HGB BLD-MCNC: 10.6 G/DL — LOW (ref 13–17)
MCHC RBC-ENTMCNC: 29.1 PG — SIGNIFICANT CHANGE UP (ref 27–34)
MCHC RBC-ENTMCNC: 32.2 GM/DL — SIGNIFICANT CHANGE UP (ref 32–36)
MCV RBC AUTO: 90.4 FL — SIGNIFICANT CHANGE UP (ref 80–100)
NRBC # BLD: 0 /100 WBCS — SIGNIFICANT CHANGE UP
NRBC # FLD: 0 K/UL — SIGNIFICANT CHANGE UP
PLATELET # BLD AUTO: 223 K/UL — SIGNIFICANT CHANGE UP (ref 150–400)
POTASSIUM SERPL-MCNC: 4.4 MMOL/L — SIGNIFICANT CHANGE UP (ref 3.5–5.3)
POTASSIUM SERPL-SCNC: 4.4 MMOL/L — SIGNIFICANT CHANGE UP (ref 3.5–5.3)
PROT SERPL-MCNC: 6.5 G/DL — SIGNIFICANT CHANGE UP (ref 6–8.3)
RBC # BLD: 3.64 M/UL — LOW (ref 4.2–5.8)
RBC # FLD: 13.2 % — SIGNIFICANT CHANGE UP (ref 10.3–14.5)
SODIUM SERPL-SCNC: 139 MMOL/L — SIGNIFICANT CHANGE UP (ref 135–145)
SPECIMEN SOURCE: SIGNIFICANT CHANGE UP
SPECIMEN SOURCE: SIGNIFICANT CHANGE UP
WBC # BLD: 3.63 K/UL — LOW (ref 3.8–10.5)
WBC # FLD AUTO: 3.63 K/UL — LOW (ref 3.8–10.5)

## 2021-11-23 PROCEDURE — 99232 SBSQ HOSP IP/OBS MODERATE 35: CPT

## 2021-11-23 PROCEDURE — 74183 MRI ABD W/O CNTR FLWD CNTR: CPT | Mod: 26

## 2021-11-23 RX ADMIN — AMPICILLIN SODIUM AND SULBACTAM SODIUM 200 GRAM(S): 250; 125 INJECTION, POWDER, FOR SUSPENSION INTRAMUSCULAR; INTRAVENOUS at 18:10

## 2021-11-23 RX ADMIN — Medication 1 TABLET(S): at 12:24

## 2021-11-23 RX ADMIN — Medication 50 MILLIGRAM(S): at 12:24

## 2021-11-23 RX ADMIN — GABAPENTIN 300 MILLIGRAM(S): 400 CAPSULE ORAL at 21:03

## 2021-11-23 RX ADMIN — DIVALPROEX SODIUM 500 MILLIGRAM(S): 500 TABLET, DELAYED RELEASE ORAL at 06:43

## 2021-11-23 RX ADMIN — Medication 325 MILLIGRAM(S): at 12:24

## 2021-11-23 RX ADMIN — Medication 10 MILLIGRAM(S): at 13:18

## 2021-11-23 RX ADMIN — ENOXAPARIN SODIUM 40 MILLIGRAM(S): 100 INJECTION SUBCUTANEOUS at 12:24

## 2021-11-23 RX ADMIN — Medication 50 MILLIGRAM(S): at 18:11

## 2021-11-23 RX ADMIN — GABAPENTIN 300 MILLIGRAM(S): 400 CAPSULE ORAL at 14:00

## 2021-11-23 RX ADMIN — AMPICILLIN SODIUM AND SULBACTAM SODIUM 200 GRAM(S): 250; 125 INJECTION, POWDER, FOR SUSPENSION INTRAMUSCULAR; INTRAVENOUS at 00:01

## 2021-11-23 RX ADMIN — Medication 1 TABLET(S): at 12:25

## 2021-11-23 RX ADMIN — Medication 50 MILLIGRAM(S): at 23:55

## 2021-11-23 RX ADMIN — AMPICILLIN SODIUM AND SULBACTAM SODIUM 200 GRAM(S): 250; 125 INJECTION, POWDER, FOR SUSPENSION INTRAMUSCULAR; INTRAVENOUS at 23:52

## 2021-11-23 RX ADMIN — Medication 50 MILLIGRAM(S): at 06:43

## 2021-11-23 RX ADMIN — Medication 10 MILLIGRAM(S): at 00:02

## 2021-11-23 RX ADMIN — METHADONE HYDROCHLORIDE 10 MILLIGRAM(S): 40 TABLET ORAL at 12:27

## 2021-11-23 RX ADMIN — DIVALPROEX SODIUM 500 MILLIGRAM(S): 500 TABLET, DELAYED RELEASE ORAL at 18:11

## 2021-11-23 RX ADMIN — GABAPENTIN 300 MILLIGRAM(S): 400 CAPSULE ORAL at 06:44

## 2021-11-23 RX ADMIN — GABAPENTIN 300 MILLIGRAM(S): 400 CAPSULE ORAL at 00:02

## 2021-11-23 RX ADMIN — AMPICILLIN SODIUM AND SULBACTAM SODIUM 200 GRAM(S): 250; 125 INJECTION, POWDER, FOR SUSPENSION INTRAMUSCULAR; INTRAVENOUS at 12:27

## 2021-11-23 RX ADMIN — Medication 81 MILLIGRAM(S): at 12:25

## 2021-11-23 RX ADMIN — Medication 50 MILLIGRAM(S): at 00:02

## 2021-11-23 RX ADMIN — Medication 10 MILLIGRAM(S): at 21:02

## 2021-11-23 RX ADMIN — SENNA PLUS 2 TABLET(S): 8.6 TABLET ORAL at 00:02

## 2021-11-23 RX ADMIN — TAMSULOSIN HYDROCHLORIDE 0.4 MILLIGRAM(S): 0.4 CAPSULE ORAL at 00:02

## 2021-11-23 RX ADMIN — TAMSULOSIN HYDROCHLORIDE 0.4 MILLIGRAM(S): 0.4 CAPSULE ORAL at 21:03

## 2021-11-23 RX ADMIN — Medication 10 MILLIGRAM(S): at 06:43

## 2021-11-23 RX ADMIN — AMPICILLIN SODIUM AND SULBACTAM SODIUM 200 GRAM(S): 250; 125 INJECTION, POWDER, FOR SUSPENSION INTRAMUSCULAR; INTRAVENOUS at 06:42
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
## 2021-11-23 NOTE — PROGRESS NOTE ADULT - SUBJECTIVE AND OBJECTIVE BOX
Reynaldo Starks MD  Academic Hospitalist  Pager [phone]  Email: [email or web address]          PROGRESS NOTE:     Patient is a 71y old  Male who presents with a chief complaint of Infected bedsores (22 Nov 2021 14:44)      SUBJECTIVE / OVERNIGHT EVENTS:  Patient seen and examined this morning on 11/24. No new complaints. Feels well, denies f/c/n/v  ADDITIONAL REVIEW OF SYSTEMS: As above    MEDICATIONS  (STANDING):  ampicillin/sulbactam  IVPB      ampicillin/sulbactam  IVPB 3 Gram(s) IV Intermittent every 6 hours  aspirin enteric coated 81 milliGRAM(s) Oral daily  baclofen 10 milliGRAM(s) Oral three times a day  calcium carbonate 1250 mG  + Vitamin D (OsCal 500 + D) 1 Tablet(s) Oral daily  diVALproex  milliGRAM(s) Oral two times a day  enoxaparin Injectable 40 milliGRAM(s) SubCutaneous daily  ferrous    sulfate 325 milliGRAM(s) Oral daily  gabapentin 300 milliGRAM(s) Oral three times a day  hydrALAZINE 50 milliGRAM(s) Oral four times a day  methadone   Solution 10 milliGRAM(s) Oral daily  metoprolol tartrate 50 milliGRAM(s) Oral two times a day  multivitamin 1 Tablet(s) Oral daily  senna 2 Tablet(s) Oral at bedtime  tamsulosin 0.4 milliGRAM(s) Oral at bedtime    MEDICATIONS  (PRN):  acetaminophen     Tablet .. 650 milliGRAM(s) Oral every 6 hours PRN Temp greater or equal to 38C (100.4F), Mild Pain (1 - 3)  melatonin 3 milliGRAM(s) Oral at bedtime PRN Insomnia      CAPILLARY BLOOD GLUCOSE        I&O's Summary    23 Nov 2021 07:01  -  24 Nov 2021 07:00  --------------------------------------------------------  IN: 300 mL / OUT: 1500 mL / NET: -1200 mL    24 Nov 2021 07:01  -  24 Nov 2021 08:57  --------------------------------------------------------  IN: 100 mL / OUT: 0 mL / NET: 100 mL        PHYSICAL EXAM:  Vital Signs Last 24 Hrs  T(C): 36.8 (24 Nov 2021 04:50), Max: 36.8 (24 Nov 2021 04:50)  T(F): 98.2 (24 Nov 2021 04:50), Max: 98.2 (24 Nov 2021 04:50)  HR: 62 (24 Nov 2021 04:50) (60 - 66)  BP: 138/60 (24 Nov 2021 04:50) (106/67 - 138/60)  BP(mean): --  RR: 16 (24 Nov 2021 04:50) (16 - 18)  SpO2: 100% (24 Nov 2021 04:50) (96% - 100%)    CONSTITUTIONAL: NAD  RESPIRATORY: Normal respiratory effort; lungs are clear to auscultation bilaterally  CARDIOVASCULAR: Regular rate and rhythm, normal S1 and S2, no murmur/rub/gallop; No lower extremity edema; Peripheral pulses are 2+ bilaterally  ABDOMEN: Nontender to palpation, normoactive bowel sounds, no rebound/guarding; No hepatosplenomegaly  MUSCLOSKELETAL: no clubbing or cyanosis of digits; no joint swelling or tenderness to palpation  PSYCH: Awake and alert  NEURO: Paraplegic     LABS:                        10.6   3.63  )-----------( 223      ( 23 Nov 2021 12:56 )             32.9     11-23    139  |  102  |  12  ----------------------------<  81  4.4   |  32<H>  |  0.65    Ca    8.8      23 Nov 2021 12:56    TPro  6.5  /  Alb  3.0<L>  /  TBili  0.3  /  DBili  <0.2  /  AST  24  /  ALT  8   /  AlkPhos  36<L>  11-23                RADIOLOGY & ADDITIONAL TESTS:  Results Reviewed:   Imaging Personally Reviewed:  Electrocardiogram Personally Reviewed:    COORDINATION OF CARE:  Care Discussed with Consultants/Other Providers [Y/N]: Patient care discussed with oncology and palliative care during interdisciplinary rounds, case management, nursing management  and social work were also present.   Prior or Outpatient Records Reviewed [Y/N]:

## 2021-11-23 NOTE — PROGRESS NOTE ADULT - PROBLEM SELECTOR PLAN 1
Sepsis present on admission likely d/t infected decubitus ulcer, sepsis now resolved   CT of abdomen showed increased stranding in the left buttock soft tissue overlying the sacral decubitus ulcer with minimally increased bone erosion of the underlying coccyx   Blood culture NGTD   Continue w/ IV unasyn, appreciate ID rec;s-- treating presumed CAP, but etiology of fevers currently unclear- can be transitioned to augmentin on discharge.   ID following, blood cxs repeated, CT chest cancelled as ID doesn't think its necessary at this time.

## 2021-11-24 ENCOUNTER — TRANSCRIPTION ENCOUNTER (OUTPATIENT)
Age: 71
End: 2021-11-24

## 2021-11-24 LAB — SARS-COV-2 RNA SPEC QL NAA+PROBE: SIGNIFICANT CHANGE UP

## 2021-11-24 PROCEDURE — 99232 SBSQ HOSP IP/OBS MODERATE 35: CPT

## 2021-11-24 RX ADMIN — Medication 10 MILLIGRAM(S): at 04:43

## 2021-11-24 RX ADMIN — AMPICILLIN SODIUM AND SULBACTAM SODIUM 200 GRAM(S): 250; 125 INJECTION, POWDER, FOR SUSPENSION INTRAMUSCULAR; INTRAVENOUS at 11:36

## 2021-11-24 RX ADMIN — Medication 50 MILLIGRAM(S): at 11:35

## 2021-11-24 RX ADMIN — TAMSULOSIN HYDROCHLORIDE 0.4 MILLIGRAM(S): 0.4 CAPSULE ORAL at 23:15

## 2021-11-24 RX ADMIN — Medication 10 MILLIGRAM(S): at 23:15

## 2021-11-24 RX ADMIN — DIVALPROEX SODIUM 500 MILLIGRAM(S): 500 TABLET, DELAYED RELEASE ORAL at 04:42

## 2021-11-24 RX ADMIN — METHADONE HYDROCHLORIDE 10 MILLIGRAM(S): 40 TABLET ORAL at 11:35

## 2021-11-24 RX ADMIN — SENNA PLUS 2 TABLET(S): 8.6 TABLET ORAL at 23:16

## 2021-11-24 RX ADMIN — AMPICILLIN SODIUM AND SULBACTAM SODIUM 200 GRAM(S): 250; 125 INJECTION, POWDER, FOR SUSPENSION INTRAMUSCULAR; INTRAVENOUS at 23:14

## 2021-11-24 RX ADMIN — GABAPENTIN 300 MILLIGRAM(S): 400 CAPSULE ORAL at 14:55

## 2021-11-24 RX ADMIN — Medication 1 TABLET(S): at 11:35

## 2021-11-24 RX ADMIN — Medication 50 MILLIGRAM(S): at 18:49

## 2021-11-24 RX ADMIN — GABAPENTIN 300 MILLIGRAM(S): 400 CAPSULE ORAL at 04:42

## 2021-11-24 RX ADMIN — Medication 81 MILLIGRAM(S): at 11:35

## 2021-11-24 RX ADMIN — AMPICILLIN SODIUM AND SULBACTAM SODIUM 200 GRAM(S): 250; 125 INJECTION, POWDER, FOR SUSPENSION INTRAMUSCULAR; INTRAVENOUS at 04:42

## 2021-11-24 RX ADMIN — Medication 325 MILLIGRAM(S): at 11:35

## 2021-11-24 RX ADMIN — Medication 50 MILLIGRAM(S): at 18:50

## 2021-11-24 RX ADMIN — AMPICILLIN SODIUM AND SULBACTAM SODIUM 200 GRAM(S): 250; 125 INJECTION, POWDER, FOR SUSPENSION INTRAMUSCULAR; INTRAVENOUS at 18:48

## 2021-11-24 RX ADMIN — Medication 50 MILLIGRAM(S): at 04:43

## 2021-11-24 RX ADMIN — Medication 50 MILLIGRAM(S): at 23:15

## 2021-11-24 RX ADMIN — GABAPENTIN 300 MILLIGRAM(S): 400 CAPSULE ORAL at 23:15

## 2021-11-24 RX ADMIN — Medication 10 MILLIGRAM(S): at 14:55

## 2021-11-24 RX ADMIN — DIVALPROEX SODIUM 500 MILLIGRAM(S): 500 TABLET, DELAYED RELEASE ORAL at 18:49

## 2021-11-24 RX ADMIN — ENOXAPARIN SODIUM 40 MILLIGRAM(S): 100 INJECTION SUBCUTANEOUS at 11:35

## 2021-11-24 RX ADMIN — Medication 50 MILLIGRAM(S): at 04:42

## 2021-11-24 NOTE — CHART NOTE - NSCHARTNOTEFT_GEN_A_CORE
Source: Patient [X ] forgetful at times   Family [ ]     other [X] electronic chart, RN    Diet : Diet, Regular:   DASH/TLC {Sodium & Cholesterol Restricted} (DASH)  Supplement Feeding Modality:  Oral  Ensure Enlive Cans or Servings Per Day:  1       Frequency:  Three Times a day (11-14-21 @ 16:49)    Nutrition follow-up Note. Patient is consuming >50% of most meals. Denies any nausea/vomiting/diarrhea/constipation or difficulty chewing and swallowing. NKFA. Patient is also consuming po supplement Ensure between meals. Continue providing mealtime assistance and encouragement. No questions or concerns voiced at this time.     Weight: 61.5kg/135.5lbs (11/12/21)      Pertinent Medications: baclofen  calcium carbonate 1250 mG  + Vitamin D (OsCal 500 + D)  diVALproex DR  ferrous    sulfate  gabapentin  hydrALAZINE  methadone   Solution  metoprolol tartrate  multivitamin  senna  tamsulosin    Pertinent Labs:  11-23 Na139 mmol/L Glu 81 mg/dL K+ 4.4 mmol/L Cr  0.65 mg/dL BUN 12 mg/dL 11-21 Phos 2.9 mg/dL 11-23 Alb 3.0 g/dL<L>      Skin: wound to left upper thigh  pressure injury sacrum unstageable and stage II right anterior distal thigh documented.    Estimated Needs:   [X ] no change since previous assessment  [ ] recalculated:       Previous Nutrition Diagnosis:      [X ]Inadequate Oral Intake  [X] Increased Nutrient Needs      Nutrition Diagnosis is [X] ongoing  [ ] resolved [ ] not applicable       Additional Recommendations:    1. Recommend liberalize diet to Regular, D/c DASH/TLC (cholesterol and sodium restricted) and continue low sodium diet.     2. Recommend continue PO supplement Ensure Enlive 240mls 3x daily (1050kcal, 60g protein) to optimal nutrition.     3. Please Encourage po intake, assist with meals and menu selections, provide alternatives PRN.     4. Monitor weights, labs, BM's, skin integrity, p.o. intake.     5. Please document % PO intake in nursing flowsheet.     5. Honor food and beverage preferences within diet restriction of patient in an effort to maximize level of nutrient intake.

## 2021-11-24 NOTE — DISCHARGE NOTE PROVIDER - NPI NUMBER (FOR SYSADMIN USE ONLY) :
[UNKNOWN],[UNKNOWN],[UNKNOWN] [UNKNOWN],[UNKNOWN],[UNKNOWN],[[id number]] [[id number]],[UNKNOWN],[UNKNOWN],[UNKNOWN],[UNKNOWN]

## 2021-11-24 NOTE — DISCHARGE NOTE PROVIDER - NSDCMRMEDTOKEN_GEN_ALL_CORE_FT
aspirin 81 mg oral tablet: 1 tab(s) orally once a day  baclofen 10 mg oral tablet: 1 tab(s) orally 3 times a day  Calcium 600+D oral tablet: 1 tab(s) orally 2 times a day  Depakote 500 mg oral delayed release tablet: 1 tab(s) orally 2 times a day  docusate sodium 100 mg oral capsule: 1 cap(s) orally 3 times a day  ferrous sulfate 325 mg (65 mg elemental iron) oral tablet:   gabapentin 300 mg oral tablet: 1 tab(s) orally 3 times a day  hydrALAZINE 50 mg oral tablet: 1 tab(s) orally 4 times a day  methadone 10 mg/mL oral concentrate: 1 milliliter(s) orally once a day  Metoprolol Tartrate 50 mg oral tablet: 1 tab(s) orally 2 times a day  multivitamin: 1 tab(s) orally once a day   amLODIPine 5 mg oral tablet: 1 tab(s) orally once a day  aspirin 81 mg oral tablet: 1 tab(s) orally once a day  atorvastatin 40 mg oral tablet: 1 tab(s) orally once a day (at bedtime)  bisacodyl 5 mg oral delayed release tablet: 1 tab(s) orally once a day  Calcium 600+D oral tablet: 1 tab(s) orally 2 times a day  Depakote 500 mg oral delayed release tablet: 1 tab(s) orally 2 times a day  ferrous sulfate 325 mg (65 mg elemental iron) oral tablet: 1 tab(s) orally once a day  melatonin 3 mg oral tablet: 1 tab(s) orally once a day (at bedtime), As needed, Insomnia  methadone 10 mg/mL oral concentrate: 1 milliliter(s) orally once a day  metoprolol succinate 25 mg oral tablet, extended release: 3 tab(s) orally once a day  multivitamin: 1 tab(s) orally once a day  polyethylene glycol 3350 oral powder for reconstitution: 17 gram(s) orally 2 times a day  senna oral tablet: 2 tab(s) orally once a day (at bedtime)  tamsulosin 0.4 mg oral capsule: 1 cap(s) orally once a day (at bedtime)   acetaminophen 325 mg oral tablet: 2 tab(s) orally every 6 hours, As needed, Temp greater or equal to 38C (100.4F), Mild Pain (1 - 3), Moderate Pain (4 - 6)  amLODIPine 5 mg oral tablet: 1 tab(s) orally once a day  aspirin 81 mg oral tablet: 1 tab(s) orally once a day  atorvastatin 40 mg oral tablet: 1 tab(s) orally once a day (at bedtime)  bisacodyl 10 mg rectal suppository: 1 suppository(ies) rectal once a day, As needed, Constipation  bisacodyl 5 mg oral delayed release tablet: 1 tab(s) orally once a day  Calcium 600+D oral tablet: 1 tab(s) orally 2 times a day  Depakote 500 mg oral delayed release tablet: 1 tab(s) orally 2 times a day  ferrous sulfate 325 mg (65 mg elemental iron) oral tablet: 1 tab(s) orally once a day  melatonin 3 mg oral tablet: 1 tab(s) orally once a day (at bedtime), As needed, Insomnia  methadone 10 mg/mL oral concentrate: 1 milliliter(s) orally once a day  metoprolol succinate 25 mg oral tablet, extended release: 3 tab(s) orally once a day  multivitamin: 1 tab(s) orally once a day  polyethylene glycol 3350 oral powder for reconstitution: 17 gram(s) orally 2 times a day  senna oral tablet: 3 tab(s) orally once a day (at bedtime)  tamsulosin 0.4 mg oral capsule: 1 cap(s) orally once a day (at bedtime)

## 2021-11-24 NOTE — DISCHARGE NOTE PROVIDER - NSDCCAREPROVSEEN_GEN_ALL_CORE_FT
[facility] Medicine ACP [facility] Medicine ACP  Giovana Rodrigez J Medicine ACP    Oksana Roque

## 2021-11-24 NOTE — DISCHARGE NOTE PROVIDER - NSDCFUADDAPPT_GEN_ALL_CORE_FT
- follow up care at [facility] [place]: [phone]. Address: [address] [place] [address]

## 2021-11-24 NOTE — PROGRESS NOTE ADULT - SUBJECTIVE AND OBJECTIVE BOX
Reynaldo Starks MD  Academic Hospitalist  Pager [phone]  Email: [email or web address]          PROGRESS NOTE:     Patient is a 71y old  Male who presents with a chief complaint of Infected bedsores (23 Nov 2021 16:00)      SUBJECTIVE / OVERNIGHT EVENTS:  Patient seen and examined this morning.   ADDITIONAL REVIEW OF SYSTEMS:    MEDICATIONS  (STANDING):  ampicillin/sulbactam  IVPB      ampicillin/sulbactam  IVPB 3 Gram(s) IV Intermittent every 6 hours  aspirin enteric coated 81 milliGRAM(s) Oral daily  baclofen 10 milliGRAM(s) Oral three times a day  calcium carbonate 1250 mG  + Vitamin D (OsCal 500 + D) 1 Tablet(s) Oral daily  diVALproex  milliGRAM(s) Oral two times a day  enoxaparin Injectable 40 milliGRAM(s) SubCutaneous daily  ferrous    sulfate 325 milliGRAM(s) Oral daily  gabapentin 300 milliGRAM(s) Oral three times a day  hydrALAZINE 50 milliGRAM(s) Oral four times a day  methadone   Solution 10 milliGRAM(s) Oral daily  metoprolol tartrate 50 milliGRAM(s) Oral two times a day  multivitamin 1 Tablet(s) Oral daily  senna 2 Tablet(s) Oral at bedtime  tamsulosin 0.4 milliGRAM(s) Oral at bedtime    MEDICATIONS  (PRN):  acetaminophen     Tablet .. 650 milliGRAM(s) Oral every 6 hours PRN Temp greater or equal to 38C (100.4F), Mild Pain (1 - 3)  melatonin 3 milliGRAM(s) Oral at bedtime PRN Insomnia      CAPILLARY BLOOD GLUCOSE        I&O's Summary    23 Nov 2021 07:01  -  24 Nov 2021 07:00  --------------------------------------------------------  IN: 300 mL / OUT: 1500 mL / NET: -1200 mL    24 Nov 2021 07:01  -  24 Nov 2021 13:27  --------------------------------------------------------  IN: 100 mL / OUT: 0 mL / NET: 100 mL        PHYSICAL EXAM:  Vital Signs Last 24 Hrs  T(C): 36.3 (24 Nov 2021 11:47), Max: 36.8 (24 Nov 2021 04:50)  T(F): 97.4 (24 Nov 2021 11:47), Max: 98.2 (24 Nov 2021 04:50)  HR: 62 (24 Nov 2021 11:47) (60 - 66)  BP: 117/84 (24 Nov 2021 11:47) (106/67 - 138/60)  BP(mean): --  RR: 17 (24 Nov 2021 11:47) (16 - 18)  SpO2: 95% (24 Nov 2021 11:47) (95% - 100%)    CONSTITUTIONAL: NAD  RESPIRATORY: Normal respiratory effort; lungs are clear to auscultation bilaterally  CARDIOVASCULAR: Regular rate and rhythm, normal S1 and S2, no murmur/rub/gallop; No lower extremity edema; Peripheral pulses are 2+ bilaterally  ABDOMEN: Nontender to palpation, normoactive bowel sounds, no rebound/guarding; No hepatosplenomegaly  MUSCLOSKELETAL: no clubbing or cyanosis of digits; no joint swelling or tenderness to palpation  PSYCH: Awake and alert  NEURO: Paraplegic     LABS:                        10.6   3.63  )-----------( 223      ( 23 Nov 2021 12:56 )             32.9     11-23    139  |  102  |  12  ----------------------------<  81  4.4   |  32<H>  |  0.65    Ca    8.8      23 Nov 2021 12:56    TPro  6.5  /  Alb  3.0<L>  /  TBili  0.3  /  DBili  <0.2  /  AST  24  /  ALT  8   /  AlkPhos  36<L>  11-23                RADIOLOGY & ADDITIONAL TESTS:  Results Reviewed:   Imaging Personally Reviewed:    EXAM:  MR MRCP WAW IC        PROCEDURE DATE:  Nov 23 2021         INTERPRETATION:  CLINICAL INFORMATION: Dilated common bile duct and indeterminate liver lesions on recent CT.    COMPARISON: CT abdomen and pelvis 11/12/2021, 10/28/2018.    CONTRAST/COMPLICATIONS:  IV Contrast: Gadavist  6 cc were administered.   1.5 cc were discarded.  Oral Contrast: None.  Complications: No immediate complications were reported.    PROCEDURE:  MRI of the abdomen was performed.  MRCP was performed.    The study was terminated early as the patient declined to continue, with several of the MRCP, axial T2 fat sat, axial delayed postcontrast, and diffusion sequences not performed. In addition, arterial phase postcontrast imaging was not obtained, with the first postcontrast sequence appearing to be in the portal venous phase. Several sequences are motion degraded.    FINDINGS:      LOWER CHEST: Trace bilateral pleural effusions. Bibasilar subsegmental atelectasis. Ectatic ascending thoracic aorta, measures 4.7 cm in diameter, similar to the prior CT exam of 10/28/2018.    LIVER: Normal size and morphology. Smooth contour. No steatosis.    Multiple hepatic cysts, including a thinly septated cyst in segment 8/4a measuring 2.4 x 2.0 cm and a thinly septated cyst in segment 2 measuring 2.6 x 3.0 cm.    There is a 2.1 cm hemangioma in segment 6.    Additional lesion measuring 3.0 cm within segment 2/3 demonstrating moderate T2 hyperintensity and lobulated contour. Peripheral hyperenhancement on the first postcontrast sequence, which appears to be in the portal venous phase without arterial phase obtained. There is progressive centripetal hyperenhancement on subsequent phases, with findings favored to represent an additional hemangioma. In retrospect, this was likely subtly present on the prior CT exam of 10/28/2018 and is likely not significantly changed in size.    BILE DUCTS: Mild bilobar intrahepatic biliary duct dilatation. Common bile duct measures up to 9 mm with distal tapering towards the ampulla. No filling defects to suggest choledocholithiasis.  GALLBLADDER: Cholelithiasis.  SPLEEN: Within normal limits.  PANCREAS: Mild diffuse atrophy. Normal parenchymal signal and enhancement. Pancreatic divisum. No pancreatic duct dilation.  ADRENALS: Within normal limits.  KIDNEYS/URETERS: Bilateral renal cysts, including a thinly septated cyst in the right kidney measuring 10 mm. No hydronephrosis.    VISUALIZED PORTIONS:  BOWEL: Within normal limits.  PERITONEUM: No ascites.  VESSELS: Within normal limits.  RETROPERITONEUM/LYMPH NODES: No lymphadenopathy.  ABDOMINAL WALL: Within normal limits.  BONES: Degenerative changes of the spine. Thoracolumbar levoscoliosis.    IMPRESSION:  1.  Mild intrahepatic and extrahepatic biliary duct dilation, smoothly tapering towards the ampulla. No choledocholithiasis or obstructing mass lesion is identified.  2.  Multiple liver lesions that are compatible with cysts and at least one hemangioma. A lesion within the left hepatic lobe is difficult to characterize due to the technical limitations of the exam described in the report, but is favored to represent an additional hemangioma that is likely unchanged in size since 2018.  Electrocardiogram Personally Reviewed:    COORDINATION OF CARE:  Care Discussed with Consultants/Other Providers [Y/N]: Case discussed during interdisciplinary rounds with social work and case management  Prior or Outpatient Records Reviewed [Y/N]:   Reynaldo Starks MD  Academic Hospitalist  Pager [phone]  Email: [email or web address]          PROGRESS NOTE:     Patient is a 71y old  Male who presents with a chief complaint of Infected bedsores (23 Nov 2021 16:00)      SUBJECTIVE / OVERNIGHT EVENTS:  Patient seen and examined this morning. Requesting assistance with obtaining hospital mattress at home and other services reinstated.   ADDITIONAL REVIEW OF SYSTEMS:  denies shortness of breath, f/c/n/v.     MEDICATIONS  (STANDING):  ampicillin/sulbactam  IVPB      ampicillin/sulbactam  IVPB 3 Gram(s) IV Intermittent every 6 hours  aspirin enteric coated 81 milliGRAM(s) Oral daily  baclofen 10 milliGRAM(s) Oral three times a day  calcium carbonate 1250 mG  + Vitamin D (OsCal 500 + D) 1 Tablet(s) Oral daily  diVALproex  milliGRAM(s) Oral two times a day  enoxaparin Injectable 40 milliGRAM(s) SubCutaneous daily  ferrous    sulfate 325 milliGRAM(s) Oral daily  gabapentin 300 milliGRAM(s) Oral three times a day  hydrALAZINE 50 milliGRAM(s) Oral four times a day  methadone   Solution 10 milliGRAM(s) Oral daily  metoprolol tartrate 50 milliGRAM(s) Oral two times a day  multivitamin 1 Tablet(s) Oral daily  senna 2 Tablet(s) Oral at bedtime  tamsulosin 0.4 milliGRAM(s) Oral at bedtime    MEDICATIONS  (PRN):  acetaminophen     Tablet .. 650 milliGRAM(s) Oral every 6 hours PRN Temp greater or equal to 38C (100.4F), Mild Pain (1 - 3)  melatonin 3 milliGRAM(s) Oral at bedtime PRN Insomnia      CAPILLARY BLOOD GLUCOSE        I&O's Summary    23 Nov 2021 07:01  -  24 Nov 2021 07:00  --------------------------------------------------------  IN: 300 mL / OUT: 1500 mL / NET: -1200 mL    24 Nov 2021 07:01  -  24 Nov 2021 13:27  --------------------------------------------------------  IN: 100 mL / OUT: 0 mL / NET: 100 mL        PHYSICAL EXAM:  Vital Signs Last 24 Hrs  T(C): 36.3 (24 Nov 2021 11:47), Max: 36.8 (24 Nov 2021 04:50)  T(F): 97.4 (24 Nov 2021 11:47), Max: 98.2 (24 Nov 2021 04:50)  HR: 62 (24 Nov 2021 11:47) (60 - 66)  BP: 117/84 (24 Nov 2021 11:47) (106/67 - 138/60)  BP(mean): --  RR: 17 (24 Nov 2021 11:47) (16 - 18)  SpO2: 95% (24 Nov 2021 11:47) (95% - 100%)    CONSTITUTIONAL: NAD  RESPIRATORY: Normal respiratory effort; lungs are clear to auscultation bilaterally  CARDIOVASCULAR: Regular rate and rhythm, normal S1 and S2, no murmur/rub/gallop; No lower extremity edema; Peripheral pulses are 2+ bilaterally  ABDOMEN: Nontender to palpation, normoactive bowel sounds, no rebound/guarding; No hepatosplenomegaly  MUSCLOSKELETAL: no clubbing or cyanosis of digits; no joint swelling or tenderness to palpation  PSYCH: Awake and alert  NEURO: Paraplegic     LABS:                        10.6   3.63  )-----------( 223      ( 23 Nov 2021 12:56 )             32.9     11-23    139  |  102  |  12  ----------------------------<  81  4.4   |  32<H>  |  0.65    Ca    8.8      23 Nov 2021 12:56    TPro  6.5  /  Alb  3.0<L>  /  TBili  0.3  /  DBili  <0.2  /  AST  24  /  ALT  8   /  AlkPhos  36<L>  11-23                RADIOLOGY & ADDITIONAL TESTS:  Results Reviewed:   Imaging Personally Reviewed:    EXAM:  MR MRCP WAW IC        PROCEDURE DATE:  Nov 23 2021         INTERPRETATION:  CLINICAL INFORMATION: Dilated common bile duct and indeterminate liver lesions on recent CT.    COMPARISON: CT abdomen and pelvis 11/12/2021, 10/28/2018.    CONTRAST/COMPLICATIONS:  IV Contrast: Gadavist  6 cc were administered.   1.5 cc were discarded.  Oral Contrast: None.  Complications: No immediate complications were reported.    PROCEDURE:  MRI of the abdomen was performed.  MRCP was performed.    The study was terminated early as the patient declined to continue, with several of the MRCP, axial T2 fat sat, axial delayed postcontrast, and diffusion sequences not performed. In addition, arterial phase postcontrast imaging was not obtained, with the first postcontrast sequence appearing to be in the portal venous phase. Several sequences are motion degraded.    FINDINGS:      LOWER CHEST: Trace bilateral pleural effusions. Bibasilar subsegmental atelectasis. Ectatic ascending thoracic aorta, measures 4.7 cm in diameter, similar to the prior CT exam of 10/28/2018.    LIVER: Normal size and morphology. Smooth contour. No steatosis.    Multiple hepatic cysts, including a thinly septated cyst in segment 8/4a measuring 2.4 x 2.0 cm and a thinly septated cyst in segment 2 measuring 2.6 x 3.0 cm.    There is a 2.1 cm hemangioma in segment 6.    Additional lesion measuring 3.0 cm within segment 2/3 demonstrating moderate T2 hyperintensity and lobulated contour. Peripheral hyperenhancement on the first postcontrast sequence, which appears to be in the portal venous phase without arterial phase obtained. There is progressive centripetal hyperenhancement on subsequent phases, with findings favored to represent an additional hemangioma. In retrospect, this was likely subtly present on the prior CT exam of 10/28/2018 and is likely not significantly changed in size.    BILE DUCTS: Mild bilobar intrahepatic biliary duct dilatation. Common bile duct measures up to 9 mm with distal tapering towards the ampulla. No filling defects to suggest choledocholithiasis.  GALLBLADDER: Cholelithiasis.  SPLEEN: Within normal limits.  PANCREAS: Mild diffuse atrophy. Normal parenchymal signal and enhancement. Pancreatic divisum. No pancreatic duct dilation.  ADRENALS: Within normal limits.  KIDNEYS/URETERS: Bilateral renal cysts, including a thinly septated cyst in the right kidney measuring 10 mm. No hydronephrosis.    VISUALIZED PORTIONS:  BOWEL: Within normal limits.  PERITONEUM: No ascites.  VESSELS: Within normal limits.  RETROPERITONEUM/LYMPH NODES: No lymphadenopathy.  ABDOMINAL WALL: Within normal limits.  BONES: Degenerative changes of the spine. Thoracolumbar levoscoliosis.    IMPRESSION:  1.  Mild intrahepatic and extrahepatic biliary duct dilation, smoothly tapering towards the ampulla. No choledocholithiasis or obstructing mass lesion is identified.  2.  Multiple liver lesions that are compatible with cysts and at least one hemangioma. A lesion within the left hepatic lobe is difficult to characterize due to the technical limitations of the exam described in the report, but is favored to represent an additional hemangioma that is likely unchanged in size since 2018.  Electrocardiogram Personally Reviewed:    COORDINATION OF CARE:  Care Discussed with Consultants/Other Providers [Y/N]: Case discussed during interdisciplinary rounds with social work and case management  Prior or Outpatient Records Reviewed [Y/N]:

## 2021-11-24 NOTE — DISCHARGE NOTE PROVIDER - CARE PROVIDER_API CALL
Noemi Dow-Neurologist,   Phone: [phone]  Fax: (   )    -  Follow Up Time:     Dr. Satya Leon-PCP,   Phone: [phone]  Fax: (   )    -  Follow Up Time:     [facility],   [address] [place] [address]  Phone: [phone]  Fax: (   )    -  Follow Up Time:    Noemi Dow-Neurologist,   Phone: [phone]  Fax: (   )    -  Follow Up Time:     Dr. Satya Leon-PCP,   Phone: [phone]  Fax: (   )    -  Follow Up Time:     [facility],   [address] [place] [address]  Phone: [phone]  Fax: (   )    -  Follow Up Time:     Galina Washington)  Surgery  [address] [facility], [address] [place] [address]  Phone: [phone]  Fax: [phone]  Follow Up Time:    Galina Washington)  Surgery  [address] [facility], [address]nd [place] [address]  Phone: [phone]  Fax: [phone]  Follow Up Time:     Noemi Dow-Neurologist,   Phone: [phone]  Fax: (   )    -  Follow Up Time:     Dr. Satya Leon-PCP,   Phone: [phone]  Fax: (   )    -  Follow Up Time:     [facility],   [address] [place] [address]  Phone: [phone]  Fax: (   )    -  Follow Up Time:     [facility],   [address] [place] [address]  [phone]  Phone: (   )    -  Fax: (   )    -  Follow Up Time:

## 2021-11-24 NOTE — DISCHARGE NOTE PROVIDER - NSDCCPCAREPLAN_GEN_ALL_CORE_FT
PRINCIPAL DISCHARGE DIAGNOSIS  Diagnosis: Sepsis  Assessment and Plan of Treatment: You were treated with antibiotics in the hospital  Follow up with your Primary Care Doctor within 1 week of discharge. Call to schedule an appointment        SECONDARY DISCHARGE DIAGNOSES  Diagnosis: Sacral decubitus ulcer  Assessment and Plan of Treatment: continue wound care:  Left medial thigh blister, Right lateral thigh scab- cleanse wound and periwound with Normal Saline, pat dry. Apply liquid barrier film to periwound skin. Apply Xeroform, cover with 4x4 gauze and paper tape. Change daily.  Sacrum- cleanse wound and periwound with Saf-clense. Dry well. Apply liquid barrier film to periwound skin, medihoney to wound base, cover with silicone foam with border. Change daily.  Left ischium healed pressure injury apply liquid barrier film daily.    Diagnosis: Seizure disorder  Assessment and Plan of Treatment: continue medciation as prescribed    Diagnosis: Urinary retention  Assessment and Plan of Treatment: continue gentile to gravity drainage  Follow up with urology for catheter removal, trial of void.    Diagnosis: HTN (hypertension)  Assessment and Plan of Treatment: continue medications as prescribed  Follow up with your Primary Care Doctor within 1 week of discharge. Call to schedule an appointment      Diagnosis: Osteomyelitis  Assessment and Plan of Treatment: You were treated with antibiotics in the hospital  Follow up with your Primary Care Doctor within 1 week of discharge. Call to schedule an appointment    Diagnosis: Pneumonia  Assessment and Plan of Treatment: You were treated with antibiotics in the hospital  Follow up with your Primary Care Doctor within 1 week of discharge. Call to schedule an appointment     PRINCIPAL DISCHARGE DIAGNOSIS  Diagnosis: Sepsis  Assessment and Plan of Treatment: You were treated with antibiotics in the hospital  Follow up with your Primary Care Doctor within 1 week of discharge. Call to schedule an appointment        SECONDARY DISCHARGE DIAGNOSES  Diagnosis: Sacral decubitus ulcer  Assessment and Plan of Treatment: continue wound care:  Left medial thigh blister, Right lateral thigh scab- cleanse wound and periwound with Normal Saline, pat dry. Apply liquid barrier film to periwound skin. Apply Xeroform, cover with 4x4 gauze and paper tape. Change daily.  Sacrum- cleanse wound and periwound with Saf-clense. Dry well. Apply liquid barrier film to periwound skin, medihoney to wound base, cover with silicone foam with border. Change daily.  Left ischium healed pressure injury apply liquid barrier film daily.    Diagnosis: Seizure disorder  Assessment and Plan of Treatment:     Diagnosis: HTN (hypertension)  Assessment and Plan of Treatment: continue medications as prescribed  Follow up with your Primary Care Doctor within 1 week of discharge. Call to schedule an appointment      Diagnosis: Urinary retention  Assessment and Plan of Treatment: continue gentile to gravity drainage  Follow up with urology for catheter removal, trial of void.    Diagnosis: Pneumonia  Assessment and Plan of Treatment: You were treated with antibiotics in the hospital  Follow up with your Primary Care Doctor within 1 week of discharge. Call to schedule an appointment     PRINCIPAL DISCHARGE DIAGNOSIS  Diagnosis: Sepsis  Assessment and Plan of Treatment: You were admitted for infection from your decubitus ulcer. You were seen by wound care Dr eason. Follow up with her once discharged.   Follow up with your Primary Care Doctor within 1 week of discharge. Call to schedule an appointment        SECONDARY DISCHARGE DIAGNOSES  Diagnosis: Sacral decubitus ulcer  Assessment and Plan of Treatment: continue wound care:  Left medial thigh blister, Right lateral thigh scab- cleanse wound and periwound with Normal Saline, pat dry. Apply liquid barrier film to periwound skin. Apply Xeroform, cover with 4x4 gauze and paper tape. Change daily.  Sacrum- cleanse wound and periwound with Saf-clense. Dry well. Apply liquid barrier film to periwound skin, medihoney to wound base, cover with silicone foam with border. Change daily.  Left ischium healed pressure injury apply liquid barrier film daily.    Diagnosis: Seizure disorder  Assessment and Plan of Treatment:     Diagnosis: HTN (hypertension)  Assessment and Plan of Treatment: continue medications as prescribed  Follow up with your Primary Care Doctor within 1 week of discharge. Call to schedule an appointment      Diagnosis: Urinary retention  Assessment and Plan of Treatment: continue gentile to gravity drainage  Follow up with urology for catheter removal, trial of void.    Diagnosis: Pneumonia  Assessment and Plan of Treatment: You were treated with antibiotics in the hospital  Follow up with your Primary Care Doctor within 1 week of discharge. Call to schedule an appointment     PRINCIPAL DISCHARGE DIAGNOSIS  Diagnosis: Sepsis  Assessment and Plan of Treatment: You were admitted for infection from your decubitus ulcer. You were seen by wound care Dr eason. Follow up with her once discharged.   Follow up with your Primary Care Doctor within 1 week of discharge. Call to schedule an appointment        SECONDARY DISCHARGE DIAGNOSES  Diagnosis: Sacral decubitus ulcer  Assessment and Plan of Treatment: continue wound care:  Left medial thigh blister, Right lateral thigh scab- cleanse wound and periwound with Normal Saline, pat dry. Apply liquid barrier film to periwound skin. Apply Xeroform, cover with 4x4 gauze and paper tape. Change daily.  Sacrum- cleanse wound and periwound with Saf-clense. Dry well. Apply liquid barrier film to periwound skin, medihoney to wound base, cover with silicone foam with border. Change daily.  Left ischium healed pressure injury apply liquid barrier film daily.    Diagnosis: Seizure disorder  Assessment and Plan of Treatment: continue depakote as directed and followup with your neurologist    Diagnosis: HTN (hypertension)  Assessment and Plan of Treatment: continue medications as prescribed  Follow up with your Primary Care Doctor within 1 week of discharge. Call to schedule an appointment      Diagnosis: Urinary retention  Assessment and Plan of Treatment: continue gentile to gravity drainage  Follow up with urology for catheter removal, trial of void.    Diagnosis: Pneumonia  Assessment and Plan of Treatment: You were treated with antibiotics in the hospital  Follow up with your Primary Care Doctor within 1 week of discharge. Call to schedule an appointment    Diagnosis: Liver lesion  Assessment and Plan of Treatment: MRCP11/23 showed mild intrahepatic and extrahepatic biliary duct dilation, smoothly tapering towards the ampulla, no choledocholithiasis or obstructing mass lesion identified, multiple liver lesions that are compatible with cysts and at least one hemangioma, a lesion w/i left hepatic lobe is difficult to characterize due to technical limitations of exam described in report favored to represent additional hemangioma likely unchanged in size since 2018  outpatient followup for liver lesion. followup with your PMD        PRINCIPAL DISCHARGE DIAGNOSIS  Diagnosis: Sepsis  Assessment and Plan of Treatment: You were admitted for infection from your decubitus ulcer. You were seen by wound care Dr eason. Follow up with her once discharged.   Follow up with your Primary Care Doctor within 1 week of discharge. Call to schedule an appointment        SECONDARY DISCHARGE DIAGNOSES  Diagnosis: Sacral decubitus ulcer  Assessment and Plan of Treatment: continue wound care:  Left medial thigh blister, Right lateral thigh scab- cleanse wound and periwound with Normal Saline, pat dry. Apply liquid barrier film to periwound skin. Apply Xeroform, cover with 4x4 gauze and paper tape. Change daily.  Sacrum- cleanse wound and periwound with Saf-clense. Dry well. Apply liquid barrier film to periwound skin, medihoney to wound base, cover with silicone foam with border. Change daily.  Left ischium healed pressure injury apply liquid barrier film daily.  - follow up care at [facility]: [phone]. Address: [address] [place] [address]    Diagnosis: Seizure disorder  Assessment and Plan of Treatment: continue depakote as directed and followup with your neurologist    Diagnosis: HTN (hypertension)  Assessment and Plan of Treatment: continue medications as prescribed  Follow up with your Primary Care Doctor within 1 week of discharge. Call to schedule an appointment      Diagnosis: Urinary retention  Assessment and Plan of Treatment: continue gentile to gravity drainage  Follow up with urology for catheter removal, trial of void.    Diagnosis: Pneumonia  Assessment and Plan of Treatment: You were treated with antibiotics in the hospital  Follow up with your Primary Care Doctor within 1 week of discharge. Call to schedule an appointment    Diagnosis: Liver lesion  Assessment and Plan of Treatment: MRCP11/23 showed mild intrahepatic and extrahepatic biliary duct dilation, smoothly tapering towards the ampulla, no choledocholithiasis or obstructing mass lesion identified, multiple liver lesions that are compatible with cysts and at least one hemangioma, a lesion w/i left hepatic lobe is difficult to characterize due to technical limitations of exam described in report favored to represent additional hemangioma likely unchanged in size since 2018  outpatient followup for liver lesion. followup with your PMD        PRINCIPAL DISCHARGE DIAGNOSIS  Diagnosis: Sepsis  Assessment and Plan of Treatment: You were admitted for infection from your decubitus ulcer. You were seen by wound care Dr eason. Follow up with her once discharged.   Follow up with your Primary Care Doctor within 1 week of discharge. Call to schedule an appointment        SECONDARY DISCHARGE DIAGNOSES  Diagnosis: Sacral decubitus ulcer  Assessment and Plan of Treatment: continue wound care:  Left medial thigh blister, Right lateral thigh scab- cleanse wound and periwound with Normal Saline, pat dry. Apply liquid barrier film to periwound skin. Apply Xeroform, cover with 4x4 gauze and paper tape. Change daily.  Sacrum- cleanse wound and periwound with Saf-clense. Dry well. Apply liquid barrier film to periwound skin, medihoney to wound base, cover with silicone foam with border. Change daily.  Left ischium healed pressure injury apply liquid barrier film daily.  - follow up care at [facility]: [phone]. Address: [address] [place] [address]    Diagnosis: Seizure disorder  Assessment and Plan of Treatment: continue Depakote as directed and followup with your neurologist    Diagnosis: HTN (hypertension)  Assessment and Plan of Treatment: continue medications as prescribed  Follow up with your Primary Care Doctor within 1 week of discharge. Call to schedule an appointment      Diagnosis: Urinary retention  Assessment and Plan of Treatment: continue gentile to gravity drainage  Follow up with urology for catheter removal, trial of void.    Diagnosis: Pneumonia  Assessment and Plan of Treatment: You were treated with antibiotics in the hospital  Follow up with your Primary Care Doctor within 1 week of discharge. Call to schedule an appointment    Diagnosis: Liver lesion  Assessment and Plan of Treatment: MRCP11/23 showed mild intrahepatic and extrahepatic biliary duct dilation, smoothly tapering towards the ampulla, no choledocholithiasis or obstructing mass lesion identified, multiple liver lesions that are compatible with cysts and at least one hemangioma, a lesion w/i left hepatic lobe is difficult to characterize due to technical limitations of exam described in report favored to represent additional hemangioma likely unchanged in size since 2018  outpatient followup for liver lesion. followup with your PMD        PRINCIPAL DISCHARGE DIAGNOSIS  Diagnosis: Sepsis  Assessment and Plan of Treatment: You were admitted for infection from your decubitus ulcer. You were seen by wound care Dr eason. Follow up with her once discharged.   Follow up with your Primary Care Doctor within 1 week of discharge. Call to schedule an appointment        SECONDARY DISCHARGE DIAGNOSES  Diagnosis: Sacral decubitus ulcer  Assessment and Plan of Treatment: continue wound care:  Left medial thigh blister, Right lateral thigh scab- cleanse wound and periwound with Normal Saline, pat dry. Apply liquid barrier film to periwound skin. Apply Xeroform, cover with 4x4 gauze and paper tape. Change daily.  Sacrum- cleanse wound and periwound with Saf-clense. Dry well. Apply liquid barrier film to periwound skin, medihoney to wound base, cover with silicone foam with border. Change daily.  Left ischium healed pressure injury apply liquid barrier film daily.  - follow up care at [facility]: [phone]. Address: [address] [place] [address]    Diagnosis: Seizure disorder  Assessment and Plan of Treatment: continue Depakote as directed and followup with your neurologist    Diagnosis: HTN (hypertension)  Assessment and Plan of Treatment: continue medications as prescribed  Follow up with your Primary Care Doctor within 1 week of discharge. Call to schedule an appointment      Diagnosis: Urinary retention  Assessment and Plan of Treatment: Gentile catheter was recommended; please monitor urine output as you are prone to retaining and may need an indwelling gentile catheter. Follow up with urology as outpatient.    Diagnosis: Pneumonia  Assessment and Plan of Treatment: You were treated with antibiotics in the hospital  Follow up with your Primary Care Doctor within 1 week of discharge. Call to schedule an appointment    Diagnosis: Liver lesion  Assessment and Plan of Treatment: MRCP11/23 showed mild intrahepatic and extrahepatic biliary duct dilation, smoothly tapering towards the ampulla, no choledocholithiasis or obstructing mass lesion identified, multiple liver lesions that are compatible with cysts and at least one hemangioma, a lesion w/i left hepatic lobe is difficult to characterize due to technical limitations of exam described in report favored to represent additional hemangioma likely unchanged in size since 2018  outpatient followup for liver lesion. followup with your PMD       Diagnosis: 2019 novel coronavirus disease (COVID-19)  Assessment and Plan of Treatment: You were positive for COVID during your admission. You were monitored. You eventually tested negative. No further testing for COVID-19 was required.
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
## 2021-11-24 NOTE — PROGRESS NOTE ADULT - PROBLEM SELECTOR PLAN 9
Pt is in no distress, afebrile. Pt's oncologist Dr. Vasquez paged to discuss case. - DVT prophylaxis Lovenox 40 mg QD  - Pt has a guardian, attempted to call 11/23-  left. - DVT prophylaxis Lovenox 40 mg QD  - Pt has a guardian, attempted to call 11/23-  left.  Given issues w/reinstating home services and equipment, patient may have to stay in the hospital until Monday.

## 2021-11-24 NOTE — DISCHARGE NOTE PROVIDER - PROVIDER TOKENS
FREE:[LAST:[Noemi Dow-Neurologist],PHONE:[phone]],FAX:[(   )    -]],FREE:[LAST:[Dr. Satya Leon-PCP],PHONE:[phone]],FAX:[(   )    -]],FREE:[LAST:[[facility]],PHONE:[phone]],FAX:[(   )    -],ADDRESS:[[address] [place] [address]]] FREE:[LAST:[Noemi Dow-Neurologist],PHONE:[phone]],FAX:[(   )    -]],FREE:[LAST:[Dr. Satya Leon-PCP],PHONE:[phone]],FAX:[(   )    -]],FREE:[LAST:[[facility]],PHONE:[phone]],FAX:[(   )    -],ADDRESS:[[address] [place] [address]],PROVIDER:[TOKEN:[[id number]]] PROVIDER:[TOKEN:[[id number]]],FREE:[LAST:[Noemi Dow-Neurologist],PHONE:[phone]],FAX:[(   )    -]],FREE:[LAST:[Dr. Satya Leon-PCP],PHONE:[phone]],FAX:[(   )    -]],FREE:[LAST:[[facility]],PHONE:[phone]],FAX:[(   )    -],ADDRESS:[[address] [place] [address]]],FREE:[LAST:[[facility]],PHONE:[(   )    -],FAX:[(   )    -],ADDRESS:[[address] [place] [address]  [phone]]]

## 2021-11-24 NOTE — DISCHARGE NOTE PROVIDER - CARE PROVIDERS DIRECT ADDRESSES
,DirectAddress_Unknown,DirectAddress_Unknown,DirectAddress_Unknown ,DirectAddress_Unknown,DirectAddress_Unknown,DirectAddress_Unknown,[email or web address],DirectAddress_Unknown,DirectAddress_Unknown,DirectAddress_Unknown,DirectAddress_Unknown
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
## 2021-11-24 NOTE — PROGRESS NOTE ADULT - PROBLEM SELECTOR PLAN 1
Sepsis present on admission likely d/t infected decubitus ulcer, sepsis now resolved   CT of abdomen showed increased stranding in the left buttock soft tissue overlying the sacral decubitus ulcer with minimally increased bone erosion of the underlying coccyx   Blood culture NGTD   Continue w/ IV unasyn, appreciate ID rec;s-- treating presumed CAP, but etiology of fevers currently unclear- can be transitioned to augmentin on discharge.   ID following, blood cxs repeated, CT chest cancelled as ID doesn't think its necessary at this time.  Given issues w/setting up home services, patient may have to stay in the hospital until Monday.

## 2021-11-25 LAB
ANION GAP SERPL CALC-SCNC: 9 MMOL/L — SIGNIFICANT CHANGE UP (ref 7–14)
BUN SERPL-MCNC: 14 MG/DL — SIGNIFICANT CHANGE UP (ref 7–23)
CALCIUM SERPL-MCNC: 8.3 MG/DL — LOW (ref 8.4–10.5)
CHLORIDE SERPL-SCNC: 102 MMOL/L — SIGNIFICANT CHANGE UP (ref 98–107)
CO2 SERPL-SCNC: 28 MMOL/L — SIGNIFICANT CHANGE UP (ref 22–31)
CREAT SERPL-MCNC: 0.54 MG/DL — SIGNIFICANT CHANGE UP (ref 0.5–1.3)
GLUCOSE SERPL-MCNC: 78 MG/DL — SIGNIFICANT CHANGE UP (ref 70–99)
HCT VFR BLD CALC: 30.3 % — LOW (ref 39–50)
HGB BLD-MCNC: 9.6 G/DL — LOW (ref 13–17)
MAGNESIUM SERPL-MCNC: 1.9 MG/DL — SIGNIFICANT CHANGE UP (ref 1.6–2.6)
MCHC RBC-ENTMCNC: 29.2 PG — SIGNIFICANT CHANGE UP (ref 27–34)
MCHC RBC-ENTMCNC: 31.7 GM/DL — LOW (ref 32–36)
MCV RBC AUTO: 92.1 FL — SIGNIFICANT CHANGE UP (ref 80–100)
NRBC # BLD: 0 /100 WBCS — SIGNIFICANT CHANGE UP
NRBC # FLD: 0 K/UL — SIGNIFICANT CHANGE UP
PLATELET # BLD AUTO: 229 K/UL — SIGNIFICANT CHANGE UP (ref 150–400)
POTASSIUM SERPL-MCNC: 4 MMOL/L — SIGNIFICANT CHANGE UP (ref 3.5–5.3)
POTASSIUM SERPL-SCNC: 4 MMOL/L — SIGNIFICANT CHANGE UP (ref 3.5–5.3)
RBC # BLD: 3.29 M/UL — LOW (ref 4.2–5.8)
RBC # FLD: 13.4 % — SIGNIFICANT CHANGE UP (ref 10.3–14.5)
SODIUM SERPL-SCNC: 139 MMOL/L — SIGNIFICANT CHANGE UP (ref 135–145)
WBC # BLD: 3.27 K/UL — LOW (ref 3.8–10.5)
WBC # FLD AUTO: 3.27 K/UL — LOW (ref 3.8–10.5)

## 2021-11-25 PROCEDURE — 99232 SBSQ HOSP IP/OBS MODERATE 35: CPT

## 2021-11-25 RX ADMIN — Medication 50 MILLIGRAM(S): at 17:01

## 2021-11-25 RX ADMIN — Medication 10 MILLIGRAM(S): at 22:15

## 2021-11-25 RX ADMIN — DIVALPROEX SODIUM 500 MILLIGRAM(S): 500 TABLET, DELAYED RELEASE ORAL at 17:02

## 2021-11-25 RX ADMIN — AMPICILLIN SODIUM AND SULBACTAM SODIUM 200 GRAM(S): 250; 125 INJECTION, POWDER, FOR SUSPENSION INTRAMUSCULAR; INTRAVENOUS at 06:24

## 2021-11-25 RX ADMIN — Medication 1 TABLET(S): at 11:36

## 2021-11-25 RX ADMIN — GABAPENTIN 300 MILLIGRAM(S): 400 CAPSULE ORAL at 15:21

## 2021-11-25 RX ADMIN — Medication 10 MILLIGRAM(S): at 15:21

## 2021-11-25 RX ADMIN — SENNA PLUS 2 TABLET(S): 8.6 TABLET ORAL at 22:14

## 2021-11-25 RX ADMIN — AMPICILLIN SODIUM AND SULBACTAM SODIUM 200 GRAM(S): 250; 125 INJECTION, POWDER, FOR SUSPENSION INTRAMUSCULAR; INTRAVENOUS at 23:48

## 2021-11-25 RX ADMIN — Medication 50 MILLIGRAM(S): at 23:48

## 2021-11-25 RX ADMIN — GABAPENTIN 300 MILLIGRAM(S): 400 CAPSULE ORAL at 22:15

## 2021-11-25 RX ADMIN — DIVALPROEX SODIUM 500 MILLIGRAM(S): 500 TABLET, DELAYED RELEASE ORAL at 06:24

## 2021-11-25 RX ADMIN — Medication 1 TABLET(S): at 11:35

## 2021-11-25 RX ADMIN — Medication 50 MILLIGRAM(S): at 11:36

## 2021-11-25 RX ADMIN — AMPICILLIN SODIUM AND SULBACTAM SODIUM 200 GRAM(S): 250; 125 INJECTION, POWDER, FOR SUSPENSION INTRAMUSCULAR; INTRAVENOUS at 11:35

## 2021-11-25 RX ADMIN — AMPICILLIN SODIUM AND SULBACTAM SODIUM 200 GRAM(S): 250; 125 INJECTION, POWDER, FOR SUSPENSION INTRAMUSCULAR; INTRAVENOUS at 17:01

## 2021-11-25 RX ADMIN — Medication 325 MILLIGRAM(S): at 11:35

## 2021-11-25 RX ADMIN — Medication 81 MILLIGRAM(S): at 11:36

## 2021-11-25 RX ADMIN — ENOXAPARIN SODIUM 40 MILLIGRAM(S): 100 INJECTION SUBCUTANEOUS at 11:36

## 2021-11-25 RX ADMIN — TAMSULOSIN HYDROCHLORIDE 0.4 MILLIGRAM(S): 0.4 CAPSULE ORAL at 22:14

## 2021-11-25 RX ADMIN — Medication 50 MILLIGRAM(S): at 06:24

## 2021-11-25 RX ADMIN — GABAPENTIN 300 MILLIGRAM(S): 400 CAPSULE ORAL at 06:24

## 2021-11-25 RX ADMIN — Medication 10 MILLIGRAM(S): at 06:24

## 2021-11-25 RX ADMIN — METHADONE HYDROCHLORIDE 10 MILLIGRAM(S): 40 TABLET ORAL at 11:36

## 2021-11-25 NOTE — PROGRESS NOTE ADULT - PROBLEM SELECTOR PLAN 9
- DVT prophylaxis Lovenox 40 mg QD  - Pt has a guardian, attempted to call 11/23-  left.  Given issues w/reinstating home services and equipment, patient may have to stay in the hospital until Monday.

## 2021-11-25 NOTE — PROGRESS NOTE ADULT - SUBJECTIVE AND OBJECTIVE BOX
PROGRESS NOTE:   Authored by Dr. Yanelis Ribeiro MD, pager [phone]     Patient is a 71y old  Male who presents with a chief complaint of Infected bedsores (24 Nov 2021 17:54)      SUBJECTIVE / OVERNIGHT EVENTS:  Patient is sitting up in bed about to eat lunch. He states he feels "fine" and denies pain. No fevers, chills, N/V/D, dysuria, or hematuria.     ADDITIONAL REVIEW OF SYSTEMS:    MEDICATIONS  (STANDING):  ampicillin/sulbactam  IVPB      ampicillin/sulbactam  IVPB 3 Gram(s) IV Intermittent every 6 hours  aspirin enteric coated 81 milliGRAM(s) Oral daily  baclofen 10 milliGRAM(s) Oral three times a day  calcium carbonate 1250 mG  + Vitamin D (OsCal 500 + D) 1 Tablet(s) Oral daily  diVALproex  milliGRAM(s) Oral two times a day  enoxaparin Injectable 40 milliGRAM(s) SubCutaneous daily  ferrous    sulfate 325 milliGRAM(s) Oral daily  gabapentin 300 milliGRAM(s) Oral three times a day  hydrALAZINE 50 milliGRAM(s) Oral four times a day  metoprolol tartrate 50 milliGRAM(s) Oral two times a day  multivitamin 1 Tablet(s) Oral daily  senna 2 Tablet(s) Oral at bedtime  tamsulosin 0.4 milliGRAM(s) Oral at bedtime    MEDICATIONS  (PRN):  acetaminophen     Tablet .. 650 milliGRAM(s) Oral every 6 hours PRN Temp greater or equal to 38C (100.4F), Mild Pain (1 - 3)  melatonin 3 milliGRAM(s) Oral at bedtime PRN Insomnia      CAPILLARY BLOOD GLUCOSE        I&O's Summary    24 Nov 2021 07:01  -  25 Nov 2021 07:00  --------------------------------------------------------  IN: 800 mL / OUT: 1700 mL / NET: -900 mL    25 Nov 2021 07:01  -  25 Nov 2021 14:53  --------------------------------------------------------  IN: 0 mL / OUT: 550 mL / NET: -550 mL        PHYSICAL EXAM:  Vital Signs Last 24 Hrs  T(C): 36.6 (25 Nov 2021 11:30), Max: 36.6 (25 Nov 2021 11:30)  T(F): 97.8 (25 Nov 2021 11:30), Max: 97.8 (25 Nov 2021 11:30)  HR: 61 (25 Nov 2021 11:30) (56 - 62)  BP: 151/60 (25 Nov 2021 11:30) (115/67 - 151/60)  BP(mean): --  RR: 18 (25 Nov 2021 11:30) (17 - 18)  SpO2: 99% (25 Nov 2021 11:30) (96% - 99%)    CONSTITUTIONAL: Older, unkempt gentleman in NAD  RESPIRATORY: Normal respiratory effort; lungs are clear to auscultation bilaterally  CARDIOVASCULAR: Regular rate and rhythm, normal S1 and S2, no murmur/rub/gallop; No lower extremity edema; Peripheral pulses are 2+ bilaterally  ABDOMEN: Nontender to palpation, normoactive bowel sounds, no rebound/guarding; No hepatosplenomegaly  MUSCULOSKELETAL: no clubbing or cyanosis of digits; no joint swelling or tenderness to palpation  PSYCH: A+O to person, place, and time; affect appropriate    LABS:                        9.6    3.27  )-----------( 229      ( 25 Nov 2021 07:53 )             30.3     11-25    139  |  102  |  14  ----------------------------<  78  4.0   |  28  |  0.54    Ca    8.3<L>      25 Nov 2021 07:53  Mg     1.90     11-25                  RADIOLOGY & ADDITIONAL TESTS:  Results Reviewed:   Imaging Personally Reviewed:  Electrocardiogram Personally Reviewed:    COORDINATION OF CARE:  Care Discussed with Consultants/Other Providers [Y/N]:  Prior or Outpatient Records Reviewed [Y/N]:
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
## 2021-11-26 LAB
ANION GAP SERPL CALC-SCNC: 8 MMOL/L — SIGNIFICANT CHANGE UP (ref 7–14)
BASOPHILS # BLD AUTO: 0.02 K/UL — SIGNIFICANT CHANGE UP (ref 0–0.2)
BASOPHILS NFR BLD AUTO: 0.5 % — SIGNIFICANT CHANGE UP (ref 0–2)
BUN SERPL-MCNC: 16 MG/DL — SIGNIFICANT CHANGE UP (ref 7–23)
CALCIUM SERPL-MCNC: 8.7 MG/DL — SIGNIFICANT CHANGE UP (ref 8.4–10.5)
CHLORIDE SERPL-SCNC: 101 MMOL/L — SIGNIFICANT CHANGE UP (ref 98–107)
CO2 SERPL-SCNC: 30 MMOL/L — SIGNIFICANT CHANGE UP (ref 22–31)
CREAT SERPL-MCNC: 0.72 MG/DL — SIGNIFICANT CHANGE UP (ref 0.5–1.3)
EOSINOPHIL # BLD AUTO: 0.08 K/UL — SIGNIFICANT CHANGE UP (ref 0–0.5)
EOSINOPHIL NFR BLD AUTO: 1.9 % — SIGNIFICANT CHANGE UP (ref 0–6)
GLUCOSE SERPL-MCNC: 78 MG/DL — SIGNIFICANT CHANGE UP (ref 70–99)
HCT VFR BLD CALC: 32.1 % — LOW (ref 39–50)
HGB BLD-MCNC: 10.6 G/DL — LOW (ref 13–17)
IANC: 1.81 K/UL — SIGNIFICANT CHANGE UP (ref 1.5–8.5)
IMM GRANULOCYTES NFR BLD AUTO: 1.2 % — SIGNIFICANT CHANGE UP (ref 0–1.5)
LYMPHOCYTES # BLD AUTO: 1.63 K/UL — SIGNIFICANT CHANGE UP (ref 1–3.3)
LYMPHOCYTES # BLD AUTO: 39 % — SIGNIFICANT CHANGE UP (ref 13–44)
MAGNESIUM SERPL-MCNC: 1.9 MG/DL — SIGNIFICANT CHANGE UP (ref 1.6–2.6)
MCHC RBC-ENTMCNC: 29.5 PG — SIGNIFICANT CHANGE UP (ref 27–34)
MCHC RBC-ENTMCNC: 33 GM/DL — SIGNIFICANT CHANGE UP (ref 32–36)
MCV RBC AUTO: 89.4 FL — SIGNIFICANT CHANGE UP (ref 80–100)
MONOCYTES # BLD AUTO: 0.59 K/UL — SIGNIFICANT CHANGE UP (ref 0–0.9)
MONOCYTES NFR BLD AUTO: 14.1 % — HIGH (ref 2–14)
NEUTROPHILS # BLD AUTO: 1.81 K/UL — SIGNIFICANT CHANGE UP (ref 1.8–7.4)
NEUTROPHILS NFR BLD AUTO: 43.3 % — SIGNIFICANT CHANGE UP (ref 43–77)
NRBC # BLD: 0 /100 WBCS — SIGNIFICANT CHANGE UP
NRBC # FLD: 0 K/UL — SIGNIFICANT CHANGE UP
PHOSPHATE SERPL-MCNC: 3.6 MG/DL — SIGNIFICANT CHANGE UP (ref 2.5–4.5)
PLATELET # BLD AUTO: 226 K/UL — SIGNIFICANT CHANGE UP (ref 150–400)
POTASSIUM SERPL-MCNC: 3.7 MMOL/L — SIGNIFICANT CHANGE UP (ref 3.5–5.3)
POTASSIUM SERPL-SCNC: 3.7 MMOL/L — SIGNIFICANT CHANGE UP (ref 3.5–5.3)
RBC # BLD: 3.59 M/UL — LOW (ref 4.2–5.8)
RBC # FLD: 13.4 % — SIGNIFICANT CHANGE UP (ref 10.3–14.5)
SODIUM SERPL-SCNC: 139 MMOL/L — SIGNIFICANT CHANGE UP (ref 135–145)
WBC # BLD: 4.18 K/UL — SIGNIFICANT CHANGE UP (ref 3.8–10.5)
WBC # FLD AUTO: 4.18 K/UL — SIGNIFICANT CHANGE UP (ref 3.8–10.5)

## 2021-11-26 PROCEDURE — 99232 SBSQ HOSP IP/OBS MODERATE 35: CPT

## 2021-11-26 RX ADMIN — DIVALPROEX SODIUM 500 MILLIGRAM(S): 500 TABLET, DELAYED RELEASE ORAL at 06:38

## 2021-11-26 RX ADMIN — Medication 81 MILLIGRAM(S): at 12:52

## 2021-11-26 RX ADMIN — DIVALPROEX SODIUM 500 MILLIGRAM(S): 500 TABLET, DELAYED RELEASE ORAL at 17:35

## 2021-11-26 RX ADMIN — Medication 10 MILLIGRAM(S): at 06:39

## 2021-11-26 RX ADMIN — ENOXAPARIN SODIUM 40 MILLIGRAM(S): 100 INJECTION SUBCUTANEOUS at 12:51

## 2021-11-26 RX ADMIN — GABAPENTIN 300 MILLIGRAM(S): 400 CAPSULE ORAL at 12:51

## 2021-11-26 RX ADMIN — SENNA PLUS 2 TABLET(S): 8.6 TABLET ORAL at 21:05

## 2021-11-26 RX ADMIN — AMPICILLIN SODIUM AND SULBACTAM SODIUM 200 GRAM(S): 250; 125 INJECTION, POWDER, FOR SUSPENSION INTRAMUSCULAR; INTRAVENOUS at 23:49

## 2021-11-26 RX ADMIN — Medication 50 MILLIGRAM(S): at 23:49

## 2021-11-26 RX ADMIN — GABAPENTIN 300 MILLIGRAM(S): 400 CAPSULE ORAL at 21:02

## 2021-11-26 RX ADMIN — Medication 50 MILLIGRAM(S): at 17:35

## 2021-11-26 RX ADMIN — Medication 325 MILLIGRAM(S): at 12:51

## 2021-11-26 RX ADMIN — Medication 1 TABLET(S): at 12:51

## 2021-11-26 RX ADMIN — TAMSULOSIN HYDROCHLORIDE 0.4 MILLIGRAM(S): 0.4 CAPSULE ORAL at 21:03

## 2021-11-26 RX ADMIN — Medication 10 MILLIGRAM(S): at 12:51

## 2021-11-26 RX ADMIN — GABAPENTIN 300 MILLIGRAM(S): 400 CAPSULE ORAL at 06:38

## 2021-11-26 RX ADMIN — AMPICILLIN SODIUM AND SULBACTAM SODIUM 200 GRAM(S): 250; 125 INJECTION, POWDER, FOR SUSPENSION INTRAMUSCULAR; INTRAVENOUS at 12:50

## 2021-11-26 RX ADMIN — AMPICILLIN SODIUM AND SULBACTAM SODIUM 200 GRAM(S): 250; 125 INJECTION, POWDER, FOR SUSPENSION INTRAMUSCULAR; INTRAVENOUS at 17:36

## 2021-11-26 RX ADMIN — Medication 50 MILLIGRAM(S): at 06:38

## 2021-11-26 RX ADMIN — Medication 50 MILLIGRAM(S): at 12:51

## 2021-11-26 RX ADMIN — AMPICILLIN SODIUM AND SULBACTAM SODIUM 200 GRAM(S): 250; 125 INJECTION, POWDER, FOR SUSPENSION INTRAMUSCULAR; INTRAVENOUS at 06:39

## 2021-11-26 RX ADMIN — Medication 10 MILLIGRAM(S): at 21:02

## 2021-11-26 NOTE — PROGRESS NOTE ADULT - SUBJECTIVE AND OBJECTIVE BOX
Reynaldo Starks MD  [employer]  Pager [phone]  Email: [email or web address]          PROGRESS NOTE:     Patient is a 71y old  Male who presents with a chief complaint of Infected bedsores (25 Nov 2021 14:53)      SUBJECTIVE / OVERNIGHT EVENTS:  Patient seen and examined this morning. No new medical complaints. Patient asks for help to be setup with a motorized wheelchair and hospital bed at home.  ADDITIONAL REVIEW OF SYSTEMS:  No f/c/n/v    MEDICATIONS  (STANDING):  ampicillin/sulbactam  IVPB 3 Gram(s) IV Intermittent every 6 hours  ampicillin/sulbactam  IVPB      aspirin enteric coated 81 milliGRAM(s) Oral daily  baclofen 10 milliGRAM(s) Oral three times a day  calcium carbonate 1250 mG  + Vitamin D (OsCal 500 + D) 1 Tablet(s) Oral daily  diVALproex  milliGRAM(s) Oral two times a day  enoxaparin Injectable 40 milliGRAM(s) SubCutaneous daily  ferrous    sulfate 325 milliGRAM(s) Oral daily  gabapentin 300 milliGRAM(s) Oral three times a day  hydrALAZINE 50 milliGRAM(s) Oral four times a day  metoprolol tartrate 50 milliGRAM(s) Oral two times a day  multivitamin 1 Tablet(s) Oral daily  senna 2 Tablet(s) Oral at bedtime  tamsulosin 0.4 milliGRAM(s) Oral at bedtime    MEDICATIONS  (PRN):  acetaminophen     Tablet .. 650 milliGRAM(s) Oral every 6 hours PRN Temp greater or equal to 38C (100.4F), Mild Pain (1 - 3)  melatonin 3 milliGRAM(s) Oral at bedtime PRN Insomnia      CAPILLARY BLOOD GLUCOSE        I&O's Summary    25 Nov 2021 07:01  -  26 Nov 2021 07:00  --------------------------------------------------------  IN: 0 mL / OUT: 1050 mL / NET: -1050 mL        PHYSICAL EXAM:  Vital Signs Last 24 Hrs  T(C): 36.6 (26 Nov 2021 06:34), Max: 36.6 (25 Nov 2021 17:00)  T(F): 97.8 (26 Nov 2021 06:34), Max: 97.8 (25 Nov 2021 17:00)  HR: 58 (26 Nov 2021 06:34) (58 - 69)  BP: 125/72 (26 Nov 2021 06:34) (118/70 - 131/74)  BP(mean): --  RR: 16 (26 Nov 2021 06:34) (16 - 18)  SpO2: 98% (26 Nov 2021 06:34) (97% - 100%)    CONSTITUTIONAL: NAD  RESPIRATORY: Normal respiratory effort; lungs are clear to auscultation bilaterally  CARDIOVASCULAR: Regular rate and rhythm, normal S1 and S2, no murmur/rub/gallop; No lower extremity edema; Peripheral pulses are 2+ bilaterally  ABDOMEN: Nontender to palpation, normoactive bowel sounds, no rebound/guarding; No hepatosplenomegaly  MUSCLOSKELETAL: no clubbing or cyanosis of digits; no joint swelling or tenderness to palpation  PSYCH: Awake and alert  NEURO: Paraplegic     LABS:                        10.6   4.18  )-----------( 226      ( 26 Nov 2021 07:26 )             32.1     11-26    139  |  101  |  16  ----------------------------<  78  3.7   |  30  |  0.72    Ca    8.7      26 Nov 2021 07:26  Phos  3.6     11-26  Mg     1.90     11-26                  RADIOLOGY & ADDITIONAL TESTS:  Results Reviewed:   Imaging Personally Reviewed:  Electrocardiogram Personally Reviewed:    COORDINATION OF CARE:  Care Discussed with Consultants/Other Providers [Y/N]: Case discussed during interdisciplinary rounds with social work and case management  Prior or Outpatient Records Reviewed [Y/N]:

## 2021-11-27 LAB
ANION GAP SERPL CALC-SCNC: 12 MMOL/L — SIGNIFICANT CHANGE UP (ref 7–14)
BASOPHILS # BLD AUTO: 0.03 K/UL — SIGNIFICANT CHANGE UP (ref 0–0.2)
BASOPHILS NFR BLD AUTO: 0.7 % — SIGNIFICANT CHANGE UP (ref 0–2)
BUN SERPL-MCNC: 15 MG/DL — SIGNIFICANT CHANGE UP (ref 7–23)
CALCIUM SERPL-MCNC: 8.8 MG/DL — SIGNIFICANT CHANGE UP (ref 8.4–10.5)
CHLORIDE SERPL-SCNC: 99 MMOL/L — SIGNIFICANT CHANGE UP (ref 98–107)
CO2 SERPL-SCNC: 26 MMOL/L — SIGNIFICANT CHANGE UP (ref 22–31)
CREAT SERPL-MCNC: 0.55 MG/DL — SIGNIFICANT CHANGE UP (ref 0.5–1.3)
EOSINOPHIL # BLD AUTO: 0.08 K/UL — SIGNIFICANT CHANGE UP (ref 0–0.5)
EOSINOPHIL NFR BLD AUTO: 1.9 % — SIGNIFICANT CHANGE UP (ref 0–6)
GLUCOSE SERPL-MCNC: 91 MG/DL — SIGNIFICANT CHANGE UP (ref 70–99)
HCT VFR BLD CALC: 33.3 % — LOW (ref 39–50)
HGB BLD-MCNC: 10.6 G/DL — LOW (ref 13–17)
IANC: 2.2 K/UL — SIGNIFICANT CHANGE UP (ref 1.5–8.5)
IMM GRANULOCYTES NFR BLD AUTO: 0.7 % — SIGNIFICANT CHANGE UP (ref 0–1.5)
LYMPHOCYTES # BLD AUTO: 1.14 K/UL — SIGNIFICANT CHANGE UP (ref 1–3.3)
LYMPHOCYTES # BLD AUTO: 27.6 % — SIGNIFICANT CHANGE UP (ref 13–44)
MCHC RBC-ENTMCNC: 29.1 PG — SIGNIFICANT CHANGE UP (ref 27–34)
MCHC RBC-ENTMCNC: 31.8 GM/DL — LOW (ref 32–36)
MCV RBC AUTO: 91.5 FL — SIGNIFICANT CHANGE UP (ref 80–100)
MONOCYTES # BLD AUTO: 0.65 K/UL — SIGNIFICANT CHANGE UP (ref 0–0.9)
MONOCYTES NFR BLD AUTO: 15.7 % — HIGH (ref 2–14)
NEUTROPHILS # BLD AUTO: 2.2 K/UL — SIGNIFICANT CHANGE UP (ref 1.8–7.4)
NEUTROPHILS NFR BLD AUTO: 53.4 % — SIGNIFICANT CHANGE UP (ref 43–77)
NRBC # BLD: 0 /100 WBCS — SIGNIFICANT CHANGE UP
NRBC # FLD: 0 K/UL — SIGNIFICANT CHANGE UP
PLATELET # BLD AUTO: 226 K/UL — SIGNIFICANT CHANGE UP (ref 150–400)
POTASSIUM SERPL-MCNC: 3.5 MMOL/L — SIGNIFICANT CHANGE UP (ref 3.5–5.3)
POTASSIUM SERPL-SCNC: 3.5 MMOL/L — SIGNIFICANT CHANGE UP (ref 3.5–5.3)
RBC # BLD: 3.64 M/UL — LOW (ref 4.2–5.8)
RBC # FLD: 13.6 % — SIGNIFICANT CHANGE UP (ref 10.3–14.5)
SODIUM SERPL-SCNC: 137 MMOL/L — SIGNIFICANT CHANGE UP (ref 135–145)
WBC # BLD: 4.13 K/UL — SIGNIFICANT CHANGE UP (ref 3.8–10.5)
WBC # FLD AUTO: 4.13 K/UL — SIGNIFICANT CHANGE UP (ref 3.8–10.5)

## 2021-11-27 PROCEDURE — 99232 SBSQ HOSP IP/OBS MODERATE 35: CPT

## 2021-11-27 RX ORDER — METHADONE HYDROCHLORIDE 40 MG/1
10 TABLET ORAL DAILY
Refills: 0 | Status: DISCONTINUED | OUTPATIENT
Start: 2021-11-27 | End: 2021-12-02

## 2021-11-27 RX ADMIN — AMPICILLIN SODIUM AND SULBACTAM SODIUM 200 GRAM(S): 250; 125 INJECTION, POWDER, FOR SUSPENSION INTRAMUSCULAR; INTRAVENOUS at 18:03

## 2021-11-27 RX ADMIN — DIVALPROEX SODIUM 500 MILLIGRAM(S): 500 TABLET, DELAYED RELEASE ORAL at 18:03

## 2021-11-27 RX ADMIN — AMPICILLIN SODIUM AND SULBACTAM SODIUM 200 GRAM(S): 250; 125 INJECTION, POWDER, FOR SUSPENSION INTRAMUSCULAR; INTRAVENOUS at 12:01

## 2021-11-27 RX ADMIN — Medication 50 MILLIGRAM(S): at 05:01

## 2021-11-27 RX ADMIN — GABAPENTIN 300 MILLIGRAM(S): 400 CAPSULE ORAL at 12:02

## 2021-11-27 RX ADMIN — Medication 50 MILLIGRAM(S): at 05:02

## 2021-11-27 RX ADMIN — Medication 650 MILLIGRAM(S): at 12:40

## 2021-11-27 RX ADMIN — Medication 1 TABLET(S): at 12:02

## 2021-11-27 RX ADMIN — Medication 50 MILLIGRAM(S): at 23:25

## 2021-11-27 RX ADMIN — Medication 10 MILLIGRAM(S): at 12:02

## 2021-11-27 RX ADMIN — Medication 50 MILLIGRAM(S): at 12:02

## 2021-11-27 RX ADMIN — DIVALPROEX SODIUM 500 MILLIGRAM(S): 500 TABLET, DELAYED RELEASE ORAL at 05:02

## 2021-11-27 RX ADMIN — ENOXAPARIN SODIUM 40 MILLIGRAM(S): 100 INJECTION SUBCUTANEOUS at 12:03

## 2021-11-27 RX ADMIN — TAMSULOSIN HYDROCHLORIDE 0.4 MILLIGRAM(S): 0.4 CAPSULE ORAL at 21:03

## 2021-11-27 RX ADMIN — Medication 325 MILLIGRAM(S): at 12:02

## 2021-11-27 RX ADMIN — GABAPENTIN 300 MILLIGRAM(S): 400 CAPSULE ORAL at 21:03

## 2021-11-27 RX ADMIN — Medication 10 MILLIGRAM(S): at 05:01

## 2021-11-27 RX ADMIN — SENNA PLUS 2 TABLET(S): 8.6 TABLET ORAL at 21:02

## 2021-11-27 RX ADMIN — Medication 81 MILLIGRAM(S): at 12:02

## 2021-11-27 RX ADMIN — AMPICILLIN SODIUM AND SULBACTAM SODIUM 200 GRAM(S): 250; 125 INJECTION, POWDER, FOR SUSPENSION INTRAMUSCULAR; INTRAVENOUS at 05:02

## 2021-11-27 RX ADMIN — Medication 50 MILLIGRAM(S): at 18:03

## 2021-11-27 RX ADMIN — GABAPENTIN 300 MILLIGRAM(S): 400 CAPSULE ORAL at 05:01

## 2021-11-27 RX ADMIN — Medication 50 MILLIGRAM(S): at 18:04

## 2021-11-27 RX ADMIN — Medication 650 MILLIGRAM(S): at 12:02

## 2021-11-27 RX ADMIN — Medication 10 MILLIGRAM(S): at 21:03

## 2021-11-27 RX ADMIN — METHADONE HYDROCHLORIDE 10 MILLIGRAM(S): 40 TABLET ORAL at 18:03

## 2021-11-27 NOTE — PROGRESS NOTE ADULT - SUBJECTIVE AND OBJECTIVE BOX
PROGRESS NOTE:   Authored by Dr. Yanelis Ribeiro MD, pager [phone]     Patient is a 71y old  Male who presents with a chief complaint of Infected bedsores (26 Nov 2021 12:44)      SUBJECTIVE / OVERNIGHT EVENTS:  Patient complains of back pain; RN at bedside to give Tylenol. No other complaints. ROS negative for fevers, chills, N/V/D, dysuria, or hematuria.     ADDITIONAL REVIEW OF SYSTEMS:    MEDICATIONS  (STANDING):  ampicillin/sulbactam  IVPB      ampicillin/sulbactam  IVPB 3 Gram(s) IV Intermittent every 6 hours  aspirin enteric coated 81 milliGRAM(s) Oral daily  baclofen 10 milliGRAM(s) Oral three times a day  calcium carbonate 1250 mG  + Vitamin D (OsCal 500 + D) 1 Tablet(s) Oral daily  diVALproex  milliGRAM(s) Oral two times a day  enoxaparin Injectable 40 milliGRAM(s) SubCutaneous daily  ferrous    sulfate 325 milliGRAM(s) Oral daily  gabapentin 300 milliGRAM(s) Oral three times a day  hydrALAZINE 50 milliGRAM(s) Oral four times a day  metoprolol tartrate 50 milliGRAM(s) Oral two times a day  multivitamin 1 Tablet(s) Oral daily  senna 2 Tablet(s) Oral at bedtime  tamsulosin 0.4 milliGRAM(s) Oral at bedtime    MEDICATIONS  (PRN):  acetaminophen     Tablet .. 650 milliGRAM(s) Oral every 6 hours PRN Temp greater or equal to 38C (100.4F), Mild Pain (1 - 3)  melatonin 3 milliGRAM(s) Oral at bedtime PRN Insomnia      CAPILLARY BLOOD GLUCOSE        I&O's Summary    26 Nov 2021 07:01  -  27 Nov 2021 07:00  --------------------------------------------------------  IN: 300 mL / OUT: 3000 mL / NET: -2700 mL        PHYSICAL EXAM:  Vital Signs Last 24 Hrs  T(C): 36.6 (27 Nov 2021 11:55), Max: 36.8 (26 Nov 2021 21:17)  T(F): 97.9 (27 Nov 2021 11:55), Max: 98.3 (27 Nov 2021 05:00)  HR: 65 (27 Nov 2021 11:55) (57 - 65)  BP: 110/70 (27 Nov 2021 11:55) (110/70 - 150/81)  BP(mean): --  RR: 14 (27 Nov 2021 11:55) (14 - 16)  SpO2: 98% (27 Nov 2021 11:55) (98% - 99%)      CONSTITUTIONAL: Older, unkempt gentleman in NAD  RESPIRATORY: Normal respiratory effort; lungs are clear to auscultation bilaterally  CARDIOVASCULAR: Regular rate and rhythm, normal S1 and S2, no murmur/rub/gallop; No lower extremity edema; Peripheral pulses are 2+ bilaterally  ABDOMEN: Nontender to palpation, normoactive bowel sounds, no rebound/guarding; No hepatosplenomegaly  MUSCULOSKELETAL: no clubbing or cyanosis of digits; no joint swelling or tenderness to palpation  PSYCH: A+O to person, place, and time; affect appropriate    LABS:                        10.6   4.13  )-----------( 226      ( 27 Nov 2021 06:53 )             33.3     11-27    137  |  99  |  15  ----------------------------<  91  3.5   |  26  |  0.55    Ca    8.8      27 Nov 2021 06:53  Phos  3.6     11-26  Mg     1.90     11-26                  RADIOLOGY & ADDITIONAL TESTS:  Results Reviewed:   Imaging Personally Reviewed:  Electrocardiogram Personally Reviewed:    COORDINATION OF CARE:  Care Discussed with Consultants/Other Providers [Y/N]:  Prior or Outpatient Records Reviewed [Y/N]:

## 2021-11-28 PROCEDURE — 99232 SBSQ HOSP IP/OBS MODERATE 35: CPT

## 2021-11-28 RX ADMIN — Medication 10 MILLIGRAM(S): at 22:06

## 2021-11-28 RX ADMIN — Medication 50 MILLIGRAM(S): at 12:37

## 2021-11-28 RX ADMIN — GABAPENTIN 300 MILLIGRAM(S): 400 CAPSULE ORAL at 12:37

## 2021-11-28 RX ADMIN — TAMSULOSIN HYDROCHLORIDE 0.4 MILLIGRAM(S): 0.4 CAPSULE ORAL at 22:06

## 2021-11-28 RX ADMIN — DIVALPROEX SODIUM 500 MILLIGRAM(S): 500 TABLET, DELAYED RELEASE ORAL at 06:31

## 2021-11-28 RX ADMIN — SENNA PLUS 2 TABLET(S): 8.6 TABLET ORAL at 22:06

## 2021-11-28 RX ADMIN — Medication 1 TABLET(S): at 12:37

## 2021-11-28 RX ADMIN — Medication 325 MILLIGRAM(S): at 12:37

## 2021-11-28 RX ADMIN — GABAPENTIN 300 MILLIGRAM(S): 400 CAPSULE ORAL at 06:32

## 2021-11-28 RX ADMIN — Medication 50 MILLIGRAM(S): at 18:14

## 2021-11-28 RX ADMIN — ENOXAPARIN SODIUM 40 MILLIGRAM(S): 100 INJECTION SUBCUTANEOUS at 12:37

## 2021-11-28 RX ADMIN — GABAPENTIN 300 MILLIGRAM(S): 400 CAPSULE ORAL at 22:06

## 2021-11-28 RX ADMIN — Medication 50 MILLIGRAM(S): at 06:33

## 2021-11-28 RX ADMIN — METHADONE HYDROCHLORIDE 10 MILLIGRAM(S): 40 TABLET ORAL at 12:37

## 2021-11-28 RX ADMIN — Medication 81 MILLIGRAM(S): at 12:37

## 2021-11-28 RX ADMIN — Medication 50 MILLIGRAM(S): at 23:16

## 2021-11-28 RX ADMIN — Medication 10 MILLIGRAM(S): at 12:38

## 2021-11-28 RX ADMIN — DIVALPROEX SODIUM 500 MILLIGRAM(S): 500 TABLET, DELAYED RELEASE ORAL at 18:14

## 2021-11-28 RX ADMIN — Medication 10 MILLIGRAM(S): at 06:31

## 2021-11-28 NOTE — PROGRESS NOTE ADULT - SUBJECTIVE AND OBJECTIVE BOX
PROGRESS NOTE:   Authored by Dr. Yanelis Ribeiro MD, pager [phone]     Patient is a 71y old  Male who presents with a chief complaint of Infected bedsores (27 Nov 2021 15:29)      SUBJECTIVE / OVERNIGHT EVENTS:  Patient is sleeping but easily awakens to voice. He denies any complaints. He states he has been eating well.     ADDITIONAL REVIEW OF SYSTEMS:  No fevers, chills, N/V/D, dysuria, or hematuria.     MEDICATIONS  (STANDING):  aspirin enteric coated 81 milliGRAM(s) Oral daily  baclofen 10 milliGRAM(s) Oral three times a day  calcium carbonate 1250 mG  + Vitamin D (OsCal 500 + D) 1 Tablet(s) Oral daily  diVALproex  milliGRAM(s) Oral two times a day  enoxaparin Injectable 40 milliGRAM(s) SubCutaneous daily  ferrous    sulfate 325 milliGRAM(s) Oral daily  gabapentin 300 milliGRAM(s) Oral three times a day  hydrALAZINE 50 milliGRAM(s) Oral four times a day  methadone   Solution 10 milliGRAM(s) Oral daily  metoprolol tartrate 50 milliGRAM(s) Oral two times a day  multivitamin 1 Tablet(s) Oral daily  senna 2 Tablet(s) Oral at bedtime  tamsulosin 0.4 milliGRAM(s) Oral at bedtime    MEDICATIONS  (PRN):  acetaminophen     Tablet .. 650 milliGRAM(s) Oral every 6 hours PRN Temp greater or equal to 38C (100.4F), Mild Pain (1 - 3)  melatonin 3 milliGRAM(s) Oral at bedtime PRN Insomnia      CAPILLARY BLOOD GLUCOSE        I&O's Summary    27 Nov 2021 07:01  -  28 Nov 2021 07:00  --------------------------------------------------------  IN: 100 mL / OUT: 400 mL / NET: -300 mL    28 Nov 2021 07:01  -  28 Nov 2021 13:54  --------------------------------------------------------  IN: 400 mL / OUT: 0 mL / NET: 400 mL        PHYSICAL EXAM:  Vital Signs Last 24 Hrs  T(C): 36.8 (28 Nov 2021 12:52), Max: 36.8 (27 Nov 2021 21:23)  T(F): 98.3 (28 Nov 2021 12:52), Max: 98.3 (28 Nov 2021 12:52)  HR: 64 (28 Nov 2021 12:52) (62 - 66)  BP: 123/84 (28 Nov 2021 12:52) (119/85 - 142/82)  BP(mean): --  RR: 17 (28 Nov 2021 12:52) (17 - 18)  SpO2: 95% (28 Nov 2021 12:52) (95% - 95%)    CONSTITUTIONAL: Older, unkempt gentleman in NAD  RESPIRATORY: Normal respiratory effort; lungs are clear to auscultation bilaterally  CARDIOVASCULAR: Regular rate and rhythm, normal S1 and S2, no murmur/rub/gallop; No lower extremity edema; Peripheral pulses are 2+ bilaterally  ABDOMEN: Nontender to palpation, normoactive bowel sounds, no rebound/guarding; No hepatosplenomegaly  MUSCULOSKELETAL: no clubbing or cyanosis of digits; no joint swelling or tenderness to palpation  PSYCH: A+O to person, place, and time; affect appropriate    LABS:                        10.6   4.13  )-----------( 226      ( 27 Nov 2021 06:53 )             33.3     11-27    137  |  99  |  15  ----------------------------<  91  3.5   |  26  |  0.55    Ca    8.8      27 Nov 2021 06:53                  RADIOLOGY & ADDITIONAL TESTS:  Results Reviewed:   Imaging Personally Reviewed:  Electrocardiogram Personally Reviewed:    COORDINATION OF CARE:  Care Discussed with Consultants/Other Providers [Y/N]:  Prior or Outpatient Records Reviewed [Y/N]:
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
## 2021-11-29 PROCEDURE — 99232 SBSQ HOSP IP/OBS MODERATE 35: CPT

## 2021-11-29 RX ADMIN — Medication 50 MILLIGRAM(S): at 18:04

## 2021-11-29 RX ADMIN — Medication 3 MILLIGRAM(S): at 21:35

## 2021-11-29 RX ADMIN — Medication 10 MILLIGRAM(S): at 12:54

## 2021-11-29 RX ADMIN — GABAPENTIN 300 MILLIGRAM(S): 400 CAPSULE ORAL at 12:53

## 2021-11-29 RX ADMIN — Medication 50 MILLIGRAM(S): at 23:27

## 2021-11-29 RX ADMIN — DIVALPROEX SODIUM 500 MILLIGRAM(S): 500 TABLET, DELAYED RELEASE ORAL at 18:04

## 2021-11-29 RX ADMIN — DIVALPROEX SODIUM 500 MILLIGRAM(S): 500 TABLET, DELAYED RELEASE ORAL at 06:14

## 2021-11-29 RX ADMIN — GABAPENTIN 300 MILLIGRAM(S): 400 CAPSULE ORAL at 06:14

## 2021-11-29 RX ADMIN — SENNA PLUS 2 TABLET(S): 8.6 TABLET ORAL at 21:35

## 2021-11-29 RX ADMIN — METHADONE HYDROCHLORIDE 10 MILLIGRAM(S): 40 TABLET ORAL at 12:53

## 2021-11-29 RX ADMIN — Medication 1 TABLET(S): at 12:53

## 2021-11-29 RX ADMIN — Medication 50 MILLIGRAM(S): at 18:03

## 2021-11-29 RX ADMIN — GABAPENTIN 300 MILLIGRAM(S): 400 CAPSULE ORAL at 21:34

## 2021-11-29 RX ADMIN — Medication 81 MILLIGRAM(S): at 12:54

## 2021-11-29 RX ADMIN — Medication 1 TABLET(S): at 12:54

## 2021-11-29 RX ADMIN — Medication 50 MILLIGRAM(S): at 06:14

## 2021-11-29 RX ADMIN — Medication 325 MILLIGRAM(S): at 12:53

## 2021-11-29 RX ADMIN — TAMSULOSIN HYDROCHLORIDE 0.4 MILLIGRAM(S): 0.4 CAPSULE ORAL at 21:35

## 2021-11-29 RX ADMIN — Medication 10 MILLIGRAM(S): at 21:34

## 2021-11-29 RX ADMIN — Medication 50 MILLIGRAM(S): at 12:53

## 2021-11-29 RX ADMIN — ENOXAPARIN SODIUM 40 MILLIGRAM(S): 100 INJECTION SUBCUTANEOUS at 12:53

## 2021-11-29 RX ADMIN — Medication 10 MILLIGRAM(S): at 06:14

## 2021-11-29 NOTE — PROGRESS NOTE ADULT - PROBLEM SELECTOR PROBLEM 7
318 Beto- just to confirm the IVF order, did you want it to still infuse at 58ml/hr or 100ml/hr once the TPN stops infusing for the day? or no IVF while TPN is off? thanks, Yvrose [phone]    Text paged Dr. Bear, he called to confirm no IVF to infuse during day while TPN is off.    Hepatic lesion
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
## 2021-11-29 NOTE — PROGRESS NOTE ADULT - SUBJECTIVE AND OBJECTIVE BOX
Internal Medicine Accept Note   Internal Medicine Accept Note  Patient is a 71y old  Male who presents with a chief complaint of Infected bedsores (29 Nov 2021 10:41)      SUBJECTIVE / OVERNIGHT EVENTS:  Resting in bed  Wants Wheel chair- patient has motorized wheel chair that is brocken and needs fixing  CM already arranged a Hospital  bed already    MEDICATIONS  (STANDING):  aspirin enteric coated 81 milliGRAM(s) Oral daily  baclofen 10 milliGRAM(s) Oral three times a day  calcium carbonate 1250 mG  + Vitamin D (OsCal 500 + D) 1 Tablet(s) Oral daily  diVALproex  milliGRAM(s) Oral two times a day  enoxaparin Injectable 40 milliGRAM(s) SubCutaneous daily  ferrous    sulfate 325 milliGRAM(s) Oral daily  gabapentin 300 milliGRAM(s) Oral three times a day  hydrALAZINE 50 milliGRAM(s) Oral four times a day  methadone   Solution 10 milliGRAM(s) Oral daily  metoprolol tartrate 50 milliGRAM(s) Oral two times a day  multivitamin 1 Tablet(s) Oral daily  senna 2 Tablet(s) Oral at bedtime  tamsulosin 0.4 milliGRAM(s) Oral at bedtime    MEDICATIONS  (PRN):  acetaminophen     Tablet .. 650 milliGRAM(s) Oral every 6 hours PRN Temp greater or equal to 38C (100.4F), Mild Pain (1 - 3)  melatonin 3 milliGRAM(s) Oral at bedtime PRN Insomnia      I&O's Summary    28 Nov 2021 07:01  -  29 Nov 2021 07:00  --------------------------------------------------------  IN: 1060 mL / OUT: 1550 mL / NET: -490 mL    Vital Signs Last 24 Hrs  T(C): 37.3 (29 Nov 2021 06:05), Max: 37.3 (29 Nov 2021 06:05)  T(F): 99.2 (29 Nov 2021 06:05), Max: 99.2 (29 Nov 2021 06:05)  HR: 58 (29 Nov 2021 06:05) (58 - 67)  BP: 147/84 (29 Nov 2021 06:05) (123/84 - 154/73)  BP(mean): --  RR: 18 (29 Nov 2021 06:05) (17 - 18)  SpO2: 96% (29 Nov 2021 06:05) (95% - 97%)    PHYSICAL EXAM:  Patient not able to walk  GENERAL: NAD, well-developed  HEAD:  Atraumatic, Normocephalic  EYES: EOMI, PERRLA, conjunctiva and sclera clear  NECK: Supple, No JVD  CHEST/LUNG: Clear to auscultation bilaterally; No wheeze  HEART: Regular rate and rhythm; No murmurs, rubs, or gallops  ABDOMEN: Soft, Nontender, Nondistended; Bowel sounds present  EXTREMITIES:  2+ Peripheral Pulses, No clubbing, cyanosis, or edema  PSYCH: AAOx3      Care Discussed with Consultants/Other Providers:  D/w case Mng/ SW/ RN/ NP and patient  He is optimized to go home but wants a new one  CM already arranged for Hospital bed at home  Called Guardian Shukri at [phone]

## 2021-11-29 NOTE — PROGRESS NOTE ADULT - NSPROGADDITIONALINFOA_GEN_ALL_CORE
CM to help re- instate services 11/29/21  ARAVIND notes patient - she set up for the hospital bed  Called Guardian Mr Watt at [phone] and explained that CM will rrange for Hosp bed BUT patient needs help to call Ins company about his non working Wheel chair- guardian will take care of this 11/29/21  ARAVIND notes patient - she set up for the hospital bed  Called Guardian Mr Watt at [phone] and explained that CM will rrange for Hosp bed BUT patient needs help to call Ins company about his non working Wheel chair- guardian will take care of this. Guardian wants CM to call him before patient goes home,  CM aware.    50 min to coordinate d/c
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
## 2021-11-30 LAB — SARS-COV-2 RNA SPEC QL NAA+PROBE: SIGNIFICANT CHANGE UP

## 2021-11-30 PROCEDURE — 99232 SBSQ HOSP IP/OBS MODERATE 35: CPT

## 2021-11-30 RX ADMIN — Medication 10 MILLIGRAM(S): at 05:22

## 2021-11-30 RX ADMIN — GABAPENTIN 300 MILLIGRAM(S): 400 CAPSULE ORAL at 14:49

## 2021-11-30 RX ADMIN — DIVALPROEX SODIUM 500 MILLIGRAM(S): 500 TABLET, DELAYED RELEASE ORAL at 05:21

## 2021-11-30 RX ADMIN — Medication 50 MILLIGRAM(S): at 05:22

## 2021-11-30 RX ADMIN — Medication 50 MILLIGRAM(S): at 17:53

## 2021-11-30 RX ADMIN — Medication 50 MILLIGRAM(S): at 12:10

## 2021-11-30 RX ADMIN — Medication 325 MILLIGRAM(S): at 12:10

## 2021-11-30 RX ADMIN — Medication 81 MILLIGRAM(S): at 12:10

## 2021-11-30 RX ADMIN — METHADONE HYDROCHLORIDE 10 MILLIGRAM(S): 40 TABLET ORAL at 12:10

## 2021-11-30 RX ADMIN — Medication 10 MILLIGRAM(S): at 21:22

## 2021-11-30 RX ADMIN — SENNA PLUS 2 TABLET(S): 8.6 TABLET ORAL at 21:22

## 2021-11-30 RX ADMIN — Medication 1 TABLET(S): at 12:10

## 2021-11-30 RX ADMIN — Medication 50 MILLIGRAM(S): at 23:15

## 2021-11-30 RX ADMIN — DIVALPROEX SODIUM 500 MILLIGRAM(S): 500 TABLET, DELAYED RELEASE ORAL at 17:53

## 2021-11-30 RX ADMIN — ENOXAPARIN SODIUM 40 MILLIGRAM(S): 100 INJECTION SUBCUTANEOUS at 12:10

## 2021-11-30 RX ADMIN — Medication 50 MILLIGRAM(S): at 17:54

## 2021-11-30 RX ADMIN — Medication 10 MILLIGRAM(S): at 14:49

## 2021-11-30 RX ADMIN — GABAPENTIN 300 MILLIGRAM(S): 400 CAPSULE ORAL at 21:21

## 2021-11-30 RX ADMIN — TAMSULOSIN HYDROCHLORIDE 0.4 MILLIGRAM(S): 0.4 CAPSULE ORAL at 21:21

## 2021-11-30 RX ADMIN — Medication 3 MILLIGRAM(S): at 21:21

## 2021-11-30 RX ADMIN — GABAPENTIN 300 MILLIGRAM(S): 400 CAPSULE ORAL at 05:23
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
## 2021-11-30 NOTE — PROGRESS NOTE ADULT - SUBJECTIVE AND OBJECTIVE BOX
Patient is a 71y old  Male who presents with a chief complaint of Infected bedsores (30 Nov 2021 09:22)      SUBJECTIVE / OVERNIGHT EVENTS:  Eating breakfast  Ready for home    MEDICATIONS  (STANDING):  aspirin enteric coated 81 milliGRAM(s) Oral daily  baclofen 10 milliGRAM(s) Oral three times a day  calcium carbonate 1250 mG  + Vitamin D (OsCal 500 + D) 1 Tablet(s) Oral daily  diVALproex  milliGRAM(s) Oral two times a day  enoxaparin Injectable 40 milliGRAM(s) SubCutaneous daily  ferrous    sulfate 325 milliGRAM(s) Oral daily  gabapentin 300 milliGRAM(s) Oral three times a day  hydrALAZINE 50 milliGRAM(s) Oral four times a day  methadone   Solution 10 milliGRAM(s) Oral daily  metoprolol tartrate 50 milliGRAM(s) Oral two times a day  multivitamin 1 Tablet(s) Oral daily  senna 2 Tablet(s) Oral at bedtime  tamsulosin 0.4 milliGRAM(s) Oral at bedtime    MEDICATIONS  (PRN):  acetaminophen     Tablet .. 650 milliGRAM(s) Oral every 6 hours PRN Temp greater or equal to 38C (100.4F), Mild Pain (1 - 3)  melatonin 3 milliGRAM(s) Oral at bedtime PRN Insomnia    Vital Signs Last 24 Hrs  T(C): 36.8 (30 Nov 2021 05:26), Max: 36.8 (29 Nov 2021 14:15)  T(F): 98.3 (30 Nov 2021 05:26), Max: 98.3 (30 Nov 2021 05:26)  HR: 66 (30 Nov 2021 05:26) (58 - 67)  BP: 143/80 (30 Nov 2021 05:26) (132/79 - 146/77)  BP(mean): --  RR: 17 (30 Nov 2021 05:26) (17 - 17)  SpO2: 100% (30 Nov 2021 05:26) (100% - 100%)    PHYSICAL EXAM:  GENERAL: NAD, well-developed  HEAD:  Atraumatic, Normocephalic  EYES: EOMI, PERRLA, conjunctiva and sclera clear  NECK: Supple, No JVD  CHEST/LUNG: Clear to auscultation bilaterally; No wheeze  HEART: Regular rate and rhythm; No murmurs, rubs, or gallops  ABDOMEN: Soft, Nontender, Nondistended; Bowel sounds present  EXTREMITIES:  2+ Peripheral Pulses, No clubbing, cyanosis, or edema  PSYCH: AAOx3
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
## 2021-11-30 NOTE — PROGRESS NOTE ADULT - PROBLEM SELECTOR PLAN 9
- DVT prophylaxis Lovenox 40 mg QD  - Pt has a guardian, attempted to call 11/23-  left.  Given issues w/reinstating home services and equipment.  CM arranging for Hospital bed- awaiting auth  Called Guardian Mr Watt at [phone] and explained that  will arrange for Hosp bed BUT patient needs help to call Ins company about his non working Wheel chair- guardian will take care of this. Guardian wants  to call him before patient goes home,  35 min to coord d/c

## 2021-12-01 PROCEDURE — 99232 SBSQ HOSP IP/OBS MODERATE 35: CPT

## 2021-12-01 RX ADMIN — DIVALPROEX SODIUM 500 MILLIGRAM(S): 500 TABLET, DELAYED RELEASE ORAL at 06:26

## 2021-12-01 RX ADMIN — Medication 1 TABLET(S): at 14:01

## 2021-12-01 RX ADMIN — Medication 10 MILLIGRAM(S): at 14:01

## 2021-12-01 RX ADMIN — Medication 10 MILLIGRAM(S): at 06:26

## 2021-12-01 RX ADMIN — GABAPENTIN 300 MILLIGRAM(S): 400 CAPSULE ORAL at 06:26

## 2021-12-01 RX ADMIN — ENOXAPARIN SODIUM 40 MILLIGRAM(S): 100 INJECTION SUBCUTANEOUS at 14:01

## 2021-12-01 RX ADMIN — GABAPENTIN 300 MILLIGRAM(S): 400 CAPSULE ORAL at 14:02

## 2021-12-01 RX ADMIN — DIVALPROEX SODIUM 500 MILLIGRAM(S): 500 TABLET, DELAYED RELEASE ORAL at 18:13

## 2021-12-01 RX ADMIN — Medication 50 MILLIGRAM(S): at 06:26

## 2021-12-01 RX ADMIN — GABAPENTIN 300 MILLIGRAM(S): 400 CAPSULE ORAL at 22:12

## 2021-12-01 RX ADMIN — Medication 50 MILLIGRAM(S): at 22:12

## 2021-12-01 RX ADMIN — Medication 50 MILLIGRAM(S): at 18:13

## 2021-12-01 RX ADMIN — Medication 10 MILLIGRAM(S): at 22:12

## 2021-12-01 RX ADMIN — Medication 325 MILLIGRAM(S): at 14:02

## 2021-12-01 RX ADMIN — Medication 1 TABLET(S): at 14:03

## 2021-12-01 RX ADMIN — Medication 81 MILLIGRAM(S): at 14:00

## 2021-12-01 RX ADMIN — METHADONE HYDROCHLORIDE 10 MILLIGRAM(S): 40 TABLET ORAL at 14:02

## 2021-12-01 RX ADMIN — TAMSULOSIN HYDROCHLORIDE 0.4 MILLIGRAM(S): 0.4 CAPSULE ORAL at 22:12

## 2021-12-01 RX ADMIN — Medication 50 MILLIGRAM(S): at 14:02

## 2021-12-01 RX ADMIN — SENNA PLUS 2 TABLET(S): 8.6 TABLET ORAL at 22:12

## 2021-12-01 NOTE — PROGRESS NOTE ADULT - SUBJECTIVE AND OBJECTIVE BOX
? Home today  Need auth for hosp bed Patient is a 71y old  Male who presents with a chief complaint of Infected bedsores (01 Dec 2021 09:51)      SUBJECTIVE / OVERNIGHT EVENTS:  Resting in bed  wants to go home but still talking about getting his wheel chair fixed- explained his gaurdian is aware and will help him fix the wheel chair.  awaiting auth on hospital bed before home --- > ?? today  Sacral wound- unstageable , L buttock wound Debrided by wound care on Monday 11/29- as explained by wound car RN Avutal- out patient f/u with Dr Brenner      MEDICATIONS  (STANDING):  aspirin enteric coated 81 milliGRAM(s) Oral daily  baclofen 10 milliGRAM(s) Oral three times a day  calcium carbonate 1250 mG  + Vitamin D (OsCal 500 + D) 1 Tablet(s) Oral daily  diVALproex  milliGRAM(s) Oral two times a day  enoxaparin Injectable 40 milliGRAM(s) SubCutaneous daily  ferrous    sulfate 325 milliGRAM(s) Oral daily  gabapentin 300 milliGRAM(s) Oral three times a day  hydrALAZINE 50 milliGRAM(s) Oral four times a day  methadone   Solution 10 milliGRAM(s) Oral daily  metoprolol tartrate 50 milliGRAM(s) Oral two times a day  multivitamin 1 Tablet(s) Oral daily  senna 2 Tablet(s) Oral at bedtime  tamsulosin 0.4 milliGRAM(s) Oral at bedtime    MEDICATIONS  (PRN):  acetaminophen     Tablet .. 650 milliGRAM(s) Oral every 6 hours PRN Temp greater or equal to 38C (100.4F), Mild Pain (1 - 3)  melatonin 3 milliGRAM(s) Oral at bedtime PRN Insomnia      I&O's Summary    30 Nov 2021 07:01  -  01 Dec 2021 07:00  --------------------------------------------------------  IN: 950 mL / OUT: 2175 mL / NET: -1225 mL  Vital Signs Last 24 Hrs  T(C): 36.9 (01 Dec 2021 06:28), Max: 36.9 (01 Dec 2021 06:28)  T(F): 98.5 (01 Dec 2021 06:28), Max: 98.5 (01 Dec 2021 06:28)  HR: 97 (01 Dec 2021 06:28) (56 - 98)  BP: 144/88 (01 Dec 2021 06:28) (124/81 - 144/88)  BP(mean): --  RR: 18 (01 Dec 2021 06:28) (18 - 18)  SpO2: 99% (01 Dec 2021 06:28) (99% - 99%)    PHYSICAL EXAM:  GENERAL: NAD, well-developed  HEAD:  Atraumatic, Normocephalic  EYES: EOMI, PERRLA, conjunctiva and sclera clear  NECK: Supple, No JVD  CHEST/LUNG: Clear to auscultation bilaterally; No wheeze  HEART: Regular rate and rhythm; No murmurs, rubs, or gallops  ABDOMEN: Soft, Nontender, Nondistended; Bowel sounds present  EXTREMITIES:  2+ Peripheral Pulses, No clubbing, cyanosis, or edema  PSYCH: Alert  NEUROLOGY: Does not move legs  Upper ext 5/5  In dwelling [place]  SKIN: Sacral wound- unstageable , L buttock wound Debrided by wound care on Monday 11/29- as explained by wound car RN Avutal- out patient f/u with Dr Brenner        Care Discussed with Consultants/Other Providers:      Sacral wound- unstageable , L buttock wound Debrided by wound care on Monday 11/29- as explained by wound car RN Avutal- out patient f/u with Dr Brenner    ? Home today  Need auth for hosp bed

## 2021-12-01 NOTE — PROGRESS NOTE ADULT - PROBLEM SELECTOR PLAN 9
- DVT prophylaxis Lovenox 40 mg QD  - Pt has a guardian, attempted to call 11/23-  left.  Given issues w/reinstating home services and equipment.  CM arranging for Hospital bed- awaiting auth  Called Guardian Mr Watt at [phone] and explained that  will arrange for Hosp bed BUT patient needs help to call Ins company about his non working Wheel chair- guardian will take care of this. Guardian wants  to call him before patient goes home,  35 min to coord d/c
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
## 2021-12-01 NOTE — PROGRESS NOTE ADULT - PROBLEM SELECTOR PLAN 1
Sepsis present on admission likely d/t infected decubitus ulcer, sepsis now RESOLVED  CT of abdomen showed increased stranding in the left buttock soft tissue overlying the sacral decubitus ulcer with minimally increased bone erosion of the underlying coccyx   Blood culture NGTD   Completed w/ IV unasyn, appreciate ID rec;s-- treating presumed CAP, but etiology of fevers currently unclear- can be transitioned to augmentin on discharge.   ID following, blood cxs repeated, CT chest cancelled as ID doesn't think its necessary at this time.  Sacral wound- unstageable , L buttock wound Debrided by wound care on Monday 11/29- as explained by wound car RN Avutal- out patient f/u with Dr Brenner  Awaiting auth on hospital bed for home
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
## 2021-12-01 NOTE — PROGRESS NOTE ADULT - PROBLEM SELECTOR PLAN 2
Wound care consult  # Sacral decub inf wounds- completed treatment 11/ 27- need wound care and out patient f/u with wound care and Hosp bed-Sacral wound- unstageable , L buttock wound Debrided by wound care on Monday 11/29- as explained by wound car ORTEGA Hi- out patient f/u with Dr Brenner    Pain management with Methadone

## 2021-12-01 NOTE — PROGRESS NOTE ADULT - SUBJECTIVE AND OBJECTIVE BOX
[facility]-- WOUND TEAM -- FOLLOW UP NOTE  --------------------------------------------------------------------------------    Subjective: Patient is a 71y old male who presents with a chief complaint of Infected bedsores. Followed by wound care team, left buttock unstagable pressure injury. Have been using medihoney on wound. Patient seen today for possible surgical debridement. Patient stated "I don't want to do that right now. I would rather stick with Medihoney." Patient stated that he awaiting hospital bed delivery to his home, additionally his family is going to reach out the company that sent his wheelchair for a new updated wheelchair. As per patient he can not follow up as instructed for outpatient hospital visits without a wheelchair.    24 hour events: Chart reviewed including labs and relevant images    Diet:  Diet, Regular:   DASH/TLC Sodium & Cholesterol Restricted (DASH)  Supplement Feeding Modality:  Oral  Ensure Enlive Cans or Servings Per Day:  1       Frequency:  Three Times a day (11-14-21 @ 16:49)    ALLERGIES & MEDICATIONS  --------------------------------------------------------------------------------  Allergies    No Known Allergies    Intolerances          STANDING INPATIENT MEDICATIONS    aspirin enteric coated 81 milliGRAM(s) Oral daily  baclofen 10 milliGRAM(s) Oral three times a day  calcium carbonate 1250 mG  + Vitamin D (OsCal 500 + D) 1 Tablet(s) Oral daily  diVALproex  milliGRAM(s) Oral two times a day  enoxaparin Injectable 40 milliGRAM(s) SubCutaneous daily  ferrous    sulfate 325 milliGRAM(s) Oral daily  gabapentin 300 milliGRAM(s) Oral three times a day  hydrALAZINE 50 milliGRAM(s) Oral four times a day  methadone   Solution 10 milliGRAM(s) Oral daily  metoprolol tartrate 50 milliGRAM(s) Oral two times a day  multivitamin 1 Tablet(s) Oral daily  senna 2 Tablet(s) Oral at bedtime  tamsulosin 0.4 milliGRAM(s) Oral at bedtime      PRN INPATIENT MEDICATION  acetaminophen     Tablet .. 650 milliGRAM(s) Oral every 6 hours PRN  melatonin 3 milliGRAM(s) Oral at bedtime PRN        Vital signs:  T(C): 37 (12-01-21 @ 18:00), Max: 37 (12-01-21 @ 18:00)  HR: 68 (12-01-21 @ 18:00) (63 - 98)  BP: 124/80 (12-01-21 @ 18:00) (113/74 - 144/88)  RR: 18 (12-01-21 @ 18:00) (18 - 18)  SpO2: 99% (12-01-21 @ 18:00) (96% - 99%)  Wt(kg): --        11-30-21 @ 07:01  -  12-01-21 @ 07:00  --------------------------------------------------------  IN: 950 mL / OUT: 2175 mL / NET: -1225 mL    12-01-21 @ 07:01  -  12-01-21 @ 18:28  --------------------------------------------------------  IN: 0 mL / OUT: 600 mL / NET: -600 mL      PHYSICAL EXAM:   Constitutional: NAD alert, speech clear,   ENMT: perrla  Back: scars midline lower, flap scar to left buttock  Respiratory: clear  Cardiovascular: rrr  Gastrointestinal: non tender  Extremities: left medial thigh upper blister- xeroform 6.5x1x.1- remains healed  vascular 3s refill, 1+ pulses, no edema  gu scrotum  Skin: as noted ischial ulcer left closed, soft tissue contraction  left buttock eschar unstageable- now released inferiorly,  5.5x5.5x1.5 no pus, fixed superiorly/50% eschar,25%  adipose, 25 granular- stable as per pervious assessment  within rotaional flap, curvilinear scar( previously) superiorly 5.5x5x.1 fixed- medihoney foam  right lat thigh scab 3x2x0 xeroform, now 0.5x.03x0.1  no pus, serous drainage  Musculoskeletal:l1 level sensation, motor-0 paraplegia both legs

## 2021-12-02 PROCEDURE — 99232 SBSQ HOSP IP/OBS MODERATE 35: CPT

## 2021-12-02 RX ORDER — METHADONE HYDROCHLORIDE 40 MG/1
10 TABLET ORAL DAILY
Refills: 0 | Status: DISCONTINUED | OUTPATIENT
Start: 2021-12-02 | End: 2021-12-03

## 2021-12-02 RX ADMIN — Medication 50 MILLIGRAM(S): at 19:46

## 2021-12-02 RX ADMIN — Medication 325 MILLIGRAM(S): at 13:44

## 2021-12-02 RX ADMIN — Medication 10 MILLIGRAM(S): at 13:44

## 2021-12-02 RX ADMIN — Medication 50 MILLIGRAM(S): at 08:01

## 2021-12-02 RX ADMIN — Medication 10 MILLIGRAM(S): at 21:12

## 2021-12-02 RX ADMIN — Medication 1 TABLET(S): at 13:43

## 2021-12-02 RX ADMIN — Medication 50 MILLIGRAM(S): at 06:39

## 2021-12-02 RX ADMIN — Medication 50 MILLIGRAM(S): at 02:41

## 2021-12-02 RX ADMIN — DIVALPROEX SODIUM 500 MILLIGRAM(S): 500 TABLET, DELAYED RELEASE ORAL at 06:38

## 2021-12-02 RX ADMIN — DIVALPROEX SODIUM 500 MILLIGRAM(S): 500 TABLET, DELAYED RELEASE ORAL at 18:16

## 2021-12-02 RX ADMIN — Medication 50 MILLIGRAM(S): at 13:44

## 2021-12-02 RX ADMIN — GABAPENTIN 300 MILLIGRAM(S): 400 CAPSULE ORAL at 21:12

## 2021-12-02 RX ADMIN — GABAPENTIN 300 MILLIGRAM(S): 400 CAPSULE ORAL at 06:39

## 2021-12-02 RX ADMIN — TAMSULOSIN HYDROCHLORIDE 0.4 MILLIGRAM(S): 0.4 CAPSULE ORAL at 21:12

## 2021-12-02 RX ADMIN — Medication 10 MILLIGRAM(S): at 06:39

## 2021-12-02 RX ADMIN — ENOXAPARIN SODIUM 40 MILLIGRAM(S): 100 INJECTION SUBCUTANEOUS at 13:42

## 2021-12-02 RX ADMIN — Medication 50 MILLIGRAM(S): at 18:16

## 2021-12-02 RX ADMIN — SENNA PLUS 2 TABLET(S): 8.6 TABLET ORAL at 21:12

## 2021-12-02 RX ADMIN — METHADONE HYDROCHLORIDE 10 MILLIGRAM(S): 40 TABLET ORAL at 13:42

## 2021-12-02 RX ADMIN — Medication 81 MILLIGRAM(S): at 13:42

## 2021-12-02 RX ADMIN — GABAPENTIN 300 MILLIGRAM(S): 400 CAPSULE ORAL at 13:44
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
## 2021-12-02 NOTE — PROGRESS NOTE ADULT - PROBLEM SELECTOR PLAN 9
- DVT prophylaxis Lovenox 40 mg QD  - Pt has a guardian, attempted to call 11/23-  left.  Given issues w/reinstating home services and equipment.  CM arranging for Hospital bed- awaiting auth  Called Guardian Mr Watt at [phone] and explained that  will arrange for Hosp bed BUT patient needs help to call Ins company about his non working Wheel chair- guardian will take care of this. Guardian wants  to call him before patient goes home,  35 min to coord d/c

## 2021-12-02 NOTE — PROGRESS NOTE ADULT - SUBJECTIVE AND OBJECTIVE BOX
Patient is a 71y old  Male who presents with a chief complaint of Infected bedsores (01 Dec 2021 18:27)      SUBJECTIVE / OVERNIGHT EVENTS:  resting in bed  Ready for home  Still wants his Wheel chair fixed- he ia aware his Pau Watt at [phone] will arrange for the wheel chair ti be fixed or replaced by insurance company.    MEDICATIONS  (STANDING):  aspirin enteric coated 81 milliGRAM(s) Oral daily  baclofen 10 milliGRAM(s) Oral three times a day  calcium carbonate 1250 mG  + Vitamin D (OsCal 500 + D) 1 Tablet(s) Oral daily  diVALproex  milliGRAM(s) Oral two times a day  enoxaparin Injectable 40 milliGRAM(s) SubCutaneous daily  ferrous    sulfate 325 milliGRAM(s) Oral daily  gabapentin 300 milliGRAM(s) Oral three times a day  hydrALAZINE 50 milliGRAM(s) Oral four times a day  methadone   Solution 10 milliGRAM(s) Oral daily  metoprolol tartrate 50 milliGRAM(s) Oral two times a day  multivitamin 1 Tablet(s) Oral daily  senna 2 Tablet(s) Oral at bedtime  tamsulosin 0.4 milliGRAM(s) Oral at bedtime    MEDICATIONS  (PRN):  acetaminophen     Tablet .. 650 milliGRAM(s) Oral every 6 hours PRN Temp greater or equal to 38C (100.4F), Mild Pain (1 - 3)  melatonin 3 milliGRAM(s) Oral at bedtime PRN Insomnia      I&O's Summary    01 Dec 2021 07:01  -  02 Dec 2021 07:00  --------------------------------------------------------  IN: 0 mL / OUT: 2500 mL / NET: -2500 mL      Vital Signs Last 24 Hrs  T(C): 36.4 (02 Dec 2021 06:30), Max: 37 (01 Dec 2021 18:00)  T(F): 97.5 (02 Dec 2021 06:30), Max: 98.6 (01 Dec 2021 18:00)  HR: 63 (02 Dec 2021 06:30) (61 - 68)  BP: 128/77 (02 Dec 2021 08:04) (113/74 - 128/85)  BP(mean): --  RR: 18 (02 Dec 2021 06:30) (18 - 18)  SpO2: 97% (02 Dec 2021 06:30) (96% - 99%)  PHYSICAL EXAM:  GENERAL: NAD, well-developed  HEAD:  Atraumatic, Normocephalic  EYES: EOMI, PERRLA, conjunctiva and sclera clear  NECK: Supple, No JVD  CHEST/LUNG: Clear to auscultation bilaterally; No wheeze  HEART: Regular rate and rhythm; No murmurs, rubs, or gallops  ABDOMEN: Soft, Nontender, Nondistended; Bowel sounds present  EXTREMITIES:  2+ Peripheral Pulses, No clubbing, cyanosis, or edema  PSYCH: Alert        Care Discussed with Consultants/Other Providers:  CM arranged for Hospital bed at home-

## 2021-12-03 LAB
ALBUMIN SERPL ELPH-MCNC: 3.1 G/DL — LOW (ref 3.3–5)
ALP SERPL-CCNC: 41 U/L — SIGNIFICANT CHANGE UP (ref 40–120)
ALT FLD-CCNC: 7 U/L — SIGNIFICANT CHANGE UP (ref 4–41)
ANION GAP SERPL CALC-SCNC: 9 MMOL/L — SIGNIFICANT CHANGE UP (ref 7–14)
APTT BLD: 44.7 SEC — HIGH (ref 27–36.3)
AST SERPL-CCNC: 16 U/L — SIGNIFICANT CHANGE UP (ref 4–40)
BASE EXCESS BLDV CALC-SCNC: 4 MMOL/L — HIGH (ref -2–3)
BASOPHILS # BLD AUTO: 0.02 K/UL — SIGNIFICANT CHANGE UP (ref 0–0.2)
BASOPHILS NFR BLD AUTO: 0.3 % — SIGNIFICANT CHANGE UP (ref 0–2)
BILIRUB SERPL-MCNC: 0.3 MG/DL — SIGNIFICANT CHANGE UP (ref 0.2–1.2)
BLD GP AB SCN SERPL QL: NEGATIVE — SIGNIFICANT CHANGE UP
BLOOD GAS VENOUS COMPREHENSIVE RESULT: SIGNIFICANT CHANGE UP
BUN SERPL-MCNC: 15 MG/DL — SIGNIFICANT CHANGE UP (ref 7–23)
CALCIUM SERPL-MCNC: 8.9 MG/DL — SIGNIFICANT CHANGE UP (ref 8.4–10.5)
CHLORIDE SERPL-SCNC: 98 MMOL/L — SIGNIFICANT CHANGE UP (ref 98–107)
CK MB BLD-MCNC: <6.7 % — HIGH (ref 0–2.5)
CK MB CFR SERPL CALC: <1 NG/ML — SIGNIFICANT CHANGE UP
CK SERPL-CCNC: 15 U/L — LOW (ref 30–200)
CO2 BLDV-SCNC: 33.8 MMOL/L — HIGH (ref 22–26)
CO2 SERPL-SCNC: 29 MMOL/L — SIGNIFICANT CHANGE UP (ref 22–31)
CREAT SERPL-MCNC: 0.63 MG/DL — SIGNIFICANT CHANGE UP (ref 0.5–1.3)
EOSINOPHIL # BLD AUTO: 0.06 K/UL — SIGNIFICANT CHANGE UP (ref 0–0.5)
EOSINOPHIL NFR BLD AUTO: 0.9 % — SIGNIFICANT CHANGE UP (ref 0–6)
GAS PNL BLDV: SIGNIFICANT CHANGE UP
GAS PNL BLDV: SIGNIFICANT CHANGE UP
GLUCOSE BLDC GLUCOMTR-MCNC: 102 MG/DL — HIGH (ref 70–99)
GLUCOSE SERPL-MCNC: 116 MG/DL — HIGH (ref 70–99)
HCO3 BLDV-SCNC: 32 MMOL/L — HIGH (ref 22–29)
HCT VFR BLD CALC: 35.3 % — LOW (ref 39–50)
HGB BLD-MCNC: 11.6 G/DL — LOW (ref 13–17)
IANC: 3.35 K/UL — SIGNIFICANT CHANGE UP (ref 1.5–8.5)
IMM GRANULOCYTES NFR BLD AUTO: 0.3 % — SIGNIFICANT CHANGE UP (ref 0–1.5)
INR BLD: 1.25 RATIO — HIGH (ref 0.88–1.16)
LACTATE SERPL-SCNC: 1.8 MMOL/L — SIGNIFICANT CHANGE UP (ref 0.5–2)
LYMPHOCYTES # BLD AUTO: 2.11 K/UL — SIGNIFICANT CHANGE UP (ref 1–3.3)
LYMPHOCYTES # BLD AUTO: 32.4 % — SIGNIFICANT CHANGE UP (ref 13–44)
MAGNESIUM SERPL-MCNC: 1.9 MG/DL — SIGNIFICANT CHANGE UP (ref 1.6–2.6)
MCHC RBC-ENTMCNC: 29.5 PG — SIGNIFICANT CHANGE UP (ref 27–34)
MCHC RBC-ENTMCNC: 32.9 GM/DL — SIGNIFICANT CHANGE UP (ref 32–36)
MCV RBC AUTO: 89.8 FL — SIGNIFICANT CHANGE UP (ref 80–100)
MONOCYTES # BLD AUTO: 0.95 K/UL — HIGH (ref 0–0.9)
MONOCYTES NFR BLD AUTO: 14.6 % — HIGH (ref 2–14)
NEUTROPHILS # BLD AUTO: 3.35 K/UL — SIGNIFICANT CHANGE UP (ref 1.8–7.4)
NEUTROPHILS NFR BLD AUTO: 51.5 % — SIGNIFICANT CHANGE UP (ref 43–77)
NRBC # BLD: 0 /100 WBCS — SIGNIFICANT CHANGE UP
NRBC # FLD: 0 K/UL — SIGNIFICANT CHANGE UP
PCO2 BLDV: 62 MMHG — HIGH (ref 42–55)
PH BLDV: 7.32 — SIGNIFICANT CHANGE UP (ref 7.32–7.43)
PHOSPHATE SERPL-MCNC: 4.3 MG/DL — SIGNIFICANT CHANGE UP (ref 2.5–4.5)
PLATELET # BLD AUTO: 201 K/UL — SIGNIFICANT CHANGE UP (ref 150–400)
PO2 BLDV: 56 MMHG — SIGNIFICANT CHANGE UP
POTASSIUM SERPL-MCNC: 3.8 MMOL/L — SIGNIFICANT CHANGE UP (ref 3.5–5.3)
POTASSIUM SERPL-SCNC: 3.8 MMOL/L — SIGNIFICANT CHANGE UP (ref 3.5–5.3)
PROT SERPL-MCNC: 6.9 G/DL — SIGNIFICANT CHANGE UP (ref 6–8.3)
PROTHROM AB SERPL-ACNC: 14.1 SEC — HIGH (ref 10.6–13.6)
RBC # BLD: 3.93 M/UL — LOW (ref 4.2–5.8)
RBC # FLD: 14.7 % — HIGH (ref 10.3–14.5)
RH IG SCN BLD-IMP: POSITIVE — SIGNIFICANT CHANGE UP
SAO2 % BLDV: 86.5 % — SIGNIFICANT CHANGE UP
SODIUM SERPL-SCNC: 136 MMOL/L — SIGNIFICANT CHANGE UP (ref 135–145)
TROPONIN T, HIGH SENSITIVITY RESULT: 24 NG/L — SIGNIFICANT CHANGE UP
VALPROATE SERPL-MCNC: 90.3 UG/ML — SIGNIFICANT CHANGE UP (ref 50–100)
WBC # BLD: 6.51 K/UL — SIGNIFICANT CHANGE UP (ref 3.8–10.5)
WBC # FLD AUTO: 6.51 K/UL — SIGNIFICANT CHANGE UP (ref 3.8–10.5)

## 2021-12-03 PROCEDURE — 93010 ELECTROCARDIOGRAM REPORT: CPT

## 2021-12-03 PROCEDURE — 70498 CT ANGIOGRAPHY NECK: CPT | Mod: 26

## 2021-12-03 PROCEDURE — 99233 SBSQ HOSP IP/OBS HIGH 50: CPT

## 2021-12-03 PROCEDURE — 99223 1ST HOSP IP/OBS HIGH 75: CPT

## 2021-12-03 PROCEDURE — 70496 CT ANGIOGRAPHY HEAD: CPT | Mod: 26

## 2021-12-03 RX ORDER — POTASSIUM CHLORIDE 20 MEQ
10 PACKET (EA) ORAL ONCE
Refills: 0 | Status: COMPLETED | OUTPATIENT
Start: 2021-12-03 | End: 2021-12-03

## 2021-12-03 RX ORDER — MAGNESIUM SULFATE 500 MG/ML
1 VIAL (ML) INJECTION ONCE
Refills: 0 | Status: COMPLETED | OUTPATIENT
Start: 2021-12-03 | End: 2021-12-03

## 2021-12-03 RX ORDER — VANCOMYCIN HCL 1 G
1000 VIAL (EA) INTRAVENOUS ONCE
Refills: 0 | Status: COMPLETED | OUTPATIENT
Start: 2021-12-03 | End: 2021-12-03

## 2021-12-03 RX ORDER — PIPERACILLIN AND TAZOBACTAM 4; .5 G/20ML; G/20ML
3.38 INJECTION, POWDER, LYOPHILIZED, FOR SOLUTION INTRAVENOUS ONCE
Refills: 0 | Status: COMPLETED | OUTPATIENT
Start: 2021-12-03 | End: 2021-12-03

## 2021-12-03 RX ORDER — METOPROLOL TARTRATE 50 MG
12.5 TABLET ORAL EVERY 12 HOURS
Refills: 0 | Status: DISCONTINUED | OUTPATIENT
Start: 2021-12-03 | End: 2021-12-04

## 2021-12-03 RX ORDER — ATORVASTATIN CALCIUM 80 MG/1
40 TABLET, FILM COATED ORAL AT BEDTIME
Refills: 0 | Status: DISCONTINUED | OUTPATIENT
Start: 2021-12-03 | End: 2022-01-18

## 2021-12-03 RX ADMIN — Medication 50 MILLIGRAM(S): at 07:54

## 2021-12-03 RX ADMIN — GABAPENTIN 300 MILLIGRAM(S): 400 CAPSULE ORAL at 05:29

## 2021-12-03 RX ADMIN — Medication 81 MILLIGRAM(S): at 12:38

## 2021-12-03 RX ADMIN — DIVALPROEX SODIUM 500 MILLIGRAM(S): 500 TABLET, DELAYED RELEASE ORAL at 05:28

## 2021-12-03 RX ADMIN — Medication 250 MILLIGRAM(S): at 15:52

## 2021-12-03 RX ADMIN — Medication 50 MILLIGRAM(S): at 03:25

## 2021-12-03 RX ADMIN — PIPERACILLIN AND TAZOBACTAM 200 GRAM(S): 4; .5 INJECTION, POWDER, LYOPHILIZED, FOR SOLUTION INTRAVENOUS at 15:23

## 2021-12-03 RX ADMIN — ATORVASTATIN CALCIUM 40 MILLIGRAM(S): 80 TABLET, FILM COATED ORAL at 21:24

## 2021-12-03 RX ADMIN — Medication 1 TABLET(S): at 12:37

## 2021-12-03 RX ADMIN — Medication 12.5 MILLIGRAM(S): at 17:51

## 2021-12-03 RX ADMIN — Medication 100 MILLIEQUIVALENT(S): at 17:54

## 2021-12-03 RX ADMIN — TAMSULOSIN HYDROCHLORIDE 0.4 MILLIGRAM(S): 0.4 CAPSULE ORAL at 21:24

## 2021-12-03 RX ADMIN — Medication 10 MILLIGRAM(S): at 05:29

## 2021-12-03 RX ADMIN — Medication 100 GRAM(S): at 17:54

## 2021-12-03 RX ADMIN — SENNA PLUS 2 TABLET(S): 8.6 TABLET ORAL at 21:24

## 2021-12-03 RX ADMIN — DIVALPROEX SODIUM 500 MILLIGRAM(S): 500 TABLET, DELAYED RELEASE ORAL at 17:54

## 2021-12-03 NOTE — PROGRESS NOTE ADULT - SUBJECTIVE AND OBJECTIVE BOX
Patient is a 71y old  Male who presents with a chief complaint of Infected bedsores (02 Dec 2021 09:15)      SUBJECTIVE / OVERNIGHT EVENTS:  Patient seen eating breakfast- no complaints , still awaiting hosp bed for home    MEDICATIONS  (STANDING):  aspirin enteric coated 81 milliGRAM(s) Oral daily  baclofen 10 milliGRAM(s) Oral three times a day  calcium carbonate 1250 mG  + Vitamin D (OsCal 500 + D) 1 Tablet(s) Oral daily  diVALproex  milliGRAM(s) Oral two times a day  enoxaparin Injectable 40 milliGRAM(s) SubCutaneous daily  ferrous    sulfate 325 milliGRAM(s) Oral daily  gabapentin 300 milliGRAM(s) Oral three times a day  hydrALAZINE 50 milliGRAM(s) Oral four times a day  methadone   Solution 10 milliGRAM(s) Oral daily  metoprolol tartrate 50 milliGRAM(s) Oral two times a day  multivitamin 1 Tablet(s) Oral daily  senna 2 Tablet(s) Oral at bedtime  tamsulosin 0.4 milliGRAM(s) Oral at bedtime    MEDICATIONS  (PRN):  acetaminophen     Tablet .. 650 milliGRAM(s) Oral every 6 hours PRN Temp greater or equal to 38C (100.4F), Mild Pain (1 - 3)  melatonin 3 milliGRAM(s) Oral at bedtime PRN Insomnia        I&O's Summary    02 Dec 2021 07:01  -  03 Dec 2021 07:00  --------------------------------------------------------  IN: 0 mL / OUT: 1800 mL / NET: -1800 mL      Vital Signs Last 24 Hrs  T(C): 36.4 (03 Dec 2021 09:50), Max: 37 (02 Dec 2021 21:11)  T(F): 97.6 (03 Dec 2021 09:50), Max: 98.6 (02 Dec 2021 21:11)  HR: 63 (03 Dec 2021 09:50) (58 - 64)  BP: 142/82 (03 Dec 2021 09:50) (119/73 - 154/80)  BP(mean): --  RR: 17 (03 Dec 2021 09:50) (17 - 18)  SpO2: 99% (03 Dec 2021 09:50) (96% - 100%)  PHYSICAL EXAM:  GENERAL: NAD, well-developed  HEAD:  Atraumatic, Normocephalic  EYES: EOMI, PERRLA, conjunctiva and sclera clear  NECK: Supple, No JVD  CHEST/LUNG: Clear to auscultation bilaterally; No wheeze  HEART: Regular rate and rhythm; No murmurs, rubs, or gallops  ABDOMEN: Soft, Nontender, Nondistended; Bowel sounds present  EXTREMITIES:  2+ Peripheral Pulses, No clubbing, cyanosis, or edema  PSYCH: Alert
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
## 2021-12-03 NOTE — PROGRESS NOTE ADULT - PROBLEM SELECTOR PLAN 9
- DVT prophylaxis Lovenox 40 mg QD  - Pt has a guardian, attempted to call 11/23-  left.  Given issues w/reinstating home services and equipment.  CM arranging for Hospital bed- awaiting auth  Guardian Mr Watt at [phone] aware  we are waiting  for Hosp bed BUT patient needs help to call Ins company about his non working Wheel chair- guardian will take care of this.   35 min to coord d/c

## 2021-12-03 NOTE — RAPID RESPONSE TEAM SUMMARY - NSOTHERINTERVENTIONSRRT_GEN_ALL_CORE
- cbc, cmp, coags, t&s, bcx x 2, prolactin, ck sent  - per neurology pursue vessel imaging if cr ok - otherwise MRA noncon  - hydral and Lopressor discontinued - add back on as medically indicated  - primary team is transferring to tele  - discontinued baclofen and methadone - add on as medically indicated  - f/u neuro recs re: anti-epileptics  - VS q4 and neuro checks q4  - rec sepsis workup - cbc, cmp, coags, t&s, bcx x 2, prolactin, ck sent  - per neurology pursue vessel imaging if cr ok - otherwise MRA noncon  - hydral and Lopressor discontinued - add back on as medically indicated - primary team Dr. Whiting notified these medications were discontinued  - primary team is transferring to tele  - discontinued baclofen and methadone - add on as medically indicated; primary team Dr. Whiting notified these medications were discontinued  - f/u neuro recs re: anti-epileptics  - VS q4 and neuro checks q4  - rec sepsis workup

## 2021-12-03 NOTE — STROKE CODE NOTE - NIH STROKE SCALE: 5A. MOTOR ARM, LEFT, QM
(2) Some effort against gravity; limb cannot get to or maintain (if cued) 90 (or 45) degrees, drifts down to bed, but has some effort against gravity (1) Drift; limb holds 90 (or 45) degrees, but drifts down before full 10 seconds; does not hit bed or other support
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
## 2021-12-03 NOTE — CHART NOTE - NSCHARTNOTEFT_GEN_A_CORE
RRT called for change in mental status . Team at bedside . code stroke called   SEE RRT flow sheet   - evaluated by neurology   - STAT labs sent   - CTA head and neck ordered  - EKG performed   - Antihypertensives held- will change metoprolol to 12.5 BID   - On IVF -  cc bolus  - d/w Dr. Whiting - telemetry ordered , blood cultures sent , 1 dose of vanco and zosyn ordered, Will call cardiology consult as patient with PVC
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
## 2021-12-03 NOTE — STROKE CODE NOTE - NIH STROKE SCALE: 5B. MOTOR ARM, RIGHT, QM
(2) Some effort against gravity; limb cannot get to or maintain (if cued) 90 (or 45) degrees, drifts down to bed, but has some effort against gravity (1) Drift; limb holds 90 (or 45) degrees, but drifts down before full 10 secs; does not hit bed or other support
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
## 2021-12-03 NOTE — CHART NOTE - NSCHARTNOTEFT_GEN_A_CORE
[facility] NIGHT MEDICINE COVERAGE    Spoke w/ Neurology resident earlier in evening regarding pt's Code Stroke CT results.  No infarct noted (CT scan impression added below).  Pt on ASA, started Lipitor 40mg qhs for tonight, and ordered AM lipid panel.  Pt NPO except meds at this time, S+S pending.  Neuro requested pt get MRI Head w/o contrast when able on non-emergent basis.  Valproate level and lactate level ordered, will f/u results.    CT Scan result copied below:  IMPRESSION:    CT head:  1.  Motion degraded examination.  2.  Apparent left frontotemporal hypodensity on noncontrast CT, favored to be artifactual due to sulcal volume averaging and motion, as detailed above. Acute infarction is felt to be less likely given preserved appearance of gray-white matter junction in this region on venous postcontrast images. Consider further evaluation with MRI if there is persistent clinical suspicion for acute cerebral ischemia.    CTA brain:  1. No proximal large vessel arterial occlusion, flow-limiting stenosis, dissection or arteriovenous malformation within the brain.    CTA neck:  1. No arterial occlusion, flow-limiting stenosis, dissection or aneurysm within the neck.  2. Ill-defined biapical ground glass opacities, may reflect infection/inflammation versus air trapping. Small bilateral pleural effusions. Consider dedicated dedicated chest imaging as clinically warranted.  (End of copied text)    Pt stable at this time, will continue to monitor.    Dylan Mays PA-C  Department of Medicine - [facility]  In-House Pager: #[phone]
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
## 2021-12-04 DIAGNOSIS — G93.40 ENCEPHALOPATHY, UNSPECIFIED: ICD-10-CM

## 2021-12-04 DIAGNOSIS — I49.3 VENTRICULAR PREMATURE DEPOLARIZATION: ICD-10-CM

## 2021-12-04 LAB — PROLACTIN SERPL-MCNC: 42.3 NG/ML — HIGH (ref 4.1–18.4)

## 2021-12-04 PROCEDURE — 99232 SBSQ HOSP IP/OBS MODERATE 35: CPT | Mod: GC

## 2021-12-04 PROCEDURE — 95816 EEG AWAKE AND DROWSY: CPT | Mod: 26

## 2021-12-04 PROCEDURE — 99233 SBSQ HOSP IP/OBS HIGH 50: CPT

## 2021-12-04 RX ORDER — METOPROLOL TARTRATE 50 MG
50 TABLET ORAL DAILY
Refills: 0 | Status: DISCONTINUED | OUTPATIENT
Start: 2021-12-04 | End: 2021-12-14

## 2021-12-04 RX ORDER — METOPROLOL TARTRATE 50 MG
25 TABLET ORAL
Refills: 0 | Status: DISCONTINUED | OUTPATIENT
Start: 2021-12-04 | End: 2021-12-04

## 2021-12-04 RX ORDER — METHADONE HYDROCHLORIDE 40 MG/1
10 TABLET ORAL DAILY
Refills: 0 | Status: DISCONTINUED | OUTPATIENT
Start: 2021-12-04 | End: 2021-12-09

## 2021-12-04 RX ADMIN — DIVALPROEX SODIUM 500 MILLIGRAM(S): 500 TABLET, DELAYED RELEASE ORAL at 12:12

## 2021-12-04 RX ADMIN — Medication 325 MILLIGRAM(S): at 12:02

## 2021-12-04 RX ADMIN — SENNA PLUS 2 TABLET(S): 8.6 TABLET ORAL at 22:33

## 2021-12-04 RX ADMIN — Medication 12.5 MILLIGRAM(S): at 05:38

## 2021-12-04 RX ADMIN — Medication 1 TABLET(S): at 12:02

## 2021-12-04 RX ADMIN — Medication 81 MILLIGRAM(S): at 12:03

## 2021-12-04 RX ADMIN — Medication 50 MILLIGRAM(S): at 22:34

## 2021-12-04 RX ADMIN — ATORVASTATIN CALCIUM 40 MILLIGRAM(S): 80 TABLET, FILM COATED ORAL at 22:33

## 2021-12-04 RX ADMIN — TAMSULOSIN HYDROCHLORIDE 0.4 MILLIGRAM(S): 0.4 CAPSULE ORAL at 22:34

## 2021-12-04 RX ADMIN — Medication 3 MILLIGRAM(S): at 22:33

## 2021-12-04 RX ADMIN — DIVALPROEX SODIUM 500 MILLIGRAM(S): 500 TABLET, DELAYED RELEASE ORAL at 22:33

## 2021-12-04 RX ADMIN — ENOXAPARIN SODIUM 40 MILLIGRAM(S): 100 INJECTION SUBCUTANEOUS at 12:03

## 2021-12-04 RX ADMIN — METHADONE HYDROCHLORIDE 10 MILLIGRAM(S): 40 TABLET ORAL at 16:24
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
## 2021-12-04 NOTE — PROGRESS NOTE ADULT - SUBJECTIVE AND OBJECTIVE BOX
Patient is a 71y old  Male who presents with a chief complaint of Infected bedsores (04 Dec 2021 09:42)      SUBJECTIVE / OVERNIGHT EVENTS:    Patient seen and examined at bedside. A+Ox3, reports yesterday event he was sleeping, no seizure activity overnight, on exam b/l UE strenght 5/5, hungry, passed dysphagia screen, eager to go home.    MEDICATIONS  (STANDING):  aspirin enteric coated 81 milliGRAM(s) Oral daily  atorvastatin 40 milliGRAM(s) Oral at bedtime  calcium carbonate 1250 mG  + Vitamin D (OsCal 500 + D) 1 Tablet(s) Oral daily  diVALproex  milliGRAM(s) Oral two times a day  enoxaparin Injectable 40 milliGRAM(s) SubCutaneous daily  ferrous    sulfate 325 milliGRAM(s) Oral daily  metoprolol tartrate 12.5 milliGRAM(s) Oral every 12 hours  multivitamin 1 Tablet(s) Oral daily  senna 2 Tablet(s) Oral at bedtime  tamsulosin 0.4 milliGRAM(s) Oral at bedtime    MEDICATIONS  (PRN):  melatonin 3 milliGRAM(s) Oral at bedtime PRN Insomnia      Vital Signs Last 24 Hrs  T(C): 36.8 (04 Dec 2021 10:25), Max: 36.8 (04 Dec 2021 10:25)  T(F): 98.2 (04 Dec 2021 10:25), Max: 98.2 (04 Dec 2021 10:25)  HR: 59 (04 Dec 2021 10:25) (56 - 89)  BP: 119/92 (04 Dec 2021 10:25) (118/64 - 141/86)  BP(mean): --  RR: 16 (04 Dec 2021 10:25) (16 - 18)  SpO2: 100% (04 Dec 2021 10:25) (93% - 100%)  CAPILLARY BLOOD GLUCOSE      POCT Blood Glucose.: 102 mg/dL (03 Dec 2021 12:53)    I&O's Summary      PHYSICAL EXAM:  Vital Signs Last 24 Hrs  T(C): 36.8 (12-04-21 @ 10:25)  T(F): 98.2 (12-04-21 @ 10:25), Max: 98.2 (12-04-21 @ 10:25)  HR: 59 (12-04-21 @ 10:25) (56 - 89)  BP: 119/92 (12-04-21 @ 10:25)  BP(mean): --  RR: 16 (12-04-21 @ 10:25) (16 - 18)  SpO2: 100% (12-04-21 @ 10:25) (93% - 100%)  Wt(kg): --    PHYSICAL EXAM:  GENERAL: NAD, well-developed  HEAD:  Atraumatic, Normocephalic  EYES: EOMI, PERRLA, conjunctiva and sclera clear  NECK: Supple, No JVD  CHEST/LUNG: Clear to auscultation bilaterally; No wheeze  HEART: Regular rate and rhythm; No murmurs, rubs, or gallops  ABDOMEN: Soft, Nontender, Nondistended; Bowel sounds present  EXTREMITIES:  2+ Peripheral Pulses, No clubbing, cyanosis, or edema  Neuro: b/l LE paraplegia, 5/5 UE  PSYCH: Alert      LABS:                        11.6   6.51  )-----------( 201      ( 03 Dec 2021 13:38 )             35.3     12-03    136  |  98  |  15  ----------------------------<  116<H>  3.8   |  29  |  0.63    Ca    8.9      03 Dec 2021 13:38  Phos  4.3     12-03  Mg     1.90     12-03    TPro  6.9  /  Alb  3.1<L>  /  TBili  0.3  /  DBili  x   /  AST  16  /  ALT  7   /  AlkPhos  41  12-03    PT/INR - ( 03 Dec 2021 13:38 )   PT: 14.1 sec;   INR: 1.25 ratio         PTT - ( 03 Dec 2021 13:38 )  PTT:44.7 sec  CARDIAC MARKERS ( 03 Dec 2021 13:38 )  x     / x     / 15 U/L / x     / <1.0 ng/mL          RADIOLOGY & ADDITIONAL TESTS:    Imaging Personally Reviewed:    Consultant(s) Notes Reviewed:      Care Discussed with Consultants/Other Providers:

## 2021-12-04 NOTE — PROGRESS NOTE ADULT - ATTENDING COMMENTS
Change metoprolol to Toprol 50 mg po qd. If PVCs persist and HR>60, increase to 75 mg po qd.  D/c planning.

## 2021-12-04 NOTE — PROGRESS NOTE ADULT - PROBLEM SELECTOR PLAN 2
- seen on telemetry, no CP, no palpaitaitons, HD stable, f/u TTE, will uptitrated metoprolol as patient tolerates

## 2021-12-04 NOTE — PROGRESS NOTE ADULT - PROBLEM SELECTOR PLAN 3
Sepsis present on admission likely d/t infected decubitus ulcer, sepsis now RESOLVED  CT of abdomen showed increased stranding in the left buttock soft tissue overlying the sacral decubitus ulcer with minimally increased bone erosion of the underlying coccyx   Blood culture NGTD   Completed w/ IV unasyn, appreciate ID rec;s-- treating presumed CAP, but etiology of fevers currently unclear- can be transitioned to augmentin on discharge.   Sacral wound- unstageable , L buttock wound Debrided by wound care on Monday 11/29- as explained by wound car RN [place]al- out patient f/u with Dr Brenner  Remains afebrile 12/4  Awaiting auth on hospital bed for home

## 2021-12-04 NOTE — CHART NOTE - NSCHARTNOTEFT_GEN_A_CORE
Imaging reviewed, discussed with radiology, and results as below:    CT Angio Neck Stroke Protocol IV Cont (12.03.21)    IMPRESSION:  CT head:  1.  Motion degraded examination.  2.  Apparent left frontotemporal hypodensity on noncontrast CT, favored to be artifactual due to sulcal volume averaging and motion, as detailed above. Acute infarction is felt to be less likely given preserved appearance of gray-white matter junction in this region on venous postcontrast images. Consider further evaluation with MRI if there is persistent clinical suspicion for acute cerebral ischemia.    CTA brain:  1. No proximal large vessel arterial occlusion, flow-limiting stenosis, dissection or arteriovenous malformation within the brain.    CTA neck:  1. No arterial occlusion, flow-limiting stenosis, dissection or aneurysm within the neck.  2. Ill-defined biapical ground glass opacities, may reflect infection/inflammation versus air trapping. Small bilateral pleural effusions. Consider dedicated chest imaging as clinically warranted.    Additionally labs reviewed:  VPA level 90.30 corrected for albumin 194 (drawn less than 3 hours after giving VPA dose)  lactate 1.8  CPK 15    Impression: based on low lactate and CPK with a supratherapeutic VPA level the likelihood of seizure is low. Unclear if L frontotemporal hypodensity is artifactual or infarct.    Recs:  [] MRI brain w/o contrast, if cannot obtain in a reasonable time frame, would repeat head CT w/o contrast  [] routine EEG (order Awake and Asleep)  [] repeat VPA level (ordered), MUST BE JUST PRIOR TO MORNING DIVALPROEX DOSE FOR TROUGH LEVEL  [] depending on morning level, will consider reduction of Divalproex to 250mg bid as it is hepatically cleared and patient now with hepatic lesions  [] continue aspirin 81mg pending MRI  [] continue statin  [] obtain A1c and lipid panel  [] ensure adequate nutrition, may consider nutrition consult  [] rest of workup as per team    Case to be seen in AM with neurology attending, Dr. Schoenberg.

## 2021-12-04 NOTE — PROGRESS NOTE ADULT - PROBLEM SELECTOR PLAN 9
CT A/P w/ hepatic lesions and CBD dilatation, MRCP completed, no active cirrhosis, outpatient surveillance

## 2021-12-04 NOTE — PROGRESS NOTE ADULT - SUBJECTIVE AND OBJECTIVE BOX
Violeta Tenorio MD  Cardiology Fellow  [phone]  All Cardiology service information can be found 24/7 on [email or web address], password: [email or web address]      Overnight Events:     Review Of Systems: No chest pain, shortness of breath, or palpitations            Current Meds:  aspirin enteric coated 81 milliGRAM(s) Oral daily  atorvastatin 40 milliGRAM(s) Oral at bedtime  calcium carbonate 1250 mG  + Vitamin D (OsCal 500 + D) 1 Tablet(s) Oral daily  diVALproex  milliGRAM(s) Oral two times a day  enoxaparin Injectable 40 milliGRAM(s) SubCutaneous daily  ferrous    sulfate 325 milliGRAM(s) Oral daily  melatonin 3 milliGRAM(s) Oral at bedtime PRN  metoprolol tartrate 12.5 milliGRAM(s) Oral every 12 hours  multivitamin 1 Tablet(s) Oral daily  senna 2 Tablet(s) Oral at bedtime  tamsulosin 0.4 milliGRAM(s) Oral at bedtime      Vitals:  T(F): 97.9 (12-04), Max: 97.9 (12-04)  HR: 89 (12-04) (56 - 89)  BP: 141/86 (12-04) (118/64 - 142/82)  RR: 17 (12-04)  SpO2: 97% (12-04)  I&O's Summary      Physical Exam:  Appearance: No acute distress; well appearing  Eyes: PERRL, EOMI, pink conjunctiva  HEENT: Normal oral mucosa  Cardiovascular: RRR, S1, S2, no murmurs, rubs, or gallops; no edema; no JVD  Respiratory: Clear to auscultation bilaterally  Gastrointestinal: soft, non-tender, non-distended with normal bowel sounds  Musculoskeletal: No clubbing; no joint deformity   Neurologic: Non-focal  Lymphatic: No lymphadenopathy  Psychiatry: AAOx3, mood & affect appropriate  Skin: No rashes, ecchymoses, or cyanosis                          11.6   6.51  )-----------( 201      ( 03 Dec 2021 13:38 )             35.3     12-03    136  |  98  |  15  ----------------------------<  116<H>  3.8   |  29  |  0.63    Ca    8.9      03 Dec 2021 13:38  Phos  4.3     12-03  Mg     1.90     12-03    TPro  6.9  /  Alb  3.1<L>  /  TBili  0.3  /  DBili  x   /  AST  16  /  ALT  7   /  AlkPhos  41  12-03    PT/INR - ( 03 Dec 2021 13:38 )   PT: 14.1 sec;   INR: 1.25 ratio         PTT - ( 03 Dec 2021 13:38 )  PTT:44.7 sec  CARDIAC MARKERS ( 03 Dec 2021 13:38 )  24 ng/L / x     / x     / 15 U/L / x     / <1.0 ng/mL         Violeta Tenorio MD  Cardiology Fellow  [phone]  All Cardiology service information can be found 24/7 on [email or web address], password: [email or web address]      Overnight Events: Patient without any symptoms however still with multiple PVCs on tele.     Review Of Systems: No chest pain, shortness of breath, or palpitations            Current Meds:  aspirin enteric coated 81 milliGRAM(s) Oral daily  atorvastatin 40 milliGRAM(s) Oral at bedtime  calcium carbonate 1250 mG  + Vitamin D (OsCal 500 + D) 1 Tablet(s) Oral daily  diVALproex  milliGRAM(s) Oral two times a day  enoxaparin Injectable 40 milliGRAM(s) SubCutaneous daily  ferrous    sulfate 325 milliGRAM(s) Oral daily  melatonin 3 milliGRAM(s) Oral at bedtime PRN  metoprolol tartrate 12.5 milliGRAM(s) Oral every 12 hours  multivitamin 1 Tablet(s) Oral daily  senna 2 Tablet(s) Oral at bedtime  tamsulosin 0.4 milliGRAM(s) Oral at bedtime      Vitals:  T(F): 97.9 (12-04), Max: 97.9 (12-04)  HR: 89 (12-04) (56 - 89)  BP: 141/86 (12-04) (118/64 - 142/82)  RR: 17 (12-04)  SpO2: 97% (12-04)  I&O's Summary      Physical Exam:  Appearance: No acute distress; well appearing  Eyes: PERRL, EOMI, pink conjunctiva  HEENT: Normal oral mucosa  Cardiovascular: RRR, S1, S2, no murmurs, rubs, or gallops; no edema; no JVD  Respiratory: Clear to auscultation bilaterally  Gastrointestinal: soft, non-tender, non-distended with normal bowel sounds  Musculoskeletal: No clubbing; no joint deformity   Neurologic: Non-focal  Lymphatic: No lymphadenopathy  Psychiatry: AAOx3, mood & affect appropriate  Skin: No rashes, ecchymoses, or cyanosis                          11.6   6.51  )-----------( 201      ( 03 Dec 2021 13:38 )             35.3     12-03    136  |  98  |  15  ----------------------------<  116<H>  3.8   |  29  |  0.63    Ca    8.9      03 Dec 2021 13:38  Phos  4.3     12-03  Mg     1.90     12-03    TPro  6.9  /  Alb  3.1<L>  /  TBili  0.3  /  DBili  x   /  AST  16  /  ALT  7   /  AlkPhos  41  12-03    PT/INR - ( 03 Dec 2021 13:38 )   PT: 14.1 sec;   INR: 1.25 ratio         PTT - ( 03 Dec 2021 13:38 )  PTT:44.7 sec  CARDIAC MARKERS ( 03 Dec 2021 13:38 )  24 ng/L / x     / x     / 15 U/L / x     / <1.0 ng/mL

## 2021-12-04 NOTE — EEG REPORT - NS EEG TEXT BOX
[facility]  Comprehensive Epilepsy Center  Report of Routine EEG with Video    [facility]: [address], [place] [address], Phone [phone]  [facility]: [address], [place] [address], Phone [phone]  [place] Office: [address], [address], [place] [address] Phone [phone]    [facility]: [address] [place] [address], Phone [phone]  [place] Office: [address] [place] [address], Phone [phone]    Patient Name: JAIME BENITEZ    Age: 71 year  : 1950  Patient ID: -, MRN #: -, Location: [address] [place] [address] A  Referring Physician: MARIAMA FOSTER  EEG #: [id number]    Study Date: 2021     Technical Information:					  On Instrument: -  Placement and Labeling of Electrodes:  The EEG was performed utilizing 20 channels referential EEG connections (coronal over temporal over parasagittal montage) using all standard 10-20 electrode placements.  Recording was at a sampling rate of 256 samples per second per channel.  Time synchronized digital video recording was done simultaneously with EEG recording.  A low light infrared camera was used for low light recording.  Be and seizure detection algorithms were utilized.    History:   ROUTINE EEG AT BEDSIDE 729 A  71 YEAR OLD MALE  COR: AWAKE / DROWSY / AGITATED / TENSE  P/W: SEPSIS  PMH: HTN, SZ DISORDER, PARAPLEGIA, SACRAL DECUBITUS ULCER  HV: NOT COMPLETED DUE TO PANDEMIC  PHOTIC: NOT COMPLETED DUE TO PT SLIGHT AGITATION  A&OX5  CONCERN FOR SEIZURES    Pertinent Medication  Senna  Dulcolax  Lovenox  Lipitor  Lopressor  Depakote (Valproic acid/Divalproex)  Ferrous Sulfate  Flomax  Melatonin    Study Interpretation:  Findings: The background was continuous and reactive.     Unable to further interpret background activity due to continuous EMG artifact obscuring background activity. Patient noted to be tense and agitated per technician.    Interictal Epileptiform Activity:   None were present.  - limited by artifact as noted above.    Events:  Clinical events: None recorded.  Seizures: None recorded.    Activation Procedures:   Hyperventilation was not performed.    Photic stimulation was not performed.     Artifacts:  Continuous prominent intermittent myogenic and movement artifacts were noted.    EEG Summary / Classification:  Technically limited study due to continuous artifact (see above).    EEG Impression / Clinical Correlate:  Technically limited study due to continuous artifact (see above).  No clear epileptiform pattern or seizure seen. However, interpretation limited by artifact.    ________________________________________    Harvey Anaya MD  Attending Physician, [facility] [place]
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
## 2021-12-04 NOTE — PROGRESS NOTE ADULT - PROBLEM SELECTOR PLAN 1
- 12/3 RRT and code stroke, CTH showing < from: CT Angio Head w/ IV Cont (12.03.21 @ 16:58) >    Apparent left frontotemporal hypodensity on noncontrast CT, favored to be artifactual due to sulcal volume averaging and motion, as detailed above. Acute infarction is felt to be less likely given preserved appearance of gray-white matter junction in this region on venous postcontrast images    < end of copied text >  - d/w neuro, given clinical improvement, no new deficits, pt A+Ox3, transient event likely toxic/metabolic, will repeat CTH 12/4 for clinical stability  - VPA 90, borderline, f/u neuro regarding downtitrating to 250 bid  - routine EEG ordered f/u read
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
## 2021-12-04 NOTE — PROGRESS NOTE ADULT - PROBLEM SELECTOR PLAN 4
Wound care consult  # Sacral decub inf wounds- completed treatment 11/ 27- need wound care and out patient f/u with wound care and Hosp bed-Sacral wound- unstageable , L buttock wound Debrided by wound care on Monday 11/29- as explained by wound car ORTEGA Hi- out patient f/u with Dr Brenner    Holding methadone after RRT 12/3 and in setting of PVCs

## 2021-12-05 PROCEDURE — 99232 SBSQ HOSP IP/OBS MODERATE 35: CPT

## 2021-12-05 PROCEDURE — 70450 CT HEAD/BRAIN W/O DYE: CPT | Mod: 26

## 2021-12-05 RX ADMIN — DIVALPROEX SODIUM 500 MILLIGRAM(S): 500 TABLET, DELAYED RELEASE ORAL at 17:35

## 2021-12-05 RX ADMIN — ATORVASTATIN CALCIUM 40 MILLIGRAM(S): 80 TABLET, FILM COATED ORAL at 21:23

## 2021-12-05 RX ADMIN — Medication 1 TABLET(S): at 12:37

## 2021-12-05 RX ADMIN — METHADONE HYDROCHLORIDE 10 MILLIGRAM(S): 40 TABLET ORAL at 12:35

## 2021-12-05 RX ADMIN — DIVALPROEX SODIUM 500 MILLIGRAM(S): 500 TABLET, DELAYED RELEASE ORAL at 06:00

## 2021-12-05 RX ADMIN — ENOXAPARIN SODIUM 40 MILLIGRAM(S): 100 INJECTION SUBCUTANEOUS at 12:36

## 2021-12-05 RX ADMIN — Medication 50 MILLIGRAM(S): at 06:00

## 2021-12-05 RX ADMIN — TAMSULOSIN HYDROCHLORIDE 0.4 MILLIGRAM(S): 0.4 CAPSULE ORAL at 21:23

## 2021-12-05 RX ADMIN — Medication 325 MILLIGRAM(S): at 12:36

## 2021-12-05 RX ADMIN — Medication 81 MILLIGRAM(S): at 12:35

## 2021-12-05 RX ADMIN — SENNA PLUS 2 TABLET(S): 8.6 TABLET ORAL at 21:24
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
## 2021-12-05 NOTE — PROGRESS NOTE ADULT - PROBLEM SELECTOR PLAN 1
- 12/3 RRT and code stroke, CTH showing < from: CT Angio Head w/ IV Cont (12.03.21 @ 16:58) >    Apparent left frontotemporal hypodensity on noncontrast CT, favored to be artifactual due to sulcal volume averaging and motion, as detailed above. Acute infarction is felt to be less likely given preserved appearance of gray-white matter junction in this region on venous postcontrast images    < end of copied text >  - d/w neuro, given clinical improvement, no new deficits, pt A+Ox3, transient event likely toxic/metabolic, plan for repeat CTH today  - VPA 90, borderline, f/u neuro regarding downtitrating to 250 bid  - routine EEG artifact but no epileptic form seen

## 2021-12-05 NOTE — PROGRESS NOTE ADULT - SUBJECTIVE AND OBJECTIVE BOX
Patient is a 71y old  Male who presents with a chief complaint of Infected bedsores (05 Dec 2021 10:19)      SUBJECTIVE / OVERNIGHT EVENTS:    Patient seen and examined at bedside. A+Ox3, kiara po intake, having regular BMs.     MEDICATIONS  (STANDING):  aspirin enteric coated 81 milliGRAM(s) Oral daily  atorvastatin 40 milliGRAM(s) Oral at bedtime  calcium carbonate 1250 mG  + Vitamin D (OsCal 500 + D) 1 Tablet(s) Oral daily  diVALproex  milliGRAM(s) Oral two times a day  enoxaparin Injectable 40 milliGRAM(s) SubCutaneous daily  ferrous    sulfate 325 milliGRAM(s) Oral daily  methadone   Solution 10 milliGRAM(s) Oral daily  metoprolol succinate ER 50 milliGRAM(s) Oral daily  multivitamin 1 Tablet(s) Oral daily  senna 2 Tablet(s) Oral at bedtime  tamsulosin 0.4 milliGRAM(s) Oral at bedtime    MEDICATIONS  (PRN):  melatonin 3 milliGRAM(s) Oral at bedtime PRN Insomnia      Vital Signs Last 24 Hrs  T(C): 36.8 (05 Dec 2021 14:08), Max: 36.9 (04 Dec 2021 17:41)  T(F): 98.2 (05 Dec 2021 14:08), Max: 98.4 (04 Dec 2021 17:41)  HR: 58 (05 Dec 2021 14:08) (57 - 59)  BP: 135/88 (05 Dec 2021 14:08) (135/88 - 152/83)  BP(mean): --  RR: 17 (05 Dec 2021 14:08) (16 - 18)  SpO2: 98% (05 Dec 2021 14:08) (98% - 100%)  CAPILLARY BLOOD GLUCOSE        I&O's Summary    04 Dec 2021 07:01  -  05 Dec 2021 07:00  --------------------------------------------------------  IN: 0 mL / OUT: 1200 mL / NET: -1200 mL    05 Dec 2021 07:01  -  05 Dec 2021 15:34  --------------------------------------------------------  IN: 0 mL / OUT: 250 mL / NET: -250 mL        PHYSICAL EXAM:  Vital Signs Last 24 Hrs  T(C): 36.8 (12-05-21 @ 14:08)  T(F): 98.2 (12-05-21 @ 14:08), Max: 98.4 (12-04-21 @ 17:41)  HR: 58 (12-05-21 @ 14:08) (57 - 59)  BP: 135/88 (12-05-21 @ 14:08)  BP(mean): --  RR: 17 (12-05-21 @ 14:08) (16 - 18)  SpO2: 98% (12-05-21 @ 14:08) (98% - 100%)  Wt(kg): --    12-04 @ 07:01  -  12-05 @ 07:00  --------------------------------------------------------  IN: 0 mL / OUT: 1200 mL / NET: -1200 mL    12-05 @ 07:01  -  12-05 @ 15:34  --------------------------------------------------------  IN: 0 mL / OUT: 250 mL / NET: -250 mL      PHYSICAL EXAM:  GENERAL: NAD, well-developed  HEAD:  Atraumatic, Normocephalic  EYES: EOMI, PERRLA, conjunctiva and sclera clear  NECK: Supple, No JVD  CHEST/LUNG: Clear to auscultation bilaterally; No wheeze  HEART: Regular rate and rhythm; No murmurs, rubs, or gallops  ABDOMEN: Soft, Nontender, Nondistended; Bowel sounds present  EXTREMITIES:  2+ Peripheral Pulses, No clubbing, cyanosis, or edema  Neuro: b/l LE paraplegia, 5/5 UE  PSYCH: Alert    LABS:                    RADIOLOGY & ADDITIONAL TESTS:    Imaging Personally Reviewed:    Consultant(s) Notes Reviewed:      Care Discussed with Consultants/Other Providers:
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
## 2021-12-06 PROCEDURE — 99232 SBSQ HOSP IP/OBS MODERATE 35: CPT

## 2021-12-06 PROCEDURE — 93306 TTE W/DOPPLER COMPLETE: CPT | Mod: 26

## 2021-12-06 RX ORDER — POLYETHYLENE GLYCOL 3350 17 G/17G
17 POWDER, FOR SOLUTION ORAL
Refills: 0 | Status: DISCONTINUED | OUTPATIENT
Start: 2021-12-06 | End: 2021-12-09

## 2021-12-06 RX ADMIN — ATORVASTATIN CALCIUM 40 MILLIGRAM(S): 80 TABLET, FILM COATED ORAL at 21:12

## 2021-12-06 RX ADMIN — SENNA PLUS 2 TABLET(S): 8.6 TABLET ORAL at 21:12

## 2021-12-06 RX ADMIN — TAMSULOSIN HYDROCHLORIDE 0.4 MILLIGRAM(S): 0.4 CAPSULE ORAL at 21:12

## 2021-12-06 RX ADMIN — METHADONE HYDROCHLORIDE 10 MILLIGRAM(S): 40 TABLET ORAL at 12:53

## 2021-12-06 RX ADMIN — DIVALPROEX SODIUM 500 MILLIGRAM(S): 500 TABLET, DELAYED RELEASE ORAL at 17:54

## 2021-12-06 RX ADMIN — Medication 325 MILLIGRAM(S): at 12:53

## 2021-12-06 RX ADMIN — Medication 81 MILLIGRAM(S): at 12:53

## 2021-12-06 RX ADMIN — Medication 50 MILLIGRAM(S): at 06:11

## 2021-12-06 RX ADMIN — Medication 1 TABLET(S): at 12:53

## 2021-12-06 RX ADMIN — DIVALPROEX SODIUM 500 MILLIGRAM(S): 500 TABLET, DELAYED RELEASE ORAL at 06:10

## 2021-12-06 RX ADMIN — ENOXAPARIN SODIUM 40 MILLIGRAM(S): 100 INJECTION SUBCUTANEOUS at 12:53

## 2021-12-06 NOTE — PROGRESS NOTE ADULT - SUBJECTIVE AND OBJECTIVE BOX
Patient is a 71y old  Male who presents with a chief complaint of Infected bedsores (05 Dec 2021 15:33)      SUBJECTIVE / OVERNIGHT EVENTS:    No events overnight. This AM, patient without n/v/d/cp/sob.      MEDICATIONS  (STANDING):  aspirin enteric coated 81 milliGRAM(s) Oral daily  atorvastatin 40 milliGRAM(s) Oral at bedtime  calcium carbonate 1250 mG  + Vitamin D (OsCal 500 + D) 1 Tablet(s) Oral daily  diVALproex  milliGRAM(s) Oral two times a day  enoxaparin Injectable 40 milliGRAM(s) SubCutaneous daily  ferrous    sulfate 325 milliGRAM(s) Oral daily  methadone   Solution 10 milliGRAM(s) Oral daily  metoprolol succinate ER 50 milliGRAM(s) Oral daily  multivitamin 1 Tablet(s) Oral daily  senna 2 Tablet(s) Oral at bedtime  tamsulosin 0.4 milliGRAM(s) Oral at bedtime    MEDICATIONS  (PRN):  melatonin 3 milliGRAM(s) Oral at bedtime PRN Insomnia  polyethylene glycol 3350 17 Gram(s) Oral two times a day PRN Constipation      PHYSICAL EXAM:  T(C): 36.8 (12-06-21 @ 13:30), Max: 36.8 (12-06-21 @ 13:30)  HR: 60 (12-06-21 @ 13:30) (57 - 60)  BP: 147/99 (12-06-21 @ 13:30) (147/99 - 176/85)  RR: 18 (12-06-21 @ 13:30) (17 - 18)  SpO2: 92% (12-06-21 @ 13:30) (92% - 97%)  I&O's Summary    05 Dec 2021 07:01  -  06 Dec 2021 07:00  --------------------------------------------------------  IN: 1450 mL / OUT: 1600 mL / NET: -150 mL    06 Dec 2021 07:01  -  06 Dec 2021 15:36  --------------------------------------------------------  IN: 650 mL / OUT: 900 mL / NET: -250 mL      GENERAL: NAD, well-developed  HEAD:  Atraumatic, Normocephalic, MMM  CHEST/LUNG: No use of accessory muscles, CTAB, breathing non-labored  COR: RR, no mrcg  ABD: Soft, ND/NT, +BS  PSYCH: AAOx3  NEUROLOGY: CN II-XII grossly intact, moving all extremities  SKIN: No rashes or lesions  EXT: wwp, no cce    LABS:  CAPILLARY BLOOD GLUCOSE                          Culture - Blood (collected 03 Dec 2021 16:19)  Source: .Blood Blood-Peripheral  Preliminary Report (04 Dec 2021 17:01):    No growth to date.    Culture - Blood (collected 03 Dec 2021 16:19)  Source: .Blood Blood-Peripheral  Preliminary Report (04 Dec 2021 17:01):    No growth to date.        RADIOLOGY & ADDITIONAL TESTS:    Telemetry Personally Reviewed - SR, vaishnavi to 50s at times    Imaging Personally Reviewed -     Imaging Reviewed -     Consultant(s) Notes Reviewed -       Care Discussed with Consultants/Other Providers -

## 2021-12-06 NOTE — PROGRESS NOTE ADULT - PROBLEM SELECTOR PLAN 1
- 12/3 RRT and code stroke, CTH showing < from: CT Angio Head w/ IV Cont (12.03.21 @ 16:58) >  Apparent left frontotemporal hypodensity on noncontrast CT, favored to be artifactual due to sulcal volume averaging and motion, as detailed above. Acute infarction is felt to be less likely given preserved appearance of gray-white matter junction in this region on venous postcontrast images  - d/w neuro, given clinical improvement, no new deficits, pt A+Ox3, transient event likely toxic/metabolic, plan for repeat CTH today  - VPA 90, borderline, f/u neuro regarding downtitrating to 250 bid  - routine EEG artifact but no epileptic form seen
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
## 2021-12-06 NOTE — PROGRESS NOTE ADULT - PROBLEM SELECTOR PLAN 2
- seen on telemetry, no CP, no palpitations, HD stable, f/u TTE, will uptitrated metoprolol as patient tolerates, on 50 ER  - will dc telemetry given lack of sx and medical stability; patient is undergoing dc planning - seen on telemetry, no CP, no palpitations, HD stable, f/u TTE, will uptitrated metoprolol as patient tolerates, on 50 ER  - cardiology recommended outpatient titration of metoprolol; patient is asymptomatic. will d/c tele  -TTE requested per cardiology which shows mild segmental LV systolic dysfunction and hypokinesis of inferolateral wall; will f/u further cardiology recs re: potential stress testing (inpatient vs outpatient); again, pt is w/o sx

## 2021-12-07 PROCEDURE — 99232 SBSQ HOSP IP/OBS MODERATE 35: CPT

## 2021-12-07 RX ADMIN — METHADONE HYDROCHLORIDE 10 MILLIGRAM(S): 40 TABLET ORAL at 12:43

## 2021-12-07 RX ADMIN — DIVALPROEX SODIUM 500 MILLIGRAM(S): 500 TABLET, DELAYED RELEASE ORAL at 05:19

## 2021-12-07 RX ADMIN — Medication 1 TABLET(S): at 12:44

## 2021-12-07 RX ADMIN — DIVALPROEX SODIUM 500 MILLIGRAM(S): 500 TABLET, DELAYED RELEASE ORAL at 17:32

## 2021-12-07 RX ADMIN — ENOXAPARIN SODIUM 40 MILLIGRAM(S): 100 INJECTION SUBCUTANEOUS at 12:44

## 2021-12-07 RX ADMIN — SENNA PLUS 2 TABLET(S): 8.6 TABLET ORAL at 21:46

## 2021-12-07 RX ADMIN — POLYETHYLENE GLYCOL 3350 17 GRAM(S): 17 POWDER, FOR SOLUTION ORAL at 12:45

## 2021-12-07 RX ADMIN — TAMSULOSIN HYDROCHLORIDE 0.4 MILLIGRAM(S): 0.4 CAPSULE ORAL at 21:47

## 2021-12-07 RX ADMIN — Medication 50 MILLIGRAM(S): at 05:18

## 2021-12-07 RX ADMIN — Medication 81 MILLIGRAM(S): at 12:44

## 2021-12-07 RX ADMIN — Medication 325 MILLIGRAM(S): at 12:44

## 2021-12-07 RX ADMIN — ATORVASTATIN CALCIUM 40 MILLIGRAM(S): 80 TABLET, FILM COATED ORAL at 21:47

## 2021-12-07 RX ADMIN — Medication 3 MILLIGRAM(S): at 21:47

## 2021-12-07 NOTE — PROGRESS NOTE ADULT - SUBJECTIVE AND OBJECTIVE BOX
Patient seen and examined at bedside.    Overnight Events: Denies CP, SOB, palpitations.    Current Meds:  aspirin enteric coated 81 milliGRAM(s) Oral daily  atorvastatin 40 milliGRAM(s) Oral at bedtime  calcium carbonate 1250 mG  + Vitamin D (OsCal 500 + D) 1 Tablet(s) Oral daily  diVALproex  milliGRAM(s) Oral two times a day  enoxaparin Injectable 40 milliGRAM(s) SubCutaneous daily  ferrous    sulfate 325 milliGRAM(s) Oral daily  melatonin 3 milliGRAM(s) Oral at bedtime PRN  methadone   Solution 10 milliGRAM(s) Oral daily  metoprolol succinate ER 50 milliGRAM(s) Oral daily  multivitamin 1 Tablet(s) Oral daily  polyethylene glycol 3350 17 Gram(s) Oral two times a day PRN  senna 2 Tablet(s) Oral at bedtime  tamsulosin 0.4 milliGRAM(s) Oral at bedtime    Vitals:  T(F): 98.5 (12-07), Max: 98.5 (12-06)  HR: 60 (12-07) (60 - 62)  BP: 154/92 (12-07) (135/93 - 154/92)  RR: 18 (12-07)  SpO2: 99% (12-07)  I&O's Summary    06 Dec 2021 07:01  -  07 Dec 2021 07:00  --------------------------------------------------------  IN: 1640 mL / OUT: 2300 mL / NET: -660 mL    Physical Exam:  Deferred    CARDIAC MARKERS ( 03 Dec 2021 13:38 )  24 ng/L / x     / x     / 15 U/L / x     / <1.0 ng/mL    New ECG(s): Personally reviewed    Echo:  < from: TTE with Doppler (w/Cont) (12.06.21 @ 08:46) >  CONCLUSIONS:  1. Mitral annular calcification, otherwise normal mitral  valve. Minimal mitral regurgitation.  2. Calcified trileaflet aortic valve with normal opening.  Mild-moderate aortic regurgitation.  3. Mild aortic root dilatation  (Ao: 4.3 cm at the sinuses  of Valsalva).  4. Normal left ventricular internal dimensions and wall  thicknesses.  5. Mild segmental left ventricular systolic dysfunction.  Hypokinesis of the basal inferior wall.  Endocardial  visualization enhanced with intravenous injection of echo  contrast (Definity).  6. Mild diastolic dysfunction (Stage I).  7. Normal right ventricular size and function.  8. Estimated right ventricular systolic pressure equals 36  mm Hg, assuming right atrial pressure equals 10 mm Hg,  consistent with borderline pulmonary hypertension.  ------------------------------------------------------------------------  Confirmed on  12/6/2021 - 10:35:17 by Rakesh Connors M.D.,  [facility], [place]  ------------------------------------------------------------------------    < end of copied text >      Stress Testing:     Cath:    Imaging:    Interpretation of Telemetry: NSR, rare PVCs

## 2021-12-07 NOTE — PROGRESS NOTE ADULT - PROBLEM SELECTOR PLAN 2
- seen on telemetry, no CP, no palpitations, HD stable, f/u TTE, will uptitrated metoprolol as patient tolerates, on 50 ER  - cardiology recommended outpatient titration of metoprolol; patient is asymptomatic. will d/c tele  -TTE shows mild segmental LV systolic dysfunction and hypokinesis of inferolateral wall; cardiology recs reviewed, will c/w medical management
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
## 2021-12-07 NOTE — PROGRESS NOTE ADULT - SUBJECTIVE AND OBJECTIVE BOX
Patient is a 71y old  Male who presents with a chief complaint of Infected bedsores (07 Dec 2021 12:22)      SUBJECTIVE / OVERNIGHT EVENTS:    No events overnight. This AM, patient without n/v/d/cp/sob.      MEDICATIONS  (STANDING):  aspirin enteric coated 81 milliGRAM(s) Oral daily  atorvastatin 40 milliGRAM(s) Oral at bedtime  calcium carbonate 1250 mG  + Vitamin D (OsCal 500 + D) 1 Tablet(s) Oral daily  diVALproex  milliGRAM(s) Oral two times a day  enoxaparin Injectable 40 milliGRAM(s) SubCutaneous daily  ferrous    sulfate 325 milliGRAM(s) Oral daily  methadone   Solution 10 milliGRAM(s) Oral daily  metoprolol succinate ER 50 milliGRAM(s) Oral daily  multivitamin 1 Tablet(s) Oral daily  senna 2 Tablet(s) Oral at bedtime  tamsulosin 0.4 milliGRAM(s) Oral at bedtime    MEDICATIONS  (PRN):  melatonin 3 milliGRAM(s) Oral at bedtime PRN Insomnia  polyethylene glycol 3350 17 Gram(s) Oral two times a day PRN Constipation      PHYSICAL EXAM:  T(C): 36.9 (12-07-21 @ 05:15), Max: 36.9 (12-06-21 @ 21:04)  HR: 60 (12-07-21 @ 05:15) (60 - 62)  BP: 154/92 (12-07-21 @ 05:15) (135/93 - 154/92)  RR: 18 (12-07-21 @ 05:15) (18 - 18)  SpO2: 99% (12-07-21 @ 05:15) (98% - 99%)  I&O's Summary    06 Dec 2021 07:01  -  07 Dec 2021 07:00  --------------------------------------------------------  IN: 1640 mL / OUT: 2300 mL / NET: -660 mL      GENERAL: NAD, well-developed, seated in bed  HEAD:  Atraumatic, Normocephalic  EYES: EOMI, PERRLA, conjunctiva and sclera clear  NECK: Supple, No JVD  CHEST/LUNG: Clear to auscultation bilaterally; No wheeze  HEART: Regular rate and rhythm; No murmurs, rubs, or gallops  ABDOMEN: Soft, Nontender, Nondistended; Bowel sounds present  EXTREMITIES:  2+ Peripheral Pulses, No clubbing, cyanosis, or edema  Neuro: b/l LE paraplegia, 5/5 UE  PSYCH: Alert.    LABS:  CAPILLARY BLOOD GLUCOSE                            RADIOLOGY & ADDITIONAL TESTS:    Telemetry Personally Reviewed -     Imaging Personally Reviewed -     Imaging Reviewed -     Consultant(s) Notes Reviewed -       Care Discussed with Consultants/Other Providers -

## 2021-12-08 LAB
CULTURE RESULTS: SIGNIFICANT CHANGE UP
CULTURE RESULTS: SIGNIFICANT CHANGE UP
SARS-COV-2 RNA SPEC QL NAA+PROBE: SIGNIFICANT CHANGE UP
SPECIMEN SOURCE: SIGNIFICANT CHANGE UP
SPECIMEN SOURCE: SIGNIFICANT CHANGE UP

## 2021-12-08 PROCEDURE — 99232 SBSQ HOSP IP/OBS MODERATE 35: CPT

## 2021-12-08 RX ADMIN — Medication 325 MILLIGRAM(S): at 11:28

## 2021-12-08 RX ADMIN — Medication 10 MILLIGRAM(S): at 00:51

## 2021-12-08 RX ADMIN — DIVALPROEX SODIUM 500 MILLIGRAM(S): 500 TABLET, DELAYED RELEASE ORAL at 17:18

## 2021-12-08 RX ADMIN — ATORVASTATIN CALCIUM 40 MILLIGRAM(S): 80 TABLET, FILM COATED ORAL at 22:33

## 2021-12-08 RX ADMIN — SENNA PLUS 2 TABLET(S): 8.6 TABLET ORAL at 22:30

## 2021-12-08 RX ADMIN — Medication 50 MILLIGRAM(S): at 05:02

## 2021-12-08 RX ADMIN — Medication 1 TABLET(S): at 11:29

## 2021-12-08 RX ADMIN — ENOXAPARIN SODIUM 40 MILLIGRAM(S): 100 INJECTION SUBCUTANEOUS at 11:28

## 2021-12-08 RX ADMIN — DIVALPROEX SODIUM 500 MILLIGRAM(S): 500 TABLET, DELAYED RELEASE ORAL at 05:02

## 2021-12-08 RX ADMIN — METHADONE HYDROCHLORIDE 10 MILLIGRAM(S): 40 TABLET ORAL at 11:30

## 2021-12-08 RX ADMIN — TAMSULOSIN HYDROCHLORIDE 0.4 MILLIGRAM(S): 0.4 CAPSULE ORAL at 22:30

## 2021-12-08 RX ADMIN — Medication 81 MILLIGRAM(S): at 11:28

## 2021-12-08 NOTE — PROGRESS NOTE ADULT - PROBLEM SELECTOR PLAN 1
- 12/3 RRT and code stroke, CTH showing < from: CT Angio Head w/ IV Cont (12.03.21 @ 16:58) >  Apparent left frontotemporal hypodensity on noncontrast CT, favored to be artifactual due to sulcal volume averaging and motion, as detailed above. Acute infarction is felt to be less likely given preserved appearance of gray-white matter junction in this region on venous postcontrast images  - d/w neuro, given clinical improvement, no new deficits, pt A+Ox3, transient event likely toxic/metabolic  - VPA 90, borderline, f/u neuro regarding downtitrating to 250 bid  - routine EEG artifact but no epileptic form seen  - awaiting MRI brain per neuro recs

## 2021-12-08 NOTE — PROGRESS NOTE ADULT - PROBLEM SELECTOR PLAN 2
- seen on telemetry, no CP, no palpitations, HD stable, f/u TTE, will uptitrate metoprolol as patient tolerates, on 50 ER  - cardiology recommended outpatient titration of metoprolol; patient is asymptomatic.  -TTE shows mild segmental LV systolic dysfunction and hypokinesis of inferolateral wall; cardiology recs reviewed, will c/w medical management
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
## 2021-12-09 LAB
ANION GAP SERPL CALC-SCNC: 8 MMOL/L — SIGNIFICANT CHANGE UP (ref 7–14)
BUN SERPL-MCNC: 14 MG/DL — SIGNIFICANT CHANGE UP (ref 7–23)
CALCIUM SERPL-MCNC: 9.1 MG/DL — SIGNIFICANT CHANGE UP (ref 8.4–10.5)
CHLORIDE SERPL-SCNC: 93 MMOL/L — LOW (ref 98–107)
CO2 SERPL-SCNC: 32 MMOL/L — HIGH (ref 22–31)
CREAT SERPL-MCNC: 0.57 MG/DL — SIGNIFICANT CHANGE UP (ref 0.5–1.3)
GLUCOSE SERPL-MCNC: 79 MG/DL — SIGNIFICANT CHANGE UP (ref 70–99)
HCT VFR BLD CALC: 37.3 % — LOW (ref 39–50)
HGB BLD-MCNC: 12.2 G/DL — LOW (ref 13–17)
MAGNESIUM SERPL-MCNC: 1.9 MG/DL — SIGNIFICANT CHANGE UP (ref 1.6–2.6)
MCHC RBC-ENTMCNC: 29.5 PG — SIGNIFICANT CHANGE UP (ref 27–34)
MCHC RBC-ENTMCNC: 32.7 GM/DL — SIGNIFICANT CHANGE UP (ref 32–36)
MCV RBC AUTO: 90.1 FL — SIGNIFICANT CHANGE UP (ref 80–100)
NRBC # BLD: 0 /100 WBCS — SIGNIFICANT CHANGE UP
NRBC # FLD: 0 K/UL — SIGNIFICANT CHANGE UP
PHOSPHATE SERPL-MCNC: 3.2 MG/DL — SIGNIFICANT CHANGE UP (ref 2.5–4.5)
PLATELET # BLD AUTO: 126 K/UL — LOW (ref 150–400)
POTASSIUM SERPL-MCNC: 3.9 MMOL/L — SIGNIFICANT CHANGE UP (ref 3.5–5.3)
POTASSIUM SERPL-SCNC: 3.9 MMOL/L — SIGNIFICANT CHANGE UP (ref 3.5–5.3)
RBC # BLD: 4.14 M/UL — LOW (ref 4.2–5.8)
RBC # FLD: 15.1 % — HIGH (ref 10.3–14.5)
SODIUM SERPL-SCNC: 133 MMOL/L — LOW (ref 135–145)
VALPROATE SERPL-MCNC: 107.6 UG/ML — HIGH (ref 50–100)
WBC # BLD: 3.38 K/UL — LOW (ref 3.8–10.5)
WBC # FLD AUTO: 3.38 K/UL — LOW (ref 3.8–10.5)

## 2021-12-09 PROCEDURE — 70551 MRI BRAIN STEM W/O DYE: CPT | Mod: 26

## 2021-12-09 PROCEDURE — 99232 SBSQ HOSP IP/OBS MODERATE 35: CPT

## 2021-12-09 RX ORDER — DIVALPROEX SODIUM 500 MG/1
500 TABLET, DELAYED RELEASE ORAL
Refills: 0 | Status: DISCONTINUED | OUTPATIENT
Start: 2021-12-09 | End: 2021-12-10

## 2021-12-09 RX ORDER — POLYETHYLENE GLYCOL 3350 17 G/17G
17 POWDER, FOR SOLUTION ORAL
Refills: 0 | Status: DISCONTINUED | OUTPATIENT
Start: 2021-12-09 | End: 2022-01-18

## 2021-12-09 RX ORDER — DIVALPROEX SODIUM 500 MG/1
250 TABLET, DELAYED RELEASE ORAL
Refills: 0 | Status: DISCONTINUED | OUTPATIENT
Start: 2021-12-09 | End: 2021-12-10

## 2021-12-09 RX ORDER — METHADONE HYDROCHLORIDE 40 MG/1
10 TABLET ORAL DAILY
Refills: 0 | Status: DISCONTINUED | OUTPATIENT
Start: 2021-12-09 | End: 2021-12-16

## 2021-12-09 RX ADMIN — Medication 1 TABLET(S): at 17:52

## 2021-12-09 RX ADMIN — DIVALPROEX SODIUM 500 MILLIGRAM(S): 500 TABLET, DELAYED RELEASE ORAL at 21:43

## 2021-12-09 RX ADMIN — POLYETHYLENE GLYCOL 3350 17 GRAM(S): 17 POWDER, FOR SOLUTION ORAL at 10:41

## 2021-12-09 RX ADMIN — METHADONE HYDROCHLORIDE 10 MILLIGRAM(S): 40 TABLET ORAL at 18:00

## 2021-12-09 RX ADMIN — POLYETHYLENE GLYCOL 3350 17 GRAM(S): 17 POWDER, FOR SOLUTION ORAL at 17:59

## 2021-12-09 RX ADMIN — Medication 1 TABLET(S): at 17:54

## 2021-12-09 RX ADMIN — Medication 81 MILLIGRAM(S): at 17:53

## 2021-12-09 RX ADMIN — ATORVASTATIN CALCIUM 40 MILLIGRAM(S): 80 TABLET, FILM COATED ORAL at 21:44

## 2021-12-09 RX ADMIN — TAMSULOSIN HYDROCHLORIDE 0.4 MILLIGRAM(S): 0.4 CAPSULE ORAL at 21:44

## 2021-12-09 RX ADMIN — SENNA PLUS 2 TABLET(S): 8.6 TABLET ORAL at 21:46

## 2021-12-09 RX ADMIN — ENOXAPARIN SODIUM 40 MILLIGRAM(S): 100 INJECTION SUBCUTANEOUS at 17:52

## 2021-12-09 RX ADMIN — Medication 5 MILLIGRAM(S): at 10:41

## 2021-12-09 RX ADMIN — Medication 50 MILLIGRAM(S): at 06:31

## 2021-12-09 RX ADMIN — DIVALPROEX SODIUM 500 MILLIGRAM(S): 500 TABLET, DELAYED RELEASE ORAL at 06:31

## 2021-12-09 RX ADMIN — Medication 325 MILLIGRAM(S): at 17:54

## 2021-12-09 RX ADMIN — DIVALPROEX SODIUM 250 MILLIGRAM(S): 500 TABLET, DELAYED RELEASE ORAL at 18:07
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
## 2021-12-09 NOTE — CHART NOTE - NSCHARTNOTEFT_GEN_A_CORE
Patient underwent MRI. Pending final read from Radiology.    Case reviewed. Dep level came back 107 though with low albumin may be supratherapeutic.     At this time advise primary team to lower 12/9 PM depakote dose to 250mg.    12/10 keep depakote 500mg | 250mg    repeat depakote level 12/10 AM with CBC, CMP - can re-evaluate whether level needs to be readjusted.    d/w primary team Patient underwent MRI.    MR Head No Cont (12.09.21 @ 08:56)     FINDINGS:  VENTRICLES AND SULCI:  Symmetricallyenlarged ventricles and cortical   sulci secondary to cerebral volume loss. Cavum septum pellucidum and   cavum vergae.  INTRA-AXIAL:  No mass, abnormal signal or evidence of acute cerebral   ischemia.  Subcortical and periventricular white matter T2and FLAIR   hyperintensity, likely related to chronic microvascular changes, given   patient's age. Calcification involving the right superior cerebellar   region is unchanged.  EXTRA-AXIAL:  No mass or collection.  VISUALIZED SINUSES:  Normal.  VISUALIZED MASTOIDS:  Clear.  CALVARIUM:  Normal.  CAROTID FLOW VOIDS:  Present.    IMPRESSION:  No mass, abnormal signal, or evidence of acute cerebral ischemia.    Case reviewed. Dep level came back 107 though with low albumin may be supratherapeutic.     At this time advise primary team to lower 12/9 PM depakote dose to 250mg.    12/10 keep depakote 500mg | 250mg    repeat depakote level 12/10 AM with CBC, CMP - can re-evaluate whether level needs to be readjusted.    no further neurological workup, however need to ensure appropriate depakote levels to prevent toxicity.    d/w primary team
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
## 2021-12-09 NOTE — PROGRESS NOTE ADULT - PROBLEM SELECTOR PLAN 1
- 12/3 RRT and code stroke, CTH showing < from: CT Angio Head w/ IV Cont (12.03.21 @ 16:58) >  Apparent left frontotemporal hypodensity on noncontrast CT, favored to be artifactual due to sulcal volume averaging and motion, as detailed above. Acute infarction is felt to be less likely given preserved appearance of gray-white matter junction in this region on venous postcontrast images  - d/w neuro, given clinical improvement, no new deficits, pt A+Ox3, transient event likely toxic/metabolic  - VPA 90, borderline, f/u neuro regarding downtitrating to 250 bid  - routine EEG artifact but no epileptic form seen  - MRI brain completed, awaiting read, will f/u neuro recs  - VPA mildly elevated, will f/u neuro recs
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
## 2021-12-10 LAB
ALBUMIN SERPL ELPH-MCNC: 2.8 G/DL — LOW (ref 3.3–5)
ALP SERPL-CCNC: 42 U/L — SIGNIFICANT CHANGE UP (ref 40–120)
ALT FLD-CCNC: 6 U/L — SIGNIFICANT CHANGE UP (ref 4–41)
ANION GAP SERPL CALC-SCNC: 11 MMOL/L — SIGNIFICANT CHANGE UP (ref 7–14)
AST SERPL-CCNC: 19 U/L — SIGNIFICANT CHANGE UP (ref 4–40)
BILIRUB SERPL-MCNC: 0.3 MG/DL — SIGNIFICANT CHANGE UP (ref 0.2–1.2)
BUN SERPL-MCNC: 22 MG/DL — SIGNIFICANT CHANGE UP (ref 7–23)
CALCIUM SERPL-MCNC: 8.8 MG/DL — SIGNIFICANT CHANGE UP (ref 8.4–10.5)
CHLORIDE SERPL-SCNC: 90 MMOL/L — LOW (ref 98–107)
CO2 SERPL-SCNC: 30 MMOL/L — SIGNIFICANT CHANGE UP (ref 22–31)
CREAT SERPL-MCNC: 0.62 MG/DL — SIGNIFICANT CHANGE UP (ref 0.5–1.3)
GLUCOSE SERPL-MCNC: 101 MG/DL — HIGH (ref 70–99)
HCT VFR BLD CALC: 32.2 % — LOW (ref 39–50)
HGB BLD-MCNC: 10.4 G/DL — LOW (ref 13–17)
MAGNESIUM SERPL-MCNC: 1.9 MG/DL — SIGNIFICANT CHANGE UP (ref 1.6–2.6)
MCHC RBC-ENTMCNC: 29.1 PG — SIGNIFICANT CHANGE UP (ref 27–34)
MCHC RBC-ENTMCNC: 32.3 GM/DL — SIGNIFICANT CHANGE UP (ref 32–36)
MCV RBC AUTO: 90.2 FL — SIGNIFICANT CHANGE UP (ref 80–100)
NRBC # BLD: 0 /100 WBCS — SIGNIFICANT CHANGE UP
NRBC # FLD: 0 K/UL — SIGNIFICANT CHANGE UP
PHOSPHATE SERPL-MCNC: 2.7 MG/DL — SIGNIFICANT CHANGE UP (ref 2.5–4.5)
PLATELET # BLD AUTO: 113 K/UL — LOW (ref 150–400)
POTASSIUM SERPL-MCNC: 4 MMOL/L — SIGNIFICANT CHANGE UP (ref 3.5–5.3)
POTASSIUM SERPL-SCNC: 4 MMOL/L — SIGNIFICANT CHANGE UP (ref 3.5–5.3)
PROT SERPL-MCNC: 5.8 G/DL — LOW (ref 6–8.3)
RBC # BLD: 3.57 M/UL — LOW (ref 4.2–5.8)
RBC # FLD: 14.7 % — HIGH (ref 10.3–14.5)
SODIUM SERPL-SCNC: 131 MMOL/L — LOW (ref 135–145)
VALPROATE SERPL-MCNC: 106.4 UG/ML — HIGH (ref 50–100)
WBC # BLD: 4.04 K/UL — SIGNIFICANT CHANGE UP (ref 3.8–10.5)
WBC # FLD AUTO: 4.04 K/UL — SIGNIFICANT CHANGE UP (ref 3.8–10.5)

## 2021-12-10 PROCEDURE — 99232 SBSQ HOSP IP/OBS MODERATE 35: CPT

## 2021-12-10 RX ORDER — DIVALPROEX SODIUM 500 MG/1
250 TABLET, DELAYED RELEASE ORAL
Refills: 0 | Status: DISCONTINUED | OUTPATIENT
Start: 2021-12-10 | End: 2022-01-18

## 2021-12-10 RX ADMIN — DIVALPROEX SODIUM 500 MILLIGRAM(S): 500 TABLET, DELAYED RELEASE ORAL at 05:59

## 2021-12-10 RX ADMIN — METHADONE HYDROCHLORIDE 10 MILLIGRAM(S): 40 TABLET ORAL at 13:30

## 2021-12-10 RX ADMIN — SENNA PLUS 2 TABLET(S): 8.6 TABLET ORAL at 22:00

## 2021-12-10 RX ADMIN — Medication 1 TABLET(S): at 13:27

## 2021-12-10 RX ADMIN — ENOXAPARIN SODIUM 40 MILLIGRAM(S): 100 INJECTION SUBCUTANEOUS at 13:26

## 2021-12-10 RX ADMIN — Medication 50 MILLIGRAM(S): at 05:59

## 2021-12-10 RX ADMIN — Medication 81 MILLIGRAM(S): at 13:27

## 2021-12-10 RX ADMIN — Medication 5 MILLIGRAM(S): at 13:27

## 2021-12-10 RX ADMIN — DIVALPROEX SODIUM 250 MILLIGRAM(S): 500 TABLET, DELAYED RELEASE ORAL at 17:44

## 2021-12-10 RX ADMIN — Medication 325 MILLIGRAM(S): at 13:28

## 2021-12-10 RX ADMIN — TAMSULOSIN HYDROCHLORIDE 0.4 MILLIGRAM(S): 0.4 CAPSULE ORAL at 22:00

## 2021-12-10 RX ADMIN — ATORVASTATIN CALCIUM 40 MILLIGRAM(S): 80 TABLET, FILM COATED ORAL at 22:00

## 2021-12-10 RX ADMIN — POLYETHYLENE GLYCOL 3350 17 GRAM(S): 17 POWDER, FOR SOLUTION ORAL at 05:59

## 2021-12-10 RX ADMIN — POLYETHYLENE GLYCOL 3350 17 GRAM(S): 17 POWDER, FOR SOLUTION ORAL at 17:43

## 2021-12-10 NOTE — CHART NOTE - NSCHARTNOTEFT_GEN_A_CORE
MRI reviewed. EEG reviewed. Valproic Acid Level, Serum: 106.40 ug/mL. Per primary team patient back to baseline.    MR Head No Cont (12.09.21 @ 08:56)     IMPRESSION:  No mass, abnormal signal, or evidence of acute cerebral ischemia.    EEG Summary / Classification:  Technically limited study due to continuous artifact (see above).    EEG Impression / Clinical Correlate:  Technically limited study due to continuous artifact (see above).  No clear epileptiform pattern or seizure seen. However, interpretation limited by artifact.    Recommendations:  no further neurological workup inpatient  patient should be discharged on depakote 250mg bid  patient should obtain follow up labwork CBC, CMP, valproic acid level, within 1 week  patient should follow up with  or     will sign off. please reconsult PRN.    d/w Hospitalist, Dr. Zeng Neurology Attending

## 2021-12-10 NOTE — PROGRESS NOTE ADULT - PROBLEM SELECTOR PLAN 1
- 12/3 RRT and code stroke, CTH showing < from: CT Angio Head w/ IV Cont (12.03.21 @ 16:58) >  Apparent left frontotemporal hypodensity on noncontrast CT, favored to be artifactual due to sulcal volume averaging and motion, as detailed above. Acute infarction is felt to be less likely given preserved appearance of gray-white matter junction in this region on venous postcontrast images  - d/w neuro, given clinical improvement, no new deficits, pt A+Ox3, transient event likely toxic/metabolic  - VPA 90, borderline, f/u neuro regarding downtitrating to 250 bid  - routine EEG artifact but no epileptic form seen  - MRI brain completed, awaiting read, will f/u neuro recs  - VPA level elevated, neuro recommended VPA 250mg bid on d/c

## 2021-12-10 NOTE — PROGRESS NOTE ADULT - PROBLEM SELECTOR PLAN 4
Wound care consult  # Sacral decub inf wounds- completed treatment 11/ 27- need wound care and out patient f/u with wound care and Hosp bed-Sacral wound- unstageable , L buttock wound Debrided by wound care on Monday 11/29- as explained by wound car RN Avutal- out patient f/u with Dr Brenner    methadone resumed 12/4    May benefit from SNF for wound care; CM investigating and discussing w/guardian
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
## 2021-12-10 NOTE — PROGRESS NOTE ADULT - PROBLEM SELECTOR PLAN 3
Sepsis present on admission likely d/t infected decubitus ulcer, sepsis now RESOLVED  CT of abdomen showed increased stranding in the left buttock soft tissue overlying the sacral decubitus ulcer with minimally increased bone erosion of the underlying coccyx   Blood culture NGTD   Completed w/ IV unasyn, appreciate ID rec;s-- treating presumed CAP, but etiology of fevers currently unclear- can be transitioned to augmentin on discharge.   Sacral wound- unstageable , L buttock wound Debrided by wound care on Monday 11/29- as explained by wound car RN Avutal- out patient f/u with Dr Brenner  Remains afebrile 12/4  Received auth on hospital bed
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
## 2021-12-11 PROCEDURE — 99232 SBSQ HOSP IP/OBS MODERATE 35: CPT

## 2021-12-11 RX ADMIN — DIVALPROEX SODIUM 250 MILLIGRAM(S): 500 TABLET, DELAYED RELEASE ORAL at 05:39

## 2021-12-11 RX ADMIN — DIVALPROEX SODIUM 250 MILLIGRAM(S): 500 TABLET, DELAYED RELEASE ORAL at 20:19

## 2021-12-11 RX ADMIN — POLYETHYLENE GLYCOL 3350 17 GRAM(S): 17 POWDER, FOR SOLUTION ORAL at 05:38

## 2021-12-11 RX ADMIN — Medication 81 MILLIGRAM(S): at 11:40

## 2021-12-11 RX ADMIN — METHADONE HYDROCHLORIDE 10 MILLIGRAM(S): 40 TABLET ORAL at 11:39

## 2021-12-11 RX ADMIN — Medication 5 MILLIGRAM(S): at 11:41

## 2021-12-11 RX ADMIN — POLYETHYLENE GLYCOL 3350 17 GRAM(S): 17 POWDER, FOR SOLUTION ORAL at 17:48

## 2021-12-11 RX ADMIN — Medication 3 MILLIGRAM(S): at 21:04

## 2021-12-11 RX ADMIN — Medication 1 TABLET(S): at 11:40

## 2021-12-11 RX ADMIN — SENNA PLUS 2 TABLET(S): 8.6 TABLET ORAL at 21:04

## 2021-12-11 RX ADMIN — ENOXAPARIN SODIUM 40 MILLIGRAM(S): 100 INJECTION SUBCUTANEOUS at 11:39

## 2021-12-11 RX ADMIN — Medication 50 MILLIGRAM(S): at 05:40

## 2021-12-11 RX ADMIN — TAMSULOSIN HYDROCHLORIDE 0.4 MILLIGRAM(S): 0.4 CAPSULE ORAL at 21:04

## 2021-12-11 RX ADMIN — Medication 325 MILLIGRAM(S): at 11:40

## 2021-12-11 RX ADMIN — ATORVASTATIN CALCIUM 40 MILLIGRAM(S): 80 TABLET, FILM COATED ORAL at 21:04
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
## 2021-12-12 PROCEDURE — 99232 SBSQ HOSP IP/OBS MODERATE 35: CPT

## 2021-12-12 RX ADMIN — TAMSULOSIN HYDROCHLORIDE 0.4 MILLIGRAM(S): 0.4 CAPSULE ORAL at 21:27

## 2021-12-12 RX ADMIN — Medication 325 MILLIGRAM(S): at 12:03

## 2021-12-12 RX ADMIN — SENNA PLUS 2 TABLET(S): 8.6 TABLET ORAL at 21:27

## 2021-12-12 RX ADMIN — Medication 3 MILLIGRAM(S): at 21:26

## 2021-12-12 RX ADMIN — Medication 1 TABLET(S): at 12:02

## 2021-12-12 RX ADMIN — DIVALPROEX SODIUM 250 MILLIGRAM(S): 500 TABLET, DELAYED RELEASE ORAL at 05:21

## 2021-12-12 RX ADMIN — POLYETHYLENE GLYCOL 3350 17 GRAM(S): 17 POWDER, FOR SOLUTION ORAL at 05:21

## 2021-12-12 RX ADMIN — Medication 50 MILLIGRAM(S): at 05:21

## 2021-12-12 RX ADMIN — POLYETHYLENE GLYCOL 3350 17 GRAM(S): 17 POWDER, FOR SOLUTION ORAL at 17:36

## 2021-12-12 RX ADMIN — Medication 5 MILLIGRAM(S): at 12:03

## 2021-12-12 RX ADMIN — DIVALPROEX SODIUM 250 MILLIGRAM(S): 500 TABLET, DELAYED RELEASE ORAL at 17:54

## 2021-12-12 RX ADMIN — ATORVASTATIN CALCIUM 40 MILLIGRAM(S): 80 TABLET, FILM COATED ORAL at 21:26

## 2021-12-12 RX ADMIN — Medication 81 MILLIGRAM(S): at 12:02

## 2021-12-12 RX ADMIN — Medication 1 TABLET(S): at 12:03

## 2021-12-12 RX ADMIN — ENOXAPARIN SODIUM 40 MILLIGRAM(S): 100 INJECTION SUBCUTANEOUS at 12:01

## 2021-12-12 NOTE — CHART NOTE - NSCHARTNOTEFT_GEN_A_CORE
Patient educated on need for COVID swab and discharge plans, however, adamantly refused COVID swab at this time.    Alberto Galarza PA-C  Medicine [facility], pgr [phone]  Available on [employer] Teams

## 2021-12-12 NOTE — PROGRESS NOTE ADULT - SUBJECTIVE AND OBJECTIVE BOX
Patient is a 71y old  Male who presents with a chief complaint of Infected bedsores (11 Dec 2021 13:08)    SUBJECTIVE / OVERNIGHT EVENTS:    No events overnight. This AM, patient without n/v/d/cp/sob.      MEDICATIONS  (STANDING):  aspirin enteric coated 81 milliGRAM(s) Oral daily  atorvastatin 40 milliGRAM(s) Oral at bedtime  bisacodyl 5 milliGRAM(s) Oral daily  calcium carbonate 1250 mG  + Vitamin D (OsCal 500 + D) 1 Tablet(s) Oral daily  diVALproex  milliGRAM(s) Oral two times a day  enoxaparin Injectable 40 milliGRAM(s) SubCutaneous daily  ferrous    sulfate 325 milliGRAM(s) Oral daily  methadone   Solution 10 milliGRAM(s) Oral daily  metoprolol succinate ER 50 milliGRAM(s) Oral daily  multivitamin 1 Tablet(s) Oral daily  polyethylene glycol 3350 17 Gram(s) Oral two times a day  senna 2 Tablet(s) Oral at bedtime  tamsulosin 0.4 milliGRAM(s) Oral at bedtime    MEDICATIONS  (PRN):  melatonin 3 milliGRAM(s) Oral at bedtime PRN Insomnia      PHYSICAL EXAM:  T(C): 37.1 (12-12-21 @ 11:56), Max: 37.1 (12-11-21 @ 17:50)  HR: 65 (12-12-21 @ 11:56) (64 - 70)  BP: 127/80 (12-12-21 @ 11:56) (127/80 - 151/90)  RR: 18 (12-12-21 @ 11:56) (18 - 18)  SpO2: 97% (12-12-21 @ 11:56) (96% - 98%)  I&O's Summary    11 Dec 2021 07:01  -  12 Dec 2021 07:00  --------------------------------------------------------  IN: 350 mL / OUT: 1250 mL / NET: -900 mL      GENERAL: NAD, well-developed, seated in bed  HEAD:  Atraumatic, Normocephalic  EYES: EOMI, PERRLA, conjunctiva and sclera clear  NECK: Supple, No JVD  CHEST/LUNG: Clear to auscultation bilaterally; No wheeze  HEART: Regular rate and rhythm; No murmurs, rubs, or gallops  ABDOMEN: Soft, Nontender, Nondistended; Bowel sounds present  EXTREMITIES:  2+ Peripheral Pulses, No clubbing, cyanosis, or edema  Neuro: b/l LE paraplegia, 5/5 UE  PSYCH: Alert, oriented to person and place    LABS:  CAPILLARY BLOOD GLUCOSE                            RADIOLOGY & ADDITIONAL TESTS:    Telemetry Personally Reviewed -     Imaging Personally Reviewed -     Imaging Reviewed -     Consultant(s) Notes Reviewed -       Care Discussed with Consultants/Other Providers -
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
## 2021-12-12 NOTE — PROGRESS NOTE ADULT - PROBLEM SELECTOR PLAN 3
Sepsis present on admission likely d/t infected decubitus ulcer, sepsis now RESOLVED  CT of abdomen showed increased stranding in the left buttock soft tissue overlying the sacral decubitus ulcer with minimally increased bone erosion of the underlying coccyx   Blood culture NGTD   Completed w/ IV unasyn, appreciate ID rec;s-- treating presumed CAP, but etiology of fevers currently unclear- can be transitioned to augmentin on discharge.   Sacral wound- unstageable , L buttock wound Debrided by wound care on Monday 11/29- as explained by wound car RN [facility]- out patient f/u with Dr Brenner  Remains afebrile 12/4  Received auth on hospital bed; will need SNF/LTC for wound care

## 2021-12-13 LAB
ANION GAP SERPL CALC-SCNC: 9 MMOL/L — SIGNIFICANT CHANGE UP (ref 7–14)
BUN SERPL-MCNC: 16 MG/DL — SIGNIFICANT CHANGE UP (ref 7–23)
CALCIUM SERPL-MCNC: 8.9 MG/DL — SIGNIFICANT CHANGE UP (ref 8.4–10.5)
CHLORIDE SERPL-SCNC: 93 MMOL/L — LOW (ref 98–107)
CO2 SERPL-SCNC: 32 MMOL/L — HIGH (ref 22–31)
CREAT SERPL-MCNC: 0.55 MG/DL — SIGNIFICANT CHANGE UP (ref 0.5–1.3)
GLUCOSE SERPL-MCNC: 98 MG/DL — SIGNIFICANT CHANGE UP (ref 70–99)
HCT VFR BLD CALC: 35.6 % — LOW (ref 39–50)
HGB BLD-MCNC: 11.1 G/DL — LOW (ref 13–17)
MAGNESIUM SERPL-MCNC: 1.9 MG/DL — SIGNIFICANT CHANGE UP (ref 1.6–2.6)
MCHC RBC-ENTMCNC: 28.9 PG — SIGNIFICANT CHANGE UP (ref 27–34)
MCHC RBC-ENTMCNC: 31.2 GM/DL — LOW (ref 32–36)
MCV RBC AUTO: 92.7 FL — SIGNIFICANT CHANGE UP (ref 80–100)
NRBC # BLD: 0 /100 WBCS — SIGNIFICANT CHANGE UP
NRBC # FLD: 0 K/UL — SIGNIFICANT CHANGE UP
PHOSPHATE SERPL-MCNC: 4 MG/DL — SIGNIFICANT CHANGE UP (ref 2.5–4.5)
PLATELET # BLD AUTO: 144 K/UL — LOW (ref 150–400)
POTASSIUM SERPL-MCNC: 4 MMOL/L — SIGNIFICANT CHANGE UP (ref 3.5–5.3)
POTASSIUM SERPL-SCNC: 4 MMOL/L — SIGNIFICANT CHANGE UP (ref 3.5–5.3)
RBC # BLD: 3.84 M/UL — LOW (ref 4.2–5.8)
RBC # FLD: 14.8 % — HIGH (ref 10.3–14.5)
SODIUM SERPL-SCNC: 134 MMOL/L — LOW (ref 135–145)
VALPROATE SERPL-MCNC: 73.1 UG/ML — SIGNIFICANT CHANGE UP (ref 50–100)
WBC # BLD: 3.67 K/UL — LOW (ref 3.8–10.5)
WBC # FLD AUTO: 3.67 K/UL — LOW (ref 3.8–10.5)

## 2021-12-13 PROCEDURE — 99232 SBSQ HOSP IP/OBS MODERATE 35: CPT

## 2021-12-13 RX ADMIN — Medication 50 MILLIGRAM(S): at 05:47

## 2021-12-13 RX ADMIN — Medication 1 TABLET(S): at 11:26

## 2021-12-13 RX ADMIN — METHADONE HYDROCHLORIDE 10 MILLIGRAM(S): 40 TABLET ORAL at 11:24

## 2021-12-13 RX ADMIN — POLYETHYLENE GLYCOL 3350 17 GRAM(S): 17 POWDER, FOR SOLUTION ORAL at 17:28

## 2021-12-13 RX ADMIN — ENOXAPARIN SODIUM 40 MILLIGRAM(S): 100 INJECTION SUBCUTANEOUS at 11:24

## 2021-12-13 RX ADMIN — Medication 1 TABLET(S): at 11:27

## 2021-12-13 RX ADMIN — POLYETHYLENE GLYCOL 3350 17 GRAM(S): 17 POWDER, FOR SOLUTION ORAL at 05:48

## 2021-12-13 RX ADMIN — DIVALPROEX SODIUM 250 MILLIGRAM(S): 500 TABLET, DELAYED RELEASE ORAL at 05:48

## 2021-12-13 RX ADMIN — Medication 81 MILLIGRAM(S): at 11:25

## 2021-12-13 RX ADMIN — Medication 325 MILLIGRAM(S): at 11:26

## 2021-12-13 RX ADMIN — DIVALPROEX SODIUM 250 MILLIGRAM(S): 500 TABLET, DELAYED RELEASE ORAL at 17:27

## 2021-12-13 RX ADMIN — Medication 5 MILLIGRAM(S): at 11:26

## 2021-12-13 NOTE — PROGRESS NOTE ADULT - PROBLEM SELECTOR PLAN 8
- CT of abdomen showed distended urinary bladder. Recommend clinical correlation to assess urinary retention. Layering of stones in the left bladder diverticulum. Did not have a chronic indwelling gentile prior to this hospitalization. UA on admission with moderate bacteria, however no urine culture done.  - Bladder scans Q 8hrs for evaluation of urinary retention  - TOV starting 11/19, passed, transitioned to condom cath--> however, then started retaining again, indwelling gentile again placed.
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
## 2021-12-13 NOTE — PROGRESS NOTE ADULT - SUBJECTIVE AND OBJECTIVE BOX
Patient is a 71y old  Male who presents with a chief complaint of Infected bedsores (12 Dec 2021 12:43)      SUBJECTIVE / OVERNIGHT EVENTS: refused methadone and COVID swab ON   ADDITIONAL REVIEW OF SYSTEMS:    MEDICATIONS  (STANDING):  aspirin enteric coated 81 milliGRAM(s) Oral daily  atorvastatin 40 milliGRAM(s) Oral at bedtime  bisacodyl 5 milliGRAM(s) Oral daily  calcium carbonate 1250 mG  + Vitamin D (OsCal 500 + D) 1 Tablet(s) Oral daily  diVALproex  milliGRAM(s) Oral two times a day  enoxaparin Injectable 40 milliGRAM(s) SubCutaneous daily  ferrous    sulfate 325 milliGRAM(s) Oral daily  methadone   Solution 10 milliGRAM(s) Oral daily  metoprolol succinate ER 50 milliGRAM(s) Oral daily  multivitamin 1 Tablet(s) Oral daily  polyethylene glycol 3350 17 Gram(s) Oral two times a day  senna 2 Tablet(s) Oral at bedtime  tamsulosin 0.4 milliGRAM(s) Oral at bedtime    MEDICATIONS  (PRN):  melatonin 3 milliGRAM(s) Oral at bedtime PRN Insomnia      CAPILLARY BLOOD GLUCOSE        I&O's Summary      PHYSICAL EXAM:  Vital Signs Last 24 Hrs  T(C): 36.9 (13 Dec 2021 05:40), Max: 37.1 (12 Dec 2021 11:56)  T(F): 98.4 (13 Dec 2021 05:40), Max: 98.7 (12 Dec 2021 11:56)  HR: 60 (13 Dec 2021 05:40) (60 - 66)  BP: 180/85 (13 Dec 2021 05:40) (127/80 - 180/85)  BP(mean): --  RR: 17 (13 Dec 2021 05:40) (17 - 18)  SpO2: 96% (13 Dec 2021 05:40) (95% - 97%)  CONSTITUTIONAL: NAD, well-developed, well-groomed  EYES: PERRLA; conjunctiva and sclera clear  ENMT: Moist oral mucosa, no pharyngeal injection or exudates; normal dentition  NECK: Supple, no palpable masses; no thyromegaly  RESPIRATORY: Normal respiratory effort; lungs are clear to auscultation bilaterally  CARDIOVASCULAR: Regular rate and rhythm, normal S1 and S2, no murmur/rub/gallop; No lower extremity edema; Peripheral pulses are 2+ bilaterally  ABDOMEN: Nontender to palpation, normoactive bowel sounds, no rebound/guarding; No hepatosplenomegaly  MUSCULOSKELETAL:  Normal gait; no clubbing or cyanosis of digits; no joint swelling or tenderness to palpation  PSYCH: A+O to person, place, and time; affect appropriate  NEUROLOGY: CN 2-12 are intact and symmetric; no gross sensory deficits   SKIN: No rashes; no palpable lesions    LABS:                      RADIOLOGY & ADDITIONAL TESTS:  Results Reviewed:   Imaging Personally Reviewed:  Electrocardiogram Personally Reviewed:    COORDINATION OF CARE:  Care Discussed with Consultants/Other Providers [Y/N]:  Prior or Outpatient Records Reviewed [Y/N]:

## 2021-12-13 NOTE — PROGRESS NOTE ADULT - PROBLEM SELECTOR PLAN 4
- Wound care consult  - Sacral decub inf wounds- completed treatment 11/ 27- need wound care and out patient f/u with wound care and Hosp bed-Sacral wound- unstageable , L buttock wound Debrided by wound care   - 11/29- as explained by wound car RN [facility]- out patient f/u with Dr Brenner    methadone resumed 12/4    May benefit from SNF for wound care; CM investigating and discussing w/guardian

## 2021-12-13 NOTE — PROGRESS NOTE ADULT - PROBLEM SELECTOR PLAN 1
- 12/3 RRT and code stroke, CTH showing < from: CT Angio Head w/ IV Cont (12.03.21 @ 16:58) >  Apparent left frontotemporal hypodensity on noncontrast CT, favored to be artifactual due to sulcal volume averaging and motion, as detailed above. Acute infarction is felt to be less likely given preserved appearance of gray-white matter junction in this region on venous postcontrast images  - d/w neuro, given clinical improvement, no new deficits, pt A+Ox3, transient event likely toxic/metabolic  - VPA 90, borderline, f/u neuro regarding downtitrating to 250 bid  - routine EEG artifact but no epileptic form seen  - MRI brain completed, awaiting read, will f/u neuro recs  - VPA level elevated, neuro recommended VPA 250mg bid on discontinue

## 2021-12-13 NOTE — PROGRESS NOTE ADULT - PROBLEM SELECTOR PLAN 9
- CT A/P w/ hepatic lesions and CBD dilatation, MRCP completed, no active cirrhosis, outpatient surveillance

## 2021-12-13 NOTE — PROGRESS NOTE ADULT - PROBLEM SELECTOR PLAN 3
- Sepsis present on admission likely d/t infected decubitus ulcer, sepsis now RESOLVED  CT of abdomen showed increased stranding in the left buttock soft tissue overlying the sacral decubitus ulcer with minimally increased bone erosion of the underlying coccyx   - Blood culture NGTD   - Completed w/ IV unasyn, appreciate ID rec;s-- treating presumed CAP  - Sacral wound- unstageable , L buttock wound Debrided by wound care on Monday 11/29- as explained by - wound car RN [facility]- out patient f/u with Dr Brenner  - Remains afebrile 12/4  - Received auth on hospital bed; will need SNF/LTC for wound care

## 2021-12-14 DIAGNOSIS — D69.6 THROMBOCYTOPENIA, UNSPECIFIED: ICD-10-CM

## 2021-12-14 DIAGNOSIS — E87.1 HYPO-OSMOLALITY AND HYPONATREMIA: ICD-10-CM

## 2021-12-14 LAB
ANION GAP SERPL CALC-SCNC: 10 MMOL/L — SIGNIFICANT CHANGE UP (ref 7–14)
BUN SERPL-MCNC: 16 MG/DL — SIGNIFICANT CHANGE UP (ref 7–23)
CALCIUM SERPL-MCNC: 8.7 MG/DL — SIGNIFICANT CHANGE UP (ref 8.4–10.5)
CHLORIDE SERPL-SCNC: 94 MMOL/L — LOW (ref 98–107)
CO2 SERPL-SCNC: 31 MMOL/L — SIGNIFICANT CHANGE UP (ref 22–31)
CREAT SERPL-MCNC: 0.54 MG/DL — SIGNIFICANT CHANGE UP (ref 0.5–1.3)
GLUCOSE SERPL-MCNC: 90 MG/DL — SIGNIFICANT CHANGE UP (ref 70–99)
HCT VFR BLD CALC: 34.5 % — LOW (ref 39–50)
HGB BLD-MCNC: 11.4 G/DL — LOW (ref 13–17)
MAGNESIUM SERPL-MCNC: 1.9 MG/DL — SIGNIFICANT CHANGE UP (ref 1.6–2.6)
MCHC RBC-ENTMCNC: 29.3 PG — SIGNIFICANT CHANGE UP (ref 27–34)
MCHC RBC-ENTMCNC: 33 GM/DL — SIGNIFICANT CHANGE UP (ref 32–36)
MCV RBC AUTO: 88.7 FL — SIGNIFICANT CHANGE UP (ref 80–100)
NRBC # BLD: 0 /100 WBCS — SIGNIFICANT CHANGE UP
NRBC # FLD: 0 K/UL — SIGNIFICANT CHANGE UP
PHOSPHATE SERPL-MCNC: 4 MG/DL — SIGNIFICANT CHANGE UP (ref 2.5–4.5)
PLATELET # BLD AUTO: 149 K/UL — LOW (ref 150–400)
POTASSIUM SERPL-MCNC: 4.1 MMOL/L — SIGNIFICANT CHANGE UP (ref 3.5–5.3)
POTASSIUM SERPL-SCNC: 4.1 MMOL/L — SIGNIFICANT CHANGE UP (ref 3.5–5.3)
RBC # BLD: 3.89 M/UL — LOW (ref 4.2–5.8)
RBC # FLD: 15 % — HIGH (ref 10.3–14.5)
SARS-COV-2 RNA SPEC QL NAA+PROBE: SIGNIFICANT CHANGE UP
SODIUM SERPL-SCNC: 135 MMOL/L — SIGNIFICANT CHANGE UP (ref 135–145)
WBC # BLD: 3.3 K/UL — LOW (ref 3.8–10.5)
WBC # FLD AUTO: 3.3 K/UL — LOW (ref 3.8–10.5)

## 2021-12-14 PROCEDURE — 99232 SBSQ HOSP IP/OBS MODERATE 35: CPT

## 2021-12-14 RX ORDER — METOPROLOL TARTRATE 50 MG
75 TABLET ORAL DAILY
Refills: 0 | Status: DISCONTINUED | OUTPATIENT
Start: 2021-12-15 | End: 2022-01-18

## 2021-12-14 RX ORDER — METOPROLOL TARTRATE 50 MG
25 TABLET ORAL ONCE
Refills: 0 | Status: DISCONTINUED | OUTPATIENT
Start: 2021-12-14 | End: 2021-12-15

## 2021-12-14 RX ADMIN — METHADONE HYDROCHLORIDE 10 MILLIGRAM(S): 40 TABLET ORAL at 11:38

## 2021-12-14 RX ADMIN — Medication 3 MILLIGRAM(S): at 21:52

## 2021-12-14 RX ADMIN — TAMSULOSIN HYDROCHLORIDE 0.4 MILLIGRAM(S): 0.4 CAPSULE ORAL at 21:52

## 2021-12-14 RX ADMIN — Medication 50 MILLIGRAM(S): at 06:09

## 2021-12-14 RX ADMIN — DIVALPROEX SODIUM 250 MILLIGRAM(S): 500 TABLET, DELAYED RELEASE ORAL at 06:09

## 2021-12-14 RX ADMIN — ATORVASTATIN CALCIUM 40 MILLIGRAM(S): 80 TABLET, FILM COATED ORAL at 21:52

## 2021-12-14 RX ADMIN — Medication 325 MILLIGRAM(S): at 11:44

## 2021-12-14 RX ADMIN — Medication 81 MILLIGRAM(S): at 11:43

## 2021-12-14 RX ADMIN — Medication 5 MILLIGRAM(S): at 11:44

## 2021-12-14 RX ADMIN — POLYETHYLENE GLYCOL 3350 17 GRAM(S): 17 POWDER, FOR SOLUTION ORAL at 06:09

## 2021-12-14 RX ADMIN — SENNA PLUS 2 TABLET(S): 8.6 TABLET ORAL at 21:52

## 2021-12-14 RX ADMIN — Medication 1 TABLET(S): at 11:45

## 2021-12-14 RX ADMIN — Medication 1 TABLET(S): at 11:44

## 2021-12-14 RX ADMIN — ENOXAPARIN SODIUM 40 MILLIGRAM(S): 100 INJECTION SUBCUTANEOUS at 11:42

## 2021-12-14 NOTE — PROGRESS NOTE ADULT - PROBLEM SELECTOR PLAN 6
- Sepsis present on admission likely d/t infected decubitus ulcer, sepsis now RESOLVED  CT of abdomen showed increased stranding in the left buttock soft tissue overlying the sacral decubitus ulcer with minimally increased bone erosion of the underlying coccyx   - Blood culture NGTD   - Completed w/ IV unasyn, appreciate ID rec;s-- treating presumed CAP  - Sacral wound- unstageable , L buttock wound Debrided by wound care on Monday 11/29- as explained by - wound car RN [facility]- out patient f/u with Dr Brenner  - Remains afebrile 12/4  - Received auth on hospital bed; will need SNF/LTC for wound care  - plan for SNF

## 2021-12-14 NOTE — PROGRESS NOTE ADULT - PROBLEM SELECTOR PLAN 5
- seen on telemetry, no CP, no palpitations, HD stable   - cardiology recommended outpatient titration of metoprolol; patient is asymptomatic.  -TTE shows mild segmental LV systolic dysfunction and hypokinesis of inferolateral wall; cardiology recs reviewed, will c/w medical management  - BP increase metoprolol to 75 ER

## 2021-12-14 NOTE — PROGRESS NOTE ADULT - PROBLEM SELECTOR PLAN 1
- 12/3 RRT and code stroke, CTH showing < from: CT Angio Head w/ IV Cont (12.03.21 @ 16:58) >  Apparent left frontotemporal hypodensity on noncontrast CT, favored to be artifactual due to sulcal volume averaging and motion, as detailed above. Acute infarction is felt to be less likely given preserved appearance of gray-white matter junction in this region on venous postcontrast images  - d/w neuro, given clinical improvement, no new deficits, pt A+Ox3, transient event likely toxic/metabolic  - VPA 90, borderline, f/u neuro regarding downtitrating to 250 bid  - routine EEG artifact but no epileptic form seen  - MRI brain neg for CVA  - VPA level elevated, neuro recommended VPA 250mg bid on discharge   - VPA now with normal limits - 12/3 RRT and code stroke, CTH showing   Apparent left frontotemporal hypodensity on noncontrast CT, favored to be artifactual due to sulcal volume averaging and motion, as detailed above. Acute infarction is felt to be less likely given preserved appearance of gray-white matter junction in this region on venous postcontrast images  - d/w neuro, given clinical improvement, no new deficits, pt A+Ox3, transient event likely toxic/metabolic  - VPA 90, borderline, f/u neuro regarding downtitrating to 250 bid  - routine EEG artifact but no epileptic form seen  - MRI brain neg for CVA  - VPA level elevated, neuro recommended VPA 250mg bid on discharge   - VPA now with normal limits

## 2021-12-14 NOTE — PROGRESS NOTE ADULT - PROBLEM SELECTOR PLAN 7
- Wound care consult  - Sacral decub inf wounds- completed treatment 11/ 27- need wound care and out patient f/u with wound care and Hosp bed-Sacral wound- unstageable , L buttock wound Debrided by wound care   - 11/29- as explained by wound car RN [facility]- out patient f/u with Dr Brenner  - methadone resumed 12/4  - May benefit from SNF for wound care; CM investigating and discussing w/guardian - Wound care consult  - Sacral decub inf wounds- completed treatment 11/ 27- need wound care and out patient f/u with wound care and Hosp bed-Sacral wound- unstageable , L buttock wound Debrided by wound care   - 11/29- as explained by wound car RN [facility]- out patient f/u with Dr Brenner  - methadone resumed 12/4  - May benefit from SNF for wound care;

## 2021-12-14 NOTE — PROGRESS NOTE ADULT - PROBLEM SELECTOR PLAN 2
- DASH/TLC diet  - Metoprolol 75 ER qd increased  - BP in 150's primarily - DASH/TLC diet  - BP in 150's primarily  - Metoprolol 75 ER qd increased

## 2021-12-14 NOTE — PROGRESS NOTE ADULT - PROBLEM SELECTOR PLAN 7
- Wound care consult  - Sacral decub inf wounds- completed treatment 11/ 27- need wound care and out patient f/u with wound care and Hosp bed-Sacral wound- unstageable , L buttock wound Debrided by wound care   - 11/29- as explained by wound car RN [facility]- out patient f/u with Dr Brenner  - methadone resumed 12/4  - May benefit from SNF for wound care; CM investigating and discussing w/guardian
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
## 2021-12-14 NOTE — PROGRESS NOTE ADULT - SUBJECTIVE AND OBJECTIVE BOX
Patient is a 71y old  Male who presents with a chief complaint of Infected bedsores (13 Dec 2021 08:35)      SUBJECTIVE / OVERNIGHT EVENTS: BP in 150's primarily   ADDITIONAL REVIEW OF SYSTEMS:    MEDICATIONS  (STANDING):  aspirin enteric coated 81 milliGRAM(s) Oral daily  atorvastatin 40 milliGRAM(s) Oral at bedtime  bisacodyl 5 milliGRAM(s) Oral daily  calcium carbonate 1250 mG  + Vitamin D (OsCal 500 + D) 1 Tablet(s) Oral daily  diVALproex  milliGRAM(s) Oral two times a day  enoxaparin Injectable 40 milliGRAM(s) SubCutaneous daily  ferrous    sulfate 325 milliGRAM(s) Oral daily  methadone   Solution 10 milliGRAM(s) Oral daily  metoprolol succinate ER 50 milliGRAM(s) Oral daily  multivitamin 1 Tablet(s) Oral daily  polyethylene glycol 3350 17 Gram(s) Oral two times a day  senna 2 Tablet(s) Oral at bedtime  tamsulosin 0.4 milliGRAM(s) Oral at bedtime    MEDICATIONS  (PRN):  melatonin 3 milliGRAM(s) Oral at bedtime PRN Insomnia      CAPILLARY BLOOD GLUCOSE        I&O's Summary    13 Dec 2021 07:01  -  14 Dec 2021 07:00  --------------------------------------------------------  IN: 120 mL / OUT: 1925 mL / NET: -1805 mL        PHYSICAL EXAM:  Vital Signs Last 24 Hrs  T(C): 36.6 (14 Dec 2021 06:00), Max: 36.9 (13 Dec 2021 17:30)  T(F): 97.9 (14 Dec 2021 06:00), Max: 98.4 (13 Dec 2021 17:30)  HR: 98 (14 Dec 2021 06:00) (67 - 98)  BP: 150/92 (14 Dec 2021 06:00) (140/91 - 164/100)  BP(mean): --  RR: 18 (14 Dec 2021 06:00) (18 - 19)  SpO2: 98% (14 Dec 2021 06:00) (97% - 98%)    GENERAL: NAD, well-developed, seated in bed  HEAD:  Atraumatic, Normocephalic  EYES: EOMI, PERRLA, conjunctiva and sclera clear  NECK: Supple, No JVD  CHEST/LUNG: Clear to auscultation bilaterally; No wheeze  HEART: Regular rate and rhythm; No murmurs, rubs, or gallops  ABDOMEN: Soft, Nontender, Nondistended; Bowel sounds present  EXTREMITIES:  2+ Peripheral Pulses, No clubbing, cyanosis, or edema  Neuro: b/l LE paraplegia, 5/5 UE  PSYCH: Alert, oriented to person and place    LABS:                        11.1   3.67  )-----------( 144      ( 13 Dec 2021 12:29 )             35.6     12-13    134<L>  |  93<L>  |  16  ----------------------------<  98  4.0   |  32<H>  |  0.55    Ca    8.9      13 Dec 2021 12:29  Phos  4.0     12-13  Mg     1.90     12-13                  RADIOLOGY & ADDITIONAL TESTS:  Results Reviewed: < from: MR Head No Cont (12.09.21 @ 08:56) >  IMPRESSION:  No mass, abnormal signal, or evidence of acute cerebral ischemia.    < end of copied text >    Imaging Personally Reviewed:  Electrocardiogram Personally Reviewed:    COORDINATION OF CARE:  Care Discussed with Consultants/Other Providers [Y/N]:  Prior or Outpatient Records Reviewed [Y/N]:   Patient is a 71y old  Male who presents with a chief complaint of Infected bedsores (13 Dec 2021 08:35)      SUBJECTIVE / OVERNIGHT EVENTS: BP in 150's primarily   ADDITIONAL REVIEW OF SYSTEMS: denies N/V     MEDICATIONS  (STANDING):  aspirin enteric coated 81 milliGRAM(s) Oral daily  atorvastatin 40 milliGRAM(s) Oral at bedtime  bisacodyl 5 milliGRAM(s) Oral daily  calcium carbonate 1250 mG  + Vitamin D (OsCal 500 + D) 1 Tablet(s) Oral daily  diVALproex  milliGRAM(s) Oral two times a day  enoxaparin Injectable 40 milliGRAM(s) SubCutaneous daily  ferrous    sulfate 325 milliGRAM(s) Oral daily  methadone   Solution 10 milliGRAM(s) Oral daily  metoprolol succinate ER 50 milliGRAM(s) Oral daily  multivitamin 1 Tablet(s) Oral daily  polyethylene glycol 3350 17 Gram(s) Oral two times a day  senna 2 Tablet(s) Oral at bedtime  tamsulosin 0.4 milliGRAM(s) Oral at bedtime    MEDICATIONS  (PRN):  melatonin 3 milliGRAM(s) Oral at bedtime PRN Insomnia      CAPILLARY BLOOD GLUCOSE        I&O's Summary    13 Dec 2021 07:01  -  14 Dec 2021 07:00  --------------------------------------------------------  IN: 120 mL / OUT: 1925 mL / NET: -1805 mL        PHYSICAL EXAM:  Vital Signs Last 24 Hrs  T(C): 36.6 (14 Dec 2021 06:00), Max: 36.9 (13 Dec 2021 17:30)  T(F): 97.9 (14 Dec 2021 06:00), Max: 98.4 (13 Dec 2021 17:30)  HR: 98 (14 Dec 2021 06:00) (67 - 98)  BP: 150/92 (14 Dec 2021 06:00) (140/91 - 164/100)  BP(mean): --  RR: 18 (14 Dec 2021 06:00) (18 - 19)  SpO2: 98% (14 Dec 2021 06:00) (97% - 98%)    GENERAL: NAD, well-developed, seated in bed  HEAD:  Atraumatic, Normocephalic  EYES: EOMI, PERRLA, conjunctiva and sclera clear  NECK: Supple, No JVD  CHEST/LUNG: Clear to auscultation bilaterally; No wheeze  HEART: Regular rate and rhythm; No murmurs, rubs, or gallops  ABDOMEN: Soft, Nontender, Nondistended; Bowel sounds present  EXTREMITIES:  2+ Peripheral Pulses, No clubbing, cyanosis, or edema  Neuro: b/l LE paraplegia, 5/5 UE  PSYCH: Alert, oriented to person and place    LABS:                        11.1   3.67  )-----------( 144      ( 13 Dec 2021 12:29 )             35.6     12-13    134<L>  |  93<L>  |  16  ----------------------------<  98  4.0   |  32<H>  |  0.55    Ca    8.9      13 Dec 2021 12:29  Phos  4.0     12-13  Mg     1.90     12-13                            11.4   3.30  )-----------( 149      ( 14 Dec 2021 12:14 )             34.5       12-14    135  |  94<L>  |  16  ----------------------------<  90  4.1   |  31  |  0.54    Ca    8.7      14 Dec 2021 12:14  Phos  4.0     12-14  Mg     1.90     12-14              RADIOLOGY & ADDITIONAL TESTS:  Results Reviewed: < from: MR Head No Cont (12.09.21 @ 08:56) >  IMPRESSION:  No mass, abnormal signal, or evidence of acute cerebral ischemia.    < end of copied text >    Imaging Personally Reviewed:  Electrocardiogram Personally Reviewed:    COORDINATION OF CARE:  Care Discussed with Consultants/Other Providers [Y/N]:  Prior or Outpatient Records Reviewed [Y/N]:

## 2021-12-14 NOTE — PROGRESS NOTE ADULT - SUBJECTIVE AND OBJECTIVE BOX
Patient is a 71y old  Male who presents with a chief complaint of Infected bedsores (14 Dec 2021 09:01)      SUBJECTIVE / OVERNIGHT EVENTS: nO acute events ON   ADDITIONAL REVIEW OF SYSTEMS: denies CP/SOB    MEDICATIONS  (STANDING):  aspirin enteric coated 81 milliGRAM(s) Oral daily  atorvastatin 40 milliGRAM(s) Oral at bedtime  bisacodyl 5 milliGRAM(s) Oral daily  calcium carbonate 1250 mG  + Vitamin D (OsCal 500 + D) 1 Tablet(s) Oral daily  diVALproex  milliGRAM(s) Oral two times a day  enoxaparin Injectable 40 milliGRAM(s) SubCutaneous daily  ferrous    sulfate 325 milliGRAM(s) Oral daily  methadone   Solution 10 milliGRAM(s) Oral daily  metoprolol succinate ER 25 milliGRAM(s) Oral once  multivitamin 1 Tablet(s) Oral daily  polyethylene glycol 3350 17 Gram(s) Oral two times a day  senna 2 Tablet(s) Oral at bedtime  tamsulosin 0.4 milliGRAM(s) Oral at bedtime    MEDICATIONS  (PRN):  melatonin 3 milliGRAM(s) Oral at bedtime PRN Insomnia      CAPILLARY BLOOD GLUCOSE        I&O's Summary    13 Dec 2021 07:01  -  14 Dec 2021 07:00  --------------------------------------------------------  IN: 120 mL / OUT: 1925 mL / NET: -1805 mL        PHYSICAL EXAM:  Vital Signs Last 24 Hrs  T(C): 36.6 (14 Dec 2021 11:45), Max: 36.9 (13 Dec 2021 17:30)  T(F): 97.8 (14 Dec 2021 11:45), Max: 98.4 (13 Dec 2021 17:30)  HR: 58 (14 Dec 2021 11:45) (58 - 98)  BP: 148/88 (14 Dec 2021 11:45) (140/91 - 164/100)  BP(mean): --  RR: 18 (14 Dec 2021 11:45) (18 - 19)  SpO2: 95% (14 Dec 2021 11:45) (95% - 98%)    GENERAL: NAD, well-developed, seated in bed  HEAD:  Atraumatic, Normocephalic  EYES: EOMI, PERRLA, conjunctiva and sclera clear  NECK: Supple, No JVD  CHEST/LUNG: Clear to auscultation bilaterally; No wheeze  HEART: Regular rate and rhythm; No murmurs, rubs, or gallops  ABDOMEN: Soft, Nontender, Nondistended; Bowel sounds present  EXTREMITIES:  2+ Peripheral Pulses, No clubbing, cyanosis, or edema  Neuro: b/l LE paraplegia, 5/5 UE  PSYCH: Alert, oriented to person and place    LABS:                        11.4   3.30  )-----------( 149      ( 14 Dec 2021 12:14 )             34.5     12-14    135  |  94<L>  |  16  ----------------------------<  90  4.1   |  31  |  0.54    Ca    8.7      14 Dec 2021 12:14  Phos  4.0     12-14  Mg     1.90     12-14                  RADIOLOGY & ADDITIONAL TESTS:  Results Reviewed:   Imaging Personally Reviewed:  Electrocardiogram Personally Reviewed:    COORDINATION OF CARE:  Care Discussed with Consultants/Other Providers [Y/N]:  Prior or Outpatient Records Reviewed [Y/N]:
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
## 2021-12-14 NOTE — PROGRESS NOTE ADULT - PROBLEM SELECTOR PLAN 1
- 12/3 RRT and code stroke, CTH showing < from: CT Angio Head w/ IV Cont (12.03.21 @ 16:58) >  Apparent left frontotemporal hypodensity on noncontrast CT, favored to be artifactual due to sulcal volume averaging and motion, as detailed above. Acute infarction is felt to be less likely given preserved appearance of gray-white matter junction in this region on venous postcontrast images  - d/w neuro, given clinical improvement, no new deficits, pt A+Ox3, transient event likely toxic/metabolic  - VPA 90, borderline, f/u neuro regarding downtitrating to 250 bid  - routine EEG artifact but no epileptic form seen  - MRI brain neg for CVA  - VPA level elevated, neuro recommended VPA 250mg bid on discharge   - VPA now with normal limits

## 2021-12-15 PROCEDURE — 99232 SBSQ HOSP IP/OBS MODERATE 35: CPT

## 2021-12-15 RX ADMIN — ATORVASTATIN CALCIUM 40 MILLIGRAM(S): 80 TABLET, FILM COATED ORAL at 21:53

## 2021-12-15 RX ADMIN — METHADONE HYDROCHLORIDE 10 MILLIGRAM(S): 40 TABLET ORAL at 12:12

## 2021-12-15 RX ADMIN — DIVALPROEX SODIUM 250 MILLIGRAM(S): 500 TABLET, DELAYED RELEASE ORAL at 05:47

## 2021-12-15 RX ADMIN — ENOXAPARIN SODIUM 40 MILLIGRAM(S): 100 INJECTION SUBCUTANEOUS at 12:12

## 2021-12-15 RX ADMIN — POLYETHYLENE GLYCOL 3350 17 GRAM(S): 17 POWDER, FOR SOLUTION ORAL at 05:48

## 2021-12-15 RX ADMIN — Medication 1 TABLET(S): at 12:12

## 2021-12-15 RX ADMIN — SENNA PLUS 2 TABLET(S): 8.6 TABLET ORAL at 21:52

## 2021-12-15 RX ADMIN — Medication 325 MILLIGRAM(S): at 12:13

## 2021-12-15 RX ADMIN — Medication 81 MILLIGRAM(S): at 12:12

## 2021-12-15 RX ADMIN — Medication 5 MILLIGRAM(S): at 12:13

## 2021-12-15 RX ADMIN — DIVALPROEX SODIUM 250 MILLIGRAM(S): 500 TABLET, DELAYED RELEASE ORAL at 17:00

## 2021-12-15 RX ADMIN — TAMSULOSIN HYDROCHLORIDE 0.4 MILLIGRAM(S): 0.4 CAPSULE ORAL at 21:52

## 2021-12-15 RX ADMIN — Medication 75 MILLIGRAM(S): at 05:53

## 2021-12-15 NOTE — PROGRESS NOTE ADULT - PROBLEM SELECTOR PLAN 6
- Sepsis present on admission likely d/t infected decubitus ulcer/PNA, sepsis now RESOLVED  CT of abdomen showed increased stranding in the left buttock soft tissue overlying the sacral decubitus ulcer with minimally increased bone erosion of the underlying coccyx   - Blood culture NGTD   - xray concern for poss PNA  - Completed w/ IV unasyn, appreciate ID rec;s-- treating presumed CAP  - Sacral wound- unstageable wound care outpt f/u   - Remains afebrile 12/4  - Received auth on hospital bed;   - will need SNF/LTC for wound care

## 2021-12-15 NOTE — PROGRESS NOTE ADULT - PROBLEM SELECTOR PLAN 1
- 12/3 RRT and code stroke, CTH showing   - Apparent left frontotemporal hypodensity on noncontrast CT, favored to be artifactual due to sulcal volume averaging and motion, as detailed above. Acute infarction is felt to be less likely given preserved appearance of gray-white matter junction in this region on venous postcontrast images  - d/w neuro, given clinical improvement, no new deficits, pt A+Ox3, transient event likely toxic/metabolic  - VPA 90, borderline, f/u neuro regarding downtitrating to 250 bid  - routine EEG artifact but no epileptic form seen  - MRI brain neg for CVA  - VPA level elevated, neuro recommended VPA 250mg bid on discharge   - VPA now with normal limits

## 2021-12-15 NOTE — PROGRESS NOTE ADULT - PROBLEM SELECTOR PLAN 7
- Wound care consult  - Sacral decub inf wounds- completed treatment 11/ 27- need wound care and out patient f/u with wound care and Hosp bed-Sacral wound- unstageable , L buttock wound Debrided by wound care   - 11/29- as explained by wound car RN [place]utal- out patient f/u with Dr Brenner  - methadone resumed 12/4  - May benefit from SNF for wound care;
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
## 2021-12-15 NOTE — PROGRESS NOTE ADULT - PROBLEM SELECTOR PLAN 5
- seen on telemetry, no CP, no palpitations, HD stable   - cardiology recommended outpatient titration of metoprolol; patient is asymptomatic.  -TTE shows mild segmental LV systolic dysfunction and hypokinesis of inferolateral wall; cardiology recs reviewed, will c/w medical management  - metoprolol to 75 ER

## 2021-12-15 NOTE — PROGRESS NOTE ADULT - SUBJECTIVE AND OBJECTIVE BOX
Patient is a 71y old  Male who presents with a chief complaint of Infected bedsores (14 Dec 2021 09:01)      SUBJECTIVE / OVERNIGHT EVENTS: pt in NAD. afebrile   ADDITIONAL REVIEW OF SYSTEMS: denies N/V     MEDICATIONS  (STANDING):  aspirin enteric coated 81 milliGRAM(s) Oral daily  atorvastatin 40 milliGRAM(s) Oral at bedtime  bisacodyl 5 milliGRAM(s) Oral daily  calcium carbonate 1250 mG  + Vitamin D (OsCal 500 + D) 1 Tablet(s) Oral daily  diVALproex  milliGRAM(s) Oral two times a day  enoxaparin Injectable 40 milliGRAM(s) SubCutaneous daily  ferrous    sulfate 325 milliGRAM(s) Oral daily  methadone   Solution 10 milliGRAM(s) Oral daily  metoprolol succinate ER 75 milliGRAM(s) Oral daily  multivitamin 1 Tablet(s) Oral daily  polyethylene glycol 3350 17 Gram(s) Oral two times a day  senna 2 Tablet(s) Oral at bedtime  tamsulosin 0.4 milliGRAM(s) Oral at bedtime    MEDICATIONS  (PRN):  melatonin 3 milliGRAM(s) Oral at bedtime PRN Insomnia      CAPILLARY BLOOD GLUCOSE        I&O's Summary    14 Dec 2021 07:01  -  15 Dec 2021 07:00  --------------------------------------------------------  IN: 250 mL / OUT: 550 mL / NET: -300 mL        PHYSICAL EXAM:  Vital Signs Last 24 Hrs  T(C): 37 (15 Dec 2021 05:40), Max: 37.2 (14 Dec 2021 21:45)  T(F): 98.6 (15 Dec 2021 05:40), Max: 98.9 (14 Dec 2021 21:45)  HR: 81 (15 Dec 2021 05:40) (58 - 81)  BP: 124/79 (15 Dec 2021 05:40) (124/79 - 152/94)  BP(mean): --  RR: 17 (15 Dec 2021 05:40) (17 - 18)  SpO2: 96% (15 Dec 2021 05:40) (95% - 97%)    GENERAL: NAD, well-developed, seated in bed  HEAD:  Atraumatic, Normocephalic  EYES: EOMI, PERRLA, conjunctiva and sclera clear  NECK: Supple, No JVD  CHEST/LUNG: Clear to auscultation bilaterally; No wheeze  HEART: Regular rate and rhythm; No murmurs, rubs, or gallops  ABDOMEN: Soft, Nontender, Nondistended; Bowel sounds present  EXTREMITIES:  2+ Peripheral Pulses, No clubbing, cyanosis, or edema  Neuro: b/l LE paraplegia, 5/5 UE  PSYCH: Alert, oriented to person and place    LABS:                        11.4   3.30  )-----------( 149      ( 14 Dec 2021 12:14 )             34.5     12-14    135  |  94<L>  |  16  ----------------------------<  90  4.1   |  31  |  0.54    Ca    8.7      14 Dec 2021 12:14  Phos  4.0     12-14  Mg     1.90     12-14                  RADIOLOGY & ADDITIONAL TESTS:  Results Reviewed:   Imaging Personally Reviewed:  Electrocardiogram Personally Reviewed:    COORDINATION OF CARE:  Care Discussed with Consultants/Other Providers [Y/N]:  Prior or Outpatient Records Reviewed [Y/N]:

## 2021-12-16 PROCEDURE — 99232 SBSQ HOSP IP/OBS MODERATE 35: CPT

## 2021-12-16 RX ORDER — METHADONE HYDROCHLORIDE 40 MG/1
10 TABLET ORAL DAILY
Refills: 0 | Status: DISCONTINUED | OUTPATIENT
Start: 2021-12-16 | End: 2021-12-23

## 2021-12-16 RX ADMIN — Medication 1 TABLET(S): at 12:07

## 2021-12-16 RX ADMIN — Medication 75 MILLIGRAM(S): at 06:33

## 2021-12-16 RX ADMIN — DIVALPROEX SODIUM 250 MILLIGRAM(S): 500 TABLET, DELAYED RELEASE ORAL at 06:33

## 2021-12-16 RX ADMIN — TAMSULOSIN HYDROCHLORIDE 0.4 MILLIGRAM(S): 0.4 CAPSULE ORAL at 21:21

## 2021-12-16 RX ADMIN — ENOXAPARIN SODIUM 40 MILLIGRAM(S): 100 INJECTION SUBCUTANEOUS at 12:06

## 2021-12-16 RX ADMIN — Medication 5 MILLIGRAM(S): at 12:08

## 2021-12-16 RX ADMIN — METHADONE HYDROCHLORIDE 10 MILLIGRAM(S): 40 TABLET ORAL at 21:20

## 2021-12-16 RX ADMIN — Medication 325 MILLIGRAM(S): at 12:08

## 2021-12-16 RX ADMIN — DIVALPROEX SODIUM 250 MILLIGRAM(S): 500 TABLET, DELAYED RELEASE ORAL at 17:30

## 2021-12-16 RX ADMIN — METHADONE HYDROCHLORIDE 10 MILLIGRAM(S): 40 TABLET ORAL at 12:05

## 2021-12-16 RX ADMIN — ATORVASTATIN CALCIUM 40 MILLIGRAM(S): 80 TABLET, FILM COATED ORAL at 21:20

## 2021-12-16 RX ADMIN — SENNA PLUS 2 TABLET(S): 8.6 TABLET ORAL at 21:20

## 2021-12-16 RX ADMIN — Medication 81 MILLIGRAM(S): at 12:06

## 2021-12-16 RX ADMIN — Medication 3 MILLIGRAM(S): at 21:21

## 2021-12-16 RX ADMIN — POLYETHYLENE GLYCOL 3350 17 GRAM(S): 17 POWDER, FOR SOLUTION ORAL at 17:29
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
## 2021-12-16 NOTE — PROGRESS NOTE ADULT - SUBJECTIVE AND OBJECTIVE BOX
Patient is a 71y old  Male who presents with a chief complaint of Infected bedsores (15 Dec 2021 07:58)      SUBJECTIVE / OVERNIGHT EVENTS: no acute events ON   ADDITIONAL REVIEW OF SYSTEMS: denies CP/SOB     MEDICATIONS  (STANDING):  aspirin enteric coated 81 milliGRAM(s) Oral daily  atorvastatin 40 milliGRAM(s) Oral at bedtime  bisacodyl 5 milliGRAM(s) Oral daily  calcium carbonate 1250 mG  + Vitamin D (OsCal 500 + D) 1 Tablet(s) Oral daily  diVALproex  milliGRAM(s) Oral two times a day  enoxaparin Injectable 40 milliGRAM(s) SubCutaneous daily  ferrous    sulfate 325 milliGRAM(s) Oral daily  methadone   Solution 10 milliGRAM(s) Oral daily  metoprolol succinate ER 75 milliGRAM(s) Oral daily  multivitamin 1 Tablet(s) Oral daily  polyethylene glycol 3350 17 Gram(s) Oral two times a day  senna 2 Tablet(s) Oral at bedtime  tamsulosin 0.4 milliGRAM(s) Oral at bedtime    MEDICATIONS  (PRN):  melatonin 3 milliGRAM(s) Oral at bedtime PRN Insomnia      CAPILLARY BLOOD GLUCOSE        I&O's Summary    15 Dec 2021 07:01  -  16 Dec 2021 07:00  --------------------------------------------------------  IN: 300 mL / OUT: 0 mL / NET: 300 mL        PHYSICAL EXAM:  Vital Signs Last 24 Hrs  T(C): 37 (16 Dec 2021 05:50), Max: 37 (16 Dec 2021 05:50)  T(F): 98.6 (16 Dec 2021 05:50), Max: 98.6 (16 Dec 2021 05:50)  HR: 71 (16 Dec 2021 05:50) (69 - 72)  BP: 144/89 (16 Dec 2021 05:50) (136/76 - 147/72)  BP(mean): --  RR: 18 (16 Dec 2021 05:50) (17 - 18)  SpO2: 96% (16 Dec 2021 05:50) (96% - 98%)    GENERAL: NAD, well-developed, seated in bed  HEAD:  Atraumatic, Normocephalic  EYES: EOMI, PERRLA, conjunctiva and sclera clear  NECK: Supple, No JVD  CHEST/LUNG: Clear to auscultation bilaterally; No wheeze  HEART: Regular rate and rhythm; No murmurs, rubs, or gallops  ABDOMEN: Soft, Nontender, Nondistended; Bowel sounds present  EXTREMITIES:  2+ Peripheral Pulses, No clubbing, cyanosis, or edema  Neuro: b/l LE paraplegia, 5/5 UE  PSYCH: Alert, oriented to person and place    LABS:                        11.4   3.30  )-----------( 149      ( 14 Dec 2021 12:14 )             34.5     12-14    135  |  94<L>  |  16  ----------------------------<  90  4.1   |  31  |  0.54    Ca    8.7      14 Dec 2021 12:14  Phos  4.0     12-14  Mg     1.90     12-14                  RADIOLOGY & ADDITIONAL TESTS:  Results Reviewed:   Imaging Personally Reviewed:  Electrocardiogram Personally Reviewed:    COORDINATION OF CARE:  Care Discussed with Consultants/Other Providers [Y/N]:  Prior or Outpatient Records Reviewed [Y/N]:

## 2021-12-17 PROCEDURE — 99232 SBSQ HOSP IP/OBS MODERATE 35: CPT

## 2021-12-17 RX ADMIN — TAMSULOSIN HYDROCHLORIDE 0.4 MILLIGRAM(S): 0.4 CAPSULE ORAL at 22:45

## 2021-12-17 RX ADMIN — DIVALPROEX SODIUM 250 MILLIGRAM(S): 500 TABLET, DELAYED RELEASE ORAL at 18:47

## 2021-12-17 RX ADMIN — ENOXAPARIN SODIUM 40 MILLIGRAM(S): 100 INJECTION SUBCUTANEOUS at 11:36

## 2021-12-17 RX ADMIN — Medication 1 TABLET(S): at 11:36

## 2021-12-17 RX ADMIN — Medication 325 MILLIGRAM(S): at 11:36

## 2021-12-17 RX ADMIN — SENNA PLUS 2 TABLET(S): 8.6 TABLET ORAL at 22:45

## 2021-12-17 RX ADMIN — Medication 75 MILLIGRAM(S): at 05:35

## 2021-12-17 RX ADMIN — ATORVASTATIN CALCIUM 40 MILLIGRAM(S): 80 TABLET, FILM COATED ORAL at 22:45

## 2021-12-17 RX ADMIN — Medication 81 MILLIGRAM(S): at 11:36

## 2021-12-17 RX ADMIN — Medication 5 MILLIGRAM(S): at 11:35

## 2021-12-17 RX ADMIN — DIVALPROEX SODIUM 250 MILLIGRAM(S): 500 TABLET, DELAYED RELEASE ORAL at 05:33

## 2021-12-17 RX ADMIN — METHADONE HYDROCHLORIDE 10 MILLIGRAM(S): 40 TABLET ORAL at 11:35

## 2021-12-17 NOTE — PROGRESS NOTE ADULT - SUBJECTIVE AND OBJECTIVE BOX
Patient is a 71y old  Male who presents with a chief complaint of Infected bedsores (16 Dec 2021 07:59)      SUBJECTIVE / OVERNIGHT EVENTS: Pt in NAD no acute events ON   ADDITIONAL REVIEW OF SYSTEMS: denies Cp/SOB     MEDICATIONS  (STANDING):  aspirin enteric coated 81 milliGRAM(s) Oral daily  atorvastatin 40 milliGRAM(s) Oral at bedtime  bisacodyl 5 milliGRAM(s) Oral daily  calcium carbonate 1250 mG  + Vitamin D (OsCal 500 + D) 1 Tablet(s) Oral daily  diVALproex  milliGRAM(s) Oral two times a day  enoxaparin Injectable 40 milliGRAM(s) SubCutaneous daily  ferrous    sulfate 325 milliGRAM(s) Oral daily  methadone   Solution 10 milliGRAM(s) Oral daily  metoprolol succinate ER 75 milliGRAM(s) Oral daily  multivitamin 1 Tablet(s) Oral daily  polyethylene glycol 3350 17 Gram(s) Oral two times a day  senna 2 Tablet(s) Oral at bedtime  tamsulosin 0.4 milliGRAM(s) Oral at bedtime    MEDICATIONS  (PRN):  melatonin 3 milliGRAM(s) Oral at bedtime PRN Insomnia      CAPILLARY BLOOD GLUCOSE        I&O's Summary    16 Dec 2021 07:01  -  17 Dec 2021 07:00  --------------------------------------------------------  IN: 450 mL / OUT: 1600 mL / NET: -1150 mL        PHYSICAL EXAM:  Vital Signs Last 24 Hrs  T(C): 36.6 (17 Dec 2021 05:33), Max: 36.9 (16 Dec 2021 21:15)  T(F): 97.8 (17 Dec 2021 05:33), Max: 98.5 (16 Dec 2021 21:15)  HR: 63 (17 Dec 2021 05:33) (61 - 72)  BP: 149/84 (17 Dec 2021 05:33) (147/86 - 161/85)  BP(mean): --  RR: 18 (17 Dec 2021 05:33) (17 - 18)  SpO2: 96% (17 Dec 2021 05:33) (96% - 97%)    CONSTITUTIONAL: NAD  EYES:  conjunctiva and sclera clear  ENMT: Moist oral mucosa  NECK: Supple, no palpable masses; no thyromegaly  RESPIRATORY: Normal respiratory effort; lungs are clear to auscultation bilaterally  CARDIOVASCULAR: Regular rate and rhythm, normal S1 and S2,  ABDOMEN: Nontender to palpation, normoactive bowel sounds  PSYCH: A+O x 3   NEUROLOGY: paraplegic       LABS:                      RADIOLOGY & ADDITIONAL TESTS:  Results Reviewed:   Imaging Personally Reviewed:  Electrocardiogram Personally Reviewed:    COORDINATION OF CARE:  Care Discussed with Consultants/Other Providers [Y/N]:  Prior or Outpatient Records Reviewed [Y/N]:
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
## 2021-12-18 PROCEDURE — 99232 SBSQ HOSP IP/OBS MODERATE 35: CPT

## 2021-12-18 RX ADMIN — ATORVASTATIN CALCIUM 40 MILLIGRAM(S): 80 TABLET, FILM COATED ORAL at 22:44

## 2021-12-18 RX ADMIN — Medication 1 TABLET(S): at 13:03

## 2021-12-18 RX ADMIN — SENNA PLUS 2 TABLET(S): 8.6 TABLET ORAL at 22:44

## 2021-12-18 RX ADMIN — DIVALPROEX SODIUM 250 MILLIGRAM(S): 500 TABLET, DELAYED RELEASE ORAL at 18:50

## 2021-12-18 RX ADMIN — Medication 325 MILLIGRAM(S): at 13:00

## 2021-12-18 RX ADMIN — Medication 5 MILLIGRAM(S): at 12:58

## 2021-12-18 RX ADMIN — Medication 75 MILLIGRAM(S): at 06:24

## 2021-12-18 RX ADMIN — ENOXAPARIN SODIUM 40 MILLIGRAM(S): 100 INJECTION SUBCUTANEOUS at 12:57

## 2021-12-18 RX ADMIN — Medication 1 TABLET(S): at 13:00

## 2021-12-18 RX ADMIN — Medication 81 MILLIGRAM(S): at 12:58

## 2021-12-18 RX ADMIN — DIVALPROEX SODIUM 250 MILLIGRAM(S): 500 TABLET, DELAYED RELEASE ORAL at 06:24

## 2021-12-18 RX ADMIN — METHADONE HYDROCHLORIDE 10 MILLIGRAM(S): 40 TABLET ORAL at 13:03

## 2021-12-18 RX ADMIN — TAMSULOSIN HYDROCHLORIDE 0.4 MILLIGRAM(S): 0.4 CAPSULE ORAL at 22:44

## 2021-12-18 NOTE — PROGRESS NOTE ADULT - SUBJECTIVE AND OBJECTIVE BOX
Patient is a 71y old  Male who presents with a chief complaint of Infected bedsores (17 Dec 2021 09:25)      SUBJECTIVE / OVERNIGHT EVENTS: No acute events ON   ADDITIONAL REVIEW OF SYSTEMS: denies CP/SOB     MEDICATIONS  (STANDING):  aspirin enteric coated 81 milliGRAM(s) Oral daily  atorvastatin 40 milliGRAM(s) Oral at bedtime  bisacodyl 5 milliGRAM(s) Oral daily  calcium carbonate 1250 mG  + Vitamin D (OsCal 500 + D) 1 Tablet(s) Oral daily  diVALproex  milliGRAM(s) Oral two times a day  enoxaparin Injectable 40 milliGRAM(s) SubCutaneous daily  ferrous    sulfate 325 milliGRAM(s) Oral daily  methadone   Solution 10 milliGRAM(s) Oral daily  metoprolol succinate ER 75 milliGRAM(s) Oral daily  multivitamin 1 Tablet(s) Oral daily  polyethylene glycol 3350 17 Gram(s) Oral two times a day  senna 2 Tablet(s) Oral at bedtime  tamsulosin 0.4 milliGRAM(s) Oral at bedtime    MEDICATIONS  (PRN):  melatonin 3 milliGRAM(s) Oral at bedtime PRN Insomnia      CAPILLARY BLOOD GLUCOSE        I&O's Summary    17 Dec 2021 07:01  -  18 Dec 2021 07:00  --------------------------------------------------------  IN: 650 mL / OUT: 1150 mL / NET: -500 mL        PHYSICAL EXAM:  Vital Signs Last 24 Hrs  T(C): 36.6 (18 Dec 2021 04:02), Max: 36.9 (17 Dec 2021 13:00)  T(F): 97.8 (18 Dec 2021 04:02), Max: 98.4 (17 Dec 2021 13:00)  HR: 63 (18 Dec 2021 06:23) (60 - 64)  BP: 148/81 (18 Dec 2021 06:23) (141/84 - 148/81)  BP(mean): --  RR: 15 (18 Dec 2021 04:02) (15 - 17)  SpO2: 99% (18 Dec 2021 06:23) (97% - 99%)    CONSTITUTIONAL: NAD  EYES:  conjunctiva and sclera clear  ENMT: Moist oral mucosa  NECK: Supple, no palpable masses; no thyromegaly  RESPIRATORY: Normal respiratory effort; lungs are clear to auscultation bilaterally  CARDIOVASCULAR: Regular rate and rhythm, normal S1 and S2,  ABDOMEN: Nontender to palpation, normoactive bowel sounds  PSYCH: A+O x 3   NEUROLOGY: paraplegic chronic     LABS:                      RADIOLOGY & ADDITIONAL TESTS:  Results Reviewed:   Imaging Personally Reviewed:  Electrocardiogram Personally Reviewed:    COORDINATION OF CARE:  Care Discussed with Consultants/Other Providers [Y/N]:  Prior or Outpatient Records Reviewed [Y/N]:
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
## 2021-12-19 LAB — SARS-COV-2 RNA SPEC QL NAA+PROBE: SIGNIFICANT CHANGE UP

## 2021-12-19 PROCEDURE — 99232 SBSQ HOSP IP/OBS MODERATE 35: CPT

## 2021-12-19 RX ORDER — AMLODIPINE BESYLATE 2.5 MG/1
5 TABLET ORAL DAILY
Refills: 0 | Status: DISCONTINUED | OUTPATIENT
Start: 2021-12-19 | End: 2022-01-18

## 2021-12-19 RX ADMIN — TAMSULOSIN HYDROCHLORIDE 0.4 MILLIGRAM(S): 0.4 CAPSULE ORAL at 21:24

## 2021-12-19 RX ADMIN — DIVALPROEX SODIUM 250 MILLIGRAM(S): 500 TABLET, DELAYED RELEASE ORAL at 18:37

## 2021-12-19 RX ADMIN — Medication 1 TABLET(S): at 11:56

## 2021-12-19 RX ADMIN — METHADONE HYDROCHLORIDE 10 MILLIGRAM(S): 40 TABLET ORAL at 11:55

## 2021-12-19 RX ADMIN — Medication 75 MILLIGRAM(S): at 05:46

## 2021-12-19 RX ADMIN — ENOXAPARIN SODIUM 40 MILLIGRAM(S): 100 INJECTION SUBCUTANEOUS at 11:55

## 2021-12-19 RX ADMIN — Medication 325 MILLIGRAM(S): at 11:56

## 2021-12-19 RX ADMIN — ATORVASTATIN CALCIUM 40 MILLIGRAM(S): 80 TABLET, FILM COATED ORAL at 21:24

## 2021-12-19 RX ADMIN — Medication 5 MILLIGRAM(S): at 11:56

## 2021-12-19 RX ADMIN — Medication 81 MILLIGRAM(S): at 11:56

## 2021-12-19 RX ADMIN — AMLODIPINE BESYLATE 5 MILLIGRAM(S): 2.5 TABLET ORAL at 18:37

## 2021-12-19 RX ADMIN — DIVALPROEX SODIUM 250 MILLIGRAM(S): 500 TABLET, DELAYED RELEASE ORAL at 05:45

## 2021-12-19 NOTE — CHART NOTE - NSCHARTNOTEFT_GEN_A_CORE
Called by family member Moises (son) who insisted that pt need to be discharge home and no rehab. Moises said he is not aware of the guardian Shukri whom should not be making decision for pt's medical care or dispo plan.     will have team reach out to SW/CM
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
## 2021-12-19 NOTE — PROGRESS NOTE ADULT - SUBJECTIVE AND OBJECTIVE BOX
Patient is a 71y old  Male who presents with a chief complaint of Infected bedsores (18 Dec 2021 08:08)      SUBJECTIVE / OVERNIGHT EVENTS: BP noted to be elevated wife updated on telephone in regards to plans for rehab. 150's   ADDITIONAL REVIEW OF SYSTEMS: denies Cp/SOB    MEDICATIONS  (STANDING):  aspirin enteric coated 81 milliGRAM(s) Oral daily  atorvastatin 40 milliGRAM(s) Oral at bedtime  bisacodyl 5 milliGRAM(s) Oral daily  calcium carbonate 1250 mG  + Vitamin D (OsCal 500 + D) 1 Tablet(s) Oral daily  diVALproex  milliGRAM(s) Oral two times a day  enoxaparin Injectable 40 milliGRAM(s) SubCutaneous daily  ferrous    sulfate 325 milliGRAM(s) Oral daily  methadone   Solution 10 milliGRAM(s) Oral daily  metoprolol succinate ER 75 milliGRAM(s) Oral daily  multivitamin 1 Tablet(s) Oral daily  polyethylene glycol 3350 17 Gram(s) Oral two times a day  senna 2 Tablet(s) Oral at bedtime  tamsulosin 0.4 milliGRAM(s) Oral at bedtime    MEDICATIONS  (PRN):  melatonin 3 milliGRAM(s) Oral at bedtime PRN Insomnia      CAPILLARY BLOOD GLUCOSE        I&O's Summary    18 Dec 2021 07:01  -  19 Dec 2021 07:00  --------------------------------------------------------  IN: 0 mL / OUT: 2000 mL / NET: -2000 mL        PHYSICAL EXAM:  Vital Signs Last 24 Hrs  T(C): 36.7 (19 Dec 2021 12:00), Max: 37.1 (18 Dec 2021 12:55)  T(F): 98.1 (19 Dec 2021 12:00), Max: 98.7 (18 Dec 2021 12:55)  HR: 76 (19 Dec 2021 12:00) (55 - 84)  BP: 147/67 (19 Dec 2021 12:00) (147/67 - 178/97)  BP(mean): --  RR: 16 (19 Dec 2021 12:00) (16 - 17)  SpO2: 99% (19 Dec 2021 12:00) (97% - 99%)    CONSTITUTIONAL: NAD  EYES:  conjunctiva and sclera clear  ENMT: Moist oral mucosa  NECK: Supple, no palpable masses; no thyromegaly  RESPIRATORY: Normal respiratory effort; lungs are clear to auscultation bilaterally  CARDIOVASCULAR: Regular rate and rhythm, normal S1 and S2,  ABDOMEN: Nontender to palpation, normoactive bowel sounds  PSYCH: A+O x 3   NEUROLOGY: paraplegic chronic     LABS:                      RADIOLOGY & ADDITIONAL TESTS:  Results Reviewed:   Imaging Personally Reviewed:  Electrocardiogram Personally Reviewed:    COORDINATION OF CARE:  Care Discussed with Consultants/Other Providers [Y/N]:  Prior or Outpatient Records Reviewed [Y/N]:

## 2021-12-20 PROCEDURE — 99232 SBSQ HOSP IP/OBS MODERATE 35: CPT

## 2021-12-20 RX ADMIN — DIVALPROEX SODIUM 250 MILLIGRAM(S): 500 TABLET, DELAYED RELEASE ORAL at 17:34

## 2021-12-20 RX ADMIN — TAMSULOSIN HYDROCHLORIDE 0.4 MILLIGRAM(S): 0.4 CAPSULE ORAL at 22:30

## 2021-12-20 RX ADMIN — Medication 5 MILLIGRAM(S): at 13:11

## 2021-12-20 RX ADMIN — Medication 1 TABLET(S): at 13:11

## 2021-12-20 RX ADMIN — METHADONE HYDROCHLORIDE 10 MILLIGRAM(S): 40 TABLET ORAL at 13:16

## 2021-12-20 RX ADMIN — ATORVASTATIN CALCIUM 40 MILLIGRAM(S): 80 TABLET, FILM COATED ORAL at 22:30

## 2021-12-20 RX ADMIN — Medication 325 MILLIGRAM(S): at 13:10

## 2021-12-20 RX ADMIN — Medication 81 MILLIGRAM(S): at 13:10

## 2021-12-20 RX ADMIN — ENOXAPARIN SODIUM 40 MILLIGRAM(S): 100 INJECTION SUBCUTANEOUS at 13:10
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
## 2021-12-20 NOTE — PROGRESS NOTE ADULT - SUBJECTIVE AND OBJECTIVE BOX
PROGRESS NOTE:     Patient is a 71y old  Male who presents with a chief complaint of Infected bedsores (19 Dec 2021 12:43)      SUBJECTIVE / OVERNIGHT EVENTS: No complaints.     ADDITIONAL REVIEW OF SYSTEMS:    MEDICATIONS  (STANDING):  amLODIPine   Tablet 5 milliGRAM(s) Oral daily  aspirin enteric coated 81 milliGRAM(s) Oral daily  atorvastatin 40 milliGRAM(s) Oral at bedtime  bisacodyl 5 milliGRAM(s) Oral daily  calcium carbonate 1250 mG  + Vitamin D (OsCal 500 + D) 1 Tablet(s) Oral daily  diVALproex  milliGRAM(s) Oral two times a day  enoxaparin Injectable 40 milliGRAM(s) SubCutaneous daily  ferrous    sulfate 325 milliGRAM(s) Oral daily  methadone   Solution 10 milliGRAM(s) Oral daily  metoprolol succinate ER 75 milliGRAM(s) Oral daily  multivitamin 1 Tablet(s) Oral daily  polyethylene glycol 3350 17 Gram(s) Oral two times a day  senna 2 Tablet(s) Oral at bedtime  tamsulosin 0.4 milliGRAM(s) Oral at bedtime    MEDICATIONS  (PRN):  melatonin 3 milliGRAM(s) Oral at bedtime PRN Insomnia      CAPILLARY BLOOD GLUCOSE        I&O's Summary    19 Dec 2021 07:01  -  20 Dec 2021 07:00  --------------------------------------------------------  IN: 0 mL / OUT: 600 mL / NET: -600 mL        PHYSICAL EXAM:  Vital Signs Last 24 Hrs  T(C): 36.7 (20 Dec 2021 14:05), Max: 36.9 (19 Dec 2021 21:23)  T(F): 98 (20 Dec 2021 14:05), Max: 98.4 (19 Dec 2021 21:23)  HR: 78 (20 Dec 2021 14:05) (75 - 78)  BP: 145/77 (20 Dec 2021 14:05) (145/77 - 160/75)  BP(mean): --  RR: 18 (20 Dec 2021 14:05) (17 - 18)  SpO2: 98% (20 Dec 2021 14:05) (98% - 99%)    CONSTITUTIONAL: NAD  EYES:  conjunctiva and sclera clear  ENMT: Moist oral mucosa  NECK: Supple, no palpable masses; no thyromegaly  RESPIRATORY: Normal respiratory effort; lungs are clear to auscultation bilaterally  CARDIOVASCULAR: Regular rate and rhythm, normal S1 and S2,  ABDOMEN: Nontender to palpation, normoactive bowel sounds  PSYCH: A+O x 3   NEUROLOGY: paraplegic chronic       LABS:                      RADIOLOGY & ADDITIONAL TESTS:  Results Reviewed:   Imaging Personally Reviewed:  Electrocardiogram Personally Reviewed:    COORDINATION OF CARE:  Care Discussed with Consultants/Other Providers [Y/N]:  Prior or Outpatient Records Reviewed [Y/N]:

## 2021-12-20 NOTE — PROGRESS NOTE ADULT - PROBLEM SELECTOR PLAN 6
- Sepsis present on admission likely d/t infected decubitus ulcer/PNA, sepsis now RESOLVED  CT of abdomen showed increased stranding in the left buttock soft tissue overlying the sacral decubitus ulcer with minimally increased bone erosion of the underlying coccyx   - Blood culture NGTD   - xray concern for poss PNA  - Completed w/ IV unasyn, appreciate ID rec;s-- treating presumed CAP  - Sacral wound- unstageable wound care outpt f/u   - Remains afebrile 12/4  - Received auth on hospital bed  - will need SNF/LTC for wound care

## 2021-12-20 NOTE — PROGRESS NOTE ADULT - PROBLEM SELECTOR PLAN 7
- Wound care consult  - Sacral decub inf wounds- completed treatment 11/ 27- need wound care and out patient f/u with wound care and Hosp bed-Sacral wound- unstageable , L buttock wound Debrided by wound care   - 11/29- as explained by wound car RN [facility]- out patient f/u with Dr Brenner  - methadone resumed 12/4  - May benefit from SNF for wound care

## 2021-12-21 PROCEDURE — 99232 SBSQ HOSP IP/OBS MODERATE 35: CPT

## 2021-12-21 RX ADMIN — DIVALPROEX SODIUM 250 MILLIGRAM(S): 500 TABLET, DELAYED RELEASE ORAL at 05:14

## 2021-12-21 RX ADMIN — ENOXAPARIN SODIUM 40 MILLIGRAM(S): 100 INJECTION SUBCUTANEOUS at 11:46

## 2021-12-21 RX ADMIN — TAMSULOSIN HYDROCHLORIDE 0.4 MILLIGRAM(S): 0.4 CAPSULE ORAL at 21:35

## 2021-12-21 RX ADMIN — Medication 1 TABLET(S): at 11:47

## 2021-12-21 RX ADMIN — AMLODIPINE BESYLATE 5 MILLIGRAM(S): 2.5 TABLET ORAL at 05:14

## 2021-12-21 RX ADMIN — Medication 75 MILLIGRAM(S): at 05:15

## 2021-12-21 RX ADMIN — ATORVASTATIN CALCIUM 40 MILLIGRAM(S): 80 TABLET, FILM COATED ORAL at 21:35

## 2021-12-21 RX ADMIN — Medication 81 MILLIGRAM(S): at 11:47

## 2021-12-21 RX ADMIN — DIVALPROEX SODIUM 250 MILLIGRAM(S): 500 TABLET, DELAYED RELEASE ORAL at 17:10

## 2021-12-21 RX ADMIN — METHADONE HYDROCHLORIDE 10 MILLIGRAM(S): 40 TABLET ORAL at 11:48

## 2021-12-21 RX ADMIN — Medication 325 MILLIGRAM(S): at 11:47

## 2021-12-21 NOTE — PROGRESS NOTE ADULT - SUBJECTIVE AND OBJECTIVE BOX
Patient is a 71y old  Male who presents with a chief complaint of Infected bedsores (20 Dec 2021 14:32)      SUBJECTIVE / OVERNIGHT EVENTS: pt in NAD no acute events   ADDITIONAL REVIEW OF SYSTEMS: denies CP/SOB     MEDICATIONS  (STANDING):  amLODIPine   Tablet 5 milliGRAM(s) Oral daily  aspirin enteric coated 81 milliGRAM(s) Oral daily  atorvastatin 40 milliGRAM(s) Oral at bedtime  bisacodyl 5 milliGRAM(s) Oral daily  calcium carbonate 1250 mG  + Vitamin D (OsCal 500 + D) 1 Tablet(s) Oral daily  diVALproex  milliGRAM(s) Oral two times a day  enoxaparin Injectable 40 milliGRAM(s) SubCutaneous daily  ferrous    sulfate 325 milliGRAM(s) Oral daily  methadone   Solution 10 milliGRAM(s) Oral daily  metoprolol succinate ER 75 milliGRAM(s) Oral daily  multivitamin 1 Tablet(s) Oral daily  polyethylene glycol 3350 17 Gram(s) Oral two times a day  senna 2 Tablet(s) Oral at bedtime  tamsulosin 0.4 milliGRAM(s) Oral at bedtime    MEDICATIONS  (PRN):  melatonin 3 milliGRAM(s) Oral at bedtime PRN Insomnia      CAPILLARY BLOOD GLUCOSE        I&O's Summary    21 Dec 2021 07:01  -  22 Dec 2021 07:00  --------------------------------------------------------  IN: 0 mL / OUT: 2300 mL / NET: -2300 mL        PHYSICAL EXAM:  Vital Signs Last 24 Hrs  T(C): 36.5 (22 Dec 2021 05:00), Max: 36.7 (21 Dec 2021 22:00)  T(F): 97.7 (22 Dec 2021 05:00), Max: 98.1 (21 Dec 2021 22:00)  HR: 60 (22 Dec 2021 05:00) (60 - 65)  BP: 152/64 (22 Dec 2021 05:00) (135/62 - 152/64)  BP(mean): --  RR: 18 (22 Dec 2021 05:00) (18 - 18)  SpO2: 100% (22 Dec 2021 05:00) (100% - 100%)    CONSTITUTIONAL: NAD  EYES:  conjunctiva and sclera clear  ENMT: Moist oral mucosa  NECK: Supple, no palpable masses; no thyromegaly  RESPIRATORY: Normal respiratory effort; lungs are clear to auscultation bilaterally  CARDIOVASCULAR: Regular rate and rhythm, normal S1 and S2,  ABDOMEN: Nontender to palpation, normoactive bowel sounds  PSYCH: A+O x 3   NEUROLOGY: paraplegic chronic     LABS:                      RADIOLOGY & ADDITIONAL TESTS:  Results Reviewed:   Imaging Personally Reviewed:  Electrocardiogram Personally Reviewed:    COORDINATION OF CARE:  Care Discussed with Consultants/Other Providers [Y/N]:  Prior or Outpatient Records Reviewed [Y/N]:
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
## 2021-12-22 PROCEDURE — 99232 SBSQ HOSP IP/OBS MODERATE 35: CPT

## 2021-12-22 RX ADMIN — DIVALPROEX SODIUM 250 MILLIGRAM(S): 500 TABLET, DELAYED RELEASE ORAL at 05:37

## 2021-12-22 RX ADMIN — METHADONE HYDROCHLORIDE 10 MILLIGRAM(S): 40 TABLET ORAL at 11:53

## 2021-12-22 RX ADMIN — ENOXAPARIN SODIUM 40 MILLIGRAM(S): 100 INJECTION SUBCUTANEOUS at 11:52

## 2021-12-22 RX ADMIN — Medication 81 MILLIGRAM(S): at 11:53

## 2021-12-22 RX ADMIN — ATORVASTATIN CALCIUM 40 MILLIGRAM(S): 80 TABLET, FILM COATED ORAL at 22:06

## 2021-12-22 RX ADMIN — Medication 1 TABLET(S): at 11:53

## 2021-12-22 RX ADMIN — AMLODIPINE BESYLATE 5 MILLIGRAM(S): 2.5 TABLET ORAL at 05:37

## 2021-12-22 RX ADMIN — DIVALPROEX SODIUM 250 MILLIGRAM(S): 500 TABLET, DELAYED RELEASE ORAL at 17:23

## 2021-12-22 RX ADMIN — Medication 75 MILLIGRAM(S): at 05:37

## 2021-12-22 RX ADMIN — TAMSULOSIN HYDROCHLORIDE 0.4 MILLIGRAM(S): 0.4 CAPSULE ORAL at 22:10

## 2021-12-22 RX ADMIN — Medication 325 MILLIGRAM(S): at 11:52

## 2021-12-22 NOTE — CHART NOTE - NSCHARTNOTEFT_GEN_A_CORE
RD follow-up.  Patient endorses appetite is good and he reported he is eating meals with mostly good intake. No reported nutritional issues.  No chewing or swallowing difficulties reported. No GI distress reported i.e. nausea, vomiting, diarrhea.     Source: Patient [ ]    Family [ ]     other [X]     Diet: Diet, Regular:   DASH/TLC {Sodium & Cholesterol Restricted} (DASH)  Supplement Feeding Modality:  Oral  Ensure Enlive Cans or Servings Per Day:  1       Frequency:  Three Times a day (12-04-21 @ 11:18)    Ensure Enlive 240mls 3x daily (1050kcal, 60g protein).     Current Weight: 12/8 - 69.2kg     no new weights to assess     Pertinent Medications:   amLODIPine   Tablet  atorvastatin  bisacodyl  calcium carbonate 1250 mG  + Vitamin D (OsCal 500 + D)  diVALproex DR  ferrous    sulfate  methadone   Solution  metoprolol succinate ER  multivitamin  polyethylene glycol 3350  senna  tamsulosin    Pertinent Labs: No recent labs      Skin: Sacral Pressure Injury - unstageable    Estimated Needs:   [X ] no change since previous assessment  [ ] recalculated:       Previous Nutrition Diagnosis: Increased nutrient needs    Nutrition Diagnosis is [X ] ongoing  [ ] resolved [ ] not applicable     Additional Recommendations:  1) Continue diet as ordered; continue Ensure Enlive 240mls 3x daily (1050kcal, 60g protein) to help promote wound healing.      Monitor weights, PO intake/diet tolerance, skin integrity, pertinent labs.    Christine Burton, MS, RDN, CDN  Pager [phone]
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
## 2021-12-22 NOTE — PROGRESS NOTE ADULT - SUBJECTIVE AND OBJECTIVE BOX
Patient is a 71y old  Male who presents with a chief complaint of Infected bedsores (21 Dec 2021 09:56)      SUBJECTIVE / OVERNIGHT EVENTS: Pt in NAD no acute events ON   ADDITIONAL REVIEW OF SYSTEMS: denies Cp/SOB    MEDICATIONS  (STANDING):  amLODIPine   Tablet 5 milliGRAM(s) Oral daily  aspirin enteric coated 81 milliGRAM(s) Oral daily  atorvastatin 40 milliGRAM(s) Oral at bedtime  bisacodyl 5 milliGRAM(s) Oral daily  calcium carbonate 1250 mG  + Vitamin D (OsCal 500 + D) 1 Tablet(s) Oral daily  diVALproex  milliGRAM(s) Oral two times a day  enoxaparin Injectable 40 milliGRAM(s) SubCutaneous daily  ferrous    sulfate 325 milliGRAM(s) Oral daily  methadone   Solution 10 milliGRAM(s) Oral daily  metoprolol succinate ER 75 milliGRAM(s) Oral daily  multivitamin 1 Tablet(s) Oral daily  polyethylene glycol 3350 17 Gram(s) Oral two times a day  senna 2 Tablet(s) Oral at bedtime  tamsulosin 0.4 milliGRAM(s) Oral at bedtime    MEDICATIONS  (PRN):  melatonin 3 milliGRAM(s) Oral at bedtime PRN Insomnia      CAPILLARY BLOOD GLUCOSE        I&O's Summary    21 Dec 2021 07:01  -  22 Dec 2021 07:00  --------------------------------------------------------  IN: 0 mL / OUT: 2300 mL / NET: -2300 mL        PHYSICAL EXAM:  Vital Signs Last 24 Hrs  T(C): 36.5 (22 Dec 2021 05:00), Max: 36.7 (21 Dec 2021 22:00)  T(F): 97.7 (22 Dec 2021 05:00), Max: 98.1 (21 Dec 2021 22:00)  HR: 60 (22 Dec 2021 05:00) (60 - 65)  BP: 152/64 (22 Dec 2021 05:00) (135/62 - 152/64)  BP(mean): --  RR: 18 (22 Dec 2021 05:00) (18 - 18)  SpO2: 100% (22 Dec 2021 05:00) (100% - 100%)    CONSTITUTIONAL: NAD  EYES:  conjunctiva and sclera clear  ENMT: Moist oral mucosa  NECK: Supple, no palpable masses; no thyromegaly  RESPIRATORY: Normal respiratory effort; lungs are clear to auscultation bilaterally  CARDIOVASCULAR: Regular rate and rhythm, normal S1 and S2,  ABDOMEN: Nontender to palpation, normoactive bowel sounds  PSYCH: A+O x 3   NEUROLOGY: paraplegic chronic     LABS:                      RADIOLOGY & ADDITIONAL TESTS:  Results Reviewed:   Imaging Personally Reviewed:  Electrocardiogram Personally Reviewed:    COORDINATION OF CARE:  Care Discussed with Consultants/Other Providers [Y/N]:  Prior or Outpatient Records Reviewed [Y/N]:

## 2021-12-23 ENCOUNTER — TRANSCRIPTION ENCOUNTER (OUTPATIENT)
Age: 71
End: 2021-12-23

## 2021-12-23 LAB
ANION GAP SERPL CALC-SCNC: 11 MMOL/L — SIGNIFICANT CHANGE UP (ref 7–14)
BUN SERPL-MCNC: 20 MG/DL — SIGNIFICANT CHANGE UP (ref 7–23)
CALCIUM SERPL-MCNC: 9.1 MG/DL — SIGNIFICANT CHANGE UP (ref 8.4–10.5)
CHLORIDE SERPL-SCNC: 97 MMOL/L — LOW (ref 98–107)
CO2 SERPL-SCNC: 29 MMOL/L — SIGNIFICANT CHANGE UP (ref 22–31)
CREAT SERPL-MCNC: 0.53 MG/DL — SIGNIFICANT CHANGE UP (ref 0.5–1.3)
GLUCOSE SERPL-MCNC: 77 MG/DL — SIGNIFICANT CHANGE UP (ref 70–99)
HCT VFR BLD CALC: 35.6 % — LOW (ref 39–50)
HGB BLD-MCNC: 11.8 G/DL — LOW (ref 13–17)
MAGNESIUM SERPL-MCNC: 2 MG/DL — SIGNIFICANT CHANGE UP (ref 1.6–2.6)
MCHC RBC-ENTMCNC: 28.9 PG — SIGNIFICANT CHANGE UP (ref 27–34)
MCHC RBC-ENTMCNC: 33.1 GM/DL — SIGNIFICANT CHANGE UP (ref 32–36)
MCV RBC AUTO: 87 FL — SIGNIFICANT CHANGE UP (ref 80–100)
NRBC # BLD: 0 /100 WBCS — SIGNIFICANT CHANGE UP
NRBC # FLD: 0 K/UL — SIGNIFICANT CHANGE UP
PHOSPHATE SERPL-MCNC: 3.5 MG/DL — SIGNIFICANT CHANGE UP (ref 2.5–4.5)
PLATELET # BLD AUTO: 202 K/UL — SIGNIFICANT CHANGE UP (ref 150–400)
POTASSIUM SERPL-MCNC: 3.5 MMOL/L — SIGNIFICANT CHANGE UP (ref 3.5–5.3)
POTASSIUM SERPL-SCNC: 3.5 MMOL/L — SIGNIFICANT CHANGE UP (ref 3.5–5.3)
RBC # BLD: 4.09 M/UL — LOW (ref 4.2–5.8)
RBC # FLD: 14.4 % — SIGNIFICANT CHANGE UP (ref 10.3–14.5)
SARS-COV-2 RNA SPEC QL NAA+PROBE: SIGNIFICANT CHANGE UP
SODIUM SERPL-SCNC: 137 MMOL/L — SIGNIFICANT CHANGE UP (ref 135–145)
WBC # BLD: 3.95 K/UL — SIGNIFICANT CHANGE UP (ref 3.8–10.5)
WBC # FLD AUTO: 3.95 K/UL — SIGNIFICANT CHANGE UP (ref 3.8–10.5)

## 2021-12-23 PROCEDURE — 99232 SBSQ HOSP IP/OBS MODERATE 35: CPT

## 2021-12-23 RX ORDER — GABAPENTIN 400 MG/1
1 CAPSULE ORAL
Qty: 0 | Refills: 0 | DISCHARGE

## 2021-12-23 RX ORDER — HYDRALAZINE HCL 50 MG
1 TABLET ORAL
Qty: 0 | Refills: 0 | DISCHARGE

## 2021-12-23 RX ORDER — FERROUS SULFATE 325(65) MG
1 TABLET ORAL
Qty: 0 | Refills: 0 | DISCHARGE
Start: 2021-12-23

## 2021-12-23 RX ORDER — LANOLIN ALCOHOL/MO/W.PET/CERES
1 CREAM (GRAM) TOPICAL
Qty: 0 | Refills: 0 | DISCHARGE
Start: 2021-12-23

## 2021-12-23 RX ORDER — POLYETHYLENE GLYCOL 3350 17 G/17G
17 POWDER, FOR SOLUTION ORAL
Qty: 0 | Refills: 0 | DISCHARGE
Start: 2021-12-23

## 2021-12-23 RX ORDER — BACLOFEN 100 %
1 POWDER (GRAM) MISCELLANEOUS
Qty: 0 | Refills: 0 | DISCHARGE

## 2021-12-23 RX ORDER — SENNA PLUS 8.6 MG/1
2 TABLET ORAL
Qty: 0 | Refills: 0 | DISCHARGE
Start: 2021-12-23

## 2021-12-23 RX ORDER — FERROUS SULFATE 325(65) MG
0 TABLET ORAL
Qty: 0 | Refills: 0 | DISCHARGE

## 2021-12-23 RX ORDER — METHADONE HYDROCHLORIDE 40 MG/1
10 TABLET ORAL DAILY
Refills: 0 | Status: DISCONTINUED | OUTPATIENT
Start: 2021-12-23 | End: 2021-12-30

## 2021-12-23 RX ORDER — METOPROLOL TARTRATE 50 MG
3 TABLET ORAL
Qty: 0 | Refills: 0 | DISCHARGE
Start: 2021-12-23

## 2021-12-23 RX ORDER — ATORVASTATIN CALCIUM 80 MG/1
1 TABLET, FILM COATED ORAL
Qty: 0 | Refills: 0 | DISCHARGE
Start: 2021-12-23

## 2021-12-23 RX ORDER — AMLODIPINE BESYLATE 2.5 MG/1
1 TABLET ORAL
Qty: 0 | Refills: 0 | DISCHARGE
Start: 2021-12-23

## 2021-12-23 RX ORDER — METOPROLOL TARTRATE 50 MG
1 TABLET ORAL
Qty: 0 | Refills: 0 | DISCHARGE

## 2021-12-23 RX ORDER — TAMSULOSIN HYDROCHLORIDE 0.4 MG/1
1 CAPSULE ORAL
Qty: 0 | Refills: 0 | DISCHARGE
Start: 2021-12-23

## 2021-12-23 RX ADMIN — ENOXAPARIN SODIUM 40 MILLIGRAM(S): 100 INJECTION SUBCUTANEOUS at 12:42

## 2021-12-23 RX ADMIN — ATORVASTATIN CALCIUM 40 MILLIGRAM(S): 80 TABLET, FILM COATED ORAL at 21:36

## 2021-12-23 RX ADMIN — AMLODIPINE BESYLATE 5 MILLIGRAM(S): 2.5 TABLET ORAL at 05:50

## 2021-12-23 RX ADMIN — METHADONE HYDROCHLORIDE 10 MILLIGRAM(S): 40 TABLET ORAL at 12:43

## 2021-12-23 RX ADMIN — Medication 81 MILLIGRAM(S): at 12:43

## 2021-12-23 RX ADMIN — TAMSULOSIN HYDROCHLORIDE 0.4 MILLIGRAM(S): 0.4 CAPSULE ORAL at 21:37

## 2021-12-23 RX ADMIN — Medication 325 MILLIGRAM(S): at 12:42

## 2021-12-23 RX ADMIN — Medication 1 TABLET(S): at 12:42

## 2021-12-23 RX ADMIN — Medication 75 MILLIGRAM(S): at 05:51

## 2021-12-23 RX ADMIN — DIVALPROEX SODIUM 250 MILLIGRAM(S): 500 TABLET, DELAYED RELEASE ORAL at 17:23

## 2021-12-23 RX ADMIN — DIVALPROEX SODIUM 250 MILLIGRAM(S): 500 TABLET, DELAYED RELEASE ORAL at 05:50

## 2021-12-23 NOTE — PROGRESS NOTE ADULT - PROBLEM SELECTOR PLAN 6
- Sepsis present on admission likely d/t infected decubitus ulcer/PNA, sepsis now RESOLVED  CT of abdomen showed increased stranding in the left buttock soft tissue overlying the sacral decubitus ulcer with minimally increased bone erosion of the underlying coccyx   - Blood culture NGTD   - xray concern for poss PNA  - Completed w/ IV unasyn, appreciate ID rec;s-- treating presumed CAP  - Sacral wound- unstageable wound care outpt f/u   - Remains afebrile 12/4  - Received auth on hospital bed  - will need SNF/LTC for wound care - Sepsis present on admission likely d/t infected decubitus ulcer/PNA, sepsis now RESOLVED  CT of abdomen showed increased stranding in the left buttock soft tissue overlying the sacral decubitus ulcer with minimally increased bone erosion of the underlying coccyx   - Blood culture NGTD   - xray concern for poss PNA  - Completed w/ IV unasyn, appreciate ID rec;s-- treating presumed CAP  - Sacral wound- unstageable wound care outpt f/u   - Remains afebrile   - Received auth on hospital bed  - will need SNF/LTC for wound care

## 2021-12-23 NOTE — PROGRESS NOTE ADULT - SUBJECTIVE AND OBJECTIVE BOX
Dr. Aida Samuels  Pager [phone]    PROGRESS NOTE:     Patient is a 71y old  Male who presents with a chief complaint of Infected bedsores (22 Dec 2021 09:58)      SUBJECTIVE / OVERNIGHT EVENTS: pt denies chest pain or sob  ADDITIONAL REVIEW OF SYSTEMS: afebrile , on RA    MEDICATIONS  (STANDING):  amLODIPine   Tablet 5 milliGRAM(s) Oral daily  aspirin enteric coated 81 milliGRAM(s) Oral daily  atorvastatin 40 milliGRAM(s) Oral at bedtime  bisacodyl 5 milliGRAM(s) Oral daily  calcium carbonate 1250 mG  + Vitamin D (OsCal 500 + D) 1 Tablet(s) Oral daily  diVALproex  milliGRAM(s) Oral two times a day  enoxaparin Injectable 40 milliGRAM(s) SubCutaneous daily  ferrous    sulfate 325 milliGRAM(s) Oral daily  metoprolol succinate ER 75 milliGRAM(s) Oral daily  multivitamin 1 Tablet(s) Oral daily  polyethylene glycol 3350 17 Gram(s) Oral two times a day  senna 2 Tablet(s) Oral at bedtime  tamsulosin 0.4 milliGRAM(s) Oral at bedtime    MEDICATIONS  (PRN):  melatonin 3 milliGRAM(s) Oral at bedtime PRN Insomnia      CAPILLARY BLOOD GLUCOSE        I&O's Summary    22 Dec 2021 07:01  -  23 Dec 2021 07:00  --------------------------------------------------------  IN: 0 mL / OUT: 1600 mL / NET: -1600 mL        PHYSICAL EXAM:  Vital Signs Last 24 Hrs  T(C): 36.6 (23 Dec 2021 12:43), Max: 36.8 (22 Dec 2021 21:00)  T(F): 97.8 (23 Dec 2021 12:43), Max: 98.2 (22 Dec 2021 21:00)  HR: 79 (23 Dec 2021 12:43) (61 - 79)  BP: 118/81 (23 Dec 2021 12:43) (118/81 - 156/88)  BP(mean): --  RR: 18 (23 Dec 2021 12:43) (17 - 148)  SpO2: 97% (23 Dec 2021 12:43) (97% - 100%)  CONSTITUTIONAL: NAD  EYES:  conjunctiva and sclera clear  ENMT: Moist oral mucosa  NECK: Supple, no palpable masses; no thyromegaly  RESPIRATORY: Normal respiratory effort; lungs are clear to auscultation bilaterally  CARDIOVASCULAR: Regular rate and rhythm, normal S1 and S2,  ABDOMEN: Nontender to palpation, normoactive bowel sounds  : chronic gentile  PSYCH: A+O x 3   NEUROLOGY: paraplegic chronic     LABS:                        11.8   3.95  )-----------( 202      ( 23 Dec 2021 07:26 )             35.6     12-23    137  |  97<L>  |  20  ----------------------------<  77  3.5   |  29  |  0.53    Ca    9.1      23 Dec 2021 07:26  Phos  3.5     12-23  Mg     2.00     12-23      RADIOLOGY & ADDITIONAL TESTS:  Results Reviewed:   Imaging Personally Reviewed:  < from: MR Head No Cont (12.09.21 @ 08:56) >  IMPRESSION:  No mass, abnormal signal, or evidence of acute cerebral ischemia.    < from: MR MRCP w/wo IV Cont (11.23.21 @ 11:09) >  IMPRESSION:  1.  Mild intrahepatic and extrahepatic biliary duct dilation, smoothly tapering towards the ampulla. No choledocholithiasis or obstructing mass lesion is identified.  2.  Multiple liver lesions that are compatible with cysts and at least one hemangioma. A lesion within the left hepatic lobe is difficult to characterize due to the technical limitations of the exam described in the report, but is favored to represent an additional hemangioma that is likely unchanged in size since 2018.      Electrocardiogram Personally Reviewed:    COORDINATION OF CARE:  Care Discussed with Consultants/Other Providers [Y/N]: therese Porter dc plan to rehab pending insurance authorization  Prior or Outpatient Records Reviewed [Y/N]:

## 2021-12-23 NOTE — PROGRESS NOTE ADULT - PROBLEM SELECTOR PLAN 7
- Wound care consult  - Sacral decub inf wounds- completed treatment 11/ 27- need wound care and out patient f/u with wound care and Hosp bed-Sacral wound- unstageable , L buttock wound Debrided by wound care   - 11/29- as explained by wound car RN [facility]- out patient f/u with Dr Brenner  - methadone resumed 12/4  DC plan SNF for wound care  last covid neg on 12/19, repeat covid swab sent 12/23

## 2021-12-23 NOTE — DISCHARGE NOTE NURSING/CASE MANAGEMENT/SOCIAL WORK - NSDCFUADDAPPT_GEN_ALL_CORE_FT
- follow up care at [facility] [place]: [phone]. Address: [address] [place] [address]

## 2021-12-23 NOTE — DISCHARGE NOTE NURSING/CASE MANAGEMENT/SOCIAL WORK - PATIENT PORTAL LINK FT
You can access the FollowMyHealth Patient Portal offered by [facility] by registering at the following website: [email or web address]. By joining [facility]’s FollowMyHealth portal, you will also be able to view your health information using other applications (apps) compatible with our system.
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
## 2021-12-23 NOTE — DISCHARGE NOTE NURSING/CASE MANAGEMENT/SOCIAL WORK - NSDCPEFALRISK_GEN_ALL_CORE
For information on Fall & Injury Prevention, visit: [email or web address] OR  [email or web address] OR  https://www.cdc.gov/steadi/patient.html

## 2021-12-23 NOTE — DISCHARGE NOTE NURSING/CASE MANAGEMENT/SOCIAL WORK - NSDCCRNAME_GEN_ALL_CORE_FT
[facility] at [phone] [place] [id number] [facility] [address] [place], [place] [id number]

## 2021-12-24 PROCEDURE — 99232 SBSQ HOSP IP/OBS MODERATE 35: CPT

## 2021-12-24 RX ADMIN — Medication 325 MILLIGRAM(S): at 12:41

## 2021-12-24 RX ADMIN — ATORVASTATIN CALCIUM 40 MILLIGRAM(S): 80 TABLET, FILM COATED ORAL at 22:00

## 2021-12-24 RX ADMIN — Medication 1 TABLET(S): at 12:42

## 2021-12-24 RX ADMIN — Medication 5 MILLIGRAM(S): at 12:42

## 2021-12-24 RX ADMIN — Medication 75 MILLIGRAM(S): at 06:36

## 2021-12-24 RX ADMIN — Medication 81 MILLIGRAM(S): at 12:42

## 2021-12-24 RX ADMIN — DIVALPROEX SODIUM 250 MILLIGRAM(S): 500 TABLET, DELAYED RELEASE ORAL at 06:35

## 2021-12-24 RX ADMIN — DIVALPROEX SODIUM 250 MILLIGRAM(S): 500 TABLET, DELAYED RELEASE ORAL at 18:32

## 2021-12-24 RX ADMIN — AMLODIPINE BESYLATE 5 MILLIGRAM(S): 2.5 TABLET ORAL at 06:36

## 2021-12-24 RX ADMIN — METHADONE HYDROCHLORIDE 10 MILLIGRAM(S): 40 TABLET ORAL at 12:41

## 2021-12-24 RX ADMIN — TAMSULOSIN HYDROCHLORIDE 0.4 MILLIGRAM(S): 0.4 CAPSULE ORAL at 22:00

## 2021-12-24 RX ADMIN — SENNA PLUS 2 TABLET(S): 8.6 TABLET ORAL at 22:00

## 2021-12-24 RX ADMIN — ENOXAPARIN SODIUM 40 MILLIGRAM(S): 100 INJECTION SUBCUTANEOUS at 12:41
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
## 2021-12-24 NOTE — PROGRESS NOTE ADULT - SUBJECTIVE AND OBJECTIVE BOX
[facility] Division of Hospital Medicine  Dominique Giang MD  Pager (M-F, 8A-5P): [phone]      Patient is a 71y old  Male who presents with a chief complaint of Infected bedsores (23 Dec 2021 13:02)      SUBJECTIVE / OVERNIGHT EVENTS:   ADDITIONAL REVIEW OF SYSTEMS:    MEDICATIONS  (STANDING):  amLODIPine   Tablet 5 milliGRAM(s) Oral daily  aspirin enteric coated 81 milliGRAM(s) Oral daily  atorvastatin 40 milliGRAM(s) Oral at bedtime  bisacodyl 5 milliGRAM(s) Oral daily  calcium carbonate 1250 mG  + Vitamin D (OsCal 500 + D) 1 Tablet(s) Oral daily  diVALproex  milliGRAM(s) Oral two times a day  enoxaparin Injectable 40 milliGRAM(s) SubCutaneous daily  ferrous    sulfate 325 milliGRAM(s) Oral daily  methadone   Solution 10 milliGRAM(s) Oral daily  metoprolol succinate ER 75 milliGRAM(s) Oral daily  multivitamin 1 Tablet(s) Oral daily  polyethylene glycol 3350 17 Gram(s) Oral two times a day  senna 2 Tablet(s) Oral at bedtime  tamsulosin 0.4 milliGRAM(s) Oral at bedtime    MEDICATIONS  (PRN):  melatonin 3 milliGRAM(s) Oral at bedtime PRN Insomnia      CAPILLARY BLOOD GLUCOSE        I&O's Summary    24 Dec 2021 07:01  -  24 Dec 2021 15:31  --------------------------------------------------------  IN: 0 mL / OUT: 800 mL / NET: -800 mL        PHYSICAL EXAM:  Vital Signs Last 24 Hrs  T(C): 36.9 (24 Dec 2021 15:12), Max: 36.9 (24 Dec 2021 15:12)  T(F): 98.5 (24 Dec 2021 15:12), Max: 98.5 (24 Dec 2021 15:12)  HR: 67 (24 Dec 2021 15:12) (67 - 80)  BP: 130/85 (24 Dec 2021 15:12) (130/85 - 150/89)  BP(mean): --  RR: 18 (24 Dec 2021 15:12) (18 - 18)  SpO2: 98% (24 Dec 2021 15:12) (97% - 98%)  CONSTITUTIONAL: NAD,   EYES: EOMI; conjunctiva and sclera clear  NECK: Supple,  RESPIRATORY: Normal respiratory effort; lungs are diminished  to auscultation bilaterally  CARDIOVASCULAR: Regular rate and rhythm, normal S1 and S2, no murmur/rub/gallop; No lower extremity edema;   ABDOMEN: Nontender to palpation, normoactive bowel sounds, no rebound/guarding;   MUSKULOSKELETAL:  no clubbing or cyanosis of digits; no joint swelling or tenderness to palpation  PSYCH: A+O to person affect appropriate  SKIN: wounds     LABS:                        11.8   3.95  )-----------( 202      ( 23 Dec 2021 07:26 )             35.6     12-23    137  |  97<L>  |  20  ----------------------------<  77  3.5   |  29  |  0.53    Ca    9.1      23 Dec 2021 07:26  Phos  3.5     12-23  Mg     2.00     12-23                  RADIOLOGY & ADDITIONAL TESTS:  Results Reviewed:   Imaging Personally Reviewed:  Electrocardiogram Personally Reviewed:    COORDINATION OF CARE:  Care Discussed with Consultants/Other Providers [Y/N]:  Prior or Outpatient Records Reviewed [Y/N]:

## 2021-12-24 NOTE — PROGRESS NOTE ADULT - PROBLEM SELECTOR PLAN 6
- Sepsis present on admission likely d/t infected decubitus ulcer/PNA, sepsis now RESOLVED  CT of abdomen showed increased stranding in the left buttock soft tissue overlying the sacral decubitus ulcer with minimally increased bone erosion of the underlying coccyx   - Blood culture NGTD   - xray concern for poss PNA  - Completed w/ IV unasyn, appreciate ID rec;s-- treating presumed CAP  - Sacral wound- unstageable wound care outpt f/u   - Remains afebrile   - Received auth on hospital bed  - will need SNF/LTC for wound care

## 2021-12-25 PROCEDURE — 99232 SBSQ HOSP IP/OBS MODERATE 35: CPT

## 2021-12-25 RX ADMIN — TAMSULOSIN HYDROCHLORIDE 0.4 MILLIGRAM(S): 0.4 CAPSULE ORAL at 21:29

## 2021-12-25 RX ADMIN — Medication 5 MILLIGRAM(S): at 11:18

## 2021-12-25 RX ADMIN — Medication 1 TABLET(S): at 11:17

## 2021-12-25 RX ADMIN — DIVALPROEX SODIUM 250 MILLIGRAM(S): 500 TABLET, DELAYED RELEASE ORAL at 05:39

## 2021-12-25 RX ADMIN — Medication 1 TABLET(S): at 11:18

## 2021-12-25 RX ADMIN — DIVALPROEX SODIUM 250 MILLIGRAM(S): 500 TABLET, DELAYED RELEASE ORAL at 18:29

## 2021-12-25 RX ADMIN — AMLODIPINE BESYLATE 5 MILLIGRAM(S): 2.5 TABLET ORAL at 05:38

## 2021-12-25 RX ADMIN — SENNA PLUS 2 TABLET(S): 8.6 TABLET ORAL at 21:29

## 2021-12-25 RX ADMIN — ENOXAPARIN SODIUM 40 MILLIGRAM(S): 100 INJECTION SUBCUTANEOUS at 11:17

## 2021-12-25 RX ADMIN — Medication 75 MILLIGRAM(S): at 05:39

## 2021-12-25 RX ADMIN — ATORVASTATIN CALCIUM 40 MILLIGRAM(S): 80 TABLET, FILM COATED ORAL at 21:29

## 2021-12-25 RX ADMIN — Medication 81 MILLIGRAM(S): at 11:17

## 2021-12-25 RX ADMIN — POLYETHYLENE GLYCOL 3350 17 GRAM(S): 17 POWDER, FOR SOLUTION ORAL at 18:38

## 2021-12-25 RX ADMIN — Medication 325 MILLIGRAM(S): at 11:17

## 2021-12-25 RX ADMIN — METHADONE HYDROCHLORIDE 10 MILLIGRAM(S): 40 TABLET ORAL at 11:20
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
## 2021-12-25 NOTE — PROGRESS NOTE ADULT - SUBJECTIVE AND OBJECTIVE BOX
[facility] Division of Hospital Medicine  Dominique Giang MD  Pager (M-F, 8A-5P): [phone]      Patient is a 71y old  Male who presents with a chief complaint of Infected bedsores (24 Dec 2021 15:30)      SUBJECTIVE / OVERNIGHT EVENTS: no events   pending ABBI    MEDICATIONS  (STANDING):  amLODIPine   Tablet 5 milliGRAM(s) Oral daily  aspirin enteric coated 81 milliGRAM(s) Oral daily  atorvastatin 40 milliGRAM(s) Oral at bedtime  bisacodyl 5 milliGRAM(s) Oral daily  calcium carbonate 1250 mG  + Vitamin D (OsCal 500 + D) 1 Tablet(s) Oral daily  diVALproex  milliGRAM(s) Oral two times a day  enoxaparin Injectable 40 milliGRAM(s) SubCutaneous daily  ferrous    sulfate 325 milliGRAM(s) Oral daily  methadone   Solution 10 milliGRAM(s) Oral daily  metoprolol succinate ER 75 milliGRAM(s) Oral daily  multivitamin 1 Tablet(s) Oral daily  polyethylene glycol 3350 17 Gram(s) Oral two times a day  senna 2 Tablet(s) Oral at bedtime  tamsulosin 0.4 milliGRAM(s) Oral at bedtime    MEDICATIONS  (PRN):  melatonin 3 milliGRAM(s) Oral at bedtime PRN Insomnia      CAPILLARY BLOOD GLUCOSE        I&O's Summary    24 Dec 2021 07:01  -  25 Dec 2021 07:00  --------------------------------------------------------  IN: 0 mL / OUT: 800 mL / NET: -800 mL        PHYSICAL EXAM:  Vital Signs Last 24 Hrs  T(C): 36.8 (25 Dec 2021 13:15), Max: 36.9 (24 Dec 2021 15:12)  T(F): 98.2 (25 Dec 2021 13:15), Max: 98.5 (24 Dec 2021 15:12)  HR: 60 (25 Dec 2021 13:15) (60 - 67)  BP: 148/89 (25 Dec 2021 13:15) (130/85 - 152/94)  BP(mean): --  RR: 17 (25 Dec 2021 13:15) (17 - 18)  SpO2: 97% (25 Dec 2021 13:15) (97% - 99%)  CONSTITUTIONAL: NAD,   EYES: EOMI; conjunctiva and sclera clear  NECK: Supple, no palpable masses; no thyromegaly  RESPIRATORY: Normal respiratory effort; lungs are clear to auscultation bilaterally  CARDIOVASCULAR: Regular rate and rhythm, normal S1 and S2,   ABDOMEN: Nontender to palpation, normoactive bowel sounds, no rebound/guarding;   MUSKULOSKELETAL:  no clubbing or cyanosis of digits; no joint swelling or tenderness to palpation  PSYCH: A+O to person,  affect appropriate  NEUROLOGY: CN 2-12 are intact and symmetric; no gross sensory deficits;   SKIN: wounds     LABS: no new                       COORDINATION OF CARE:  Care Discussed with ACP

## 2021-12-26 LAB — SARS-COV-2 RNA SPEC QL NAA+PROBE: SIGNIFICANT CHANGE UP

## 2021-12-26 PROCEDURE — 99232 SBSQ HOSP IP/OBS MODERATE 35: CPT

## 2021-12-26 RX ADMIN — POLYETHYLENE GLYCOL 3350 17 GRAM(S): 17 POWDER, FOR SOLUTION ORAL at 18:21

## 2021-12-26 RX ADMIN — SENNA PLUS 2 TABLET(S): 8.6 TABLET ORAL at 22:58

## 2021-12-26 RX ADMIN — ATORVASTATIN CALCIUM 40 MILLIGRAM(S): 80 TABLET, FILM COATED ORAL at 22:58

## 2021-12-26 RX ADMIN — METHADONE HYDROCHLORIDE 10 MILLIGRAM(S): 40 TABLET ORAL at 11:42

## 2021-12-26 RX ADMIN — Medication 5 MILLIGRAM(S): at 11:43

## 2021-12-26 RX ADMIN — Medication 325 MILLIGRAM(S): at 11:42

## 2021-12-26 RX ADMIN — Medication 1 TABLET(S): at 11:43

## 2021-12-26 RX ADMIN — Medication 1 TABLET(S): at 11:41

## 2021-12-26 RX ADMIN — TAMSULOSIN HYDROCHLORIDE 0.4 MILLIGRAM(S): 0.4 CAPSULE ORAL at 22:58

## 2021-12-26 RX ADMIN — Medication 81 MILLIGRAM(S): at 11:41

## 2021-12-26 RX ADMIN — Medication 3 MILLIGRAM(S): at 22:58

## 2021-12-26 RX ADMIN — AMLODIPINE BESYLATE 5 MILLIGRAM(S): 2.5 TABLET ORAL at 06:12

## 2021-12-26 RX ADMIN — Medication 75 MILLIGRAM(S): at 06:11

## 2021-12-26 RX ADMIN — ENOXAPARIN SODIUM 40 MILLIGRAM(S): 100 INJECTION SUBCUTANEOUS at 11:41

## 2021-12-26 RX ADMIN — DIVALPROEX SODIUM 250 MILLIGRAM(S): 500 TABLET, DELAYED RELEASE ORAL at 18:21

## 2021-12-26 RX ADMIN — DIVALPROEX SODIUM 250 MILLIGRAM(S): 500 TABLET, DELAYED RELEASE ORAL at 06:11

## 2021-12-26 NOTE — PROGRESS NOTE ADULT - SUBJECTIVE AND OBJECTIVE BOX
[facility] Division of Hospital Medicine  Dominique Giang MD  Pager (M-F, 8A-5P): [phone]      Patient is a 71y old  Male who presents with a chief complaint of Infected bedsores (25 Dec 2021 13:50)      SUBJECTIVE / OVERNIGHT EVENTS: no events     Pending DC to [facility]     MEDICATIONS  (STANDING):  amLODIPine   Tablet 5 milliGRAM(s) Oral daily  aspirin enteric coated 81 milliGRAM(s) Oral daily  atorvastatin 40 milliGRAM(s) Oral at bedtime  bisacodyl 5 milliGRAM(s) Oral daily  calcium carbonate 1250 mG  + Vitamin D (OsCal 500 + D) 1 Tablet(s) Oral daily  diVALproex  milliGRAM(s) Oral two times a day  enoxaparin Injectable 40 milliGRAM(s) SubCutaneous daily  ferrous    sulfate 325 milliGRAM(s) Oral daily  methadone   Solution 10 milliGRAM(s) Oral daily  metoprolol succinate ER 75 milliGRAM(s) Oral daily  multivitamin 1 Tablet(s) Oral daily  polyethylene glycol 3350 17 Gram(s) Oral two times a day  senna 2 Tablet(s) Oral at bedtime  tamsulosin 0.4 milliGRAM(s) Oral at bedtime    MEDICATIONS  (PRN):  melatonin 3 milliGRAM(s) Oral at bedtime PRN Insomnia      CAPILLARY BLOOD GLUCOSE        I&O's Summary    26 Dec 2021 07:01  -  26 Dec 2021 12:43  --------------------------------------------------------  IN: 0 mL / OUT: 2500 mL / NET: -2500 mL        PHYSICAL EXAM:  Vital Signs Last 24 Hrs  T(C): 37.1 (26 Dec 2021 05:00), Max: 37.1 (26 Dec 2021 05:00)  T(F): 98.7 (26 Dec 2021 05:00), Max: 98.7 (26 Dec 2021 05:00)  HR: 66 (26 Dec 2021 05:00) (60 - 69)  BP: 153/95 (26 Dec 2021 05:00) (140/72 - 153/95)  BP(mean): --  RR: 16 (26 Dec 2021 05:00) (16 - 17)  SpO2: 98% (26 Dec 2021 05:00) (97% - 98%)  CONSTITUTIONAL: NAD,  EYES: EOMI; conjunctiva and sclera clear  NECK: Supple, no palpable masses; no thyromegaly  RESPIRATORY: Normal respiratory effort; lungs are clear to auscultation bilaterally  CARDIOVASCULAR: Regular rate and rhythm, normal S1 and S2,  No lower extremity edema;   ABDOMEN: Nontender to palpation, normoactive bowel sounds, no rebound/guarding;   MUSKULOSKELETAL:  no clubbing or cyanosis of digits; no joint swelling or tenderness to palpation  PSYCH: A+O to person, ; affect appropriate  NEUROLOGY: CN 2-12 are intact and symmetric; no gross sensory deficits;   SKIN: wounds     LABS: no new

## 2021-12-26 NOTE — PROGRESS NOTE ADULT - PROBLEM SELECTOR PLAN 1
- 12/3 RRT and code stroke, CTH showing   - Apparent left frontotemporal hypodensity on noncontrast CT, favored to be artifactual due to sulcal volume averaging and motion, as detailed above. Acute infarction is felt to be less likely given preserved appearance of gray-white matter junction in this region on venous postcontrast images  - d/w neuro, given clinical improvement, no new deficits, pt A+Ox3, transient event likely toxic/metabolic  - VPA 90, borderline, f/u neuro regarding downtitrating to 250 bid  - routine EEG artifact but no epileptic form seen  - MRI brain neg for CVA  - VPA level elevated, neuro recommended VPA 250mg bid on discharge   - VPA now with normal limits 12/13
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
## 2021-12-27 LAB
ANION GAP SERPL CALC-SCNC: 8 MMOL/L — SIGNIFICANT CHANGE UP (ref 7–14)
BUN SERPL-MCNC: 25 MG/DL — HIGH (ref 7–23)
CALCIUM SERPL-MCNC: 9.1 MG/DL — SIGNIFICANT CHANGE UP (ref 8.4–10.5)
CHLORIDE SERPL-SCNC: 96 MMOL/L — LOW (ref 98–107)
CO2 SERPL-SCNC: 32 MMOL/L — HIGH (ref 22–31)
CREAT SERPL-MCNC: 0.51 MG/DL — SIGNIFICANT CHANGE UP (ref 0.5–1.3)
GLUCOSE SERPL-MCNC: 82 MG/DL — SIGNIFICANT CHANGE UP (ref 70–99)
HCT VFR BLD CALC: 36.9 % — LOW (ref 39–50)
HGB BLD-MCNC: 11.6 G/DL — LOW (ref 13–17)
MAGNESIUM SERPL-MCNC: 1.9 MG/DL — SIGNIFICANT CHANGE UP (ref 1.6–2.6)
MCHC RBC-ENTMCNC: 28.4 PG — SIGNIFICANT CHANGE UP (ref 27–34)
MCHC RBC-ENTMCNC: 31.4 GM/DL — LOW (ref 32–36)
MCV RBC AUTO: 90.4 FL — SIGNIFICANT CHANGE UP (ref 80–100)
NRBC # BLD: 0 /100 WBCS — SIGNIFICANT CHANGE UP
NRBC # FLD: 0 K/UL — SIGNIFICANT CHANGE UP
PHOSPHATE SERPL-MCNC: 3.5 MG/DL — SIGNIFICANT CHANGE UP (ref 2.5–4.5)
PLATELET # BLD AUTO: 161 K/UL — SIGNIFICANT CHANGE UP (ref 150–400)
POTASSIUM SERPL-MCNC: 4.4 MMOL/L — SIGNIFICANT CHANGE UP (ref 3.5–5.3)
POTASSIUM SERPL-SCNC: 4.4 MMOL/L — SIGNIFICANT CHANGE UP (ref 3.5–5.3)
RBC # BLD: 4.08 M/UL — LOW (ref 4.2–5.8)
RBC # FLD: 14.1 % — SIGNIFICANT CHANGE UP (ref 10.3–14.5)
SODIUM SERPL-SCNC: 136 MMOL/L — SIGNIFICANT CHANGE UP (ref 135–145)
WBC # BLD: 3.82 K/UL — SIGNIFICANT CHANGE UP (ref 3.8–10.5)
WBC # FLD AUTO: 3.82 K/UL — SIGNIFICANT CHANGE UP (ref 3.8–10.5)

## 2021-12-27 PROCEDURE — 99239 HOSP IP/OBS DSCHRG MGMT >30: CPT

## 2021-12-27 RX ADMIN — Medication 75 MILLIGRAM(S): at 06:29

## 2021-12-27 RX ADMIN — DIVALPROEX SODIUM 250 MILLIGRAM(S): 500 TABLET, DELAYED RELEASE ORAL at 06:29

## 2021-12-27 RX ADMIN — SENNA PLUS 2 TABLET(S): 8.6 TABLET ORAL at 21:42

## 2021-12-27 RX ADMIN — TAMSULOSIN HYDROCHLORIDE 0.4 MILLIGRAM(S): 0.4 CAPSULE ORAL at 21:42

## 2021-12-27 RX ADMIN — ATORVASTATIN CALCIUM 40 MILLIGRAM(S): 80 TABLET, FILM COATED ORAL at 21:42

## 2021-12-27 RX ADMIN — POLYETHYLENE GLYCOL 3350 17 GRAM(S): 17 POWDER, FOR SOLUTION ORAL at 06:28

## 2021-12-27 RX ADMIN — Medication 3 MILLIGRAM(S): at 21:42

## 2021-12-27 RX ADMIN — AMLODIPINE BESYLATE 5 MILLIGRAM(S): 2.5 TABLET ORAL at 06:29

## 2021-12-27 NOTE — PROGRESS NOTE ADULT - PROBLEM SELECTOR PLAN 1
- Apparent left frontotemporal hypodensity on noncontrast CT, favored to be artifactual due to sulcal volume averaging and motion, as detailed above. Acute infarction is felt to be less likely given preserved appearance of gray-white matter junction in this region on venous postcontrast images  - d/w neuro, given clinical improvement, no new deficits, pt A+Ox3, transient event likely toxic/metabolic  - routine EEG artifact but no epileptic form seen  - MRI brain neg for CVA  - VPA level elevated, neuro recommended VPA 250mg bid on discharge   - VPA now with normal limits 12/13

## 2021-12-27 NOTE — PROGRESS NOTE ADULT - PROBLEM SELECTOR PLAN 7
- Wound care consult  - Sacral decub inf wounds- completed treatment 11/ 27- need wound care and out patient f/u with wound care and Hosp bed-Sacral wound- unstageable , L buttock wound Debrided by wound care   - 11/29- as explained by wound car RN Avutal- out patient f/u with Dr Brenner  - methadone resumed 12/4  DC plan SNF for wound care
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
## 2021-12-27 NOTE — PROGRESS NOTE ADULT - SUBJECTIVE AND OBJECTIVE BOX
[facility] Division of Hospital Medicine  Dr. Giovana Rodrigez  Pager (M-F, 8A-5P): [phone]      Patient is a 71y old  Male who presents with a chief complaint of Infected bedsores (25 Dec 2021 13:50)    SUBJECTIVE / OVERNIGHT EVENTS: Reports wound dressing change was done this morning and he has some pain today. Offered tylenol for pain. Feels well otherwise. Explained to him that he will be discharged to rehab today with krupa.     Pending DC to [facility]     MEDICATIONS  (STANDING):  amLODIPine   Tablet 5 milliGRAM(s) Oral daily  aspirin enteric coated 81 milliGRAM(s) Oral daily  atorvastatin 40 milliGRAM(s) Oral at bedtime  bisacodyl 5 milliGRAM(s) Oral daily  calcium carbonate 1250 mG  + Vitamin D (OsCal 500 + D) 1 Tablet(s) Oral daily  diVALproex  milliGRAM(s) Oral two times a day  enoxaparin Injectable 40 milliGRAM(s) SubCutaneous daily  ferrous    sulfate 325 milliGRAM(s) Oral daily  methadone   Solution 10 milliGRAM(s) Oral daily  metoprolol succinate ER 75 milliGRAM(s) Oral daily  multivitamin 1 Tablet(s) Oral daily  polyethylene glycol 3350 17 Gram(s) Oral two times a day  senna 2 Tablet(s) Oral at bedtime  tamsulosin 0.4 milliGRAM(s) Oral at bedtime    MEDICATIONS  (PRN):  melatonin 3 milliGRAM(s) Oral at bedtime PRN Insomnia      I&O's Summary    26 Dec 2021 07:01  -  26 Dec 2021 12:43  --------------------------------------------------------  IN: 0 mL / OUT: 2500 mL / NET: -2500 mL        PHYSICAL EXAM:  Vital Signs Last 24 Hrs  T(C): 36.6 (27 Dec 2021 06:37), Max: 36.8 (26 Dec 2021 23:14)  T(F): 97.9 (27 Dec 2021 06:37), Max: 98.3 (26 Dec 2021 23:14)  HR: 70 (27 Dec 2021 06:37) (63 - 70)  BP: 146/87 (27 Dec 2021 06:37) (142/81 - 149/80)  BP(mean): --  RR: 18 (27 Dec 2021 06:37) (16 - 18)  SpO2: 95% (27 Dec 2021 06:37) (95% - 96%)  CONSTITUTIONAL: NAD,  EYES: EOMI; conjunctiva and sclera clear  NECK: Supple, no palpable masses; no thyromegaly  RESPIRATORY: Normal respiratory effort; lungs are clear to auscultation bilaterally  CARDIOVASCULAR: Regular rate and rhythm, normal S1 and S2,  No lower extremity edema;   ABDOMEN: Nontender to palpation, normoactive bowel sounds, no rebound/guarding;   MUSKULOSKELETAL:  no clubbing or cyanosis of digits; no joint swelling or tenderness to palpation  PSYCH: A+O to person, ; affect appropriate  NEUROLOGY: CN 2-12 are intact and symmetric; no gross sensory deficits;   SKIN: wounds                           11.6   3.82  )-----------( 161      ( 27 Dec 2021 06:43 )             36.9   12-27    136  |  96<L>  |  25<H>  ----------------------------<  82  4.4   |  32<H>  |  0.51    Ca    9.1      27 Dec 2021 06:43  Phos  3.5     12-27  Mg     1.90     12-27

## 2021-12-28 PROCEDURE — 99231 SBSQ HOSP IP/OBS SF/LOW 25: CPT

## 2021-12-28 RX ADMIN — METHADONE HYDROCHLORIDE 10 MILLIGRAM(S): 40 TABLET ORAL at 12:58

## 2021-12-28 RX ADMIN — ENOXAPARIN SODIUM 40 MILLIGRAM(S): 100 INJECTION SUBCUTANEOUS at 12:57

## 2021-12-28 RX ADMIN — Medication 75 MILLIGRAM(S): at 06:29

## 2021-12-28 RX ADMIN — POLYETHYLENE GLYCOL 3350 17 GRAM(S): 17 POWDER, FOR SOLUTION ORAL at 06:30

## 2021-12-28 RX ADMIN — Medication 1 TABLET(S): at 12:56

## 2021-12-28 RX ADMIN — ATORVASTATIN CALCIUM 40 MILLIGRAM(S): 80 TABLET, FILM COATED ORAL at 22:39

## 2021-12-28 RX ADMIN — Medication 5 MILLIGRAM(S): at 12:57

## 2021-12-28 RX ADMIN — DIVALPROEX SODIUM 250 MILLIGRAM(S): 500 TABLET, DELAYED RELEASE ORAL at 06:29

## 2021-12-28 RX ADMIN — AMLODIPINE BESYLATE 5 MILLIGRAM(S): 2.5 TABLET ORAL at 06:29

## 2021-12-28 RX ADMIN — Medication 325 MILLIGRAM(S): at 12:56

## 2021-12-28 RX ADMIN — TAMSULOSIN HYDROCHLORIDE 0.4 MILLIGRAM(S): 0.4 CAPSULE ORAL at 22:40

## 2021-12-28 RX ADMIN — Medication 81 MILLIGRAM(S): at 12:55

## 2021-12-28 RX ADMIN — DIVALPROEX SODIUM 250 MILLIGRAM(S): 500 TABLET, DELAYED RELEASE ORAL at 17:38
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
## 2021-12-29 LAB — SARS-COV-2 RNA SPEC QL NAA+PROBE: SIGNIFICANT CHANGE UP

## 2021-12-29 PROCEDURE — 99233 SBSQ HOSP IP/OBS HIGH 50: CPT

## 2021-12-29 RX ORDER — ACETAMINOPHEN 500 MG
650 TABLET ORAL ONCE
Refills: 0 | Status: COMPLETED | OUTPATIENT
Start: 2021-12-29 | End: 2021-12-29

## 2021-12-29 RX ADMIN — Medication 325 MILLIGRAM(S): at 12:25

## 2021-12-29 RX ADMIN — Medication 81 MILLIGRAM(S): at 12:26

## 2021-12-29 RX ADMIN — Medication 75 MILLIGRAM(S): at 05:02

## 2021-12-29 RX ADMIN — SENNA PLUS 2 TABLET(S): 8.6 TABLET ORAL at 21:24

## 2021-12-29 RX ADMIN — AMLODIPINE BESYLATE 5 MILLIGRAM(S): 2.5 TABLET ORAL at 05:02

## 2021-12-29 RX ADMIN — TAMSULOSIN HYDROCHLORIDE 0.4 MILLIGRAM(S): 0.4 CAPSULE ORAL at 21:24

## 2021-12-29 RX ADMIN — Medication 3 MILLIGRAM(S): at 21:24

## 2021-12-29 RX ADMIN — ENOXAPARIN SODIUM 40 MILLIGRAM(S): 100 INJECTION SUBCUTANEOUS at 12:26

## 2021-12-29 RX ADMIN — ATORVASTATIN CALCIUM 40 MILLIGRAM(S): 80 TABLET, FILM COATED ORAL at 21:24

## 2021-12-29 RX ADMIN — Medication 1 TABLET(S): at 12:26

## 2021-12-29 RX ADMIN — Medication 650 MILLIGRAM(S): at 15:00

## 2021-12-29 RX ADMIN — METHADONE HYDROCHLORIDE 10 MILLIGRAM(S): 40 TABLET ORAL at 12:25

## 2021-12-29 RX ADMIN — DIVALPROEX SODIUM 250 MILLIGRAM(S): 500 TABLET, DELAYED RELEASE ORAL at 05:03

## 2021-12-29 RX ADMIN — DIVALPROEX SODIUM 250 MILLIGRAM(S): 500 TABLET, DELAYED RELEASE ORAL at 17:26

## 2021-12-29 RX ADMIN — Medication 650 MILLIGRAM(S): at 15:30

## 2021-12-29 NOTE — PROGRESS NOTE ADULT - SUBJECTIVE AND OBJECTIVE BOX
[facility] Division of Hospital Medicine  Dylan Turk MD  In House Pager [phone]    Patient is a 71y old  Male who presents with a chief complaint of Infected bedsores (28 Dec 2021 13:19)      SUBJECTIVE / OVERNIGHT EVENTS:  No overnight events. Labs and vitals reviewed. overall stable  Patient seen and examined at bedside, no acute complaints. He is upset when ever rehab is mentioned.   No fever, no chills, no SOB, no CP, no n/v/d, no abd pain, no dysuria      MEDICATIONS  (STANDING):  amLODIPine   Tablet 5 milliGRAM(s) Oral daily  aspirin enteric coated 81 milliGRAM(s) Oral daily  atorvastatin 40 milliGRAM(s) Oral at bedtime  bisacodyl 5 milliGRAM(s) Oral daily  calcium carbonate 1250 mG  + Vitamin D (OsCal 500 + D) 1 Tablet(s) Oral daily  diVALproex  milliGRAM(s) Oral two times a day  enoxaparin Injectable 40 milliGRAM(s) SubCutaneous daily  ferrous    sulfate 325 milliGRAM(s) Oral daily  LORazepam     Tablet 1 milliGRAM(s) Oral once  methadone   Solution 10 milliGRAM(s) Oral daily  metoprolol succinate ER 75 milliGRAM(s) Oral daily  multivitamin 1 Tablet(s) Oral daily  polyethylene glycol 3350 17 Gram(s) Oral two times a day  senna 2 Tablet(s) Oral at bedtime  tamsulosin 0.4 milliGRAM(s) Oral at bedtime    MEDICATIONS  (PRN):  melatonin 3 milliGRAM(s) Oral at bedtime PRN Insomnia    CAPILLARY BLOOD GLUCOSE        I&O's Summary    28 Dec 2021 07:01  -  29 Dec 2021 07:00  --------------------------------------------------------  IN: 550 mL / OUT: 2550 mL / NET: -2000 mL        PHYSICAL EXAM:  Vital Signs Last 24 Hrs  T(C): 36.9 (29 Dec 2021 12:59), Max: 36.9 (28 Dec 2021 22:20)  T(F): 98.4 (29 Dec 2021 12:59), Max: 98.4 (28 Dec 2021 22:20)  HR: 62 (29 Dec 2021 12:59) (61 - 71)  BP: 147/84 (29 Dec 2021 12:59) (139/79 - 158/88)  BP(mean): --  RR: 18 (29 Dec 2021 12:59) (16 - 18)  SpO2: 100% (29 Dec 2021 12:59) (98% - 100%)    Gen: NAD; resting in bed. awake and alert.   Pulm: no respiratory distress; no accessory muscle use; CTA b/l; no wheezing; no crackles.   Cards: RRR, nl S1/S2; no obvious murmurs; no LE edema; no JVD  Abd: soft; NT on exam; +bs  Ext: no cyanosis; no joint effusion; no joint pain on palpation.  Skin: no rash; no cyanosis    LABS:                      RADIOLOGY & ADDITIONAL TESTS:  Results Reviewed: Y  Imaging Personally Reviewed: Y  Electrocardiogram Personally Reviewed: Y    COORDINATION OF CARE:  Care Discussed with Consultants/Other Providers [Y/N]: Y  Prior or Outpatient Records Reviewed [Y/N]: Y

## 2021-12-29 NOTE — PROGRESS NOTE ADULT - SUBJECTIVE AND OBJECTIVE BOX
[facility]-- WOUND TEAM -- FOLLOW UP NOTE  --------------------------------------------------------------------------------    Subjective: Patient is a 71y old male who presents with a chief complaint of Infected bedsores. 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 Using medihoney for autolytic debridement previously refused surgical debridement, packing dead space with aquacel hydrofiber. Patient seen today for follow up of left buttock wound. Patient remains argumentative, had difficulty agreeing to wound assessment again today. Denies pain and/or tenderness to wound.     24 hour events: Chart reviewed including labs and relevant images      Diet:  Diet, Regular:   DASH/TLC Sodium & Cholesterol Restricted (DASH)  Supplement Feeding Modality:  Oral  Ensure Enlive Cans or Servings Per Day:  1       Frequency:  Three Times a day (12-04-21 @ 11:18)      ROS: General/ SKIN see subjective.  all other systems negative    ALLERGIES & MEDICATIONS  --------------------------------------------------------------------------------  Allergies    No Known Allergies    Intolerances      STANDING INPATIENT MEDICATIONS    amLODIPine   Tablet 5 milliGRAM(s) Oral daily  aspirin enteric coated 81 milliGRAM(s) Oral daily  atorvastatin 40 milliGRAM(s) Oral at bedtime  bisacodyl 5 milliGRAM(s) Oral daily  calcium carbonate 1250 mG  + Vitamin D (OsCal 500 + D) 1 Tablet(s) Oral daily  diVALproex  milliGRAM(s) Oral two times a day  enoxaparin Injectable 40 milliGRAM(s) SubCutaneous daily  ferrous    sulfate 325 milliGRAM(s) Oral daily  LORazepam     Tablet 1 milliGRAM(s) Oral once  methadone   Solution 10 milliGRAM(s) Oral daily  metoprolol succinate ER 75 milliGRAM(s) Oral daily  multivitamin 1 Tablet(s) Oral daily  polyethylene glycol 3350 17 Gram(s) Oral two times a day  senna 2 Tablet(s) Oral at bedtime  tamsulosin 0.4 milliGRAM(s) Oral at bedtime      PRN INPATIENT MEDICATION  melatonin 3 milliGRAM(s) Oral at bedtime PRN      Vital signs:  T(C): 36.9 (12-29-21 @ 12:59), Max: 36.9 (12-28-21 @ 22:20)  HR: 62 (12-29-21 @ 12:59) (61 - 71)  BP: 147/84 (12-29-21 @ 12:59) (139/79 - 158/88)  RR: 18 (12-29-21 @ 12:59) (16 - 18)  SpO2: 100% (12-29-21 @ 12:59) (98% - 100%)  Wt(kg): --        12-28-21 @ 07:01  -  12-29-21 @ 07:00  --------------------------------------------------------  IN: 550 mL / OUT: 2550 mL / NET: -2000 mL        PHYSICAL EXAM:   Constitutional: NAD, alert, speech clear.  ENMT: perrla  Back: scars midline lower, flap scar to left buttock  Respiratory: clear, on RA  Cardiovascular: rrr  Gastrointestinal: non tender, incontinence of formed stool, perineal care provided  : indwelling gentile catheter  Extremities: left medial thigh upper blister- healed, 100% re-epithelialized, hypopigmented. right lat thigh scab- healed, 100% re-epithelialized, hypopigmented.   Vascular: capillary refill < 3seconds, 1+ pulses, no edema  Musculoskeletal: L1 level sensation, motor-0 paraplegia both legs, requires one person assist for turning and positioning  Skin: as noted ischial ulcer left closed, soft tissue contraction  Left buttock stage 4 pressure injury within rotational flap-   [id number] em from 1- 3 o'clock extending 2cm (previously 5.5x7.5x2.5 undermining from 1-3 o'clock extending 2.5cm). Wound base previously with eschar now 100% red-moist granular base, bone in close proximity, not visible. Small-moderate serous drainage, no odor. Periwound skin with curvilinear scar (previously) superiorly 5.5x5x.1. Alignate, foam applied.  Psych: argumentative
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
## 2021-12-30 DIAGNOSIS — Z20.822 CONTACT WITH AND (SUSPECTED) EXPOSURE TO COVID-19: ICD-10-CM

## 2021-12-30 LAB
APPEARANCE UR: ABNORMAL
BACTERIA # UR AUTO: ABNORMAL
BILIRUB UR-MCNC: NEGATIVE — SIGNIFICANT CHANGE UP
COLOR SPEC: YELLOW — SIGNIFICANT CHANGE UP
DIFF PNL FLD: NEGATIVE — SIGNIFICANT CHANGE UP
EPI CELLS # UR: 2 /HPF — SIGNIFICANT CHANGE UP (ref 0–5)
GLUCOSE UR QL: NEGATIVE — SIGNIFICANT CHANGE UP
HYALINE CASTS # UR AUTO: 15 /LPF — HIGH (ref 0–7)
KETONES UR-MCNC: ABNORMAL
LEUKOCYTE ESTERASE UR-ACNC: ABNORMAL
NITRITE UR-MCNC: NEGATIVE — SIGNIFICANT CHANGE UP
PH UR: 8.5 — HIGH (ref 5–8)
PROT UR-MCNC: ABNORMAL
RBC CASTS # UR COMP ASSIST: 2 /HPF — SIGNIFICANT CHANGE UP (ref 0–4)
SP GR SPEC: 1.03 — SIGNIFICANT CHANGE UP (ref 1–1.05)
TRI-PHOS CRY UR QL COMP ASSIST: ABNORMAL
UROBILINOGEN FLD QL: ABNORMAL
WBC UR QL: 222 /HPF — HIGH (ref 0–5)

## 2021-12-30 PROCEDURE — 99233 SBSQ HOSP IP/OBS HIGH 50: CPT

## 2021-12-30 PROCEDURE — 71045 X-RAY EXAM CHEST 1 VIEW: CPT | Mod: 26

## 2021-12-30 RX ORDER — METHADONE HYDROCHLORIDE 40 MG/1
10 TABLET ORAL DAILY
Refills: 0 | Status: DISCONTINUED | OUTPATIENT
Start: 2021-12-30 | End: 2022-01-06

## 2021-12-30 RX ORDER — ACETAMINOPHEN 500 MG
1000 TABLET ORAL ONCE
Refills: 0 | Status: COMPLETED | OUTPATIENT
Start: 2021-12-30 | End: 2021-12-30

## 2021-12-30 RX ADMIN — Medication 81 MILLIGRAM(S): at 12:03

## 2021-12-30 RX ADMIN — METHADONE HYDROCHLORIDE 10 MILLIGRAM(S): 40 TABLET ORAL at 12:02

## 2021-12-30 RX ADMIN — POLYETHYLENE GLYCOL 3350 17 GRAM(S): 17 POWDER, FOR SOLUTION ORAL at 06:26

## 2021-12-30 RX ADMIN — Medication 75 MILLIGRAM(S): at 06:26

## 2021-12-30 RX ADMIN — DIVALPROEX SODIUM 250 MILLIGRAM(S): 500 TABLET, DELAYED RELEASE ORAL at 18:19

## 2021-12-30 RX ADMIN — ATORVASTATIN CALCIUM 40 MILLIGRAM(S): 80 TABLET, FILM COATED ORAL at 22:25

## 2021-12-30 RX ADMIN — AMLODIPINE BESYLATE 5 MILLIGRAM(S): 2.5 TABLET ORAL at 06:26

## 2021-12-30 RX ADMIN — Medication 1 TABLET(S): at 12:03

## 2021-12-30 RX ADMIN — METHADONE HYDROCHLORIDE 10 MILLIGRAM(S): 40 TABLET ORAL at 22:24

## 2021-12-30 RX ADMIN — Medication 1000 MILLIGRAM(S): at 23:01

## 2021-12-30 RX ADMIN — SENNA PLUS 2 TABLET(S): 8.6 TABLET ORAL at 22:25

## 2021-12-30 RX ADMIN — Medication 325 MILLIGRAM(S): at 12:03

## 2021-12-30 RX ADMIN — Medication 1 TABLET(S): at 12:04

## 2021-12-30 RX ADMIN — DIVALPROEX SODIUM 250 MILLIGRAM(S): 500 TABLET, DELAYED RELEASE ORAL at 06:26

## 2021-12-30 RX ADMIN — Medication 5 MILLIGRAM(S): at 12:03

## 2021-12-30 RX ADMIN — Medication 400 MILLIGRAM(S): at 22:18

## 2021-12-30 RX ADMIN — TAMSULOSIN HYDROCHLORIDE 0.4 MILLIGRAM(S): 0.4 CAPSULE ORAL at 22:25

## 2021-12-30 RX ADMIN — ENOXAPARIN SODIUM 40 MILLIGRAM(S): 100 INJECTION SUBCUTANEOUS at 12:03

## 2021-12-30 NOTE — CHART NOTE - NSCHARTNOTEFT_GEN_A_CORE
[facility] MEDICINE NIGHT COVERAGE    Notified by primary RN that patient has oral temp of 102.6F and is complaining of cough. Denies chest pain, palpitations, shortness of breath or any other complaints. COVID PCR, Tylenol 1g IVP x1, BCx x2, UA/UCx and CXR ordered STAT. RN made aware of plan.     Rianna Sosa PA-C  Medicine [id number] [facility] MEDICINE NIGHT COVERAGE    Notified by primary RN that patient has oral temp of 102.6F and is complaining of cough. Denies chest pain, palpitations, shortness of breath or any other complaints. COVID PCR, Tylenol 1g IVP x1, BCx x2, UA/UCx and CXR ordered STAT. On-call radiology called for preliminary results of CXR, awaiting callback. RN made aware of plan.       ADDENDUM 12/31/2021 03:25 --  COVID PCR positive; likely cause of fever. Temp downtrended. Will continue to monitor.    Rianan Sosa PA-C  Medicine [id number]
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
## 2021-12-30 NOTE — PROGRESS NOTE ADULT - PROBLEM SELECTOR PLAN 1
exposure on 12/29  PCR neg on 12/29  now in quarantine. monitor for symptoms. can offer MAB if patient have mild symptoms.

## 2021-12-30 NOTE — PROGRESS NOTE ADULT - SUBJECTIVE AND OBJECTIVE BOX
[facility] Division of Hospital Medicine  Dylan Turk MD  In House Pager [phone]    Patient is a 71y old  Male who presents with a chief complaint of Infected bedsores (29 Dec 2021 13:51)      SUBJECTIVE / OVERNIGHT EVENTS:  Labs and vitals reviewed. COVID exposure, now in quarantine. PCR negative.   Patient seen and examined at bedside, still very upset that he needs to goto rehab.   No fever, no chills, no SOB, no CP, no n/v/d, no abd pain, no dysuria      MEDICATIONS  (STANDING):  amLODIPine   Tablet 5 milliGRAM(s) Oral daily  aspirin enteric coated 81 milliGRAM(s) Oral daily  atorvastatin 40 milliGRAM(s) Oral at bedtime  bisacodyl 5 milliGRAM(s) Oral daily  calcium carbonate 1250 mG  + Vitamin D (OsCal 500 + D) 1 Tablet(s) Oral daily  diVALproex  milliGRAM(s) Oral two times a day  enoxaparin Injectable 40 milliGRAM(s) SubCutaneous daily  ferrous    sulfate 325 milliGRAM(s) Oral daily  LORazepam     Tablet 1 milliGRAM(s) Oral once  metoprolol succinate ER 75 milliGRAM(s) Oral daily  multivitamin 1 Tablet(s) Oral daily  polyethylene glycol 3350 17 Gram(s) Oral two times a day  senna 2 Tablet(s) Oral at bedtime  tamsulosin 0.4 milliGRAM(s) Oral at bedtime    MEDICATIONS  (PRN):  melatonin 3 milliGRAM(s) Oral at bedtime PRN Insomnia    CAPILLARY BLOOD GLUCOSE        I&O's Summary      PHYSICAL EXAM:  Vital Signs Last 24 Hrs  T(C): 37.1 (30 Dec 2021 12:20), Max: 37.1 (30 Dec 2021 12:20)  T(F): 98.8 (30 Dec 2021 12:20), Max: 98.8 (30 Dec 2021 12:20)  HR: 69 (30 Dec 2021 12:20) (61 - 69)  BP: 155/81 (30 Dec 2021 12:20) (144/76 - 155/81)  BP(mean): --  RR: 18 (30 Dec 2021 12:20) (17 - 18)  SpO2: 97% (30 Dec 2021 12:20) (97% - 100%)    Gen: NAD; resting in bed. awake and alert.   Pulm: no respiratory distress; no accessory muscle use; CTA b/l; no wheezing; no crackles.   Cards: RRR, nl S1/S2; no obvious murmurs; no LE edema; no JVD  Abd: soft; NT on exam; +bs  Ext: no cyanosis; no joint effusion; no joint pain on palpation.  Skin: no rash; no cyanosis    LABS:                      RADIOLOGY & ADDITIONAL TESTS:  Results Reviewed: Y  Imaging Personally Reviewed: Y  Electrocardiogram Personally Reviewed: Y    COORDINATION OF CARE:  Care Discussed with Consultants/Other Providers [Y/N]: Y  Prior or Outpatient Records Reviewed [Y/N]: Y

## 2021-12-31 DIAGNOSIS — U07.1 COVID-19: ICD-10-CM

## 2021-12-31 LAB
ALBUMIN SERPL ELPH-MCNC: 3.4 G/DL — SIGNIFICANT CHANGE UP (ref 3.3–5)
ALP SERPL-CCNC: 141 U/L — HIGH (ref 40–120)
ALT FLD-CCNC: 15 U/L — SIGNIFICANT CHANGE UP (ref 4–41)
AST SERPL-CCNC: 45 U/L — HIGH (ref 4–40)
BILIRUB DIRECT SERPL-MCNC: 0.2 MG/DL — SIGNIFICANT CHANGE UP (ref 0–0.3)
BILIRUB INDIRECT FLD-MCNC: 0.3 MG/DL — SIGNIFICANT CHANGE UP (ref 0–1)
BILIRUB SERPL-MCNC: 0.5 MG/DL — SIGNIFICANT CHANGE UP (ref 0.2–1.2)
CREAT SERPL-MCNC: 0.58 MG/DL — SIGNIFICANT CHANGE UP (ref 0.5–1.3)
D DIMER BLD IA.RAPID-MCNC: <150 NG/ML DDU — SIGNIFICANT CHANGE UP
INR BLD: 1.25 RATIO — HIGH (ref 0.88–1.16)
PROT SERPL-MCNC: 7.4 G/DL — SIGNIFICANT CHANGE UP (ref 6–8.3)
PROTHROM AB SERPL-ACNC: 14.1 SEC — HIGH (ref 10.6–13.6)
SARS-COV-2 RNA SPEC QL NAA+PROBE: DETECTED

## 2021-12-31 PROCEDURE — 99233 SBSQ HOSP IP/OBS HIGH 50: CPT

## 2021-12-31 RX ORDER — REMDESIVIR 5 MG/ML
INJECTION INTRAVENOUS
Refills: 0 | Status: COMPLETED | OUTPATIENT
Start: 2021-12-31 | End: 2022-01-02

## 2021-12-31 RX ORDER — ACETAMINOPHEN 500 MG
650 TABLET ORAL EVERY 6 HOURS
Refills: 0 | Status: DISCONTINUED | OUTPATIENT
Start: 2021-12-31 | End: 2022-01-18

## 2021-12-31 RX ORDER — ACETAMINOPHEN 500 MG
1000 TABLET ORAL ONCE
Refills: 0 | Status: DISCONTINUED | OUTPATIENT
Start: 2021-12-31 | End: 2021-12-31

## 2021-12-31 RX ORDER — REMDESIVIR 5 MG/ML
100 INJECTION INTRAVENOUS EVERY 24 HOURS
Refills: 0 | Status: COMPLETED | OUTPATIENT
Start: 2022-01-01 | End: 2022-01-02

## 2021-12-31 RX ORDER — REMDESIVIR 5 MG/ML
200 INJECTION INTRAVENOUS EVERY 24 HOURS
Refills: 0 | Status: COMPLETED | OUTPATIENT
Start: 2021-12-31 | End: 2021-12-31

## 2021-12-31 RX ADMIN — METHADONE HYDROCHLORIDE 10 MILLIGRAM(S): 40 TABLET ORAL at 12:38

## 2021-12-31 RX ADMIN — ENOXAPARIN SODIUM 40 MILLIGRAM(S): 100 INJECTION SUBCUTANEOUS at 12:27

## 2021-12-31 RX ADMIN — Medication 650 MILLIGRAM(S): at 17:54

## 2021-12-31 RX ADMIN — AMLODIPINE BESYLATE 5 MILLIGRAM(S): 2.5 TABLET ORAL at 05:29

## 2021-12-31 RX ADMIN — Medication 1 TABLET(S): at 12:28

## 2021-12-31 RX ADMIN — DIVALPROEX SODIUM 250 MILLIGRAM(S): 500 TABLET, DELAYED RELEASE ORAL at 17:55

## 2021-12-31 RX ADMIN — ATORVASTATIN CALCIUM 40 MILLIGRAM(S): 80 TABLET, FILM COATED ORAL at 21:28

## 2021-12-31 RX ADMIN — TAMSULOSIN HYDROCHLORIDE 0.4 MILLIGRAM(S): 0.4 CAPSULE ORAL at 21:28

## 2021-12-31 RX ADMIN — Medication 5 MILLIGRAM(S): at 12:28

## 2021-12-31 RX ADMIN — REMDESIVIR 500 MILLIGRAM(S): 5 INJECTION INTRAVENOUS at 18:27

## 2021-12-31 RX ADMIN — Medication 81 MILLIGRAM(S): at 12:27

## 2021-12-31 RX ADMIN — DIVALPROEX SODIUM 250 MILLIGRAM(S): 500 TABLET, DELAYED RELEASE ORAL at 05:29

## 2021-12-31 RX ADMIN — Medication 75 MILLIGRAM(S): at 05:29

## 2021-12-31 RX ADMIN — SENNA PLUS 2 TABLET(S): 8.6 TABLET ORAL at 21:29

## 2021-12-31 RX ADMIN — POLYETHYLENE GLYCOL 3350 17 GRAM(S): 17 POWDER, FOR SOLUTION ORAL at 17:55

## 2021-12-31 RX ADMIN — Medication 1 TABLET(S): at 12:27

## 2021-12-31 RX ADMIN — Medication 325 MILLIGRAM(S): at 12:28
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
## 2021-12-31 NOTE — PROGRESS NOTE ADULT - SUBJECTIVE AND OBJECTIVE BOX
[facility] [place] [facility] Division of Hospital Medicine  Dylan Turk MD  In House Pager [phone]    Patient is a 71y old  Male who presents with a chief complaint of Infected bedsores (30 Dec 2021 12:34)      SUBJECTIVE / OVERNIGHT EVENTS:  Labs and vitals reviewed. febrile overnight. cultures send. UA positive, but patient is asymptomatic.   Patient seen and examined at bedside, no acute complaints.  No fever, no chills, no SOB, no CP, no n/v/d, no abd pain, no dysuria      MEDICATIONS  (STANDING):  amLODIPine   Tablet 5 milliGRAM(s) Oral daily  aspirin enteric coated 81 milliGRAM(s) Oral daily  atorvastatin 40 milliGRAM(s) Oral at bedtime  bisacodyl 5 milliGRAM(s) Oral daily  calcium carbonate 1250 mG  + Vitamin D (OsCal 500 + D) 1 Tablet(s) Oral daily  diVALproex  milliGRAM(s) Oral two times a day  enoxaparin Injectable 40 milliGRAM(s) SubCutaneous daily  ferrous    sulfate 325 milliGRAM(s) Oral daily  LORazepam     Tablet 1 milliGRAM(s) Oral once  methadone   Solution 10 milliGRAM(s) Oral daily  metoprolol succinate ER 75 milliGRAM(s) Oral daily  multivitamin 1 Tablet(s) Oral daily  polyethylene glycol 3350 17 Gram(s) Oral two times a day  remdesivir  IVPB   IV Intermittent   senna 2 Tablet(s) Oral at bedtime  tamsulosin 0.4 milliGRAM(s) Oral at bedtime    MEDICATIONS  (PRN):  melatonin 3 milliGRAM(s) Oral at bedtime PRN Insomnia    CAPILLARY BLOOD GLUCOSE        I&O's Summary    30 Dec 2021 07:01  -  31 Dec 2021 07:00  --------------------------------------------------------  IN: 0 mL / OUT: 2350 mL / NET: -2350 mL        PHYSICAL EXAM:  Vital Signs Last 24 Hrs  T(C): 36.7 (31 Dec 2021 05:17), Max: 39.2 (30 Dec 2021 20:45)  T(F): 98 (31 Dec 2021 05:17), Max: 102.6 (30 Dec 2021 20:45)  HR: 66 (31 Dec 2021 05:17) (66 - 74)  BP: 145/77 (31 Dec 2021 05:17) (141/81 - 145/77)  BP(mean): --  RR: 17 (31 Dec 2021 05:17) (17 - 18)  SpO2: 100% (31 Dec 2021 05:17) (98% - 100%)    Gen: NAD; resting in bed. awake and alert.   Pulm: no respiratory distress; no accessory muscle use; CTA b/l; no wheezing; no crackles.   Cards: RRR, nl S1/S2; no obvious murmurs; no LE edema; no JVD  Abd: soft; NT on exam; +bs  Ext: no cyanosis; no joint effusion; no joint pain on palpation.  Skin: no rash; no cyanosis    LABS:                Urinalysis Basic - ( 30 Dec 2021 22:45 )    Color: Yellow / Appearance: Turbid / S.026 / pH: x  Gluc: x / Ketone: Small  / Bili: Negative / Urobili: 12 mg/dL   Blood: x / Protein: 300 mg/dL / Nitrite: Negative   Leuk Esterase: Large / RBC: 2 /HPF /  /HPF   Sq Epi: x / Non Sq Epi: 2 /HPF / Bacteria: Many          RADIOLOGY & ADDITIONAL TESTS:  Results Reviewed: Y  Imaging Personally Reviewed: Y  Electrocardiogram Personally Reviewed: Y    COORDINATION OF CARE:  Care Discussed with Consultants/Other Providers [Y/N]: Y  Prior or Outpatient Records Reviewed [Y/N]: Y

## 2021-12-31 NOTE — PROGRESS NOTE ADULT - PROBLEM SELECTOR PLAN 1
PCR+ on 12/30  isolation precaution  febrile overnight, cultures send. clinically not septic. UA positive, but asymptomatic.   not candidate for MAb. as he does not have high risk criteria and fully vaccinated in 6/2021.   will give 3 day course of Remdesivir.

## 2022-01-01 LAB
APPEARANCE UR: ABNORMAL
BACTERIA # UR AUTO: ABNORMAL
BILIRUB UR-MCNC: NEGATIVE — SIGNIFICANT CHANGE UP
COLOR SPEC: SIGNIFICANT CHANGE UP
DIFF PNL FLD: NEGATIVE — SIGNIFICANT CHANGE UP
GLUCOSE UR QL: NEGATIVE — SIGNIFICANT CHANGE UP
KETONES UR-MCNC: NEGATIVE — SIGNIFICANT CHANGE UP
LEUKOCYTE ESTERASE UR-ACNC: ABNORMAL
NITRITE UR-MCNC: NEGATIVE — SIGNIFICANT CHANGE UP
PH UR: 8 — SIGNIFICANT CHANGE UP (ref 5–8)
PROT UR-MCNC: 300 — SIGNIFICANT CHANGE UP
RBC CASTS # UR COMP ASSIST: SIGNIFICANT CHANGE UP /HPF (ref 0–4)
SP GR SPEC: SIGNIFICANT CHANGE UP (ref 1–1.05)
TRI-PHOS CRY UR QL COMP ASSIST: ABNORMAL
UROBILINOGEN FLD QL: SIGNIFICANT CHANGE UP
WBC UR QL: >50 /HPF — SIGNIFICANT CHANGE UP (ref 0–5)

## 2022-01-01 PROCEDURE — 99233 SBSQ HOSP IP/OBS HIGH 50: CPT

## 2022-01-01 RX ADMIN — Medication 1 TABLET(S): at 12:47

## 2022-01-01 RX ADMIN — Medication 75 MILLIGRAM(S): at 05:22

## 2022-01-01 RX ADMIN — ENOXAPARIN SODIUM 40 MILLIGRAM(S): 100 INJECTION SUBCUTANEOUS at 12:46

## 2022-01-01 RX ADMIN — ATORVASTATIN CALCIUM 40 MILLIGRAM(S): 80 TABLET, FILM COATED ORAL at 22:00

## 2022-01-01 RX ADMIN — Medication 81 MILLIGRAM(S): at 12:48

## 2022-01-01 RX ADMIN — TAMSULOSIN HYDROCHLORIDE 0.4 MILLIGRAM(S): 0.4 CAPSULE ORAL at 21:59

## 2022-01-01 RX ADMIN — DIVALPROEX SODIUM 250 MILLIGRAM(S): 500 TABLET, DELAYED RELEASE ORAL at 09:50

## 2022-01-01 RX ADMIN — DIVALPROEX SODIUM 250 MILLIGRAM(S): 500 TABLET, DELAYED RELEASE ORAL at 18:16

## 2022-01-01 RX ADMIN — SENNA PLUS 2 TABLET(S): 8.6 TABLET ORAL at 21:59

## 2022-01-01 RX ADMIN — Medication 325 MILLIGRAM(S): at 12:47

## 2022-01-01 RX ADMIN — REMDESIVIR 500 MILLIGRAM(S): 5 INJECTION INTRAVENOUS at 18:39

## 2022-01-01 RX ADMIN — AMLODIPINE BESYLATE 5 MILLIGRAM(S): 2.5 TABLET ORAL at 05:22

## 2022-01-01 RX ADMIN — METHADONE HYDROCHLORIDE 10 MILLIGRAM(S): 40 TABLET ORAL at 12:46

## 2022-01-01 NOTE — PROGRESS NOTE ADULT - PROBLEM SELECTOR PLAN 1
PCR+ on 12/30  isolation precaution  febrile overnight, cultures send. clinically not septic. UA positive, but asymptomatic.   not candidate for MAb. as he does not have high risk criteria and fully vaccinated in 6/2021.   c/w 3 day course of Remdesivir.  monitor for new symptoms such as hypoxia.
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
## 2022-01-01 NOTE — PROGRESS NOTE ADULT - PROBLEM SELECTOR PLAN 7
- Sepsis present on admission likely d/t infected decubitus ulcer/PNA, sepsis now RESOLVED  CT of abdomen showed increased stranding in the left buttock soft tissue overlying the sacral decubitus ulcer with minimally increased bone erosion of the underlying coccyx   - Blood culture NGTD   - xray concern for poss PNA  - Completed w/ IV unasyn, appreciate ID rec;s-- treating presumed CAP  - Sacral wound- unstageable wound care outpt f/u   - Remains afebrile   - Received auth on hospital bed  - will need SNF/LTC for wound care  - ct to have fever. UA+, but clinically asymptomatic. f/u Ucx. hold abx for now given no leukocytosis.
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
## 2022-01-01 NOTE — PROGRESS NOTE ADULT - SUBJECTIVE AND OBJECTIVE BOX
[facility] Division of Hospital Medicine  Dylan Turk MD  In House Pager [phone]    Patient is a 71y old  Male who presents with a chief complaint of Infected bedsores (31 Dec 2021 12:22)      SUBJECTIVE / OVERNIGHT EVENTS:  No overnight events. Labs and vitals reviewed. still with fever. UA grossly positive. gentile exchanged, cultures send.   Patient seen and examined at bedside, no acute complaints. no new symptoms.   No fever, no chills, no SOB, no CP, no n/v/d, no abd pain, no dysuria      MEDICATIONS  (STANDING):  amLODIPine   Tablet 5 milliGRAM(s) Oral daily  aspirin enteric coated 81 milliGRAM(s) Oral daily  atorvastatin 40 milliGRAM(s) Oral at bedtime  bisacodyl 5 milliGRAM(s) Oral daily  calcium carbonate 1250 mG  + Vitamin D (OsCal 500 + D) 1 Tablet(s) Oral daily  diVALproex  milliGRAM(s) Oral two times a day  enoxaparin Injectable 40 milliGRAM(s) SubCutaneous daily  ferrous    sulfate 325 milliGRAM(s) Oral daily  LORazepam     Tablet 1 milliGRAM(s) Oral once  methadone   Solution 10 milliGRAM(s) Oral daily  metoprolol succinate ER 75 milliGRAM(s) Oral daily  multivitamin 1 Tablet(s) Oral daily  polyethylene glycol 3350 17 Gram(s) Oral two times a day  remdesivir  IVPB   IV Intermittent   remdesivir  IVPB 100 milliGRAM(s) IV Intermittent every 24 hours  senna 2 Tablet(s) Oral at bedtime  tamsulosin 0.4 milliGRAM(s) Oral at bedtime    MEDICATIONS  (PRN):  acetaminophen     Tablet .. 650 milliGRAM(s) Oral every 6 hours PRN Temp greater or equal to 38C (100.4F), Mild Pain (1 - 3), Moderate Pain (4 - 6)  melatonin 3 milliGRAM(s) Oral at bedtime PRN Insomnia    CAPILLARY BLOOD GLUCOSE        I&O's Summary      PHYSICAL EXAM:  Vital Signs Last 24 Hrs  T(C): 36.7 (31 Dec 2021 23:28), Max: 38.5 (31 Dec 2021 12:30)  T(F): 98.1 (31 Dec 2021 23:28), Max: 101.3 (31 Dec 2021 12:30)  HR: 65 (31 Dec 2021 23:28) (65 - 70)  BP: 145/90 (31 Dec 2021 23:28) (127/74 - 151/88)  BP(mean): --  RR: 17 (31 Dec 2021 23:28) (16 - 17)  SpO2: 95% (31 Dec 2021 23:28) (95% - 98%)    Gen: NAD; resting in bed. awake and alert.   Pulm: no respiratory distress; no accessory muscle use; CTA b/l; no wheezing; no crackles.   Cards: RRR, nl S1/S2; no obvious murmurs; no LE edema; no JVD  Abd: soft; NT on exam; +bs  : gentile, no suprapubic tenderness.   Ext: no cyanosis; no joint effusion; no joint pain on palpation.  Skin: no rash; no cyanosis    LABS:    12-31    x   |  x   |  x   ----------------------------<  x   x    |  x   |  0.58      TPro  7.4  /  Alb  3.4  /  TBili  0.5  /  DBili  0.2  /  AST  45<H>  /  ALT  15  /  AlkPhos  141<H>  12-31    PT/INR - ( 31 Dec 2021 12:52 )   PT: 14.1 sec;   INR: 1.25 ratio               Urinalysis Basic - ( 01 Jan 2022 11:14 )    Color: Light Yellow / Appearance: Turbid / SG: --------1.029 / pH: x  Gluc: x / Ketone: Negative  / Bili: Negative / Urobili: <2 mg/dL   Blood: x / Protein: 300 / Nitrite: Negative   Leuk Esterase: Large / RBC: 5-10 /HPF / WBC >50 /HPF   Sq Epi: x / Non Sq Epi: x / Bacteria: Moderate        Culture - Blood (collected 31 Dec 2021 02:21)  Source: .Blood Blood-Peripheral  Preliminary Report (01 Jan 2022 03:01):    No growth to date.    Culture - Blood (collected 31 Dec 2021 02:21)  Source: .Blood Blood-Peripheral  Preliminary Report (01 Jan 2022 03:01):    No growth to date.        RADIOLOGY & ADDITIONAL TESTS:  Results Reviewed: Y  Imaging Personally Reviewed: Y  Electrocardiogram Personally Reviewed: Y    COORDINATION OF CARE:  Care Discussed with Consultants/Other Providers [Y/N]: Y  Prior or Outpatient Records Reviewed [Y/N]: Y

## 2022-01-02 PROCEDURE — 99232 SBSQ HOSP IP/OBS MODERATE 35: CPT

## 2022-01-02 RX ADMIN — Medication 81 MILLIGRAM(S): at 11:16

## 2022-01-02 RX ADMIN — REMDESIVIR 500 MILLIGRAM(S): 5 INJECTION INTRAVENOUS at 18:48

## 2022-01-02 RX ADMIN — TAMSULOSIN HYDROCHLORIDE 0.4 MILLIGRAM(S): 0.4 CAPSULE ORAL at 21:49

## 2022-01-02 RX ADMIN — Medication 1 TABLET(S): at 11:16

## 2022-01-02 RX ADMIN — Medication 325 MILLIGRAM(S): at 11:16

## 2022-01-02 RX ADMIN — ENOXAPARIN SODIUM 40 MILLIGRAM(S): 100 INJECTION SUBCUTANEOUS at 11:15

## 2022-01-02 RX ADMIN — METHADONE HYDROCHLORIDE 10 MILLIGRAM(S): 40 TABLET ORAL at 12:39

## 2022-01-02 RX ADMIN — ATORVASTATIN CALCIUM 40 MILLIGRAM(S): 80 TABLET, FILM COATED ORAL at 21:49

## 2022-01-02 RX ADMIN — AMLODIPINE BESYLATE 5 MILLIGRAM(S): 2.5 TABLET ORAL at 05:59

## 2022-01-02 RX ADMIN — DIVALPROEX SODIUM 250 MILLIGRAM(S): 500 TABLET, DELAYED RELEASE ORAL at 06:00

## 2022-01-02 RX ADMIN — DIVALPROEX SODIUM 250 MILLIGRAM(S): 500 TABLET, DELAYED RELEASE ORAL at 17:57

## 2022-01-02 RX ADMIN — Medication 5 MILLIGRAM(S): at 12:39

## 2022-01-02 RX ADMIN — Medication 75 MILLIGRAM(S): at 06:08

## 2022-01-02 NOTE — PROGRESS NOTE ADULT - PROBLEM SELECTOR PLAN 1
PCR+ on 12/30  isolation precaution  febrile overnight, cultures send. clinically not septic. UA positive, but asymptomatic.   not candidate for MAb. as he does not have high risk criteria and fully vaccinated in 6/2021.   c/w 3 day course of Remdesivir.  monitor for new symptoms
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
## 2022-01-02 NOTE — PROGRESS NOTE ADULT - SUBJECTIVE AND OBJECTIVE BOX
[facility] Division of Hospital Medicine  Dylan Turk MD  In House Pager [phone]    Patient is a 71y old  Male who presents with a chief complaint of Infected bedsores (01 Jan 2022 12:19)      SUBJECTIVE / OVERNIGHT EVENTS:  No overnight events. Labs and vitals reviewed. no fever or O2 req.   Patient seen and examined at bedside, no acute complaints.  No fever, no chills, no SOB, no CP, no n/v/d, no abd pain, no dysuria      MEDICATIONS  (STANDING):  amLODIPine   Tablet 5 milliGRAM(s) Oral daily  aspirin enteric coated 81 milliGRAM(s) Oral daily  atorvastatin 40 milliGRAM(s) Oral at bedtime  bisacodyl 5 milliGRAM(s) Oral daily  calcium carbonate 1250 mG  + Vitamin D (OsCal 500 + D) 1 Tablet(s) Oral daily  diVALproex  milliGRAM(s) Oral two times a day  enoxaparin Injectable 40 milliGRAM(s) SubCutaneous daily  ferrous    sulfate 325 milliGRAM(s) Oral daily  LORazepam     Tablet 1 milliGRAM(s) Oral once  methadone   Solution 10 milliGRAM(s) Oral daily  metoprolol succinate ER 75 milliGRAM(s) Oral daily  multivitamin 1 Tablet(s) Oral daily  polyethylene glycol 3350 17 Gram(s) Oral two times a day  remdesivir  IVPB   IV Intermittent   remdesivir  IVPB 100 milliGRAM(s) IV Intermittent every 24 hours  senna 2 Tablet(s) Oral at bedtime  tamsulosin 0.4 milliGRAM(s) Oral at bedtime    MEDICATIONS  (PRN):  acetaminophen     Tablet .. 650 milliGRAM(s) Oral every 6 hours PRN Temp greater or equal to 38C (100.4F), Mild Pain (1 - 3), Moderate Pain (4 - 6)  melatonin 3 milliGRAM(s) Oral at bedtime PRN Insomnia    CAPILLARY BLOOD GLUCOSE        I&O's Summary      PHYSICAL EXAM:  Vital Signs Last 24 Hrs  T(C): 36.6 (02 Jan 2022 12:00), Max: 36.7 (01 Jan 2022 18:14)  T(F): 97.9 (02 Jan 2022 12:00), Max: 98.1 (01 Jan 2022 18:14)  HR: 60 (02 Jan 2022 12:00) (56 - 62)  BP: 127/87 (02 Jan 2022 12:00) (121/69 - 148/86)  BP(mean): --  RR: 18 (02 Jan 2022 12:00) (16 - 18)  SpO2: 99% (02 Jan 2022 12:00) (98% - 99%)    Gen: NAD; resting in bed. awake and alert.   Pulm: no respiratory distress; no accessory muscle use; CTA b/l; no wheezing; no crackles.   Cards: RRR, nl S1/S2; no obvious murmurs; no LE edema; no JVD  Abd: soft; NT on exam; +bs  : +gentile  Ext: no cyanosis; no joint effusion; no joint pain on palpation.  Skin: no rash; no cyanosis    LABS:                Urinalysis Basic - ( 01 Jan 2022 11:14 )    Color: Light Yellow / Appearance: Turbid / SG: --------1.029 / pH: x  Gluc: x / Ketone: Negative  / Bili: Negative / Urobili: <2 mg/dL   Blood: x / Protein: 300 / Nitrite: Negative   Leuk Esterase: Large / RBC: 5-10 /HPF / WBC >50 /HPF   Sq Epi: x / Non Sq Epi: x / Bacteria: Moderate        Culture - Blood (collected 31 Dec 2021 02:21)  Source: .Blood Blood-Peripheral  Preliminary Report (01 Jan 2022 03:01):    No growth to date.    Culture - Blood (collected 31 Dec 2021 02:21)  Source: .Blood Blood-Peripheral  Preliminary Report (01 Jan 2022 03:01):    No growth to date.        RADIOLOGY & ADDITIONAL TESTS:  Results Reviewed: Y  Imaging Personally Reviewed: Y  Electrocardiogram Personally Reviewed: Y    COORDINATION OF CARE:  Care Discussed with Consultants/Other Providers [Y/N]: Y  Prior or Outpatient Records Reviewed [Y/N]: Y

## 2022-01-03 LAB
-  AMIKACIN: SIGNIFICANT CHANGE UP
-  AMOXICILLIN/CLAVULANIC ACID: SIGNIFICANT CHANGE UP
-  AMPICILLIN/SULBACTAM: SIGNIFICANT CHANGE UP
-  AMPICILLIN: SIGNIFICANT CHANGE UP
-  AZTREONAM: SIGNIFICANT CHANGE UP
-  CEFAZOLIN: SIGNIFICANT CHANGE UP
-  CEFEPIME: SIGNIFICANT CHANGE UP
-  CEFOXITIN: SIGNIFICANT CHANGE UP
-  CEFTRIAXONE: SIGNIFICANT CHANGE UP
-  CIPROFLOXACIN: SIGNIFICANT CHANGE UP
-  ERTAPENEM: SIGNIFICANT CHANGE UP
-  GENTAMICIN: SIGNIFICANT CHANGE UP
-  LEVOFLOXACIN: SIGNIFICANT CHANGE UP
-  MEROPENEM: SIGNIFICANT CHANGE UP
-  NITROFURANTOIN: SIGNIFICANT CHANGE UP
-  PIPERACILLIN/TAZOBACTAM: SIGNIFICANT CHANGE UP
-  TOBRAMYCIN: SIGNIFICANT CHANGE UP
-  TRIMETHOPRIM/SULFAMETHOXAZOLE: SIGNIFICANT CHANGE UP
CULTURE RESULTS: SIGNIFICANT CHANGE UP
METHOD TYPE: SIGNIFICANT CHANGE UP
ORGANISM # SPEC MICROSCOPIC CNT: SIGNIFICANT CHANGE UP
ORGANISM # SPEC MICROSCOPIC CNT: SIGNIFICANT CHANGE UP
SPECIMEN SOURCE: SIGNIFICANT CHANGE UP

## 2022-01-03 PROCEDURE — 99232 SBSQ HOSP IP/OBS MODERATE 35: CPT

## 2022-01-03 RX ADMIN — Medication 1 TABLET(S): at 13:13

## 2022-01-03 RX ADMIN — METHADONE HYDROCHLORIDE 10 MILLIGRAM(S): 40 TABLET ORAL at 13:22

## 2022-01-03 RX ADMIN — Medication 75 MILLIGRAM(S): at 05:27

## 2022-01-03 RX ADMIN — AMLODIPINE BESYLATE 5 MILLIGRAM(S): 2.5 TABLET ORAL at 05:12

## 2022-01-03 RX ADMIN — TAMSULOSIN HYDROCHLORIDE 0.4 MILLIGRAM(S): 0.4 CAPSULE ORAL at 21:19

## 2022-01-03 RX ADMIN — DIVALPROEX SODIUM 250 MILLIGRAM(S): 500 TABLET, DELAYED RELEASE ORAL at 05:12

## 2022-01-03 RX ADMIN — Medication 325 MILLIGRAM(S): at 13:13

## 2022-01-03 RX ADMIN — ATORVASTATIN CALCIUM 40 MILLIGRAM(S): 80 TABLET, FILM COATED ORAL at 21:19

## 2022-01-03 RX ADMIN — ENOXAPARIN SODIUM 40 MILLIGRAM(S): 100 INJECTION SUBCUTANEOUS at 13:13

## 2022-01-03 RX ADMIN — Medication 5 MILLIGRAM(S): at 13:14

## 2022-01-03 RX ADMIN — DIVALPROEX SODIUM 250 MILLIGRAM(S): 500 TABLET, DELAYED RELEASE ORAL at 17:48

## 2022-01-03 RX ADMIN — Medication 81 MILLIGRAM(S): at 13:13
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
## 2022-01-03 NOTE — PROGRESS NOTE ADULT - PROBLEM SELECTOR PLAN 1
PCR+ on 12/30  isolation precaution  febrile overnight, cultures send. clinically not septic. UA positive, but asymptomatic.   not candidate for MAb. as he does not have high risk criteria and fully vaccinated in 6/2021.   finished 3 day course of Remdesivir.  monitor for new symptoms

## 2022-01-03 NOTE — CHART NOTE - NSCHARTNOTESELECT_GEN_ALL_CORE
ACP NP/MRCP/Event Note
Event Note
Event Note
NEUROLOGY/Event Note
ACP-Np note/Event Note
Bed Qualification/Event Note
Event Note
Follow-Up/Nutrition Services
Follow-Up/Nutrition Services
Hospitalist/Event Note
NEUROLOGY/Event Note
Neurology
Nutrition Follow-up Note-Registered Dietitian/Nutrition Services
Nutrition Services
nutrition follow up note/Nutrition Services
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
## 2022-01-03 NOTE — PROGRESS NOTE ADULT - PROBLEM SELECTOR PLAN 7
- Sepsis present on admission likely d/t infected decubitus ulcer/PNA, sepsis now RESOLVED  CT of abdomen showed increased stranding in the left buttock soft tissue overlying the sacral decubitus ulcer with minimally increased bone erosion of the underlying coccyx   - Blood culture NGTD   - xray concern for poss PNA  - Completed w/ IV unasyn, appreciate ID rec;s-- treating presumed CAP  - Sacral wound- unstageable wound care outpt f/u   - Remains afebrile   - will need SNF/LTC for wound care  - ct to have fever. UA+, but clinically asymptomatic. f/u Ucx. hold abx for now given no leukocytosis.

## 2022-01-04 LAB
ALBUMIN SERPL ELPH-MCNC: 3.3 G/DL — SIGNIFICANT CHANGE UP (ref 3.3–5)
ALP SERPL-CCNC: 214 U/L — HIGH (ref 40–120)
ALT FLD-CCNC: 30 U/L — SIGNIFICANT CHANGE UP (ref 4–41)
ANION GAP SERPL CALC-SCNC: 11 MMOL/L — SIGNIFICANT CHANGE UP (ref 7–14)
AST SERPL-CCNC: 65 U/L — HIGH (ref 4–40)
BILIRUB DIRECT SERPL-MCNC: 0.2 MG/DL — SIGNIFICANT CHANGE UP (ref 0–0.3)
BILIRUB INDIRECT FLD-MCNC: 0.2 MG/DL — SIGNIFICANT CHANGE UP (ref 0–1)
BILIRUB SERPL-MCNC: 0.4 MG/DL — SIGNIFICANT CHANGE UP (ref 0.2–1.2)
BUN SERPL-MCNC: 16 MG/DL — SIGNIFICANT CHANGE UP (ref 7–23)
CALCIUM SERPL-MCNC: 8.9 MG/DL — SIGNIFICANT CHANGE UP (ref 8.4–10.5)
CHLORIDE SERPL-SCNC: 94 MMOL/L — LOW (ref 98–107)
CO2 SERPL-SCNC: 29 MMOL/L — SIGNIFICANT CHANGE UP (ref 22–31)
CREAT SERPL-MCNC: 0.41 MG/DL — LOW (ref 0.5–1.3)
CREAT SERPL-MCNC: 0.42 MG/DL — LOW (ref 0.5–1.3)
GLUCOSE SERPL-MCNC: 114 MG/DL — HIGH (ref 70–99)
HCT VFR BLD CALC: 37.9 % — LOW (ref 39–50)
HGB BLD-MCNC: 12.3 G/DL — LOW (ref 13–17)
INR BLD: 1.38 RATIO — HIGH (ref 0.88–1.16)
MCHC RBC-ENTMCNC: 27.5 PG — SIGNIFICANT CHANGE UP (ref 27–34)
MCHC RBC-ENTMCNC: 32.5 GM/DL — SIGNIFICANT CHANGE UP (ref 32–36)
MCV RBC AUTO: 84.8 FL — SIGNIFICANT CHANGE UP (ref 80–100)
NRBC # BLD: 0 /100 WBCS — SIGNIFICANT CHANGE UP
NRBC # FLD: 0 K/UL — SIGNIFICANT CHANGE UP
PLATELET # BLD AUTO: 196 K/UL — SIGNIFICANT CHANGE UP (ref 150–400)
POTASSIUM SERPL-MCNC: 3.4 MMOL/L — LOW (ref 3.5–5.3)
POTASSIUM SERPL-SCNC: 3.4 MMOL/L — LOW (ref 3.5–5.3)
PROT SERPL-MCNC: 6.6 G/DL — SIGNIFICANT CHANGE UP (ref 6–8.3)
PROTHROM AB SERPL-ACNC: 15.5 SEC — HIGH (ref 10.6–13.6)
RBC # BLD: 4.47 M/UL — SIGNIFICANT CHANGE UP (ref 4.2–5.8)
RBC # FLD: 14 % — SIGNIFICANT CHANGE UP (ref 10.3–14.5)
SARS-COV-2 RNA SPEC QL NAA+PROBE: DETECTED
SODIUM SERPL-SCNC: 134 MMOL/L — LOW (ref 135–145)
WBC # BLD: 3.2 K/UL — LOW (ref 3.8–10.5)
WBC # FLD AUTO: 3.2 K/UL — LOW (ref 3.8–10.5)

## 2022-01-04 PROCEDURE — 99233 SBSQ HOSP IP/OBS HIGH 50: CPT

## 2022-01-04 RX ADMIN — ENOXAPARIN SODIUM 40 MILLIGRAM(S): 100 INJECTION SUBCUTANEOUS at 12:07

## 2022-01-04 RX ADMIN — METHADONE HYDROCHLORIDE 10 MILLIGRAM(S): 40 TABLET ORAL at 12:40

## 2022-01-04 RX ADMIN — Medication 1 TABLET(S): at 12:06

## 2022-01-04 RX ADMIN — AMLODIPINE BESYLATE 5 MILLIGRAM(S): 2.5 TABLET ORAL at 06:10

## 2022-01-04 RX ADMIN — Medication 81 MILLIGRAM(S): at 12:06

## 2022-01-04 RX ADMIN — DIVALPROEX SODIUM 250 MILLIGRAM(S): 500 TABLET, DELAYED RELEASE ORAL at 17:13

## 2022-01-04 RX ADMIN — DIVALPROEX SODIUM 250 MILLIGRAM(S): 500 TABLET, DELAYED RELEASE ORAL at 06:09

## 2022-01-04 RX ADMIN — SENNA PLUS 2 TABLET(S): 8.6 TABLET ORAL at 21:42

## 2022-01-04 RX ADMIN — Medication 325 MILLIGRAM(S): at 12:07

## 2022-01-04 RX ADMIN — ATORVASTATIN CALCIUM 40 MILLIGRAM(S): 80 TABLET, FILM COATED ORAL at 21:41

## 2022-01-04 RX ADMIN — TAMSULOSIN HYDROCHLORIDE 0.4 MILLIGRAM(S): 0.4 CAPSULE ORAL at 21:42
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
## 2022-01-05 LAB
CULTURE RESULTS: SIGNIFICANT CHANGE UP
CULTURE RESULTS: SIGNIFICANT CHANGE UP
SPECIMEN SOURCE: SIGNIFICANT CHANGE UP
SPECIMEN SOURCE: SIGNIFICANT CHANGE UP

## 2022-01-05 PROCEDURE — 99232 SBSQ HOSP IP/OBS MODERATE 35: CPT

## 2022-01-05 RX ADMIN — Medication 1 TABLET(S): at 14:24

## 2022-01-05 RX ADMIN — ATORVASTATIN CALCIUM 40 MILLIGRAM(S): 80 TABLET, FILM COATED ORAL at 21:31

## 2022-01-05 RX ADMIN — METHADONE HYDROCHLORIDE 10 MILLIGRAM(S): 40 TABLET ORAL at 14:22

## 2022-01-05 RX ADMIN — DIVALPROEX SODIUM 250 MILLIGRAM(S): 500 TABLET, DELAYED RELEASE ORAL at 18:18

## 2022-01-05 RX ADMIN — ENOXAPARIN SODIUM 40 MILLIGRAM(S): 100 INJECTION SUBCUTANEOUS at 14:22

## 2022-01-05 RX ADMIN — Medication 1 TABLET(S): at 14:23

## 2022-01-05 RX ADMIN — DIVALPROEX SODIUM 250 MILLIGRAM(S): 500 TABLET, DELAYED RELEASE ORAL at 06:28

## 2022-01-05 RX ADMIN — TAMSULOSIN HYDROCHLORIDE 0.4 MILLIGRAM(S): 0.4 CAPSULE ORAL at 21:31

## 2022-01-05 RX ADMIN — Medication 75 MILLIGRAM(S): at 06:29

## 2022-01-05 RX ADMIN — SENNA PLUS 2 TABLET(S): 8.6 TABLET ORAL at 21:32

## 2022-01-05 RX ADMIN — Medication 325 MILLIGRAM(S): at 14:24

## 2022-01-05 RX ADMIN — Medication 81 MILLIGRAM(S): at 14:23

## 2022-01-05 RX ADMIN — AMLODIPINE BESYLATE 5 MILLIGRAM(S): 2.5 TABLET ORAL at 06:28

## 2022-01-05 NOTE — PROGRESS NOTE ADULT - SUBJECTIVE AND OBJECTIVE BOX
[facility] Division of Hospital Medicine  Armond Azar MD  Pager (M-F, 8A-5P): [phone]  Other Times:  [phone]    Patient is a 71y old  Male who presents with a chief complaint of Infected bedsores (04 Jan 2022 15:52)    SUBJECTIVE / OVERNIGHT EVENTS:  Patient has no new issues.  No N/V, CP,  lightheadedness, dizziness, abdominal pain, diarrhea, dysuria.    MEDICATIONS  (STANDING):  amLODIPine   Tablet 5 milliGRAM(s) Oral daily  aspirin enteric coated 81 milliGRAM(s) Oral daily  atorvastatin 40 milliGRAM(s) Oral at bedtime  bisacodyl 5 milliGRAM(s) Oral daily  calcium carbonate 1250 mG  + Vitamin D (OsCal 500 + D) 1 Tablet(s) Oral daily  diVALproex  milliGRAM(s) Oral two times a day  enoxaparin Injectable 40 milliGRAM(s) SubCutaneous daily  ferrous    sulfate 325 milliGRAM(s) Oral daily  methadone   Solution 10 milliGRAM(s) Oral daily  metoprolol succinate ER 75 milliGRAM(s) Oral daily  multivitamin 1 Tablet(s) Oral daily  polyethylene glycol 3350 17 Gram(s) Oral two times a day  senna 2 Tablet(s) Oral at bedtime  tamsulosin 0.4 milliGRAM(s) Oral at bedtime    MEDICATIONS  (PRN):  acetaminophen     Tablet .. 650 milliGRAM(s) Oral every 6 hours PRN Temp greater or equal to 38C (100.4F), Mild Pain (1 - 3), Moderate Pain (4 - 6)  melatonin 3 milliGRAM(s) Oral at bedtime PRN Insomnia      Vital Signs Last 24 Hrs  T(C): 36.8 (05 Jan 2022 06:20), Max: 36.8 (05 Jan 2022 06:20)  T(F): 98.2 (05 Jan 2022 06:20), Max: 98.2 (05 Jan 2022 06:20)  HR: 61 (05 Jan 2022 06:20) (57 - 61)  BP: 139/91 (05 Jan 2022 06:20) (135/90 - 157/99)  BP(mean): --  RR: 17 (05 Jan 2022 06:20) (16 - 17)  SpO2: 97% (05 Jan 2022 06:20) (97% - 100%)  CAPILLARY BLOOD GLUCOSE        I&O's Summary    04 Jan 2022 07:01  -  05 Jan 2022 07:00  --------------------------------------------------------  IN: 0 mL / OUT: 2600 mL / NET: -2600 mL        PHYSICAL EXAM:  CONSTITUTIONAL: NAD  EYES: PERRLA; conjunctiva and sclera clear  ENMT: Moist oral mucosa, no pharyngeal injection or exudates; normal dentition  NECK: Supple, no palpable masses; no thyromegaly  RESPIRATORY: Normal respiratory effort; lungs are clear to auscultation bilaterally  CARDIOVASCULAR: Regular rate and rhythm, normal S1 and S2, no murmur/rub/gallop; No lower extremity edema; Peripheral pulses are 2+ bilaterally  ABDOMEN: Nontender to palpation, normoactive bowel sounds, no rebound/guarding; No hepatosplenomegaly; chronic gentile in place  MUSCULOSKELETAL:  Unable to assess gait; no clubbing or cyanosis of digits; no joint swelling or tenderness to palpation  PSYCH: A+O to person, place; affect appropriate  NEUROLOGY: CN 2-12 are intact and symmetric; no gross sensory deficits   SKIN: No rashes; no palpable lesions.    LABS:                        12.3   3.20  )-----------( 196      ( 04 Jan 2022 11:26 )             37.9     01-04    134<L>  |  94<L>  |  16  ----------------------------<  114<H>  3.4<L>   |  29  |  0.41<L>    Ca    8.9      04 Jan 2022 11:26    TPro  6.6  /  Alb  3.3  /  TBili  0.4  /  DBili  0.2  /  AST  65<H>  /  ALT  30  /  AlkPhos  214<H>  01-04    PT/INR - ( 04 Jan 2022 11:26 )   PT: 15.5 sec;   INR: 1.38 ratio                         D-Dimer Assay, Quantitative: <150 ng/mL DDU (12-31-21 @ 12:52)      COVID-19 PCR: Detected (01-03-22 @ 19:27)  COVID-19 PCR: Detected (12-30-21 @ 22:55)  COVID-19 PCR: NotDetec (12-29-21 @ 09:28)  COVID-19 PCR: NotDetec (12-26-21 @ 12:18)  COVID-19 PCR: NotDetec (12-23-21 @ 11:31)  COVID-19 PCR: NotDetec (12-19-21 @ 10:49)  COVID-19 PCR: NotDetec (12-14-21 @ 06:51)  COVID-19 PCR: NotDetec (12-08-21 @ 13:50)      RADIOLOGY & ADDITIONAL TESTS:    Imaging Personally Reviewed:    Care Discussed with Consultants/Other Providers:
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
## 2022-01-06 PROCEDURE — 99232 SBSQ HOSP IP/OBS MODERATE 35: CPT

## 2022-01-06 RX ADMIN — AMLODIPINE BESYLATE 5 MILLIGRAM(S): 2.5 TABLET ORAL at 06:11

## 2022-01-06 RX ADMIN — Medication 5 MILLIGRAM(S): at 12:28

## 2022-01-06 RX ADMIN — TAMSULOSIN HYDROCHLORIDE 0.4 MILLIGRAM(S): 0.4 CAPSULE ORAL at 21:49

## 2022-01-06 RX ADMIN — Medication 325 MILLIGRAM(S): at 12:27

## 2022-01-06 RX ADMIN — Medication 75 MILLIGRAM(S): at 06:11

## 2022-01-06 RX ADMIN — Medication 1 TABLET(S): at 12:27

## 2022-01-06 RX ADMIN — ATORVASTATIN CALCIUM 40 MILLIGRAM(S): 80 TABLET, FILM COATED ORAL at 21:51

## 2022-01-06 RX ADMIN — SENNA PLUS 2 TABLET(S): 8.6 TABLET ORAL at 21:54

## 2022-01-06 RX ADMIN — METHADONE HYDROCHLORIDE 10 MILLIGRAM(S): 40 TABLET ORAL at 12:33

## 2022-01-06 RX ADMIN — Medication 650 MILLIGRAM(S): at 18:27

## 2022-01-06 RX ADMIN — Medication 81 MILLIGRAM(S): at 12:27

## 2022-01-06 RX ADMIN — Medication 650 MILLIGRAM(S): at 17:38

## 2022-01-06 RX ADMIN — DIVALPROEX SODIUM 250 MILLIGRAM(S): 500 TABLET, DELAYED RELEASE ORAL at 17:39

## 2022-01-06 RX ADMIN — ENOXAPARIN SODIUM 40 MILLIGRAM(S): 100 INJECTION SUBCUTANEOUS at 12:26

## 2022-01-06 RX ADMIN — DIVALPROEX SODIUM 250 MILLIGRAM(S): 500 TABLET, DELAYED RELEASE ORAL at 06:10

## 2022-01-06 NOTE — PROVIDER CONTACT NOTE (OTHER) - RECOMMENDATIONS
Needs disimpaction
Will order tylenol and continue to monitor patient.
ACP should speak to pt
notify provider.
provider made aware
Contact provider, obtain orders
consider tylenol PO
provider aware
provider notified
provider aware
provider notified
ACP should speak to patient about the importance of having and IV access
Provider made aware
provider notified.
Tylenol PO
provider made aware
Next bladder scan due 2300 if patient does not void by then. That will be 3rd attempt.
Notify provider
Provider made aware
Provider made aware
Notify provider
Notify provider
notify provider.
provider notified

## 2022-01-06 NOTE — PROGRESS NOTE ADULT - SUBJECTIVE AND OBJECTIVE BOX
[facility] Division of Hospital Medicine  Armond Azar MD  Pager (M-F, 8A-5P): [phone]  Other Times:  [phone]    Patient is a 71y old  Male who presents with a chief complaint of Infected bedsores (05 Jan 2022 14:35)    SUBJECTIVE / OVERNIGHT EVENTS:  No new events overnight.  No N/V, CP,  lightheadedness, dizziness, abdominal pain, diarrhea, dysuria.    MEDICATIONS  (STANDING):  amLODIPine   Tablet 5 milliGRAM(s) Oral daily  aspirin enteric coated 81 milliGRAM(s) Oral daily  atorvastatin 40 milliGRAM(s) Oral at bedtime  bisacodyl 5 milliGRAM(s) Oral daily  calcium carbonate 1250 mG  + Vitamin D (OsCal 500 + D) 1 Tablet(s) Oral daily  diVALproex  milliGRAM(s) Oral two times a day  enoxaparin Injectable 40 milliGRAM(s) SubCutaneous daily  ferrous    sulfate 325 milliGRAM(s) Oral daily  metoprolol succinate ER 75 milliGRAM(s) Oral daily  multivitamin 1 Tablet(s) Oral daily  polyethylene glycol 3350 17 Gram(s) Oral two times a day  senna 2 Tablet(s) Oral at bedtime  tamsulosin 0.4 milliGRAM(s) Oral at bedtime    MEDICATIONS  (PRN):  acetaminophen     Tablet .. 650 milliGRAM(s) Oral every 6 hours PRN Temp greater or equal to 38C (100.4F), Mild Pain (1 - 3), Moderate Pain (4 - 6)  melatonin 3 milliGRAM(s) Oral at bedtime PRN Insomnia      Vital Signs Last 24 Hrs  T(C): 37 (06 Jan 2022 06:00), Max: 37 (06 Jan 2022 06:00)  T(F): 98.6 (06 Jan 2022 06:00), Max: 98.6 (06 Jan 2022 06:00)  HR: 59 (06 Jan 2022 06:00) (59 - 70)  BP: 141/91 (06 Jan 2022 06:00) (132/86 - 142/95)  BP(mean): --  RR: 18 (06 Jan 2022 06:00) (18 - 18)  SpO2: 99% (06 Jan 2022 06:00) (98% - 99%)  CAPILLARY BLOOD GLUCOSE        I&O's Summary    05 Jan 2022 07:01 - 06 Jan 2022 07:00  --------------------------------------------------------  IN: 0 mL / OUT: 1100 mL / NET: -1100 mL        PHYSICAL EXAM:  CONSTITUTIONAL: NAD  EYES: PERRLA; conjunctiva and sclera clear  ENMT: Moist oral mucosa, no pharyngeal injection or exudates; normal dentition  NECK: Supple, no palpable masses; no thyromegaly  RESPIRATORY: Normal respiratory effort; lungs are clear to auscultation bilaterally  CARDIOVASCULAR: Regular rate and rhythm, normal S1 and S2, no murmur/rub/gallop; No lower extremity edema; Peripheral pulses are 2+ bilaterally  ABDOMEN: Nontender to palpation, normoactive bowel sounds, no rebound/guarding; No hepatosplenomegaly; chronic gentile in place  MUSCULOSKELETAL:  Unable to assess gait; no clubbing or cyanosis of digits; no joint swelling or tenderness to palpation  PSYCH: A+O to person, place; affect appropriate  NEUROLOGY: CN 2-12 are intact and symmetric; no gross sensory deficits   SKIN: No rashes; no palpable lesions.  LABS:                          D-Dimer Assay, Quantitative: <150 ng/mL DDU (12-31-21 @ 12:52)      COVID-19 PCR: Detected (01-03-22 @ 19:27)  COVID-19 PCR: Detected (12-30-21 @ 22:55)  COVID-19 PCR: NotDetec (12-29-21 @ 09:28)  COVID-19 PCR: NotDetec (12-26-21 @ 12:18)  COVID-19 PCR: NotDetec (12-23-21 @ 11:31)  COVID-19 PCR: NotDetec (12-19-21 @ 10:49)  COVID-19 PCR: NotDetec (12-14-21 @ 06:51)  COVID-19 PCR: NotDetec (12-08-21 @ 13:50)      RADIOLOGY & ADDITIONAL TESTS:    Imaging Personally Reviewed:    Care Discussed with Consultants/Other Providers:
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
## 2022-01-07 PROCEDURE — 99232 SBSQ HOSP IP/OBS MODERATE 35: CPT

## 2022-01-07 RX ORDER — METHADONE HYDROCHLORIDE 40 MG/1
10 TABLET ORAL DAILY
Refills: 0 | Status: DISCONTINUED | OUTPATIENT
Start: 2022-01-07 | End: 2022-01-13

## 2022-01-07 RX ADMIN — Medication 81 MILLIGRAM(S): at 14:35

## 2022-01-07 RX ADMIN — Medication 1 TABLET(S): at 14:35

## 2022-01-07 RX ADMIN — DIVALPROEX SODIUM 250 MILLIGRAM(S): 500 TABLET, DELAYED RELEASE ORAL at 17:59

## 2022-01-07 RX ADMIN — METHADONE HYDROCHLORIDE 10 MILLIGRAM(S): 40 TABLET ORAL at 17:59

## 2022-01-07 RX ADMIN — DIVALPROEX SODIUM 250 MILLIGRAM(S): 500 TABLET, DELAYED RELEASE ORAL at 05:37

## 2022-01-07 RX ADMIN — Medication 5 MILLIGRAM(S): at 14:35

## 2022-01-07 RX ADMIN — TAMSULOSIN HYDROCHLORIDE 0.4 MILLIGRAM(S): 0.4 CAPSULE ORAL at 21:22

## 2022-01-07 RX ADMIN — Medication 75 MILLIGRAM(S): at 05:37

## 2022-01-07 RX ADMIN — Medication 325 MILLIGRAM(S): at 14:35

## 2022-01-07 RX ADMIN — AMLODIPINE BESYLATE 5 MILLIGRAM(S): 2.5 TABLET ORAL at 06:31

## 2022-01-07 RX ADMIN — SENNA PLUS 2 TABLET(S): 8.6 TABLET ORAL at 21:23

## 2022-01-07 RX ADMIN — ATORVASTATIN CALCIUM 40 MILLIGRAM(S): 80 TABLET, FILM COATED ORAL at 21:21

## 2022-01-07 RX ADMIN — ENOXAPARIN SODIUM 40 MILLIGRAM(S): 100 INJECTION SUBCUTANEOUS at 14:35
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
## 2022-01-07 NOTE — PROGRESS NOTE ADULT - SUBJECTIVE AND OBJECTIVE BOX
[facility] Division of Hospital Medicine  Armond Azar MD  Pager (M-F, 8A-5P): [phone]  Other Times:  [phone]    Patient is a 71y old  Male who presents with a chief complaint of Infected bedsores (06 Jan 2022 12:54)    SUBJECTIVE / OVERNIGHT EVENTS:  No new complaints overnight.  No N/V, CP,  lightheadedness, dizziness, abdominal pain, diarrhea, dysuria.    MEDICATIONS  (STANDING):  amLODIPine   Tablet 5 milliGRAM(s) Oral daily  aspirin enteric coated 81 milliGRAM(s) Oral daily  atorvastatin 40 milliGRAM(s) Oral at bedtime  bisacodyl 5 milliGRAM(s) Oral daily  calcium carbonate 1250 mG  + Vitamin D (OsCal 500 + D) 1 Tablet(s) Oral daily  diVALproex  milliGRAM(s) Oral two times a day  enoxaparin Injectable 40 milliGRAM(s) SubCutaneous daily  ferrous    sulfate 325 milliGRAM(s) Oral daily  metoprolol succinate ER 75 milliGRAM(s) Oral daily  multivitamin 1 Tablet(s) Oral daily  polyethylene glycol 3350 17 Gram(s) Oral two times a day  senna 2 Tablet(s) Oral at bedtime  tamsulosin 0.4 milliGRAM(s) Oral at bedtime    MEDICATIONS  (PRN):  acetaminophen     Tablet .. 650 milliGRAM(s) Oral every 6 hours PRN Temp greater or equal to 38C (100.4F), Mild Pain (1 - 3), Moderate Pain (4 - 6)  melatonin 3 milliGRAM(s) Oral at bedtime PRN Insomnia      Vital Signs Last 24 Hrs  T(C): 36.7 (07 Jan 2022 05:34), Max: 36.9 (06 Jan 2022 12:00)  T(F): 98.1 (07 Jan 2022 05:34), Max: 98.5 (06 Jan 2022 12:00)  HR: 68 (07 Jan 2022 05:34) (65 - 70)  BP: 125/72 (07 Jan 2022 05:34) (125/72 - 138/77)  BP(mean): --  RR: 19 (07 Jan 2022 05:34) (16 - 19)  SpO2: 100% (07 Jan 2022 05:34) (99% - 100%)  CAPILLARY BLOOD GLUCOSE        I&O's Summary    06 Jan 2022 07:01  -  07 Jan 2022 07:00  --------------------------------------------------------  IN: 0 mL / OUT: 2200 mL / NET: -2200 mL        PHYSICAL EXAM:  CONSTITUTIONAL: NAD  EYES: PERRLA; conjunctiva and sclera clear  ENMT: Moist oral mucosa, no pharyngeal injection or exudates; normal dentition  NECK: Supple, no palpable masses; no thyromegaly  RESPIRATORY: Normal respiratory effort; lungs are clear to auscultation bilaterally  CARDIOVASCULAR: Regular rate and rhythm, normal S1 and S2, no murmur/rub/gallop; No lower extremity edema; Peripheral pulses are 2+ bilaterally  ABDOMEN: Nontender to palpation, normoactive bowel sounds, no rebound/guarding; No hepatosplenomegaly; chronic gentile in place  MUSCULOSKELETAL:  Unable to assess gait; no clubbing or cyanosis of digits; no joint swelling or tenderness to palpation  PSYCH: A+O to person, place; affect appropriate  NEUROLOGY: CN 2-12 are intact and symmetric; no gross sensory deficits   SKIN: No rashes; no palpable lesions.    LABS:                          D-Dimer Assay, Quantitative: <150 ng/mL DDU (12-31-21 @ 12:52)      COVID-19 PCR: Detected (01-03-22 @ 19:27)  COVID-19 PCR: Detected (12-30-21 @ 22:55)  COVID-19 PCR: NotDetec (12-29-21 @ 09:28)  COVID-19 PCR: NotDetec (12-26-21 @ 12:18)  COVID-19 PCR: NotDetec (12-23-21 @ 11:31)  COVID-19 PCR: NotDetec (12-19-21 @ 10:49)  COVID-19 PCR: NotDetec (12-14-21 @ 06:51)  COVID-19 PCR: NotDetec (12-08-21 @ 13:50)      RADIOLOGY & ADDITIONAL TESTS:    Imaging Personally Reviewed:    Care Discussed with Consultants/Other Providers:

## 2022-01-08 PROCEDURE — 99233 SBSQ HOSP IP/OBS HIGH 50: CPT

## 2022-01-08 RX ADMIN — DIVALPROEX SODIUM 250 MILLIGRAM(S): 500 TABLET, DELAYED RELEASE ORAL at 21:55

## 2022-01-08 RX ADMIN — Medication 1 TABLET(S): at 12:09

## 2022-01-08 RX ADMIN — Medication 75 MILLIGRAM(S): at 05:35

## 2022-01-08 RX ADMIN — Medication 5 MILLIGRAM(S): at 12:09

## 2022-01-08 RX ADMIN — TAMSULOSIN HYDROCHLORIDE 0.4 MILLIGRAM(S): 0.4 CAPSULE ORAL at 21:55

## 2022-01-08 RX ADMIN — DIVALPROEX SODIUM 250 MILLIGRAM(S): 500 TABLET, DELAYED RELEASE ORAL at 05:33

## 2022-01-08 RX ADMIN — SENNA PLUS 2 TABLET(S): 8.6 TABLET ORAL at 21:55

## 2022-01-08 RX ADMIN — ENOXAPARIN SODIUM 40 MILLIGRAM(S): 100 INJECTION SUBCUTANEOUS at 12:43

## 2022-01-08 RX ADMIN — Medication 81 MILLIGRAM(S): at 12:09

## 2022-01-08 RX ADMIN — METHADONE HYDROCHLORIDE 10 MILLIGRAM(S): 40 TABLET ORAL at 17:57

## 2022-01-08 RX ADMIN — Medication 325 MILLIGRAM(S): at 12:09

## 2022-01-08 RX ADMIN — ATORVASTATIN CALCIUM 40 MILLIGRAM(S): 80 TABLET, FILM COATED ORAL at 21:55

## 2022-01-08 RX ADMIN — AMLODIPINE BESYLATE 5 MILLIGRAM(S): 2.5 TABLET ORAL at 05:34

## 2022-01-08 NOTE — PROVIDER CONTACT NOTE (OTHER) - SITUATION
Refusal of morning labs draw & new IV
patient IV  last night, 12 hour extension was placed but IV is now  again and patient refusing new IV access.
Patient has IV R 20G 12/4 and L 22G from 12/3. Patient refusing new IV insertion. Current IVs clean dry and intact, flush well. No s/s of infection
Patient refused labs
Pt refused miralax
patient refusing methadone
Patient admitted with fever/sepsis
Patient refusing morning labs
Despite educating patient on importance of changing IV, Patient refused new IV
[place] removed 2300 on 11/19
PAtient did not have BM after suppository
Patient c/o of headache.
RN notified provider regarding rectal temp 101.3
Patient is refusing morning lab draw
patient /100
patient refused labs
patient refuses removal of left IV access from december 3rd. no signs or symptoms of infection.
pt febrile, temp. 102.6
Patient's oral temp 101.9.
Patient gentile was leaking, new gentile was put in place
Patient is refusing to have IV change, requesting for it to be change in daytime
Pt refused  IV access placement
patient refusing 6pm meds and vital signs
patient refusing Depakote
patient refusing morning labs

## 2022-01-08 NOTE — PROVIDER CONTACT NOTE (OTHER) - ACTION/TREATMENT ORDERED:
provider notified.
Provider order Iv extension
Straight cath patient now.
Tylenol given. Will continue to monitor patient.
no recommendations at this time, will follow up and reassess
provider notified and made aware
Provider notified. as per provider ok to reschedule Depakote.
continue to monitor patient and retry to obtain labs.
provider recommend retrying lab draw later in the morning
Pending further instructions
Provider made aware. No orders given at this time
provider made aware, extension on IV
will ask patient later.
Administer IV tylenol, obtain blood cultures, and UA
Provider made aware. No orders given at this time
No interventions presently.
No interventions presently.
Pending further instructions
Provider made aware. Instructed that an extended is not needed. Daytime nurse should try to change IV
None yet
Provider notified. No further orders at this time.
Provider notified. educate patient. ok to hold this dose of methadone
provider notified.
Provider notified. no further interventions ordered at this time

## 2022-01-08 NOTE — PROVIDER CONTACT NOTE (OTHER) - ASSESSMENT
Patient gentile was leaking.  New gentile was put in place. Patient tolerated it well
Patient has IV R 20G 12/4 and L 22G from 12/3. Patient refusing new IV insertion. Current IVs clean dry and intact, flush well. No s/s of infection
Patient is refusing to have IV change, requesting for it to be change in daytime. Requested an extension from provider.
Patient refusing morning labs
Pt. temp. 102.6, Pt. has a chronic gentile for urinary retention, last exchange 12/27. pt. exposed to COVID. VSS. No s/s of acute distress noted.
despite education and encouragement, patient is refusing meds and vitals
despite education and encouragement, patient is refusing morning labs and new IV. multiple attempts made.
patient AxOx2/3 as per baseline.
patient agitated and refusing medication at this time
patient agitated and refusing methadone at this time. patient is A&Ox2
patient is A&Ox2 and asymptomatic resting in bed
patient is alert and oriented x4; denies chest pain, SOB or HA. paraplegic. turn and position q2h. patient refused labs
PAtient did not have BM after suppository
Pt refused  IV access placement
Patient is refusing morning lab draw
Patient lethargic but arousable. able to answer questions. Skin warm to touch.
Pt refused miralax
[place] catheter in place
warm to touch, patient denies chills
Patient with stool stuck in rectum. Unable to do enema. Pt needs to be disimpacted. ACP aware but has not come to bedside at this time.
patient in no acute distress and does not receive meds through IV or fluids.
Despite educating patient on importance of changing IV, Patient refused new IV
Patient c/o of headache. Pain is 5/10.
patient refusing morning labs
patient A&Ox4, refusing labs
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
## 2022-01-08 NOTE — PROVIDER CONTACT NOTE (OTHER) - DATE AND TIME:
04-Jan-2022 07:34
16-Nov-2021 21:24
20-Nov-2021 15:10
28-Dec-2021 22:30
31-Dec-2021 12:40
08-Dec-2021 07:38
08-Dec-2021 09:15
08-Jan-2022 14:00
06-Jan-2022 02:57
08-Dec-2021 07:43
10-Dec-2021 07:30
15-Dec-2021 02:30
15-Dec-2021 14:12
05-Jan-2022 00:30
12-Dec-2021 13:52
13-Dec-2021 17:51
14-Dec-2021 06:30
14-Dec-2021 18:00
29-Dec-2021 05:30
11-Dec-2021 18:00
05-Jan-2022 07:20
08-Dec-2021 00:39
16-Dec-2021 07:26
30-Dec-2021 20:55

## 2022-01-08 NOTE — PROGRESS NOTE ADULT - SUBJECTIVE AND OBJECTIVE BOX
[facility] Division of Hospital Medicine  Armond Azar MD  Pager (M-F, 8A-5P): [phone]  Other Times:  [phone]    Patient is a 71y old  Male who presents with a chief complaint of Infected bedsores (07 Jan 2022 11:53)    SUBJECTIVE / OVERNIGHT EVENTS:  No new events overnight  No N/V, CP,  lightheadedness, dizziness, abdominal pain, diarrhea, dysuria.    MEDICATIONS  (STANDING):  amLODIPine   Tablet 5 milliGRAM(s) Oral daily  aspirin enteric coated 81 milliGRAM(s) Oral daily  atorvastatin 40 milliGRAM(s) Oral at bedtime  bisacodyl 5 milliGRAM(s) Oral daily  calcium carbonate 1250 mG  + Vitamin D (OsCal 500 + D) 1 Tablet(s) Oral daily  diVALproex  milliGRAM(s) Oral two times a day  enoxaparin Injectable 40 milliGRAM(s) SubCutaneous daily  ferrous    sulfate 325 milliGRAM(s) Oral daily  methadone   Solution 10 milliGRAM(s) Oral daily  metoprolol succinate ER 75 milliGRAM(s) Oral daily  multivitamin 1 Tablet(s) Oral daily  polyethylene glycol 3350 17 Gram(s) Oral two times a day  senna 2 Tablet(s) Oral at bedtime  tamsulosin 0.4 milliGRAM(s) Oral at bedtime    MEDICATIONS  (PRN):  acetaminophen     Tablet .. 650 milliGRAM(s) Oral every 6 hours PRN Temp greater or equal to 38C (100.4F), Mild Pain (1 - 3), Moderate Pain (4 - 6)  melatonin 3 milliGRAM(s) Oral at bedtime PRN Insomnia      Vital Signs Last 24 Hrs  T(C): 36.6 (08 Jan 2022 12:10), Max: 36.8 (07 Jan 2022 15:09)  T(F): 97.9 (08 Jan 2022 12:10), Max: 98.2 (07 Jan 2022 15:09)  HR: 66 (08 Jan 2022 12:10) (54 - 66)  BP: 123/74 (08 Jan 2022 12:10) (123/74 - 152/83)  BP(mean): --  RR: 18 (08 Jan 2022 12:10) (17 - 18)  SpO2: 100% (08 Jan 2022 12:10) (100% - 100%)  CAPILLARY BLOOD GLUCOSE        I&O's Summary    07 Jan 2022 07:01  -  08 Jan 2022 07:00  --------------------------------------------------------  IN: 0 mL / OUT: 700 mL / NET: -700 mL        PHYSICAL EXAM:  CONSTITUTIONAL: NAD  EYES: PERRLA; conjunctiva and sclera clear  ENMT: Moist oral mucosa, no pharyngeal injection or exudates; normal dentition  NECK: Supple, no palpable masses; no thyromegaly  RESPIRATORY: Normal respiratory effort; lungs are clear to auscultation bilaterally  CARDIOVASCULAR: Regular rate and rhythm, normal S1 and S2, no murmur/rub/gallop; No lower extremity edema; Peripheral pulses are 2+ bilaterally  ABDOMEN: Nontender to palpation, normoactive bowel sounds, no rebound/guarding; No hepatosplenomegaly; chronic gentile in place  MUSCULOSKELETAL:  Unable to assess gait; no clubbing or cyanosis of digits; no joint swelling or tenderness to palpation  PSYCH: A+O to person, place; affect appropriate  NEUROLOGY: CN 2-12 are intact and symmetric; no gross sensory deficits   SKIN: No rashes; no palpable lesions.    LABS:                              COVID-19 PCR: Detected (01-03-22 @ 19:27)  COVID-19 PCR: Detected (12-30-21 @ 22:55)  COVID-19 PCR: NotDetec (12-29-21 @ 09:28)  COVID-19 PCR: NotDetec (12-26-21 @ 12:18)  COVID-19 PCR: NotDetec (12-23-21 @ 11:31)  COVID-19 PCR: NotDetec (12-19-21 @ 10:49)  COVID-19 PCR: NotDetec (12-14-21 @ 06:51)  COVID-19 PCR: NotDetec (12-08-21 @ 13:50)      RADIOLOGY & ADDITIONAL TESTS:    Imaging Personally Reviewed:    Care Discussed with Consultants/Other Providers:

## 2022-01-08 NOTE — PROVIDER CONTACT NOTE (OTHER) - BACKGROUND
71y M pmh HTN, seizure disorder, paraplegia and urinary incontinence.
71 y phm HTN, seizure disorder, paraplegia, urinary incontinence.
71y M pmh HTN, seizure disorder, paraplegia and urinary incontinence.
patient admit with Sepsis
patient admit with sepsis
Pt was admitted for sepsis
Sepsis, Hx parapelegia
Admitted for fever/sepsis. Patient on IV Vancomycin.
Patient admitted for sepsis.
patient admit with sepsis
patient admitted for sepsis.
pt. admitted for sepsis. Pt. has a hx. of urinary retention, seizures, HTN, paraplegia.
patient admit with sepsis
patient admitted for sepsis.
Sepsis, Hx parapelegia
Sepsis, Hx parapelegia
admitted +sepsis, +covid
patient admit with sepsis
patient admit with sepsis
patient admitted for sepsis.
71 year old male pmhx of HTN, seizure disorder, paraplegia. present to hospital on 11/12/21 for sepsis.
Pt was admitted for sepsis
patient admit with sepsis

## 2022-01-08 NOTE — PROVIDER CONTACT NOTE (OTHER) - REASON
PAtient did not have BM after suppository
Pt refused miralax
Patient is refusing morning lab draw
elevated ruth
patient refusing Methadone
Patient is refusing to have IV change, requesting for it to be change in daytime
Patient needs disimpaction
patient c/o headache
PAtient refusing IV
Patient refused labs
patient refusing new IV access
patient /100
patient refused labs
patient refusing morning labs
2nd TOV, 225cc bladder scanned now
Patient gentile was leaking, new gentile was put in place
PAtient refusing morning labs
Pt refused  IV access placement
Pt. febrile, temp. 102.6
patient refusing 6pm meds and vital signs
patient refusing Depakote
rectal temp 101.3
Patient refusing new IV
Refusal of morning labs draw & new IV
patient refuses removal of left IV access from december 3rd. no signs or symptoms of infection.

## 2022-01-09 LAB — SARS-COV-2 RNA SPEC QL NAA+PROBE: SIGNIFICANT CHANGE UP

## 2022-01-09 PROCEDURE — 99232 SBSQ HOSP IP/OBS MODERATE 35: CPT

## 2022-01-09 RX ADMIN — Medication 325 MILLIGRAM(S): at 12:15

## 2022-01-09 RX ADMIN — DIVALPROEX SODIUM 250 MILLIGRAM(S): 500 TABLET, DELAYED RELEASE ORAL at 06:09

## 2022-01-09 RX ADMIN — TAMSULOSIN HYDROCHLORIDE 0.4 MILLIGRAM(S): 0.4 CAPSULE ORAL at 21:41

## 2022-01-09 RX ADMIN — Medication 1 TABLET(S): at 12:13

## 2022-01-09 RX ADMIN — METHADONE HYDROCHLORIDE 10 MILLIGRAM(S): 40 TABLET ORAL at 13:40

## 2022-01-09 RX ADMIN — Medication 650 MILLIGRAM(S): at 12:36

## 2022-01-09 RX ADMIN — ATORVASTATIN CALCIUM 40 MILLIGRAM(S): 80 TABLET, FILM COATED ORAL at 21:41

## 2022-01-09 RX ADMIN — Medication 650 MILLIGRAM(S): at 12:06

## 2022-01-09 RX ADMIN — Medication 1 TABLET(S): at 12:15

## 2022-01-09 RX ADMIN — DIVALPROEX SODIUM 250 MILLIGRAM(S): 500 TABLET, DELAYED RELEASE ORAL at 17:32

## 2022-01-09 RX ADMIN — Medication 5 MILLIGRAM(S): at 12:16

## 2022-01-09 RX ADMIN — SENNA PLUS 2 TABLET(S): 8.6 TABLET ORAL at 21:41

## 2022-01-09 RX ADMIN — Medication 81 MILLIGRAM(S): at 12:12

## 2022-01-09 RX ADMIN — AMLODIPINE BESYLATE 5 MILLIGRAM(S): 2.5 TABLET ORAL at 06:09

## 2022-01-09 RX ADMIN — Medication 75 MILLIGRAM(S): at 06:09

## 2022-01-09 RX ADMIN — ENOXAPARIN SODIUM 40 MILLIGRAM(S): 100 INJECTION SUBCUTANEOUS at 12:11
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
## 2022-01-09 NOTE — PROGRESS NOTE ADULT - SUBJECTIVE AND OBJECTIVE BOX
[facility] Division of Hospital Medicine  Armond Azar MD  Pager (M-F, 8A-5P): [phone]  Other Times:  [phone]    Patient is a 71y old  Male who presents with a chief complaint of Infected bedsores (08 Jan 2022 13:34)    SUBJECTIVE / OVERNIGHT EVENTS:  No new complaints.  No N/V, CP,  lightheadedness, dizziness, abdominal pain, diarrhea, dysuria.    MEDICATIONS  (STANDING):  amLODIPine   Tablet 5 milliGRAM(s) Oral daily  aspirin enteric coated 81 milliGRAM(s) Oral daily  atorvastatin 40 milliGRAM(s) Oral at bedtime  bisacodyl 5 milliGRAM(s) Oral daily  calcium carbonate 1250 mG  + Vitamin D (OsCal 500 + D) 1 Tablet(s) Oral daily  diVALproex  milliGRAM(s) Oral two times a day  enoxaparin Injectable 40 milliGRAM(s) SubCutaneous daily  ferrous    sulfate 325 milliGRAM(s) Oral daily  methadone   Solution 10 milliGRAM(s) Oral daily  metoprolol succinate ER 75 milliGRAM(s) Oral daily  multivitamin 1 Tablet(s) Oral daily  polyethylene glycol 3350 17 Gram(s) Oral two times a day  senna 2 Tablet(s) Oral at bedtime  tamsulosin 0.4 milliGRAM(s) Oral at bedtime    MEDICATIONS  (PRN):  acetaminophen     Tablet .. 650 milliGRAM(s) Oral every 6 hours PRN Temp greater or equal to 38C (100.4F), Mild Pain (1 - 3), Moderate Pain (4 - 6)  melatonin 3 milliGRAM(s) Oral at bedtime PRN Insomnia      Vital Signs Last 24 Hrs  T(C): 36.7 (09 Jan 2022 06:04), Max: 36.8 (09 Jan 2022 04:05)  T(F): 98 (09 Jan 2022 06:04), Max: 98.2 (09 Jan 2022 04:05)  HR: 64 (09 Jan 2022 06:04) (62 - 68)  BP: 132/79 (09 Jan 2022 06:04) (132/79 - 138/85)  BP(mean): --  RR: 17 (09 Jan 2022 06:04) (15 - 17)  SpO2: 100% (09 Jan 2022 06:04) (100% - 100%)  CAPILLARY BLOOD GLUCOSE        I&O's Summary    08 Jan 2022 07:01  -  09 Jan 2022 07:00  --------------------------------------------------------  IN: 460 mL / OUT: 1550 mL / NET: -1090 mL        PHYSICAL EXAM:  CONSTITUTIONAL: NAD  EYES: PERRLA; conjunctiva and sclera clear  ENMT: Moist oral mucosa, no pharyngeal injection or exudates; normal dentition  NECK: Supple, no palpable masses; no thyromegaly  RESPIRATORY: Normal respiratory effort; lungs are clear to auscultation bilaterally  CARDIOVASCULAR: Regular rate and rhythm, normal S1 and S2, no murmur/rub/gallop; No lower extremity edema; Peripheral pulses are 2+ bilaterally  ABDOMEN: Nontender to palpation, normoactive bowel sounds, no rebound/guarding; No hepatosplenomegaly; chronic gentile in place  MUSCULOSKELETAL:  Unable to assess gait; no clubbing or cyanosis of digits; no joint swelling or tenderness to palpation  PSYCH: A+O to person, place; affect appropriate  NEUROLOGY: CN 2-12 are intact and symmetric; no gross sensory deficits   SKIN: No rashes; no palpable lesions.    LABS:                              COVID-19 PCR: Detected (01-03-22 @ 19:27)  COVID-19 PCR: Detected (12-30-21 @ 22:55)  COVID-19 PCR: NotDetec (12-29-21 @ 09:28)  COVID-19 PCR: NotDetec (12-26-21 @ 12:18)  COVID-19 PCR: NotDetec (12-23-21 @ 11:31)  COVID-19 PCR: NotDetec (12-19-21 @ 10:49)  COVID-19 PCR: NotDetec (12-14-21 @ 06:51)      RADIOLOGY & ADDITIONAL TESTS:    Imaging Personally Reviewed:    Care Discussed with Consultants/Other Providers:

## 2022-01-10 PROCEDURE — 99232 SBSQ HOSP IP/OBS MODERATE 35: CPT

## 2022-01-10 RX ADMIN — Medication 75 MILLIGRAM(S): at 05:43

## 2022-01-10 RX ADMIN — AMLODIPINE BESYLATE 5 MILLIGRAM(S): 2.5 TABLET ORAL at 05:43

## 2022-01-10 RX ADMIN — Medication 1 TABLET(S): at 11:51

## 2022-01-10 RX ADMIN — Medication 1 TABLET(S): at 11:50

## 2022-01-10 RX ADMIN — Medication 5 MILLIGRAM(S): at 11:51

## 2022-01-10 RX ADMIN — Medication 81 MILLIGRAM(S): at 11:50

## 2022-01-10 RX ADMIN — ATORVASTATIN CALCIUM 40 MILLIGRAM(S): 80 TABLET, FILM COATED ORAL at 21:01

## 2022-01-10 RX ADMIN — METHADONE HYDROCHLORIDE 10 MILLIGRAM(S): 40 TABLET ORAL at 11:48

## 2022-01-10 RX ADMIN — DIVALPROEX SODIUM 250 MILLIGRAM(S): 500 TABLET, DELAYED RELEASE ORAL at 17:26

## 2022-01-10 RX ADMIN — ENOXAPARIN SODIUM 40 MILLIGRAM(S): 100 INJECTION SUBCUTANEOUS at 11:50

## 2022-01-10 RX ADMIN — DIVALPROEX SODIUM 250 MILLIGRAM(S): 500 TABLET, DELAYED RELEASE ORAL at 05:43

## 2022-01-10 RX ADMIN — SENNA PLUS 2 TABLET(S): 8.6 TABLET ORAL at 21:02

## 2022-01-10 RX ADMIN — Medication 325 MILLIGRAM(S): at 11:50

## 2022-01-10 RX ADMIN — TAMSULOSIN HYDROCHLORIDE 0.4 MILLIGRAM(S): 0.4 CAPSULE ORAL at 21:01
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
## 2022-01-10 NOTE — PROGRESS NOTE ADULT - PROBLEM SELECTOR PLAN 1
PCR+ on 12/30  isolation precaution  not candidate for MAb. as he does not have high risk criteria and fully vaccinated in 6/2021.   finished 3 day course of Remdesivir.  remain asymptomatic.   COVID PCR neg 1/9

## 2022-01-11 PROCEDURE — 99232 SBSQ HOSP IP/OBS MODERATE 35: CPT

## 2022-01-11 RX ORDER — IBUPROFEN 200 MG
400 TABLET ORAL ONCE
Refills: 0 | Status: COMPLETED | OUTPATIENT
Start: 2022-01-11 | End: 2022-01-11

## 2022-01-11 RX ADMIN — Medication 75 MILLIGRAM(S): at 05:14

## 2022-01-11 RX ADMIN — AMLODIPINE BESYLATE 5 MILLIGRAM(S): 2.5 TABLET ORAL at 05:15

## 2022-01-11 RX ADMIN — METHADONE HYDROCHLORIDE 10 MILLIGRAM(S): 40 TABLET ORAL at 12:14

## 2022-01-11 RX ADMIN — Medication 1 TABLET(S): at 12:14

## 2022-01-11 RX ADMIN — TAMSULOSIN HYDROCHLORIDE 0.4 MILLIGRAM(S): 0.4 CAPSULE ORAL at 22:23

## 2022-01-11 RX ADMIN — DIVALPROEX SODIUM 250 MILLIGRAM(S): 500 TABLET, DELAYED RELEASE ORAL at 17:00

## 2022-01-11 RX ADMIN — ENOXAPARIN SODIUM 40 MILLIGRAM(S): 100 INJECTION SUBCUTANEOUS at 12:14

## 2022-01-11 RX ADMIN — SENNA PLUS 2 TABLET(S): 8.6 TABLET ORAL at 22:23

## 2022-01-11 RX ADMIN — Medication 325 MILLIGRAM(S): at 12:14

## 2022-01-11 RX ADMIN — ATORVASTATIN CALCIUM 40 MILLIGRAM(S): 80 TABLET, FILM COATED ORAL at 22:23

## 2022-01-11 RX ADMIN — Medication 81 MILLIGRAM(S): at 12:14

## 2022-01-11 RX ADMIN — Medication 400 MILLIGRAM(S): at 23:27

## 2022-01-11 RX ADMIN — Medication 5 MILLIGRAM(S): at 12:14

## 2022-01-11 RX ADMIN — Medication 1 TABLET(S): at 12:13

## 2022-01-11 RX ADMIN — DIVALPROEX SODIUM 250 MILLIGRAM(S): 500 TABLET, DELAYED RELEASE ORAL at 05:14
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
## 2022-01-11 NOTE — PROGRESS NOTE ADULT - SUBJECTIVE AND OBJECTIVE BOX
[facility] Division of Hospital Medicine  Dylan Turk MD  In House Pager [phone]    Patient is a 71y old  Male who presents with a chief complaint of Infected bedsores (10 Mello 2022 15:32)      SUBJECTIVE / OVERNIGHT EVENTS:  No overnight events. Labs and vitals reviewed.  Patient seen and examined at bedside, reports urine leaking around gentile. he states he usually use condom cath with intermittent straight cath at home.   No fever, no chills, no SOB, no CP, no n/v/d, no abd pain, no dysuria      MEDICATIONS  (STANDING):  amLODIPine   Tablet 5 milliGRAM(s) Oral daily  aspirin enteric coated 81 milliGRAM(s) Oral daily  atorvastatin 40 milliGRAM(s) Oral at bedtime  bisacodyl 5 milliGRAM(s) Oral daily  calcium carbonate 1250 mG  + Vitamin D (OsCal 500 + D) 1 Tablet(s) Oral daily  diVALproex  milliGRAM(s) Oral two times a day  enoxaparin Injectable 40 milliGRAM(s) SubCutaneous daily  ferrous    sulfate 325 milliGRAM(s) Oral daily  methadone   Solution 10 milliGRAM(s) Oral daily  metoprolol succinate ER 75 milliGRAM(s) Oral daily  multivitamin 1 Tablet(s) Oral daily  polyethylene glycol 3350 17 Gram(s) Oral two times a day  senna 2 Tablet(s) Oral at bedtime  tamsulosin 0.4 milliGRAM(s) Oral at bedtime    MEDICATIONS  (PRN):  acetaminophen     Tablet .. 650 milliGRAM(s) Oral every 6 hours PRN Temp greater or equal to 38C (100.4F), Mild Pain (1 - 3), Moderate Pain (4 - 6)  melatonin 3 milliGRAM(s) Oral at bedtime PRN Insomnia    CAPILLARY BLOOD GLUCOSE        I&O's Summary    10 Mello 2022 07:01  -  11 Jan 2022 07:00  --------------------------------------------------------  IN: 250 mL / OUT: 650 mL / NET: -400 mL        PHYSICAL EXAM:  Vital Signs Last 24 Hrs  T(C): 36.8 (11 Jan 2022 15:22), Max: 36.8 (11 Jan 2022 05:05)  T(F): 98.2 (11 Jan 2022 15:22), Max: 98.3 (11 Jan 2022 05:05)  HR: 76 (11 Jan 2022 15:22) (60 - 76)  BP: 129/88 (11 Jan 2022 15:22) (120/88 - 148/94)  BP(mean): --  RR: 16 (11 Jan 2022 15:22) (16 - 18)  SpO2: 98% (11 Jan 2022 15:22) (95% - 100%)    Gen: NAD; sitting in bed comfortably   Pulm: no accessory muscle use; lungs clear on auscultation bilaterally; no wheezing or crackles.   Cards: RRR, nl S1/S2; no LE edema; no JVD  Abd: non-distended; soft and NT on exam; +bs  : gentile in place.   Ext: ANTONY in upper extremities; no joint effusion or tenderness in upper and lower extremities; no cyanosis  Neuro: Awake and Alert; non-focal; moving all extremities.   Skin: no new rashs; warm to touch;     LABS:                      RADIOLOGY & ADDITIONAL TESTS:  Results Reviewed: Y  Imaging Personally Reviewed: Y  Electrocardiogram Personally Reviewed: Y    COORDINATION OF CARE:  Care Discussed with Consultants/Other Providers [Y/N]: Y  Prior or Outpatient Records Reviewed [Y/N]: Y

## 2022-01-12 PROCEDURE — 99232 SBSQ HOSP IP/OBS MODERATE 35: CPT

## 2022-01-12 RX ADMIN — Medication 5 MILLIGRAM(S): at 11:59

## 2022-01-12 RX ADMIN — SENNA PLUS 2 TABLET(S): 8.6 TABLET ORAL at 21:17

## 2022-01-12 RX ADMIN — Medication 325 MILLIGRAM(S): at 11:59

## 2022-01-12 RX ADMIN — ATORVASTATIN CALCIUM 40 MILLIGRAM(S): 80 TABLET, FILM COATED ORAL at 21:17

## 2022-01-12 RX ADMIN — ENOXAPARIN SODIUM 40 MILLIGRAM(S): 100 INJECTION SUBCUTANEOUS at 11:58

## 2022-01-12 RX ADMIN — METHADONE HYDROCHLORIDE 10 MILLIGRAM(S): 40 TABLET ORAL at 11:58

## 2022-01-12 RX ADMIN — AMLODIPINE BESYLATE 5 MILLIGRAM(S): 2.5 TABLET ORAL at 05:58

## 2022-01-12 RX ADMIN — Medication 1 TABLET(S): at 11:59

## 2022-01-12 RX ADMIN — DIVALPROEX SODIUM 250 MILLIGRAM(S): 500 TABLET, DELAYED RELEASE ORAL at 17:10

## 2022-01-12 RX ADMIN — Medication 75 MILLIGRAM(S): at 05:58

## 2022-01-12 RX ADMIN — TAMSULOSIN HYDROCHLORIDE 0.4 MILLIGRAM(S): 0.4 CAPSULE ORAL at 21:17

## 2022-01-12 RX ADMIN — Medication 81 MILLIGRAM(S): at 11:58

## 2022-01-12 RX ADMIN — Medication 400 MILLIGRAM(S): at 00:57

## 2022-01-12 RX ADMIN — DIVALPROEX SODIUM 250 MILLIGRAM(S): 500 TABLET, DELAYED RELEASE ORAL at 05:59
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
## 2022-01-12 NOTE — PROGRESS NOTE ADULT - SUBJECTIVE AND OBJECTIVE BOX
[facility] Division of Hospital Medicine  Dylan Turk MD  In House Pager [phone]    Patient is a 71y old  Male who presents with a chief complaint of Infected bedsores (11 Jan 2022 16:57)      SUBJECTIVE / OVERNIGHT EVENTS:  No overnight events. Labs and vitals reviewed.  Patient seen and examined at bedside, no acute complaints.  No fever, no chills, no SOB, no CP, no n/v/d, no abd pain, no dysuria      MEDICATIONS  (STANDING):  amLODIPine   Tablet 5 milliGRAM(s) Oral daily  aspirin enteric coated 81 milliGRAM(s) Oral daily  atorvastatin 40 milliGRAM(s) Oral at bedtime  bisacodyl 5 milliGRAM(s) Oral daily  calcium carbonate 1250 mG  + Vitamin D (OsCal 500 + D) 1 Tablet(s) Oral daily  diVALproex  milliGRAM(s) Oral two times a day  enoxaparin Injectable 40 milliGRAM(s) SubCutaneous daily  ferrous    sulfate 325 milliGRAM(s) Oral daily  methadone   Solution 10 milliGRAM(s) Oral daily  metoprolol succinate ER 75 milliGRAM(s) Oral daily  multivitamin 1 Tablet(s) Oral daily  polyethylene glycol 3350 17 Gram(s) Oral two times a day  senna 2 Tablet(s) Oral at bedtime  tamsulosin 0.4 milliGRAM(s) Oral at bedtime    MEDICATIONS  (PRN):  acetaminophen     Tablet .. 650 milliGRAM(s) Oral every 6 hours PRN Temp greater or equal to 38C (100.4F), Mild Pain (1 - 3), Moderate Pain (4 - 6)  melatonin 3 milliGRAM(s) Oral at bedtime PRN Insomnia    CAPILLARY BLOOD GLUCOSE        I&O's Summary      PHYSICAL EXAM:  Vital Signs Last 24 Hrs  T(C): 37.1 (12 Jan 2022 06:32), Max: 37.1 (12 Jan 2022 06:32)  T(F): 98.7 (12 Jan 2022 06:32), Max: 98.7 (12 Jan 2022 06:32)  HR: 63 (12 Jan 2022 06:32) (63 - 76)  BP: 146/83 (12 Jan 2022 06:32) (129/88 - 154/95)  BP(mean): --  RR: 19 (12 Jan 2022 06:32) (16 - 19)  SpO2: 97% (12 Jan 2022 06:32) (97% - 99%)    Gen: NAD; sitting in bed comfortably   Pulm: no accessory muscle use; lungs clear on auscultation bilaterally; no wheezing or crackles.   Cards: RRR, nl S1/S2; no LE edema; no JVD  Abd: non-distended; soft and NT on exam; +bs  Ext: ANTONY; no joint effusion or tenderness in upper and lower extremities; no cyanosis  Neuro: Awake and Alert; non-focal; moving b/l upper extremities.   Skin: no new rashs; warm to touch;     LABS:                      RADIOLOGY & ADDITIONAL TESTS:  Results Reviewed: Y  Imaging Personally Reviewed: Y  Electrocardiogram Personally Reviewed: Y    COORDINATION OF CARE:  Care Discussed with Consultants/Other Providers [Y/N]: Y  Prior or Outpatient Records Reviewed [Y/N]: Y

## 2022-01-13 LAB — SARS-COV-2 RNA SPEC QL NAA+PROBE: DETECTED

## 2022-01-13 PROCEDURE — 99232 SBSQ HOSP IP/OBS MODERATE 35: CPT

## 2022-01-13 RX ORDER — METHADONE HYDROCHLORIDE 40 MG/1
10 TABLET ORAL DAILY
Refills: 0 | Status: DISCONTINUED | OUTPATIENT
Start: 2022-01-13 | End: 2022-01-18

## 2022-01-13 RX ADMIN — Medication 1 TABLET(S): at 12:23

## 2022-01-13 RX ADMIN — Medication 325 MILLIGRAM(S): at 12:22

## 2022-01-13 RX ADMIN — ENOXAPARIN SODIUM 40 MILLIGRAM(S): 100 INJECTION SUBCUTANEOUS at 12:22

## 2022-01-13 RX ADMIN — Medication 81 MILLIGRAM(S): at 12:22

## 2022-01-13 RX ADMIN — Medication 5 MILLIGRAM(S): at 12:23

## 2022-01-13 RX ADMIN — POLYETHYLENE GLYCOL 3350 17 GRAM(S): 17 POWDER, FOR SOLUTION ORAL at 05:16

## 2022-01-13 RX ADMIN — Medication 75 MILLIGRAM(S): at 05:16

## 2022-01-13 RX ADMIN — AMLODIPINE BESYLATE 5 MILLIGRAM(S): 2.5 TABLET ORAL at 05:16

## 2022-01-13 RX ADMIN — TAMSULOSIN HYDROCHLORIDE 0.4 MILLIGRAM(S): 0.4 CAPSULE ORAL at 21:04

## 2022-01-13 RX ADMIN — METHADONE HYDROCHLORIDE 10 MILLIGRAM(S): 40 TABLET ORAL at 12:22

## 2022-01-13 RX ADMIN — SENNA PLUS 2 TABLET(S): 8.6 TABLET ORAL at 21:04

## 2022-01-13 RX ADMIN — DIVALPROEX SODIUM 250 MILLIGRAM(S): 500 TABLET, DELAYED RELEASE ORAL at 17:27

## 2022-01-13 RX ADMIN — ATORVASTATIN CALCIUM 40 MILLIGRAM(S): 80 TABLET, FILM COATED ORAL at 21:04

## 2022-01-13 RX ADMIN — DIVALPROEX SODIUM 250 MILLIGRAM(S): 500 TABLET, DELAYED RELEASE ORAL at 05:16

## 2022-01-13 NOTE — PROGRESS NOTE ADULT - SUBJECTIVE AND OBJECTIVE BOX
[facility] Division of Hospital Medicine  Dylan Turk MD  In House Pager [phone]    Patient is a 71y old  Male who presents with a chief complaint of Infected bedsores (12 Jan 2022 14:27)      SUBJECTIVE / OVERNIGHT EVENTS:  No overnight events. Labs and vitals reviewed.  Patient seen and examined at bedside, no acute complaints. looking for his pants.   No fever, no chills, no SOB, no CP, no n/v/d, no abd pain, no dysuria      MEDICATIONS  (STANDING):  amLODIPine   Tablet 5 milliGRAM(s) Oral daily  aspirin enteric coated 81 milliGRAM(s) Oral daily  atorvastatin 40 milliGRAM(s) Oral at bedtime  bisacodyl 5 milliGRAM(s) Oral daily  calcium carbonate 1250 mG  + Vitamin D (OsCal 500 + D) 1 Tablet(s) Oral daily  diVALproex  milliGRAM(s) Oral two times a day  enoxaparin Injectable 40 milliGRAM(s) SubCutaneous daily  ferrous    sulfate 325 milliGRAM(s) Oral daily  methadone   Solution 10 milliGRAM(s) Oral daily  metoprolol succinate ER 75 milliGRAM(s) Oral daily  multivitamin 1 Tablet(s) Oral daily  polyethylene glycol 3350 17 Gram(s) Oral two times a day  senna 2 Tablet(s) Oral at bedtime  tamsulosin 0.4 milliGRAM(s) Oral at bedtime    MEDICATIONS  (PRN):  acetaminophen     Tablet .. 650 milliGRAM(s) Oral every 6 hours PRN Temp greater or equal to 38C (100.4F), Mild Pain (1 - 3), Moderate Pain (4 - 6)  melatonin 3 milliGRAM(s) Oral at bedtime PRN Insomnia    CAPILLARY BLOOD GLUCOSE        I&O's Summary      PHYSICAL EXAM:  Vital Signs Last 24 Hrs  T(C): 36.4 (13 Jan 2022 05:15), Max: 36.7 (12 Jan 2022 21:30)  T(F): 97.6 (13 Jan 2022 05:15), Max: 98 (12 Jan 2022 21:30)  HR: 69 (13 Jan 2022 05:15) (66 - 71)  BP: 145/72 (13 Jan 2022 05:15) (126/75 - 145/72)  BP(mean): --  RR: 18 (13 Jan 2022 05:15) (18 - 18)  SpO2: 100% (13 Jan 2022 05:15) (100% - 100%)    Gen: NAD; sitting in bed comfortably   Pulm: no accessory muscle use; lungs clear on auscultation bilaterally; no wheezing or crackles.   Cards: RRR, nl S1/S2; no LE edema; no JVD  Abd: non-distended; soft and NT on exam; +bs  Ext: ANTONY; no joint effusion or tenderness in upper and lower extremities; no cyanosis  Neuro: Awake and Alert; non-focal; moving all extremities.   Skin: no new rashs; warm to touch;     LABS:                      RADIOLOGY & ADDITIONAL TESTS:  Results Reviewed: Y  Imaging Personally Reviewed: Y  Electrocardiogram Personally Reviewed: Y    COORDINATION OF CARE:  Care Discussed with Consultants/Other Providers [Y/N]: Y  Prior or Outpatient Records Reviewed [Y/N]: Y
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
## 2022-01-14 PROCEDURE — 99232 SBSQ HOSP IP/OBS MODERATE 35: CPT

## 2022-01-14 RX ADMIN — POLYETHYLENE GLYCOL 3350 17 GRAM(S): 17 POWDER, FOR SOLUTION ORAL at 05:46

## 2022-01-14 RX ADMIN — POLYETHYLENE GLYCOL 3350 17 GRAM(S): 17 POWDER, FOR SOLUTION ORAL at 17:06

## 2022-01-14 RX ADMIN — TAMSULOSIN HYDROCHLORIDE 0.4 MILLIGRAM(S): 0.4 CAPSULE ORAL at 21:19

## 2022-01-14 RX ADMIN — Medication 1 TABLET(S): at 11:37

## 2022-01-14 RX ADMIN — ENOXAPARIN SODIUM 40 MILLIGRAM(S): 100 INJECTION SUBCUTANEOUS at 11:37

## 2022-01-14 RX ADMIN — Medication 5 MILLIGRAM(S): at 11:37

## 2022-01-14 RX ADMIN — AMLODIPINE BESYLATE 5 MILLIGRAM(S): 2.5 TABLET ORAL at 05:46

## 2022-01-14 RX ADMIN — DIVALPROEX SODIUM 250 MILLIGRAM(S): 500 TABLET, DELAYED RELEASE ORAL at 05:46

## 2022-01-14 RX ADMIN — SENNA PLUS 2 TABLET(S): 8.6 TABLET ORAL at 21:19

## 2022-01-14 RX ADMIN — Medication 81 MILLIGRAM(S): at 11:37

## 2022-01-14 RX ADMIN — METHADONE HYDROCHLORIDE 10 MILLIGRAM(S): 40 TABLET ORAL at 11:36

## 2022-01-14 RX ADMIN — Medication 75 MILLIGRAM(S): at 05:46

## 2022-01-14 RX ADMIN — Medication 1 TABLET(S): at 11:36

## 2022-01-14 RX ADMIN — Medication 325 MILLIGRAM(S): at 11:36

## 2022-01-14 RX ADMIN — DIVALPROEX SODIUM 250 MILLIGRAM(S): 500 TABLET, DELAYED RELEASE ORAL at 17:06

## 2022-01-14 RX ADMIN — ATORVASTATIN CALCIUM 40 MILLIGRAM(S): 80 TABLET, FILM COATED ORAL at 21:19
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
## 2022-01-14 NOTE — PROGRESS NOTE ADULT - SUBJECTIVE AND OBJECTIVE BOX
[facility] Division of Hospital Medicine  Dylan Turk MD  In House Pager [phone]    Patient is a 71y old  Male who presents with a chief complaint of Infected bedsores (13 Jan 2022 13:31)      SUBJECTIVE / OVERNIGHT EVENTS:  No overnight events. Labs and vitals reviewed.   Patient seen and examined at bedside, no acute complaints.  No fever, no chills, no SOB, no CP, no n/v/d, no abd pain, no dysuria      MEDICATIONS  (STANDING):  amLODIPine   Tablet 5 milliGRAM(s) Oral daily  aspirin enteric coated 81 milliGRAM(s) Oral daily  atorvastatin 40 milliGRAM(s) Oral at bedtime  bisacodyl 5 milliGRAM(s) Oral daily  calcium carbonate 1250 mG  + Vitamin D (OsCal 500 + D) 1 Tablet(s) Oral daily  diVALproex  milliGRAM(s) Oral two times a day  enoxaparin Injectable 40 milliGRAM(s) SubCutaneous daily  ferrous    sulfate 325 milliGRAM(s) Oral daily  methadone    Tablet 10 milliGRAM(s) Oral daily  metoprolol succinate ER 75 milliGRAM(s) Oral daily  multivitamin 1 Tablet(s) Oral daily  polyethylene glycol 3350 17 Gram(s) Oral two times a day  senna 2 Tablet(s) Oral at bedtime  tamsulosin 0.4 milliGRAM(s) Oral at bedtime    MEDICATIONS  (PRN):  acetaminophen     Tablet .. 650 milliGRAM(s) Oral every 6 hours PRN Temp greater or equal to 38C (100.4F), Mild Pain (1 - 3), Moderate Pain (4 - 6)  melatonin 3 milliGRAM(s) Oral at bedtime PRN Insomnia    CAPILLARY BLOOD GLUCOSE        I&O's Summary    13 Jan 2022 07:01  -  14 Jan 2022 07:00  --------------------------------------------------------  IN: 0 mL / OUT: 600 mL / NET: -600 mL        PHYSICAL EXAM:  Vital Signs Last 24 Hrs  T(C): 36.3 (14 Jan 2022 05:57), Max: 36.6 (13 Jan 2022 21:10)  T(F): 97.4 (14 Jan 2022 05:57), Max: 97.8 (13 Jan 2022 21:10)  HR: 72 (14 Jan 2022 05:57) (72 - 78)  BP: 133/76 (14 Jan 2022 05:57) (133/76 - 145/68)  BP(mean): --  RR: 17 (14 Jan 2022 05:57) (17 - 18)  SpO2: 98% (14 Jan 2022 05:57) (98% - 98%)    Gen: NAD; sitting in bed comfortably   Pulm: no accessory muscle use; lungs clear on auscultation bilaterally; no wheezing or crackles.   Cards: RRR, nl S1/S2; no LE edema; no JVD  Abd: non-distended; soft and NT on exam; +bs  : condom cath; no suprapubic distension.   Ext: ANTONY; no joint effusion or tenderness in upper and lower extremities; no cyanosis  Neuro: Awake and Alert; non-focal; moving all extremities.   Skin: no new rashs; warm to touch;     LABS:                      RADIOLOGY & ADDITIONAL TESTS:  Results Reviewed: Y  Imaging Personally Reviewed: Y  Electrocardiogram Personally Reviewed: Y    COORDINATION OF CARE:  Care Discussed with Consultants/Other Providers [Y/N]: Y  Prior or Outpatient Records Reviewed [Y/N]: Y

## 2022-01-15 PROCEDURE — 99232 SBSQ HOSP IP/OBS MODERATE 35: CPT

## 2022-01-15 RX ORDER — SENNA PLUS 8.6 MG/1
3 TABLET ORAL AT BEDTIME
Refills: 0 | Status: DISCONTINUED | OUTPATIENT
Start: 2022-01-15 | End: 2022-01-18

## 2022-01-15 RX ADMIN — Medication 325 MILLIGRAM(S): at 12:24

## 2022-01-15 RX ADMIN — Medication 10 MILLIGRAM(S): at 16:38

## 2022-01-15 RX ADMIN — DIVALPROEX SODIUM 250 MILLIGRAM(S): 500 TABLET, DELAYED RELEASE ORAL at 17:46

## 2022-01-15 RX ADMIN — METHADONE HYDROCHLORIDE 10 MILLIGRAM(S): 40 TABLET ORAL at 12:24

## 2022-01-15 RX ADMIN — Medication 1 TABLET(S): at 12:24

## 2022-01-15 RX ADMIN — Medication 81 MILLIGRAM(S): at 12:24

## 2022-01-15 RX ADMIN — TAMSULOSIN HYDROCHLORIDE 0.4 MILLIGRAM(S): 0.4 CAPSULE ORAL at 21:11

## 2022-01-15 RX ADMIN — AMLODIPINE BESYLATE 5 MILLIGRAM(S): 2.5 TABLET ORAL at 05:03

## 2022-01-15 RX ADMIN — Medication 75 MILLIGRAM(S): at 05:03

## 2022-01-15 RX ADMIN — Medication 5 MILLIGRAM(S): at 12:25

## 2022-01-15 RX ADMIN — ATORVASTATIN CALCIUM 40 MILLIGRAM(S): 80 TABLET, FILM COATED ORAL at 21:09

## 2022-01-15 RX ADMIN — SENNA PLUS 3 TABLET(S): 8.6 TABLET ORAL at 21:09

## 2022-01-15 RX ADMIN — ENOXAPARIN SODIUM 40 MILLIGRAM(S): 100 INJECTION SUBCUTANEOUS at 12:25

## 2022-01-15 RX ADMIN — POLYETHYLENE GLYCOL 3350 17 GRAM(S): 17 POWDER, FOR SOLUTION ORAL at 18:00

## 2022-01-15 RX ADMIN — POLYETHYLENE GLYCOL 3350 17 GRAM(S): 17 POWDER, FOR SOLUTION ORAL at 05:02

## 2022-01-15 RX ADMIN — DIVALPROEX SODIUM 250 MILLIGRAM(S): 500 TABLET, DELAYED RELEASE ORAL at 05:03

## 2022-01-15 NOTE — PROGRESS NOTE ADULT - SUBJECTIVE AND OBJECTIVE BOX
[facility]  Mike Chau  Pager: [phone]      Patient is a 71y old  Male who presents with a chief complaint of Infected bedsores (14 Jan 2022 13:03)      SUBJECTIVE / OVERNIGHT EVENTS: no events overnight. feels well.   ADDITIONAL REVIEW OF SYSTEMS: denies nausea, vomiting, abd pain, diarrhea, chest pain.     MEDICATIONS  (STANDING):  amLODIPine   Tablet 5 milliGRAM(s) Oral daily  aspirin enteric coated 81 milliGRAM(s) Oral daily  atorvastatin 40 milliGRAM(s) Oral at bedtime  bisacodyl 5 milliGRAM(s) Oral daily  calcium carbonate 1250 mG  + Vitamin D (OsCal 500 + D) 1 Tablet(s) Oral daily  diVALproex  milliGRAM(s) Oral two times a day  enoxaparin Injectable 40 milliGRAM(s) SubCutaneous daily  ferrous    sulfate 325 milliGRAM(s) Oral daily  methadone    Tablet 10 milliGRAM(s) Oral daily  metoprolol succinate ER 75 milliGRAM(s) Oral daily  multivitamin 1 Tablet(s) Oral daily  polyethylene glycol 3350 17 Gram(s) Oral two times a day  senna 3 Tablet(s) Oral at bedtime  tamsulosin 0.4 milliGRAM(s) Oral at bedtime    MEDICATIONS  (PRN):  acetaminophen     Tablet .. 650 milliGRAM(s) Oral every 6 hours PRN Temp greater or equal to 38C (100.4F), Mild Pain (1 - 3), Moderate Pain (4 - 6)  bisacodyl Suppository 10 milliGRAM(s) Rectal daily PRN Constipation  melatonin 3 milliGRAM(s) Oral at bedtime PRN Insomnia      CAPILLARY BLOOD GLUCOSE        I&O's Summary    14 Jan 2022 07:01  -  15 Mello 2022 07:00  --------------------------------------------------------  IN: 0 mL / OUT: 850 mL / NET: -850 mL    15 Mello 2022 07:01  -  15 Jan 2022 15:12  --------------------------------------------------------  IN: 0 mL / OUT: 1600 mL / NET: -1600 mL        PHYSICAL EXAM:  Vital Signs Last 24 Hrs  T(C): 36.2 (15 Mello 2022 14:15), Max: 36.8 (15 Mello 2022 04:52)  T(F): 97.2 (15 Mello 2022 14:15), Max: 98.2 (15 Mello 2022 04:52)  HR: 67 (15 Mello 2022 14:15) (67 - 86)  BP: 115/75 (15 Mello 2022 14:15) (115/75 - 154/95)  BP(mean): --  RR: 20 (15 Mello 2022 14:15) (18 - 20)  SpO2: 96% (15 Mello 2022 14:15) (96% - 100%)  CONSTITUTIONAL: NAD, well-developed, well-groomed  EYES: PERRLA; conjunctiva and sclera clear  ENMT: Moist oral mucosa, no pharyngeal injection or exudates; normal dentition  NECK: Supple, no palpable masses; no thyromegaly  RESPIRATORY: Normal respiratory effort; lungs are clear to auscultation bilaterally  CARDIOVASCULAR: Regular rate and rhythm, normal S1 and S2, no murmur/rub/gallop; No lower extremity edema; Peripheral pulses are 2+ bilaterally  ABDOMEN: Nontender to palpation, normoactive bowel sounds, no rebound/guarding; No hepatosplenomegaly  MUSCULOSKELETAL: no clubbing or cyanosis of digits; no joint swelling or tenderness to palpation  PSYCH: A+O to person, place, and time; affect appropriate  NEUROLOGY: paraplegic; no gross sensory deficits otherwise  SKIN: No rashes; no palpable lesions    LABS:                      RADIOLOGY & ADDITIONAL TESTS:  Results Reviewed:   Imaging Personally Reviewed:  Electrocardiogram Personally Reviewed:    COORDINATION OF CARE:  Care Discussed with Consultants/Other Providers [Y/N]:  Prior or Outpatient Records Reviewed [Y/N]:

## 2022-01-15 NOTE — PROGRESS NOTE ADULT - PROBLEM SELECTOR PLAN 3
resolved  - Apparent left frontotemporal hypodensity on noncontrast CT, favored to be artifactual due to sulcal volume   averaging and motion, as detailed above. Acute infarction is felt to be less likely given preserved appearance of gray-white matter junction in this region on venous postcontrast images  - d/w neuro, given clinical improvement, no new deficits, pt A+Ox3, transient event likely toxic/metabolic  - routine EEG artifact but no epileptic form seen  - MRI brain neg for CVA  - VPA level elevated, neuro recommended VPA 250mg bid on discharge   - VPA now with normal limits 12/13
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
## 2022-01-16 LAB — SARS-COV-2 RNA SPEC QL NAA+PROBE: SIGNIFICANT CHANGE UP

## 2022-01-16 PROCEDURE — 99232 SBSQ HOSP IP/OBS MODERATE 35: CPT

## 2022-01-16 RX ADMIN — Medication 650 MILLIGRAM(S): at 06:51

## 2022-01-16 RX ADMIN — AMLODIPINE BESYLATE 5 MILLIGRAM(S): 2.5 TABLET ORAL at 05:57

## 2022-01-16 RX ADMIN — Medication 1 TABLET(S): at 11:39

## 2022-01-16 RX ADMIN — Medication 650 MILLIGRAM(S): at 05:56

## 2022-01-16 RX ADMIN — Medication 75 MILLIGRAM(S): at 05:57

## 2022-01-16 RX ADMIN — Medication 325 MILLIGRAM(S): at 11:39

## 2022-01-16 RX ADMIN — Medication 81 MILLIGRAM(S): at 11:38

## 2022-01-16 RX ADMIN — METHADONE HYDROCHLORIDE 10 MILLIGRAM(S): 40 TABLET ORAL at 11:38

## 2022-01-16 RX ADMIN — TAMSULOSIN HYDROCHLORIDE 0.4 MILLIGRAM(S): 0.4 CAPSULE ORAL at 21:10

## 2022-01-16 RX ADMIN — POLYETHYLENE GLYCOL 3350 17 GRAM(S): 17 POWDER, FOR SOLUTION ORAL at 05:57

## 2022-01-16 RX ADMIN — ENOXAPARIN SODIUM 40 MILLIGRAM(S): 100 INJECTION SUBCUTANEOUS at 11:38

## 2022-01-16 RX ADMIN — DIVALPROEX SODIUM 250 MILLIGRAM(S): 500 TABLET, DELAYED RELEASE ORAL at 05:57

## 2022-01-16 RX ADMIN — DIVALPROEX SODIUM 250 MILLIGRAM(S): 500 TABLET, DELAYED RELEASE ORAL at 17:32

## 2022-01-16 RX ADMIN — Medication 5 MILLIGRAM(S): at 11:39

## 2022-01-16 RX ADMIN — ATORVASTATIN CALCIUM 40 MILLIGRAM(S): 80 TABLET, FILM COATED ORAL at 21:13
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
## 2022-01-16 NOTE — PROGRESS NOTE ADULT - PROBLEM SELECTOR PLAN 10
- CT of abdomen showed distended urinary bladder. Recommend clinical correlation to assess urinary retention. Layering of stones in the left bladder diverticulum. 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   - will d/c gentile and TOV overnight. patient may do intermittent straight cath for retention instead of replacing gentile.
- CT of abdomen showed distended urinary bladder. Recommend clinical correlation to assess urinary retention. Layering of stones in the left bladder diverticulum. Did not have a chronic indwelling gentile prior to this hospitalization. UA on admission with moderate bacteria, however no urine culture done.  - Bladder scans Q 8hrs for evaluation of urinary retention  - TOV starting 11/19, passed, transitioned to condom cath--> however, then started retaining again, indwelling gentile again placed.
- CT of abdomen showed distended urinary bladder. Recommend clinical correlation to assess urinary retention. Layering of stones in the left bladder diverticulum. Did not have a chronic indwelling gentile prior to this hospitalization. UA on admission with moderate bacteria, however no urine culture done.  - Bladder scans Q 8hrs for evaluation of urinary retention  - TOV starting 11/19, passed, transitioned to condom cath--> however, then started retaining again, indwelling gentile again placed.  -Failed TOV, dc to rehab with gentile, outpt f/u urology  - Urine studies likely due to colonizing organism - will monitor off abx.
- Continue with Depakote  - Seizure precautions
- CT of abdomen showed distended urinary bladder. Recommend clinical correlation to assess urinary retention. Layering of stones in the left bladder diverticulum. Did not have a chronic indwelling gentile prior to this hospitalization. UA on admission with moderate bacteria, however no urine culture done.  - Bladder scans Q 8hrs for evaluation of urinary retention  - TOV starting 11/19, passed, transitioned to condom cath--> however, then started retaining again, indwelling gentile again placed.
- CT of abdomen showed distended urinary bladder. Recommend clinical correlation to assess urinary retention. Layering of stones in the left bladder diverticulum. Did not have a chronic indwelling gentile prior to this hospitalization. UA on admission with moderate bacteria, however no urine culture done.  - Bladder scans Q 8hrs for evaluation of urinary retention  - TOV starting 11/19, passed, transitioned to condom cath--> however, then started retaining again, indwelling gentile again placed.  -Failed TOV, dc to rehab with gentile, outpt f/u urology
- CT of abdomen showed distended urinary bladder. Recommend clinical correlation to assess urinary retention. Layering of stones in the left bladder diverticulum. Did not have a chronic indwelling gentile prior to this hospitalization. UA on admission with moderate bacteria, however no urine culture done.  - Bladder scans Q 8hrs for evaluation of urinary retention  - TOV starting 11/19, passed, transitioned to condom cath--> however, then started retaining again, indwelling gentile again placed.  -Failed TOV, dc to rehab with gentile, outpt f/u urology  - Urine studies likely due to colonizing organism - c/t monitor off abx.
resolved
- CT of abdomen showed distended urinary bladder. Recommend clinical correlation to assess urinary retention. Layering of stones in the left bladder diverticulum. 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   - will d/c gentile and TOV overnight. patient may do intermittent straight cath for retention instead of replacing gentile.
- CT of abdomen showed distended urinary bladder. Recommend clinical correlation to assess urinary retention. Layering of stones in the left bladder diverticulum. Did not have a chronic indwelling gentile prior to this hospitalization. UA on admission with moderate bacteria, however no urine culture done.  - Bladder scans Q 8hrs for evaluation of urinary retention  - TOV starting 11/19, passed, transitioned to condom cath--> however, then started retaining again, indwelling gentile again placed.  -Failed TOV, dc to rehab with gentile, outpt f/u urology
- CT of abdomen showed distended urinary bladder. Recommend clinical correlation to assess urinary retention. Layering of stones in the left bladder diverticulum. Did not have a chronic indwelling gentile prior to this hospitalization. UA on admission with moderate bacteria, however no urine culture done.  - Bladder scans Q 8hrs for evaluation of urinary retention  - TOV starting 11/19, passed, transitioned to condom cath--> however, then started retaining again, indwelling gentile again placed.
- CT of abdomen showed distended urinary bladder. Recommend clinical correlation to assess urinary retention. Layering of stones in the left bladder diverticulum. 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   - will d/c gentile and TOV overnight. patient may do intermittent straight cath for retention instead of replacing gentile.
- CT of abdomen showed distended urinary bladder. Recommend clinical correlation to assess urinary retention. Layering of stones in the left bladder diverticulum. Did not have a chronic indwelling gentile prior to this hospitalization. UA on admission with moderate bacteria, however no urine culture done.  - Bladder scans Q 8hrs for evaluation of urinary retention  - TOV starting 11/19, passed, transitioned to condom cath--> however, then started retaining again, indwelling gentile again placed.  -Failed TOV, dc to rehab with gentile, outpt f/u urology  - Urine studies likely due to colonizing organism - will monitor off abx.
- CT of abdomen showed distended urinary bladder. Recommend clinical correlation to assess urinary retention. Layering of stones in the left bladder diverticulum. Did not have a chronic indwelling gentile prior to this hospitalization. UA on admission with moderate bacteria, however no urine culture done.  - Bladder scans Q 8hrs for evaluation of urinary retention  - TOV starting 11/19, passed, transitioned to condom cath--> however, then started retaining again, indwelling gentile again placed.  -Failed TOV, dc to rehab with gentile, outpt f/u urology  - Urine studies likely due to colonizing organism - will monitor off abx.
- Continue with Depakote  - Seizure precautions
resolved
- resolved
resolved
resolved
- CT of abdomen showed distended urinary bladder. Recommend clinical correlation to assess urinary retention. Layering of stones in the left bladder diverticulum. 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   - will d/c gentile and TOV overnight. patient may do intermittent straight cath for retention instead of replacing gentile.
- CT of abdomen showed distended urinary bladder. Recommend clinical correlation to assess urinary retention. Layering of stones in the left bladder diverticulum. Did not have a chronic indwelling gentile prior to this hospitalization. UA on admission with moderate bacteria, however no urine culture done.  - Bladder scans Q 8hrs for evaluation of urinary retention  - TOV starting 11/19, passed, transitioned to condom cath--> however, then started retaining again, indwelling gentile again placed.  -Failed TOV, dc to rehab with gentile, outpt f/u urology
resolved
- CT of abdomen showed distended urinary bladder. Recommend clinical correlation to assess urinary retention. Layering of stones in the left bladder diverticulum. 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   - will d/c gentile and TOV overnight. patient may do intermittent straight cath for retention instead of replacing gentile.
- CT of abdomen showed distended urinary bladder. Recommend clinical correlation to assess urinary retention. Layering of stones in the left bladder diverticulum. Did not have a chronic indwelling gentile prior to this hospitalization. UA on admission with moderate bacteria, however no urine culture done.  - Bladder scans Q 8hrs for evaluation of urinary retention  - TOV starting 11/19, passed, transitioned to condom cath--> however, then started retaining again, indwelling gentile again placed.
- CT of abdomen showed distended urinary bladder. Recommend clinical correlation to assess urinary retention. Layering of stones in the left bladder diverticulum. Did not have a chronic indwelling gentile prior to this hospitalization. UA on admission with moderate bacteria, however no urine culture done.  - Bladder scans Q 8hrs for evaluation of urinary retention  - TOV starting 11/19, passed, transitioned to condom cath--> however, then started retaining again, indwelling gentile again placed.  -Failed TOV, dc to rehab with gentile, outpt f/u urology  - Urine studies likely due to colonizing organism - will monitor off abx.
resolved
- Continue with Depakote  - Seizure precautions
- CT of abdomen showed distended urinary bladder. Recommend clinical correlation to assess urinary retention. Layering of stones in the left bladder diverticulum. Did not have a chronic indwelling gentile prior to this hospitalization. UA on admission with moderate bacteria, however no urine culture done.  - Bladder scans Q 8hrs for evaluation of urinary retention  - TOV starting 11/19, passed, transitioned to condom cath--> however, then started retaining again, indwelling gentile again placed.  -Failed TOV, dc to rehab with gentile, outpt f/u urology  - Urine studies likely due to colonizing organism - will monitor off abx.
- CT of abdomen showed distended urinary bladder. Recommend clinical correlation to assess urinary retention. Layering of stones in the left bladder diverticulum. Did not have a chronic indwelling gentile prior to this hospitalization. UA on admission with moderate bacteria, however no urine culture done.  - Bladder scans Q 8hrs for evaluation of urinary retention  - TOV starting 11/19, passed, transitioned to condom cath--> however, then started retaining again, indwelling gentile again placed.
- CT of abdomen showed distended urinary bladder. Recommend clinical correlation to assess urinary retention. Layering of stones in the left bladder diverticulum. Did not have a chronic indwelling gentile prior to this hospitalization. UA on admission with moderate bacteria, however no urine culture done.  - Bladder scans Q 8hrs for evaluation of urinary retention  - TOV starting 11/19, passed, transitioned to condom cath--> however, then started retaining again, indwelling gentile again placed.
resolved
resolved
- CT of abdomen showed distended urinary bladder. Recommend clinical correlation to assess urinary retention. Layering of stones in the left bladder diverticulum. Did not have a chronic indwelling gentile prior to this hospitalization. UA on admission with moderate bacteria, however no urine culture done.  - Bladder scans Q 8hrs for evaluation of urinary retention  - TOV starting 11/19, passed, transitioned to condom cath--> however, then started retaining again, indwelling gentile again placed.  -Failed TOV, dc to rehab with gentile, outpt f/u urology
- CT of abdomen showed distended urinary bladder. Recommend clinical correlation to assess urinary retention. Layering of stones in the left bladder diverticulum. Did not have a chronic indwelling gentile prior to this hospitalization. UA on admission with moderate bacteria, however no urine culture done.  - Bladder scans Q 8hrs for evaluation of urinary retention  - TOV starting 11/19, passed, transitioned to condom cath--> however, then started retaining again, indwelling gentile again placed.  -Failed TOV, dc to rehab with gentile, outpt f/u urology
- CT of abdomen showed distended urinary bladder. Recommend clinical correlation to assess urinary retention. Layering of stones in the left bladder diverticulum. Did not have a chronic indwelling gentile prior to this hospitalization. UA on admission with moderate bacteria, however no urine culture done.  - Bladder scans Q 8hrs for evaluation of urinary retention  - TOV starting 11/19, passed, transitioned to condom cath--> however, then started retaining again, indwelling gentile again placed.
- CT of abdomen showed distended urinary bladder. Recommend clinical correlation to assess urinary retention. Layering of stones in the left bladder diverticulum. 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   - will d/c gentile and TOV overnight. patient may do intermittent straight cath for retention instead of replacing gentile.
- CT of abdomen showed distended urinary bladder. Recommend clinical correlation to assess urinary retention. Layering of stones in the left bladder diverticulum. Did not have a chronic indwelling gentile prior to this hospitalization. UA on admission with moderate bacteria, however no urine culture done.  - Bladder scans Q 8hrs for evaluation of urinary retention  - TOV starting 11/19, passed, transitioned to condom cath--> however, then started retaining again, indwelling gentile again placed.
- CT of abdomen showed distended urinary bladder. Recommend clinical correlation to assess urinary retention. Layering of stones in the left bladder diverticulum. Did not have a chronic indwelling gentile prior to this hospitalization. UA on admission with moderate bacteria, however no urine culture done.  - Bladder scans Q 8hrs for evaluation of urinary retention  - TOV starting 11/19, passed, transitioned to condom cath--> however, then started retaining again, indwelling gentile again placed.
- CT of abdomen showed distended urinary bladder. Recommend clinical correlation to assess urinary retention. Layering of stones in the left bladder diverticulum. Did not have a chronic indwelling gentile prior to this hospitalization. UA on admission with moderate bacteria, however no urine culture done.  - Bladder scans Q 8hrs for evaluation of urinary retention  - TOV starting 11/19, passed, transitioned to condom cath--> however, then started retaining again, indwelling gentile again placed.  -Failed TOV, dc to rehab with gentile, outpt f/u urology
resolved
- CT of abdomen showed distended urinary bladder. Recommend clinical correlation to assess urinary retention. Layering of stones in the left bladder diverticulum. Did not have a chronic indwelling gentile prior to this hospitalization. UA on admission with moderate bacteria, however no urine culture done.  - Bladder scans Q 8hrs for evaluation of urinary retention  - TOV starting 11/19, passed, transitioned to condom cath--> however, then started retaining again, indwelling gentile again placed.  -Failed TOV, dc to rehab with gentile, outpt f/u urology
- Continue with Depakote  - Seizure precautions
- CT of abdomen showed distended urinary bladder. Recommend clinical correlation to assess urinary retention. Layering of stones in the left bladder diverticulum. Did not have a chronic indwelling gentile prior to this hospitalization. UA on admission with moderate bacteria, however no urine culture done.  - Bladder scans Q 8hrs for evaluation of urinary retention  - TOV starting 11/19, passed, transitioned to condom cath--> however, then started retaining again, indwelling gentile again placed.  -Failed TOV, dc to rehab with gentile, outpt f/u urology  - Urine studies likely due to colonizing organism - will monitor off abx.
- Continue with Depakote  - Seizure precautions
- CT of abdomen showed distended urinary bladder. Recommend clinical correlation to assess urinary retention. Layering of stones in the left bladder diverticulum. Did not have a chronic indwelling gentile prior to this hospitalization. UA on admission with moderate bacteria, however no urine culture done.  - Bladder scans Q 8hrs for evaluation of urinary retention  - TOV starting 11/19, passed, transitioned to condom cath--> however, then started retaining again, indwelling gentile again placed.
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
## 2022-01-16 NOTE — PROGRESS NOTE ADULT - PROBLEM SELECTOR PROBLEM 10
Urinary retention
Seizure disorder
Urinary retention
Hypokalemia
Urinary retention
Hypokalemia
Urinary retention
Hypokalemia
Urinary retention
Hypokalemia
Urinary retention
Hypokalemia
Seizure disorder
Urinary retention
Hypokalemia
Urinary retention
Seizure disorder
Urinary retention
Hypokalemia
Urinary retention
Seizure disorder
Hypokalemia
Urinary retention
Urinary retention
Seizure disorder
Urinary retention

## 2022-01-16 NOTE — PROGRESS NOTE ADULT - PROBLEM SELECTOR PLAN 11
- CT A/P w/ hepatic lesions and CBD dilatation, MRCP completed, no active cirrhosis, outpatient surveillance
- CT A/P w/ hepatic lesions and CBD dilatation, MRCP completed, no active cirrhosis, outpatient surveillance
- DVT prophylaxis Lovenox 40 mg QD  - Pt has a guardian, attempted to call 11/23-  left.  Given issues w/reinstating home services and equipment.  CM arranging for Hospital bed- awaiting auth  Guardian Mr Watt at [phone] aware  we are waiting  for Hosp bed BUT patient needs help to call Ins company about his non working Wheel chair- guardian will take care of this.
- CT A/P w/ hepatic lesions and CBD dilatation, MRCP completed, no active cirrhosis, outpatient surveillance
- DVT prophylaxis Lovenox 40 mg QD  - Pt has a guardian, attempted to call 11/23-  left.  Given issues w/reinstating home services and equipment.  CM arranging for Hospital bed- awaiting auth  Guardian Mr Watt at [phone] aware  we are waiting  for Hosp bed BUT patient needs help to call Ins company about his non working Wheel chair- guardian will take care of this.
- CT A/P w/ hepatic lesions and CBD dilatation, MRCP completed, no active cirrhosis, outpatient surveillance
- CT A/P w/ hepatic lesions and CBD dilatation, MRCP completed, no active cirrhosis, outpatient surveillance
- DVT prophylaxis Lovenox 40 mg QD  - Pt has a guardian, attempted to call 11/23-  left.  Given issues w/reinstating home services and equipment.  CM arranging for Hospital bed- which has been approved by insurance, also discussing SNF for wound care w/guardian
- DVT prophylaxis Lovenox 40 mg QD  - Pt has a guardian, attempted to call 11/23-  left.  Given issues w/reinstating home services and equipment.  CM arranging for Hospital bed- awaiting auth  Guardian Mr Watt at [phone] aware  we are waiting  for Hosp bed BUT patient needs help to call Ins company about his non working Wheel chair- guardian will take care of this.   35 min to coord d/c
- CT A/P w/ hepatic lesions and CBD dilatation, MRCP completed, no active cirrhosis, outpatient surveillance
- CT A/P w/ hepatic lesions and CBD dilatation, MRCP completed, no active cirrhosis, outpatient surveillance
- CT of abdomen showed distended urinary bladder. Recommend clinical correlation to assess urinary retention. Layering of stones in the left bladder diverticulum. Did not have a chronic indwelling gentile prior to this hospitalization. UA on admission with moderate bacteria, however no urine culture done.  - Bladder scans Q 8hrs for evaluation of urinary retention  - TOV starting 11/19, passed, transitioned to condom cath--> however, then started retaining again, indwelling gentile again placed.  -Failed TOV, dc to rehab with gentile, outpt f/u urology
- CT of abdomen showed distended urinary bladder. Recommend clinical correlation to assess urinary retention. Layering of stones in the left bladder diverticulum. Did not have a chronic indwelling gentile prior to this hospitalization. UA on admission with moderate bacteria, however no urine culture done.  - Bladder scans Q 8hrs for evaluation of urinary retention  - TOV starting 11/19, passed, transitioned to condom cath--> however, then started retaining again, indwelling gentile again placed.  -Failed TOV, dc to rehab with gentile, outpt f/u urology
- CT A/P w/ hepatic lesions and CBD dilatation, MRCP completed, no active cirrhosis, outpatient surveillance
- CT A/P w/ hepatic lesions and CBD dilatation, MRCP completed, no active cirrhosis, outpatient surveillance
- DVT prophylaxis Lovenox 40 mg QD  - Pt has a guardian, attempted to call 11/23-  left.  Given issues w/reinstating home services and equipment.  CM arranging for Hospital bed- which has been approved by insurance, also discussing SNF for wound care w/guardian
- CT A/P w/ hepatic lesions and CBD dilatation, MRCP completed, no active cirrhosis, outpatient surveillance
- CT of abdomen showed distended urinary bladder. Recommend clinical correlation to assess urinary retention. Layering of stones in the left bladder diverticulum. Did not have a chronic indwelling gentile prior to this hospitalization. UA on admission with moderate bacteria, however no urine culture done.  - Bladder scans Q 8hrs for evaluation of urinary retention  - TOV starting 11/19, passed, transitioned to condom cath--> however, then started retaining again, indwelling gentile again placed.  -Failed TOV, dc to rehab with gentile, outpt f/u urology
- CT of abdomen showed distended urinary bladder. Recommend clinical correlation to assess urinary retention. Layering of stones in the left bladder diverticulum. Did not have a chronic indwelling gentile prior to this hospitalization. UA on admission with moderate bacteria, however no urine culture done.  - Bladder scans Q 8hrs for evaluation of urinary retention  - TOV starting 11/19, passed, transitioned to condom cath--> however, then started retaining again, indwelling gentile again placed.  -Failed TOV, dc to rehab with gentile, outpt f/u urology
- CT A/P w/ hepatic lesions and CBD dilatation, MRCP completed, no active cirrhosis, outpatient surveillance
- DVT prophylaxis Lovenox 40 mg QD  - Pt has a guardian, attempted to call 11/23-  left.  Given issues w/reinstating home services and equipment.  CM arranging for Hospital bed- awaiting auth  Guardian Mr Watt at [phone] aware  we are waiting  for Hosp bed BUT patient needs help to call Ins company about his non working Wheel chair- guardian will take care of this.
- CT A/P w/ hepatic lesions and CBD dilatation, MRCP completed, no active cirrhosis, outpatient surveillance
- DVT prophylaxis Lovenox 40 mg QD  - Pt has a guardian, attempted to call 11/23-  left.  Given issues w/reinstating home services and equipment.  CM arranging for Hospital bed- awaiting auth  Guardian Mr Watt at [phone] aware  we are waiting  for Hosp bed BUT patient needs help to call Ins company about his non working Wheel chair- guardian will take care of this.
- CT A/P w/ hepatic lesions and CBD dilatation, MRCP completed, no active cirrhosis, outpatient surveillance
- DVT prophylaxis Lovenox 40 mg QD  - Pt has a guardian, attempted to call 11/23- vm left.  Given issues w/reinstating home services and equipment.  - CM arranging for Hospital bed- which has been approved by insurance current plan for SNF
- CT A/P w/ hepatic lesions and CBD dilatation, MRCP completed, no active cirrhosis, outpatient surveillance
- DVT prophylaxis Lovenox 40 mg QD  - Pt has a guardian, attempted to call 11/23-  left.  Given issues w/reinstating home services and equipment.  CM arranging for Hospital bed- awaiting auth  Guardian Mr Watt at [phone] aware  we are waiting  for Hosp bed BUT patient needs help to call Ins company about his non working Wheel chair- guardian will take care of this.
- DVT prophylaxis Lovenox 40 mg QD  - Pt has a guardian, attempted to call 11/23-  left.  Given issues w/reinstating home services and equipment.  CM arranging for Hospital bed- which has been approved by insurance, also discussing SNF for wound care w/guardian
- CT A/P w/ hepatic lesions and CBD dilatation, MRCP completed, no active cirrhosis, outpatient surveillance
- CT of abdomen showed distended urinary bladder. Recommend clinical correlation to assess urinary retention. Layering of stones in the left bladder diverticulum. Did not have a chronic indwelling gentile prior to this hospitalization. UA on admission with moderate bacteria, however no urine culture done.  - Bladder scans Q 8hrs for evaluation of urinary retention  - TOV starting 11/19, passed, transitioned to condom cath--> however, then started retaining again, indwelling gentile again placed.  -Failed TOV, dc to rehab with gentile, outpt f/u urology

## 2022-01-16 NOTE — PROGRESS NOTE ADULT - PROBLEM SELECTOR PLAN 3
resolved  - Apparent left frontotemporal hypodensity on noncontrast CT, favored to be artifactual due to sulcal volume   averaging and motion, as detailed above. Acute infarction is felt to be less likely given preserved appearance of gray-white matter junction in this region on venous postcontrast images  - d/w neuro, given clinical improvement, no new deficits, pt A+Ox3, transient event likely toxic/metabolic  - routine EEG artifact but no epileptic form seen  - MRI brain neg for CVA  - VPA level elevated, neuro recommended VPA 250mg bid on discharge   - VPA now with normal limits 12/13 - DASH/TLC diet  - c/w Metoprolol 75 ER qd and norvasc 5 mg PO qd   - monitor BP

## 2022-01-16 NOTE — PROGRESS NOTE ADULT - PROBLEM SELECTOR PROBLEM 11
Hepatic lesion
Hepatic lesion
Urinary retention
Preventive measure
Hepatic lesion
Urinary retention
Preventive measure
Hepatic lesion
Preventive measure
Hepatic lesion
Hepatic lesion
Urinary retention
Hepatic lesion
Preventive measure
Hepatic lesion
Preventive measure
Hepatic lesion
Hepatic lesion
Preventive measure
Hepatic lesion
Hepatic lesion
Preventive measure
Preventive measure
Hepatic lesion
Hepatic lesion
Urinary retention
Urinary retention

## 2022-01-16 NOTE — PROGRESS NOTE ADULT - PROBLEM SELECTOR PLAN 5
resolved - seen on telemetry, no CP, no palpitations, HD stable   - cardiology recommended outpatient titration of metoprolol; patient is asymptomatic.  -TTE shows mild segmental LV systolic dysfunction and hypokinesis of inferolateral wall; cardiology recs reviewed, will c/w medical management  - metoprolol to 75 ER

## 2022-01-16 NOTE — PROGRESS NOTE ADULT - SUBJECTIVE AND OBJECTIVE BOX
[facility] Division of Hospital Medicine  Mike Chau  Pager: [phone]      Patient is a 71y old  Male who presents with a chief complaint of Infected bedsores (15 Mello 2022 15:12)      SUBJECTIVE / OVERNIGHT EVENTS:  ADDITIONAL REVIEW OF SYSTEMS:    MEDICATIONS  (STANDING):  amLODIPine   Tablet 5 milliGRAM(s) Oral daily  aspirin enteric coated 81 milliGRAM(s) Oral daily  atorvastatin 40 milliGRAM(s) Oral at bedtime  bisacodyl 5 milliGRAM(s) Oral daily  calcium carbonate 1250 mG  + Vitamin D (OsCal 500 + D) 1 Tablet(s) Oral daily  diVALproex  milliGRAM(s) Oral two times a day  enoxaparin Injectable 40 milliGRAM(s) SubCutaneous daily  ferrous    sulfate 325 milliGRAM(s) Oral daily  methadone    Tablet 10 milliGRAM(s) Oral daily  metoprolol succinate ER 75 milliGRAM(s) Oral daily  multivitamin 1 Tablet(s) Oral daily  polyethylene glycol 3350 17 Gram(s) Oral two times a day  senna 3 Tablet(s) Oral at bedtime  tamsulosin 0.4 milliGRAM(s) Oral at bedtime    MEDICATIONS  (PRN):  acetaminophen     Tablet .. 650 milliGRAM(s) Oral every 6 hours PRN Temp greater or equal to 38C (100.4F), Mild Pain (1 - 3), Moderate Pain (4 - 6)  bisacodyl Suppository 10 milliGRAM(s) Rectal daily PRN Constipation  melatonin 3 milliGRAM(s) Oral at bedtime PRN Insomnia      CAPILLARY BLOOD GLUCOSE        I&O's Summary    15 Mello 2022 07:01  -  16 Jan 2022 07:00  --------------------------------------------------------  IN: 0 mL / OUT: 1600 mL / NET: -1600 mL        PHYSICAL EXAM:  Vital Signs Last 24 Hrs  T(C): 36.1 (16 Jan 2022 05:54), Max: 36.2 (15 Mello 2022 14:15)  T(F): 97 (16 Jan 2022 05:54), Max: 97.2 (15 Mello 2022 14:15)  HR: 78 (16 Jan 2022 05:54) (67 - 78)  BP: 135/86 (16 Jan 2022 05:54) (115/75 - 135/86)  BP(mean): --  RR: 18 (16 Jan 2022 05:54) (18 - 20)  SpO2: 98% (16 Jan 2022 05:54) (96% - 98%)  CONSTITUTIONAL: NAD, well-developed, well-groomed  EYES: PERRLA; conjunctiva and sclera clear  ENMT: Moist oral mucosa, no pharyngeal injection or exudates; normal dentition  NECK: Supple, no palpable masses; no thyromegaly  RESPIRATORY: Normal respiratory effort; lungs are clear to auscultation bilaterally  CARDIOVASCULAR: Regular rate and rhythm, normal S1 and S2, no murmur/rub/gallop; No lower extremity edema; Peripheral pulses are 2+ bilaterally  ABDOMEN: Nontender to palpation, normoactive bowel sounds, no rebound/guarding; No hepatosplenomegaly  MUSCULOSKELETAL:  Normal gait; no clubbing or cyanosis of digits; no joint swelling or tenderness to palpation  PSYCH: A+O to person, place, and time; affect appropriate  NEUROLOGY: CN 2-12 are intact and symmetric; no gross sensory deficits   SKIN: No rashes; no palpable lesions    LABS:                      RADIOLOGY & ADDITIONAL TESTS:  Results Reviewed:   Imaging Personally Reviewed:  Electrocardiogram Personally Reviewed:    COORDINATION OF CARE:  Care Discussed with Consultants/Other Providers [Y/N]:  Prior or Outpatient Records Reviewed [Y/N]:   [facility] Division of Hospital Medicine  Mike Chau  Pager: [phone]      Patient is a 71y old  Male who presents with a chief complaint of Infected bedsores (15 Mello 2022 15:12)      SUBJECTIVE / OVERNIGHT EVENTS: No events overnight.   ADDITIONAL REVIEW OF SYSTEMS: Denies chest pain, nausea, vomiting, dysuria, diarrhea.     MEDICATIONS  (STANDING):  amLODIPine   Tablet 5 milliGRAM(s) Oral daily  aspirin enteric coated 81 milliGRAM(s) Oral daily  atorvastatin 40 milliGRAM(s) Oral at bedtime  bisacodyl 5 milliGRAM(s) Oral daily  calcium carbonate 1250 mG  + Vitamin D (OsCal 500 + D) 1 Tablet(s) Oral daily  diVALproex  milliGRAM(s) Oral two times a day  enoxaparin Injectable 40 milliGRAM(s) SubCutaneous daily  ferrous    sulfate 325 milliGRAM(s) Oral daily  methadone    Tablet 10 milliGRAM(s) Oral daily  metoprolol succinate ER 75 milliGRAM(s) Oral daily  multivitamin 1 Tablet(s) Oral daily  polyethylene glycol 3350 17 Gram(s) Oral two times a day  senna 3 Tablet(s) Oral at bedtime  tamsulosin 0.4 milliGRAM(s) Oral at bedtime    MEDICATIONS  (PRN):  acetaminophen     Tablet .. 650 milliGRAM(s) Oral every 6 hours PRN Temp greater or equal to 38C (100.4F), Mild Pain (1 - 3), Moderate Pain (4 - 6)  bisacodyl Suppository 10 milliGRAM(s) Rectal daily PRN Constipation  melatonin 3 milliGRAM(s) Oral at bedtime PRN Insomnia      CAPILLARY BLOOD GLUCOSE        I&O's Summary    15 Mello 2022 07:01  -  16 Jan 2022 07:00  --------------------------------------------------------  IN: 0 mL / OUT: 1600 mL / NET: -1600 mL        PHYSICAL EXAM:  Vital Signs Last 24 Hrs  T(C): 36.1 (16 Jan 2022 05:54), Max: 36.2 (15 Mello 2022 14:15)  T(F): 97 (16 Jan 2022 05:54), Max: 97.2 (15 Mello 2022 14:15)  HR: 78 (16 Jan 2022 05:54) (67 - 78)  BP: 135/86 (16 Jan 2022 05:54) (115/75 - 135/86)  BP(mean): --  RR: 18 (16 Jan 2022 05:54) (18 - 20)  SpO2: 98% (16 Jan 2022 05:54) (96% - 98%)  CONSTITUTIONAL: NAD, well-developed, well-groomed  EYES: PERRLA; conjunctiva and sclera clear  ENMT: Moist oral mucosa, no pharyngeal injection or exudates; normal dentition  NECK: Supple, no palpable masses; no thyromegaly  RESPIRATORY: Normal respiratory effort; lungs are clear to auscultation bilaterally  CARDIOVASCULAR: Regular rate and rhythm, normal S1 and S2, no murmur/rub/gallop; No lower extremity edema; Peripheral pulses are 2+ bilaterally  ABDOMEN: Nontender to palpation, normoactive bowel sounds, no rebound/guarding; No hepatosplenomegaly  MUSCULOSKELETAL: no clubbing or cyanosis of digits; no joint swelling or tenderness to palpation  PSYCH: A+O to person, place, and time; affect appropriate  NEUROLOGY: paraplegic; no gross sensory deficits otherwise  SKIN: No rashes; no palpable lesions    LABS:                      RADIOLOGY & ADDITIONAL TESTS:  Results Reviewed:   Imaging Personally Reviewed:  Electrocardiogram Personally Reviewed:    COORDINATION OF CARE:  Care Discussed with Consultants/Other Providers [Y/N]:  Prior or Outpatient Records Reviewed [Y/N]:

## 2022-01-16 NOTE — PROGRESS NOTE ADULT - PROBLEM SELECTOR PLAN 9
- Continue with Depakote  - Seizure precautions - CT A/P w/ hepatic lesions and CBD dilatation, MRCP completed, no active cirrhosis, outpatient surveillance

## 2022-01-16 NOTE — PROGRESS NOTE ADULT - PROBLEM SELECTOR PLAN 4
- DASH/TLC diet  - c/w Metoprolol 75 ER qd and norvasc 5 mg PO qd   - monitor BP - suspect secondary to infection and depakote  - monitor.

## 2022-01-16 NOTE — PROGRESS NOTE ADULT - PROBLEM SELECTOR PLAN 7
- seen on telemetry, no CP, no palpitations, HD stable   - cardiology recommended outpatient titration of metoprolol; patient is asymptomatic.  -TTE shows mild segmental LV systolic dysfunction and hypokinesis of inferolateral wall; cardiology recs reviewed, will c/w medical management  - metoprolol to 75 ER - Continue with Depakote  - Seizure precautions

## 2022-01-17 PROCEDURE — 99232 SBSQ HOSP IP/OBS MODERATE 35: CPT

## 2022-01-17 RX ADMIN — Medication 5 MILLIGRAM(S): at 12:27

## 2022-01-17 RX ADMIN — POLYETHYLENE GLYCOL 3350 17 GRAM(S): 17 POWDER, FOR SOLUTION ORAL at 17:39

## 2022-01-17 RX ADMIN — Medication 1 TABLET(S): at 12:28

## 2022-01-17 RX ADMIN — Medication 325 MILLIGRAM(S): at 12:27

## 2022-01-17 RX ADMIN — Medication 81 MILLIGRAM(S): at 12:27

## 2022-01-17 RX ADMIN — Medication 3 MILLIGRAM(S): at 21:50

## 2022-01-17 RX ADMIN — SENNA PLUS 3 TABLET(S): 8.6 TABLET ORAL at 21:50

## 2022-01-17 RX ADMIN — METHADONE HYDROCHLORIDE 10 MILLIGRAM(S): 40 TABLET ORAL at 12:27

## 2022-01-17 RX ADMIN — POLYETHYLENE GLYCOL 3350 17 GRAM(S): 17 POWDER, FOR SOLUTION ORAL at 06:23

## 2022-01-17 RX ADMIN — ENOXAPARIN SODIUM 40 MILLIGRAM(S): 100 INJECTION SUBCUTANEOUS at 12:28

## 2022-01-17 RX ADMIN — AMLODIPINE BESYLATE 5 MILLIGRAM(S): 2.5 TABLET ORAL at 06:24

## 2022-01-17 RX ADMIN — Medication 75 MILLIGRAM(S): at 06:23

## 2022-01-17 RX ADMIN — Medication 1 TABLET(S): at 12:27

## 2022-01-17 RX ADMIN — DIVALPROEX SODIUM 250 MILLIGRAM(S): 500 TABLET, DELAYED RELEASE ORAL at 06:23

## 2022-01-17 RX ADMIN — TAMSULOSIN HYDROCHLORIDE 0.4 MILLIGRAM(S): 0.4 CAPSULE ORAL at 21:51

## 2022-01-17 RX ADMIN — DIVALPROEX SODIUM 250 MILLIGRAM(S): 500 TABLET, DELAYED RELEASE ORAL at 17:37

## 2022-01-17 RX ADMIN — ATORVASTATIN CALCIUM 40 MILLIGRAM(S): 80 TABLET, FILM COATED ORAL at 21:51
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
## 2022-01-17 NOTE — PROGRESS NOTE ADULT - SUBJECTIVE AND OBJECTIVE BOX
MD carlos Beck Chief Resident Physician  Internal Medicine  Pager: [phone] l [phone]       SUBJECTIVE / OVERNIGHT EVENTS:  - No acute events overnight  - Patient denies chest pain, shortness of breath, abdominal pain, nausea or vomiting    ADDITIONAL REVIEW OF SYSTEMS:    MEDICATIONS  (STANDING):  amLODIPine   Tablet 5 milliGRAM(s) Oral daily  aspirin enteric coated 81 milliGRAM(s) Oral daily  atorvastatin 40 milliGRAM(s) Oral at bedtime  bisacodyl 5 milliGRAM(s) Oral daily  calcium carbonate 1250 mG  + Vitamin D (OsCal 500 + D) 1 Tablet(s) Oral daily  diVALproex  milliGRAM(s) Oral two times a day  enoxaparin Injectable 40 milliGRAM(s) SubCutaneous daily  ferrous    sulfate 325 milliGRAM(s) Oral daily  methadone    Tablet 10 milliGRAM(s) Oral daily  metoprolol succinate ER 75 milliGRAM(s) Oral daily  multivitamin 1 Tablet(s) Oral daily  polyethylene glycol 3350 17 Gram(s) Oral two times a day  senna 3 Tablet(s) Oral at bedtime  tamsulosin 0.4 milliGRAM(s) Oral at bedtime    MEDICATIONS  (PRN):  acetaminophen     Tablet .. 650 milliGRAM(s) Oral every 6 hours PRN Temp greater or equal to 38C (100.4F), Mild Pain (1 - 3), Moderate Pain (4 - 6)  bisacodyl Suppository 10 milliGRAM(s) Rectal daily PRN Constipation  melatonin 3 milliGRAM(s) Oral at bedtime PRN Insomnia      I&O's Summary    16 Jan 2022 07:01  -  17 Jan 2022 07:00  --------------------------------------------------------  IN: 0 mL / OUT: 3450 mL / NET: -3450 mL        PHYSICAL EXAM:  Vital Signs Last 24 Hrs  T(C): 36.5 (17 Jan 2022 14:45), Max: 36.9 (17 Jan 2022 06:29)  T(F): 97.7 (17 Jan 2022 14:45), Max: 98.4 (17 Jan 2022 06:29)  HR: 69 (17 Jan 2022 14:45) (69 - 75)  BP: 127/78 (17 Jan 2022 14:45) (127/78 - 143/94)  BP(mean): 106 (17 Jan 2022 06:29) (106 - 111)  RR: 20 (17 Jan 2022 14:45) (18 - 20)  SpO2: 98% (17 Jan 2022 14:45) (96% - 99%)    CONSTITUTIONAL: NAD, well-developed, well-groomed  EYES: PERRLA; conjunctiva and sclera clear  ENMT: Moist oral mucosa, no pharyngeal injection or exudates; normal dentition  NECK: Supple, no palpable masses; no thyromegaly  RESPIRATORY: Normal respiratory effort; lungs are clear to auscultation bilaterally  CARDIOVASCULAR: Regular rate and rhythm, normal S1 and S2, no murmur/rub/gallop; No lower extremity edema; Peripheral pulses are 2+ bilaterally  ABDOMEN: Nontender to palpation, normoactive bowel sounds, no rebound/guarding; No hepatosplenomegaly  MUSCULOSKELETAL: no clubbing or cyanosis of digits; no joint swelling or tenderness to palpation  PSYCH: A+O to person, place, and time; affect appropriate  NEUROLOGY: paraplegic; no gross sensory deficits otherwise  SKIN: No rashes; no palpable lesions      LABS:                      RADIOLOGY & ADDITIONAL TESTS:  Results Reviewed:   Imaging Personally Reviewed:  Electrocardiogram Personally Reviewed:    COORDINATION OF CARE:  Care Discussed with Consultants/Other Providers [Y/N]:  Prior or Outpatient Records Reviewed [Y/N]:

## 2022-01-17 NOTE — PROGRESS NOTE ADULT - PROBLEM SELECTOR PLAN 8
- CT of abdomen showed distended urinary bladder. Recommend clinical correlation to assess urinary retention. Layering of stones in the left bladder diverticulum. 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   - Has condom catheter in place with good UOP

## 2022-01-18 VITALS
SYSTOLIC BLOOD PRESSURE: 127 MMHG | OXYGEN SATURATION: 99 % | HEART RATE: 66 BPM | TEMPERATURE: 98 F | RESPIRATION RATE: 19 BRPM | DIASTOLIC BLOOD PRESSURE: 83 MMHG

## 2022-01-18 PROCEDURE — 99239 HOSP IP/OBS DSCHRG MGMT >30: CPT

## 2022-01-18 RX ORDER — ACETAMINOPHEN 500 MG
2 TABLET ORAL
Qty: 0 | Refills: 0 | DISCHARGE
Start: 2022-01-18

## 2022-01-18 RX ORDER — SENNA PLUS 8.6 MG/1
3 TABLET ORAL
Qty: 0 | Refills: 0 | DISCHARGE
Start: 2022-01-18

## 2022-01-18 RX ADMIN — Medication 1 TABLET(S): at 12:14

## 2022-01-18 RX ADMIN — Medication 5 MILLIGRAM(S): at 12:15

## 2022-01-18 RX ADMIN — Medication 325 MILLIGRAM(S): at 12:15

## 2022-01-18 RX ADMIN — METHADONE HYDROCHLORIDE 10 MILLIGRAM(S): 40 TABLET ORAL at 12:14

## 2022-01-18 RX ADMIN — TAMSULOSIN HYDROCHLORIDE 0.4 MILLIGRAM(S): 0.4 CAPSULE ORAL at 21:40

## 2022-01-18 RX ADMIN — Medication 81 MILLIGRAM(S): at 12:15

## 2022-01-18 RX ADMIN — Medication 75 MILLIGRAM(S): at 05:58

## 2022-01-18 RX ADMIN — ATORVASTATIN CALCIUM 40 MILLIGRAM(S): 80 TABLET, FILM COATED ORAL at 21:40

## 2022-01-18 RX ADMIN — AMLODIPINE BESYLATE 5 MILLIGRAM(S): 2.5 TABLET ORAL at 05:59

## 2022-01-18 RX ADMIN — DIVALPROEX SODIUM 250 MILLIGRAM(S): 500 TABLET, DELAYED RELEASE ORAL at 05:58

## 2022-01-18 RX ADMIN — POLYETHYLENE GLYCOL 3350 17 GRAM(S): 17 POWDER, FOR SOLUTION ORAL at 05:57

## 2022-01-18 RX ADMIN — Medication 1 TABLET(S): at 12:15

## 2022-01-18 RX ADMIN — DIVALPROEX SODIUM 250 MILLIGRAM(S): 500 TABLET, DELAYED RELEASE ORAL at 18:46

## 2022-01-18 NOTE — PROGRESS NOTE ADULT - REASON FOR ADMISSION
Infected bedsores
fevers, concern for infected bedsores
Infected bedsores

## 2022-01-18 NOTE — PROGRESS NOTE ADULT - PROBLEM/PLAN-5
12S Physical Therapy Attempt  Pt working with OT and MD also present during attempt.  Will re-attempt as schedule allows. Will continue PT efforts  
DISPLAY PLAN FREE TEXT

## 2022-01-18 NOTE — PROGRESS NOTE ADULT - PROBLEM SELECTOR PROBLEM 1
Sepsis
2019 novel coronavirus disease (COVID-19)
Sepsis
2019 novel coronavirus disease (COVID-19)
Acute encephalopathy
Sacral decubitus ulcer
Sepsis
2019 novel coronavirus disease (COVID-19)
Acute encephalopathy
Sepsis
Sepsis
2019 novel coronavirus disease (COVID-19)
Acute encephalopathy
Sacral decubitus ulcer
Sepsis
2019 novel coronavirus disease (COVID-19)
Acute encephalopathy
Sacral decubitus ulcer
Acute encephalopathy
Sepsis
Acute encephalopathy
Sepsis
Acute encephalopathy
Acute encephalopathy
2019 novel coronavirus disease (COVID-19)
Exposure to COVID-19 virus
Acute encephalopathy
Acute encephalopathy
Sepsis
2019 novel coronavirus disease (COVID-19)
2019 novel coronavirus disease (COVID-19)
Sepsis
Acute encephalopathy
Sepsis
2019 novel coronavirus disease (COVID-19)
Acute encephalopathy
Sacral decubitus ulcer
Sacral decubitus ulcer
Sepsis
2019 novel coronavirus disease (COVID-19)
2019 novel coronavirus disease (COVID-19)
Sacral decubitus ulcer
2019 novel coronavirus disease (COVID-19)
Acute encephalopathy
Sepsis
2019 novel coronavirus disease (COVID-19)
Acute encephalopathy
Sepsis
Acute encephalopathy
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
## 2022-01-18 NOTE — PROGRESS NOTE ADULT - SUBJECTIVE AND OBJECTIVE BOX
[facility] Division of Hospital Medicine  Oksana Roque MD  Pager (M-F, 8A-5P): [phone]  Other Times:  [phone]    Patient is a 71y old  Male who presents with a chief complaint of Infected bedsores (17 Jan 2022 16:42)    SUBJECTIVE / OVERNIGHT EVENTS:  Pt denies complaints - wants to leave hospital.      MEDICATIONS  (STANDING):  amLODIPine   Tablet 5 milliGRAM(s) Oral daily  aspirin enteric coated 81 milliGRAM(s) Oral daily  atorvastatin 40 milliGRAM(s) Oral at bedtime  bisacodyl 5 milliGRAM(s) Oral daily  calcium carbonate 1250 mG  + Vitamin D (OsCal 500 + D) 1 Tablet(s) Oral daily  diVALproex  milliGRAM(s) Oral two times a day  enoxaparin Injectable 40 milliGRAM(s) SubCutaneous daily  ferrous    sulfate 325 milliGRAM(s) Oral daily  methadone    Tablet 10 milliGRAM(s) Oral daily  metoprolol succinate ER 75 milliGRAM(s) Oral daily  multivitamin 1 Tablet(s) Oral daily  polyethylene glycol 3350 17 Gram(s) Oral two times a day  senna 3 Tablet(s) Oral at bedtime  tamsulosin 0.4 milliGRAM(s) Oral at bedtime    MEDICATIONS  (PRN):  acetaminophen     Tablet .. 650 milliGRAM(s) Oral every 6 hours PRN Temp greater or equal to 38C (100.4F), Mild Pain (1 - 3), Moderate Pain (4 - 6)  bisacodyl Suppository 10 milliGRAM(s) Rectal daily PRN Constipation  melatonin 3 milliGRAM(s) Oral at bedtime PRN Insomnia      CAPILLARY BLOOD GLUCOSE        I&O's Summary    17 Jan 2022 07:01  -  18 Jan 2022 07:00  --------------------------------------------------------  IN: 0 mL / OUT: 2050 mL / NET: -2050 mL        PHYSICAL EXAM:  Vital Signs Last 24 Hrs  T(C): 36.7 (18 Jan 2022 06:00), Max: 36.9 (17 Jan 2022 21:58)  T(F): 98 (18 Jan 2022 06:00), Max: 98.4 (17 Jan 2022 21:58)  HR: 60 (18 Jan 2022 06:00) (60 - 69)  BP: 153/87 (18 Jan 2022 06:00) (127/78 - 153/87)  BP(mean): 112 (18 Jan 2022 06:00) (112 - 112)  RR: 18 (18 Jan 2022 06:00) (18 - 20)  SpO2: 100% (18 Jan 2022 06:00) (96% - 100%)    CONSTITUTIONAL: NAD, well-developed, well-groomed  EYES: EOMI; conjunctiva and sclera clear  ENMT: Moist oral mucosa  RESPIRATORY: Normal respiratory effort; lungs are clear to auscultation bilaterally  CARDIOVASCULAR: Regular rate and rhythm, normal S1 and S2, no murmur; No lower extremity edema  ABDOMEN: Nontender to palpation, normoactive bowel sounds, no rebound/guarding  MUSCULOSKELETAL:   no clubbing or cyanosis of digits; no joint swelling or tenderness to palpation  PSYCH: A+O to person, place, and time; affect appropriate  NEUROLOGY: CN 2-12 are intact and symmetric; no gross sensory deficits   SKIN: + sacral wounds     LABS:          RADIOLOGY & ADDITIONAL TESTS:  Results Reviewed:   Imaging Personally Reviewed:  Electrocardiogram Personally Reviewed:    COORDINATION OF CARE:  Care Discussed with Consultants/Other Providers [Y/N]: Y  Prior or Outpatient Records Reviewed [Y/N]: Y

## 2022-01-18 NOTE — PROGRESS NOTE ADULT - PROBLEM SELECTOR PROBLEM 2
HTN (hypertension)
Sacral decubitus ulcer
Sacral decubitus ulcer
2019 novel coronavirus disease (COVID-19)
Acute encephalopathy
HTN (hypertension)
Sacral decubitus ulcer
2019 novel coronavirus disease (COVID-19)
Sacral decubitus ulcer
Frequent PVCs
HTN (hypertension)
Sacral decubitus ulcer
Acute encephalopathy
HTN (hypertension)
Sacral decubitus ulcer
HTN (hypertension)
Sacral decubitus ulcer
Sacral decubitus ulcer
2019 novel coronavirus disease (COVID-19)
Acute encephalopathy
Sacral decubitus ulcer
Frequent PVCs
Sacral decubitus ulcer
Frequent PVCs
HTN (hypertension)
Sacral decubitus ulcer
HTN (hypertension)
Sacral decubitus ulcer
Acute encephalopathy
Sacral decubitus ulcer
2019 novel coronavirus disease (COVID-19)
HTN (hypertension)
Sacral decubitus ulcer
HTN (hypertension)
Sacral decubitus ulcer
2019 novel coronavirus disease (COVID-19)
Sacral decubitus ulcer
Acute encephalopathy
Sacral decubitus ulcer
HTN (hypertension)
Sacral decubitus ulcer
Acute encephalopathy
Frequent PVCs
Sacral decubitus ulcer
Sacral decubitus ulcer
Acute encephalopathy
Frequent PVCs
Frequent PVCs
Sacral decubitus ulcer
2019 novel coronavirus disease (COVID-19)
Acute encephalopathy
HTN (hypertension)
Acute encephalopathy
Frequent PVCs
Frequent PVCs
HTN (hypertension)
Acute encephalopathy
Frequent PVCs
Acute encephalopathy
Frequent PVCs
Acute encephalopathy

## 2022-01-18 NOTE — PROGRESS NOTE ADULT - NSPROGADDITIONALINFOA_GEN_ALL_CORE
outpt follow up with wound care center -[facility]: [phone]. Address: [address] [place] [address].   discharge planning and coordination ~ 45mins.

## 2022-01-18 NOTE — PROGRESS NOTE ADULT - PROVIDER SPECIALTY LIST ADULT
Cardiology
Cardiology
Hospitalist
Infectious Disease
Wound Care
Hospitalist
Infectious Disease
Infectious Disease
Neurology
Wound Care
Wound Care
Hospitalist
Infectious Disease
Neurology
Wound Care
Hospitalist
Internal Medicine
Hospitalist
Internal Medicine
Hospitalist
Hospitalist
Internal Medicine
Hospitalist
Hospitalist

## 2022-03-11 NOTE — PROGRESS NOTE ADULT - PROBLEM SELECTOR PROBLEM 5
Physical Therapy Discharge Summary Addendum:  Date: 3/11/2022  Total Number of Visits: 7  Referred by: Ernst Byrd MD  Medical Diagnosis (from order):   Diagnosis Information      Diagnosis    719.41 (ICD-9-CM) - M25.511 (ICD-10-CM) - Right shoulder pain, unspecified chronicity    719.46 (ICD-9-CM) - M25.561 (ICD-10-CM) - Right knee pain, unspecified chronicity                Please see below daily treatment note for last known status.  Patient discharged due to not scheduling more appointments.  Status of goals: per status in last daily treatment note     Frequent PVCs

## 2022-10-28 NOTE — PATIENT PROFILE ADULT. - ASSIST WITH
Cryotherapy Text: The wound bed was treated with cryotherapy after the biopsy was performed. standing/toileting/walking
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
## 2023-01-18 NOTE — PATIENT PROFILE ADULT. - FUNCTIONAL SCREEN CURRENT LEVEL: SWALLOWING (IF SCORE 2 OR MORE FOR ANY ITEM, CONSULT REHAB SERVICES), MLM)
BP continues to be uncontrolled in office and at home  Pt endorsing compliance with medication  Will add another HTN medication and see if control can be achieved      -goal BP per JNC8 <160/90  -continue carvedilol 37 5mg (25mg 1 5 pill) BID, nifedipine 60mg am and 30mg PM, lisinopril 40mg daily, chlorthalidone 50mg daily   Will begin spironolactone 12 5mg BID  Will require BMP check in 2 weeks after initiating medication   -counseled on maintaining low salt diet and decreasing stress  Weight loss counseling provided and pt willing to see dietician for assistance     Will place referral to Dietician for assistance in weight loss   -advised to schedule cardiology follow up for further med recommendations as he has not been seen in almost a year  -ED precautions given (0) swallows foods/liquids without difficulty

## 2023-01-23 ENCOUNTER — APPOINTMENT (OUTPATIENT)
Dept: OPHTHALMOLOGY | Facility: CLINIC | Age: 73
End: 2023-01-23
Payer: MEDICAID

## 2023-01-23 ENCOUNTER — NON-APPOINTMENT (OUTPATIENT)
Age: 73
End: 2023-01-23

## 2023-01-23 PROBLEM — Z00.00 ENCOUNTER FOR PREVENTIVE HEALTH EXAMINATION: Status: ACTIVE | Noted: 2023-01-23

## 2023-01-23 PROCEDURE — 92004 COMPRE OPH EXAM NEW PT 1/>: CPT

## 2023-02-03 NOTE — DISCHARGE NOTE ADULT - NS AS DC PROVIDER CONTACT Y/N MULTI
Yes Information: Selecting Yes will display possible errors in your note based on the variables you have selected. This validation is only offered as a suggestion for you. PLEASE NOTE THAT THE VALIDATION TEXT WILL BE REMOVED WHEN YOU FINALIZE YOUR NOTE. IF YOU WANT TO FAX A PRELIMINARY NOTE YOU WILL NEED TO TOGGLE THIS TO 'NO' IF YOU DO NOT WANT IT IN YOUR FAXED NOTE.

## 2023-02-09 NOTE — PATIENT PROFILE ADULT. - NS TRANSFER RESPONSE BELONGING
Pt is no longer completing imaging with [facility], plan is for pt to have imaging completed at [address] [place] [address] on 2/22 pending OOP cost and options for payment plan. Nurse will fax over order today for [facility] and follow up to make sure no further action is needed by provider. Due to nature of complaint,is 2/22 not an acceptable range for pt to imaging completed? pt can't complete orders on Monday or Fridays, and cost is also a factor. yes

## 2023-02-12 NOTE — PROGRESS NOTE ADULT - SUBJECTIVE AND OBJECTIVE BOX
[facility] Division of Hospital Medicine  Armond Azar MD  Pager (M-F, 8A-5P): [phone]  Other Times:  [phone]    Patient is a 71y old  Male who presents with a chief complaint of Infected bedsores (03 Jan 2022 14:14)    SUBJECTIVE / OVERNIGHT EVENTS:  Patient refused blood work this AM. Able to convince the patient after lengthy discussion.  No N/V, CP,  lightheadedness, dizziness, abdominal pain, diarrhea, dysuria.    MEDICATIONS  (STANDING):  amLODIPine   Tablet 5 milliGRAM(s) Oral daily  aspirin enteric coated 81 milliGRAM(s) Oral daily  atorvastatin 40 milliGRAM(s) Oral at bedtime  bisacodyl 5 milliGRAM(s) Oral daily  calcium carbonate 1250 mG  + Vitamin D (OsCal 500 + D) 1 Tablet(s) Oral daily  diVALproex  milliGRAM(s) Oral two times a day  enoxaparin Injectable 40 milliGRAM(s) SubCutaneous daily  ferrous    sulfate 325 milliGRAM(s) Oral daily  methadone   Solution 10 milliGRAM(s) Oral daily  metoprolol succinate ER 75 milliGRAM(s) Oral daily  multivitamin 1 Tablet(s) Oral daily  polyethylene glycol 3350 17 Gram(s) Oral two times a day  senna 2 Tablet(s) Oral at bedtime  tamsulosin 0.4 milliGRAM(s) Oral at bedtime    MEDICATIONS  (PRN):  acetaminophen     Tablet .. 650 milliGRAM(s) Oral every 6 hours PRN Temp greater or equal to 38C (100.4F), Mild Pain (1 - 3), Moderate Pain (4 - 6)  melatonin 3 milliGRAM(s) Oral at bedtime PRN Insomnia      Vital Signs Last 24 Hrs  T(C): 37.1 (04 Jan 2022 12:04), Max: 37.1 (04 Jan 2022 12:04)  T(F): 98.7 (04 Jan 2022 12:04), Max: 98.7 (04 Jan 2022 12:04)  HR: 57 (04 Jan 2022 12:04) (55 - 64)  BP: 129/86 (04 Jan 2022 12:04) (129/86 - 155/96)  BP(mean): --  RR: 16 (04 Jan 2022 12:04) (16 - 18)  SpO2: 100% (04 Jan 2022 12:04) (97% - 100%)  CAPILLARY BLOOD GLUCOSE        I&O's Summary    03 Jan 2022 07:01  -  04 Jan 2022 07:00  --------------------------------------------------------  IN: 850 mL / OUT: 650 mL / NET: 200 mL        PHYSICAL EXAM:  CONSTITUTIONAL: NAD  EYES: PERRLA; conjunctiva and sclera clear  ENMT: Moist oral mucosa, no pharyngeal injection or exudates; normal dentition  NECK: Supple, no palpable masses; no thyromegaly  RESPIRATORY: Normal respiratory effort; lungs are clear to auscultation bilaterally  CARDIOVASCULAR: Regular rate and rhythm, normal S1 and S2, no murmur/rub/gallop; No lower extremity edema; Peripheral pulses are 2+ bilaterally  ABDOMEN: Nontender to palpation, normoactive bowel sounds, no rebound/guarding; No hepatosplenomegaly; chronic gentile in place  MUSCULOSKELETAL:  Unable to assess gait; no clubbing or cyanosis of digits; no joint swelling or tenderness to palpation  PSYCH: A+O to person, place; affect appropriate  NEUROLOGY: CN 2-12 are intact and symmetric; no gross sensory deficits   SKIN: No rashes; no palpable lesions.    LABS:                        12.3   3.20  )-----------( 196      ( 04 Jan 2022 11:26 )             37.9     01-04    134<L>  |  94<L>  |  16  ----------------------------<  114<H>  3.4<L>   |  29  |  0.41<L>    Ca    8.9      04 Jan 2022 11:26    TPro  6.6  /  Alb  3.3  /  TBili  0.4  /  DBili  0.2  /  AST  65<H>  /  ALT  30  /  AlkPhos  214<H>  01-04    PT/INR - ( 04 Jan 2022 11:26 )   PT: 15.5 sec;   INR: 1.38 ratio                         D-Dimer Assay, Quantitative: <150 ng/mL DDU (12-31-21 @ 12:52)      COVID-19 PCR: Detected (01-03-22 @ 19:27)  COVID-19 PCR: Detected (12-30-21 @ 22:55)  COVID-19 PCR: NotDetec (12-29-21 @ 09:28)  COVID-19 PCR: NotDetec (12-26-21 @ 12:18)  COVID-19 PCR: NotDetec (12-23-21 @ 11:31)  COVID-19 PCR: NotDetec (12-19-21 @ 10:49)  COVID-19 PCR: NotDetec (12-14-21 @ 06:51)  COVID-19 PCR: NotDetec (12-08-21 @ 13:50)      RADIOLOGY & ADDITIONAL TESTS:    Imaging Personally Reviewed:    Care Discussed with Consultants/Other Providers:   normal...

## 2023-05-10 NOTE — PROGRESS NOTE ADULT - PROBLEM SELECTOR PLAN 5
Impression: Other secondary cataract, left eye: H26.492. Plan: Discussed diagnosis in detail with patient. No treatment is required at this time. Will continue to observe condition and or symptoms. Call if [address] worsens. Continue with Depakote  Seizure precautions

## 2023-06-13 NOTE — ED PROVIDER NOTE - RESPIRATORY NEGATIVE STATEMENT, MLM
maximum assist (25% patients effort)
no chest pain, no cough, and no shortness of breath.
moderate assist (50% patients effort)

## 2023-09-27 NOTE — PROVIDER CONTACT NOTE (OTHER) - ACTION/TREATMENT ORDERED:
Dallin Hill should be transferred out to [facility] and has been medically cleared. IV hydralazine
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
## 2024-01-22 ENCOUNTER — APPOINTMENT (OUTPATIENT)
Dept: OPHTHALMOLOGY | Facility: CLINIC | Age: 74
End: 2024-01-22

## 2024-12-05 NOTE — DISCHARGE NOTE ADULT - MEDICATION SUMMARY - MEDICATIONS TO TAKE
Per daughter in law and patient he has never had a blood transfusion before   I will START or STAY ON the medications listed below when I get home from the hospital:    methadone 10 mg/5 mL oral solution  -- 65 milliliter(s) by mouth every 24 hours  -- Indication: For Pain management     aspirin 81 mg oral tablet  -- 1 tab(s) by mouth once a day  -- Indication: For Prophylaxis     losartan 100 mg oral tablet  -- 1 tab(s) by mouth once a day  -- Indication: For Uncontrolled hypertension    tamsulosin 0.4 mg oral capsule  -- 1 cap(s) by mouth once a day (at bedtime)  -- Indication: For Urinary retention     phenytoin 100 mg oral capsule  -- 100 milligram(s) by mouth 3 times a day  -- Indication: For Seizure disorder    atenolol 100 mg oral tablet  -- 1 tab(s) by mouth once a day  -- Indication: For Uncontrolled hypertension    amLODIPine 10 mg oral tablet  -- 1 tab(s) by mouth once a day  -- Indication: For Uncontrolled hypertension    senna oral tablet  -- 2 tab(s) by mouth once a day (at bedtime)  -- Indication: For Constipation     docusate sodium 100 mg oral capsule  -- 1 cap(s) by mouth 3 times a day  -- Indication: For Constipation     polyethylene glycol 3350 oral powder for reconstitution  -- 17 gram(s) by mouth once a day  -- Indication: For Constipation     baclofen 10 mg oral tablet  -- 1 tab(s) by mouth 3 times a day  -- Indication: For Paraplegia    ciprofloxacin 500 mg oral tablet  -- 1 tab(s) by mouth every 12 hours, last day 2/6/2017 PM dose   -- Indication: For Urinary tract infection    hydrALAZINE 25 mg oral tablet  -- 1 tab(s) by mouth every 8 hours  -- Indication: For Uncontrolled hypertension    multivitamin  -- 1 tab(s) by mouth once a day  -- Indication: For Supplement

## 2025-07-25 NOTE — PATIENT PROFILE ADULT - FUNCTIONAL SCREEN CURRENT LEVEL: DRESSING, MLM
Advised her to increase Lantus to 30 units at bedtime, and NovoLog 12 units twice a day with meal, she will do Mounjaro 2.5 mg once a week alternating with 5 mg of the week advised her to eat small portion more often, start the glipizide 5 mg a day, we will see her back in 4 to 6 weeks.  Lab Results   Component Value Date    HGBA1C 12.9 (H) 07/23/2025       Orders:  •  glipiZIDE (GLUCOTROL) 5 mg tablet; Take 1 tablet (5 mg total) by mouth in the morning   4 = completely dependent